# Patient Record
Sex: FEMALE | Race: WHITE | NOT HISPANIC OR LATINO | Employment: OTHER | URBAN - METROPOLITAN AREA
[De-identification: names, ages, dates, MRNs, and addresses within clinical notes are randomized per-mention and may not be internally consistent; named-entity substitution may affect disease eponyms.]

---

## 2017-02-18 ENCOUNTER — HOSPITAL ENCOUNTER (EMERGENCY)
Facility: HOSPITAL | Age: 82
Discharge: HOME | End: 2017-02-18
Attending: EMERGENCY MEDICINE
Payer: MEDICARE

## 2017-02-18 VITALS
TEMPERATURE: 98 F | SYSTOLIC BLOOD PRESSURE: 187 MMHG | DIASTOLIC BLOOD PRESSURE: 77 MMHG | BODY MASS INDEX: 20.81 KG/M2 | OXYGEN SATURATION: 100 % | HEIGHT: 60 IN | WEIGHT: 106 LBS | HEART RATE: 94 BPM | RESPIRATION RATE: 16 BRPM

## 2017-02-18 DIAGNOSIS — J20.8 VIRAL BRONCHITIS: ICD-10-CM

## 2017-02-18 DIAGNOSIS — S60.222A CONTUSION OF LEFT HAND, INITIAL ENCOUNTER: ICD-10-CM

## 2017-02-18 DIAGNOSIS — R05.9 COUGH: ICD-10-CM
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
## 2017-02-18 DIAGNOSIS — S01.81XA FOREHEAD LACERATION, INITIAL ENCOUNTER: Primary | ICD-10-CM

## 2017-02-18 LAB
ANION GAP SERPL CALC-SCNC: 11 MMOL/L
BASOPHILS # BLD AUTO: 0 K/UL
BASOPHILS NFR BLD: 0.2 %
BUN SERPL-MCNC: 13 MG/DL
CALCIUM SERPL-MCNC: 8.6 MG/DL
CHLORIDE SERPL-SCNC: 103 MMOL/L
CO2 SERPL-SCNC: 25 MMOL/L
CREAT SERPL-MCNC: 0.7 MG/DL
DIFFERENTIAL METHOD: ABNORMAL
EOSINOPHIL # BLD AUTO: 0.1 K/UL
EOSINOPHIL NFR BLD: 0.9 %
ERYTHROCYTE [DISTWIDTH] IN BLOOD BY AUTOMATED COUNT: 12.9 %
EST. GFR  (AFRICAN AMERICAN): >60 ML/MIN/1.73 M^2
EST. GFR  (NON AFRICAN AMERICAN): >60 ML/MIN/1.73 M^2
GLUCOSE SERPL-MCNC: 113 MG/DL
HCT VFR BLD AUTO: 36.8 %
HGB BLD-MCNC: 12 G/DL
LYMPHOCYTES # BLD AUTO: 0.9 K/UL
LYMPHOCYTES NFR BLD: 9.9 %
MCH RBC QN AUTO: 29.2 PG
MCHC RBC AUTO-ENTMCNC: 32.7 %
MCV RBC AUTO: 89 FL
MONOCYTES # BLD AUTO: 0.8 K/UL
MONOCYTES NFR BLD: 9.6 %
NEUTROPHILS # BLD AUTO: 7 K/UL
NEUTROPHILS NFR BLD: 79.4 %
PLATELET # BLD AUTO: 148 K/UL
PMV BLD AUTO: 9.1 FL
POTASSIUM SERPL-SCNC: 3.8 MMOL/L
RBC # BLD AUTO: 4.12 M/UL
SODIUM SERPL-SCNC: 139 MMOL/L
WBC # BLD AUTO: 8.8 K/UL

## 2017-02-18 PROCEDURE — 80048 BASIC METABOLIC PNL TOTAL CA: CPT

## 2017-02-18 PROCEDURE — 85025 COMPLETE CBC W/AUTO DIFF WBC: CPT

## 2017-02-18 PROCEDURE — 36415 COLL VENOUS BLD VENIPUNCTURE: CPT

## 2017-02-18 PROCEDURE — 25000003 PHARM REV CODE 250: Performed by: EMERGENCY MEDICINE

## 2017-02-18 PROCEDURE — 99284 EMERGENCY DEPT VISIT MOD MDM: CPT | Mod: 25

## 2017-02-18 PROCEDURE — 12053 INTMD RPR FACE/MM 5.1-7.5 CM: CPT

## 2017-02-18 PROCEDURE — 93005 ELECTROCARDIOGRAM TRACING: CPT

## 2017-02-18 PROCEDURE — 93010 ELECTROCARDIOGRAM REPORT: CPT | Mod: ,,, | Performed by: INTERNAL MEDICINE

## 2017-02-18 RX ORDER — BENZONATATE 100 MG/1
200 CAPSULE ORAL 3 TIMES DAILY PRN
Qty: 15 CAPSULE | Refills: 0 | Status: SHIPPED | OUTPATIENT
Start: 2017-02-18 | End: 2017-06-15

## 2017-02-18 RX ORDER — BENZONATATE 100 MG/1
200 CAPSULE ORAL
Status: COMPLETED | OUTPATIENT
Start: 2017-02-18 | End: 2017-02-18

## 2017-02-18 RX ORDER — LIDOCAINE HCL/EPINEPHRINE/PF 2%-1:200K
10 VIAL (ML) INJECTION ONCE
Status: DISCONTINUED | OUTPATIENT
Start: 2017-02-18 | End: 2017-02-18 | Stop reason: HOSPADM

## 2017-02-18 RX ADMIN — BENZONATATE 200 MG: 100 CAPSULE ORAL at 05:02

## 2017-02-18 NOTE — ED NOTES
Patient identifiers for Talia Alberts checked and correct.  LOC: Patient is awake, alert, and aware of environment with an appropriate affect. Patient is oriented x 3 and speaking appropriately.  APPEARANCE: Patient resting comfortably and in no acute distress. Patient is clean and well groomed, patient's clothing is properly fastened.  SKIN: The skin is warm and dry. Patient has normal skin turgor and moist mucus membrances. Laceration noted to the midline forehead, jagged, est. 4 cm in length  MUSCULOSKELETAL: Patient is moving all extremities well, no obvious deformities noted. Pulses intact. Ground level fall prior to arrival, reports pain to the right hand  RESPIRATORY: Airway is open and patent. Respirations are spontaneous and non-labored with normal effort and rate.  CARDIAC: Patient has a normal rate and rhythm. No peripheral edema noted. Capillary refill < 3 seconds.  ABDOMEN: No distention noted. Bowel sounds active in all 4 quadrants. Soft and non-tender upon palpation.  NEUROLOGICAL: PERRL. Facial expression is symmetrical. Hand grasps are equal bilaterally. Normal sensation in all extremities when touched with finger. GCS 15, pt denies any loss of consciousness, cranial nerves intact, speech normal.  Allergies reported:   Review of patient's allergies indicates:   Allergen Reactions    Morphine Swelling

## 2017-02-18 NOTE — ED AVS SNAPSHOT
[facility] [address]  [place] [address]               Talia Alberts   2017  3:47 AM   ED    Description:  Female : 1926   Department:  [facility]           Your Care was Coordinated By:     Provider Role From To    Simon Larsen III, MD Attending Provider 17 0353 --      Reason for Visit     Fall           Diagnoses this Visit        Comments    Forehead laceration, initial encounter    -  Primary     Fall         Cough         Contusion of left hand, initial encounter         Viral bronchitis           ED Disposition     None           To Do List           Follow-up Information     Follow up with Michael Martin MD In 3 days.    Specialty:  Family Medicine    Contact information:    [address] [facility]  [place] [address]  [phone]         These Medications        Disp Refills Start End    benzonatate (TESSALON) 100 MG capsule 15 capsule 0 2017     Take 2 capsules (200 mg total) by mouth 3 (three) times daily as needed for Cough. - Oral      [facility]     [facility] Nurse Care Line -  Assistance  Registered nurses in the [facility] provide clinical advisement, health education, appointment booking, and other advisory services.  Call for this free service at [phone].             Medications           Message regarding Medications     Verify the changes and/or additions to your medication regime listed below are the same as discussed with your clinician today.  If any of these changes or additions are incorrect, please notify your healthcare provider.        START taking these NEW medications        Refills    benzonatate (TESSALON) 100 MG capsule 0    Sig: Take 2 capsules (200 mg total) by mouth 3 (three) times daily as needed for Cough.    Class: Print    Route: Oral      These medications were administered today        Dose Freq    lidocaine-EPINEPHrine (PF) 2%-1:200,000  injection 10 mL 10 mL Once    Sig: Inject 10 mLs into the skin once.    Class: Normal    Route: Intradermal    benzonatate capsule 200 mg 200 mg ED 1 Time    Sig: Take 2 capsules (200 mg total) by mouth ED 1 Time.    Class: Normal    Route: Oral           Verify that the below list of medications is an accurate representation of the medications you are currently taking.  If none reported, the list may be blank. If incorrect, please contact your healthcare provider. Carry this list with you in case of emergency.           Current Medications     acetaminophen (TYLENOL) 500 MG tablet Take 1,000 mg by mouth every 6 (six) hours as needed.    aspirin 81 MG Chew Take 81 mg by mouth once daily.    benzonatate (TESSALON) 100 MG capsule Take 2 capsules (200 mg total) by mouth 3 (three) times daily as needed for Cough.    benzonatate capsule 200 mg Take 2 capsules (200 mg total) by mouth ED 1 Time.    lidocaine-EPINEPHrine (PF) 2%-1:200,000 injection 10 mL Inject 10 mLs into the skin once.    lisinopril (PRINIVIL,ZESTRIL) 5 MG tablet Take 5 mg by mouth once daily.    metoprolol tartrate (LOPRESSOR) 25 MG tablet Take 25 mg by mouth 2 (two) times daily.    omeprazole (PRILOSEC) 40 MG capsule Take 40 mg by mouth once daily.    senna (SENOKOT) 8.6 mg tablet Take 1 tablet by mouth once daily.    simvastatin (ZOCOR) 40 MG tablet Take 40 mg by mouth every evening.    tolterodine (DETROL LA) 4 MG 24 hr capsule Take 4 mg by mouth once daily.           Clinical Reference Information           Your Vitals Were     BP Pulse Temp Resp Height Weight    221/96 (BP Location: Right arm, Patient Position: Sitting) 94 98 °F (36.7 °C) (Oral) 16 5' (1.524 m) 48.1 kg (106 lb)    SpO2 BMI             100% 20.7 kg/m2         Allergies as of 2/18/2017        Reactions    Morphine Swelling      Immunizations Administered on Date of Encounter - 2/18/2017     None      ED Micro, Lab, POCT     Start Ordered       Status Ordering Provider    02/18/17 6953  02/18/17 0359  Basic metabolic panel  STAT      Acknowledged     02/18/17 0400 02/18/17 0359  CBC auto differential  STAT      Acknowledged       ED Imaging Orders     Start Ordered       Status Ordering Provider    02/18/17 0442 02/18/17 0431  X-Ray Hand 3 View Left  1 time imaging      In process     02/18/17 0432 02/18/17 0431    1 time imaging,   Status:  Canceled      Canceled     02/18/17 0432 02/18/17 0431  X-Ray Chest PA And Lateral  1 time imaging      In process     02/18/17 0400 02/18/17 0359  CT Head Without Contrast  1 time imaging      In process       Discharge References/Attachments     BRONCHITIS, NO ANTIBIOTIC (ADULT) (ENGLISH)    LACERATION, FACE: SUTURE OR TAPE (ENGLISH)    HAND CONTUSION (ENGLISH)       [facility] complies with applicable Federal civil rights laws and does not discriminate on the basis of race, color, national origin, age, disability, or sex.        Language Assistance Services     ATTENTION: Language assistance services are available, free of charge. Please call [phone].      ATENCIÓN: Si habla español, tiene a aranda disposición servicios gratuitos de asistencia lingüística. Llame al [phone].     CHÚ Ý: N?u b?n nói Ti?ng Vi?t, có các d?ch v? h? tr? ngôn ng? mi?n phí dành cho b?n. G?i s? [phone].

## 2017-02-18 NOTE — ED PROVIDER NOTES
Encounter Date: 2/18/2017       History     Chief Complaint   Patient presents with    Fall     Review of patient's allergies indicates:   Allergen Reactions    Morphine Swelling     HPI Comments: Chief complaint: Lost my balance and fell    History of present illness:Talia Alberts is a 89 y.o. female who presents with  a for head laceration and left hand pain after she lost her balance while going to the bathroom falling and hitting her head.  She denies any loss of consciousness and has no headache.  She denies any neck pain.  She has no symptoms prior to fall and denies any chest pain or palpitations.  She has frequent falls and had one fall earlier tonight.  Her past medical history is significant for hypertension and  coronary artery disease.    The history is provided by the patient.     Past Medical History   Diagnosis Date    Cancer     Hypertension     MI (myocardial infarction)      No past medical history pertinent negatives.  Past Surgical History   Procedure Laterality Date    Cardiac surgery      Fracture surgery       History reviewed. No pertinent family history.  Social History   Substance Use Topics    Smoking status: Never Smoker    Smokeless tobacco: None    Alcohol use No     Review of Systems   Constitutional: Negative for activity change, appetite change and fever.   HENT: Negative for voice change.    Eyes: Negative for visual disturbance.   Respiratory: Negative for apnea and shortness of breath.    Cardiovascular: Negative for chest pain.   Gastrointestinal: Negative for abdominal pain and vomiting.   Genitourinary: Negative for decreased urine volume.   Musculoskeletal: Negative for back pain and neck pain.   Skin: Negative for color change.   Neurological: Negative for weakness and headaches.   Hematological: Does not bruise/bleed easily.   Psychiatric/Behavioral: Negative for confusion.       Physical Exam   Initial Vitals   BP Pulse Resp Temp SpO2   02/18/17 0348 02/18/17  0348 02/18/17 0348 02/18/17 0348 02/18/17 0348   221/96 94 16 98 °F (36.7 °C) 100 %     Physical Exam    Nursing note and vitals reviewed.  Constitutional: She appears well-developed and well-nourished.   HENT:   Head: Normocephalic.   7.1 cm flap laceration of the central for head with bleeding   Eyes: EOM are normal. Pupils are equal, round, and reactive to light.   Left subconjunctival hemorrhage     Neck: Normal range of motion. Neck supple.   Cardiovascular: Normal rate.   Pulmonary/Chest: Effort normal and breath sounds normal. No respiratory distress. She has no wheezes. She has no rales.   Abdominal: Soft. She exhibits no distension. There is no tenderness.   Musculoskeletal: Normal range of motion. She exhibits tenderness (Tenderness without swelling or ecchymosis of the left hyperthenar eminence).   Neurological: She is alert and oriented to person, place, and time.   Skin: Skin is warm and dry. No erythema.   Psychiatric: She has a normal mood and affect.         ED Course   Lac Repair  Date/Time: 2/18/2017 4:32 AM  Performed by: JOSHUA BURRIS III  Authorized by: JOSHUA BURRIS III   Body area: head/neck  Location details: forehead  Laceration length: 7.1 cm  Anesthesia: local infiltration    Anesthesia:  Anesthesia: local infiltration  Local Anesthetic: lidocaine 2% with epinephrine   Anesthetic total: 5 mL  Patient sedated: no  Preparation: Patient was prepped and draped in the usual sterile fashion.  Irrigation solution: saline  Irrigation method: jet lavage  Amount of cleaning: extensive  Debridement: none  Degree of undermining: none  Skin closure: 5-0 nylon  Number of sutures: 14  Technique: simple  Approximation: close  Approximation difficulty: complex  Patient tolerance: Patient tolerated the procedure well with no immediate complications        Labs Reviewed   BASIC METABOLIC PANEL - Abnormal; Notable for the following:        Result Value    Glucose 113 (*)     Calcium 8.6 (*)     All  other components within normal limits   CBC W/ AUTO DIFFERENTIAL - Abnormal; Notable for the following:     Hematocrit 36.8 (*)     Platelets 148 (*)     MPV 9.1 (*)     Lymph # 0.9 (*)     Gran% 79.4 (*)     Lymph% 9.9 (*)     All other components within normal limits     EKG Readings: (Independently Interpreted)   Rhythm: Normal Sinus Rhythm. Heart Rate: 91. Ectopy: No Ectopy. Conduction: Normal. ST Segments: Normal ST Segments. T Waves: Normal. T Waves Flipped: I. Clinical Impression: Normal Sinus Rhythm          Medical Decision Making:   Independently Interpreted Test(s):   I have ordered and independently interpreted X-rays - see summary below.       <> Summary of X-Ray Reading(s): chest x-ray interpreted by me reveals no infiltrates, effusions or mediastinal widening  Bilateral hand x-rays independently interpreted by me demonstrate osteopenia with no fracture or dislocation.  ED Management:  Talia Alberts is a 89 y.o. female who presents with  a large forehead laceration which is repaired.  CT of the head is obtained to exclude intracranial injury with no significant abnormality.  She complains of right hand pain with no radiographic abnormalities.  She has had a persistent nonproductive cough with no radiographic evidence of pneumonia.  The symptoms most likely represent a viral URI.                   ED Course     Clinical Impression:   The primary encounter diagnosis was Forehead laceration, initial encounter. Diagnoses of Fall, Cough, Contusion of left hand, initial encounter, and Viral bronchitis were also pertinent to this visit.          Simon Larsen III, MD  02/18/17 0613

## 2017-03-19 ENCOUNTER — HOSPITAL ENCOUNTER (OUTPATIENT)
Facility: HOSPITAL | Age: 82
Discharge: NURSING, REHAB OR LONG-TERM CARE | End: 2017-03-22
Attending: EMERGENCY MEDICINE | Admitting: INTERNAL MEDICINE
Payer: MEDICARE

## 2017-03-19 DIAGNOSIS — S00.83XA FACIAL CONTUSION, INITIAL ENCOUNTER: ICD-10-CM

## 2017-03-19 DIAGNOSIS — M25.562 KNEE PAIN, BILATERAL: ICD-10-CM

## 2017-03-19 DIAGNOSIS — M25.561 KNEE PAIN, BILATERAL: ICD-10-CM

## 2017-03-19 DIAGNOSIS — I10 ESSENTIAL HYPERTENSION: ICD-10-CM

## 2017-03-19 DIAGNOSIS — K21.9 GASTROESOPHAGEAL REFLUX DISEASE, ESOPHAGITIS PRESENCE NOT SPECIFIED: ICD-10-CM

## 2017-03-19 DIAGNOSIS — S89.92XA LEFT KNEE INJURY, INITIAL ENCOUNTER: Primary | ICD-10-CM
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
## 2017-03-19 LAB
ALBUMIN SERPL BCP-MCNC: 3.5 G/DL
ALP SERPL-CCNC: 98 U/L
ALT SERPL W/O P-5'-P-CCNC: 19 U/L
ANION GAP SERPL CALC-SCNC: 8 MMOL/L
AST SERPL-CCNC: 27 U/L
BACTERIA #/AREA URNS HPF: ABNORMAL /HPF
BASOPHILS # BLD AUTO: 0 K/UL
BASOPHILS NFR BLD: 0.3 %
BILIRUB SERPL-MCNC: 0.4 MG/DL
BILIRUB UR QL STRIP: NEGATIVE
BUN SERPL-MCNC: 16 MG/DL
CALCIUM SERPL-MCNC: 8.5 MG/DL
CHLORIDE SERPL-SCNC: 104 MMOL/L
CLARITY UR: ABNORMAL
CO2 SERPL-SCNC: 26 MMOL/L
COLOR UR: YELLOW
CREAT SERPL-MCNC: 0.9 MG/DL
DIFFERENTIAL METHOD: ABNORMAL
EOSINOPHIL # BLD AUTO: 0.1 K/UL
EOSINOPHIL NFR BLD: 1.4 %
ERYTHROCYTE [DISTWIDTH] IN BLOOD BY AUTOMATED COUNT: 13.9 %
EST. GFR  (AFRICAN AMERICAN): >60 ML/MIN/1.73 M^2
EST. GFR  (NON AFRICAN AMERICAN): 56 ML/MIN/1.73 M^2
GLUCOSE SERPL-MCNC: 107 MG/DL
GLUCOSE UR QL STRIP: NEGATIVE
HCT VFR BLD AUTO: 35.9 %
HGB BLD-MCNC: 11.6 G/DL
HGB UR QL STRIP: NEGATIVE
HYALINE CASTS #/AREA URNS LPF: 5 /LPF
KETONES UR QL STRIP: NEGATIVE
LEUKOCYTE ESTERASE UR QL STRIP: ABNORMAL
LYMPHOCYTES # BLD AUTO: 1.4 K/UL
LYMPHOCYTES NFR BLD: 16 %
MCH RBC QN AUTO: 28.6 PG
MCHC RBC AUTO-ENTMCNC: 32.2 %
MCV RBC AUTO: 89 FL
MICROSCOPIC COMMENT: ABNORMAL
MONOCYTES # BLD AUTO: 0.6 K/UL
MONOCYTES NFR BLD: 7.2 %
NEUTROPHILS # BLD AUTO: 6.4 K/UL
NEUTROPHILS NFR BLD: 75.1 %
NITRITE UR QL STRIP: NEGATIVE
PH UR STRIP: 7 [PH] (ref 5–8)
PLATELET # BLD AUTO: 140 K/UL
PMV BLD AUTO: 8.7 FL
POTASSIUM SERPL-SCNC: 4.6 MMOL/L
PROT SERPL-MCNC: 6.2 G/DL
PROT UR QL STRIP: NEGATIVE
RBC # BLD AUTO: 4.05 M/UL
SODIUM SERPL-SCNC: 138 MMOL/L
SP GR UR STRIP: 1.01 (ref 1–1.03)
SQUAMOUS #/AREA URNS HPF: 2 /HPF
URN SPEC COLLECT METH UR: ABNORMAL
UROBILINOGEN UR STRIP-ACNC: NEGATIVE EU/DL
WBC # BLD AUTO: 8.5 K/UL
WBC #/AREA URNS HPF: 30 /HPF (ref 0–5)

## 2017-03-19 PROCEDURE — 36415 COLL VENOUS BLD VENIPUNCTURE: CPT

## 2017-03-19 PROCEDURE — 99219 PR INITIAL OBSERVATION CARE,LEVL II: CPT | Mod: ,,, | Performed by: INTERNAL MEDICINE

## 2017-03-19 PROCEDURE — 85025 COMPLETE CBC W/AUTO DIFF WBC: CPT

## 2017-03-19 PROCEDURE — G0378 HOSPITAL OBSERVATION PER HR: HCPCS

## 2017-03-19 PROCEDURE — 25000003 PHARM REV CODE 250: Performed by: EMERGENCY MEDICINE

## 2017-03-19 PROCEDURE — 81000 URINALYSIS NONAUTO W/SCOPE: CPT

## 2017-03-19 PROCEDURE — 99285 EMERGENCY DEPT VISIT HI MDM: CPT

## 2017-03-19 PROCEDURE — 80053 COMPREHEN METABOLIC PANEL: CPT

## 2017-03-19 RX ORDER — ACETAMINOPHEN 500 MG
500 TABLET ORAL
Status: COMPLETED | OUTPATIENT
Start: 2017-03-19 | End: 2017-03-19

## 2017-03-19 RX ORDER — HYDROCODONE BITARTRATE AND ACETAMINOPHEN 5; 325 MG/1; MG/1
1 TABLET ORAL
Status: COMPLETED | OUTPATIENT
Start: 2017-03-19 | End: 2017-03-19

## 2017-03-19 RX ADMIN — HYDROCODONE BITARTRATE AND ACETAMINOPHEN 1 TABLET: 5; 325 TABLET ORAL at 09:03

## 2017-03-19 RX ADMIN — ACETAMINOPHEN 500 MG: 500 TABLET, FILM COATED ORAL at 09:03

## 2017-03-19 NOTE — IP AVS SNAPSHOT
[address] [place] [address]  Phone: [phone]           Patient Discharge Instructions     Our goal is to set you up for success. This packet includes information on your condition, medications, and your home care. It will help you to care for yourself so you don't get sicker and need to go back to the hospital.     Please ask your nurse if you have any questions.        There are many details to remember when preparing to leave the hospital. Here is what you will need to do:    1. Take your medicine. If you are prescribed medications, review your Medication List in the following pages. You may have new medications to  at the pharmacy and others that you'll need to stop taking. Review the instructions for how and when to take your medications. Talk with your doctor or nurses if you are unsure of what to do.     2. Go to your follow-up appointments. Specific follow-up information is listed in the following pages. Your may be contacted by a transition nurse or clinical provider about future appointments. Be sure we have all of the phone numbers to reach you, if needed. Please contact your provider's office if you are unable to make an appointment.     3. Watch for warning signs. Your doctor or nurse will give you detailed warning signs to watch for and when to call for assistance. These instructions may also include educational information about your condition. If you experience any of warning signs to your health, call your doctor.               [facility]  Unless otherwise directed by your provider, please contact [facility], our nurse care line that is available for 24/7 assistance.     [phone] (toll-free)    Registered nurses in the [facility] provide clinical advisement, health education, appointment booking, and other advisory services.                    ** Verify the list of medication(s) below is accurate and up to date.  Carry this with you in case of emergency. If your medications have changed, please notify your healthcare provider.             Medication List      START taking these medications        Additional Info                      ciprofloxacin HCl 250 MG tablet   Commonly known as:  CIPRO   Quantity:  10 tablet   Refills:  0   Dose:  250 mg    Instructions:  Take 1 tablet (250 mg total) by mouth 2 (two) times daily.     Begin Date    AM    Noon    PM    Bedtime       hydrocodone-acetaminophen 5-325mg 5-325 mg per tablet   Commonly known as:  NORCO   Quantity:  15 tablet   Refills:  0   Dose:  1 tablet    Last time this was given:  1 tablet on 3/22/2017 11:22 AM   Instructions:  Take 1 tablet by mouth every 4 (four) hours as needed.     Begin Date    AM    Noon    PM    Bedtime         CONTINUE taking these medications        Additional Info                      acetaminophen 500 MG tablet   Commonly known as:  TYLENOL   Refills:  0   Dose:  1000 mg    Last time this was given:  650 mg on 3/21/2017  5:37 PM   Instructions:  Take 1,000 mg by mouth every 6 (six) hours as needed.     Begin Date    AM    Noon    PM    Bedtime       alendronate 70 MG tablet   Commonly known as:  FOSAMAX   Refills:  0   Dose:  70 mg    Instructions:  Take 70 mg by mouth every 7 days.     Begin Date    AM    Noon    PM    Bedtime       aspirin 81 MG Chew   Refills:  0   Dose:  81 mg    Last time this was given:  81 mg on 3/22/2017  9:01 AM   Instructions:  Take 81 mg by mouth once daily.     Begin Date    AM    Noon    PM    Bedtime       benzonatate 100 MG capsule   Commonly known as:  TESSALON   Quantity:  15 capsule   Refills:  0   Dose:  200 mg    Instructions:  Take 2 capsules (200 mg total) by mouth 3 (three) times daily as needed for Cough.     Begin Date    AM    Noon    PM    Bedtime       escitalopram oxalate 10 MG tablet   Commonly known as:  LEXAPRO   Refills:  0   Dose:  10 mg    Last time this was given:  10 mg on 3/21/2017  9:30 PM    Instructions:  Take 10 mg by mouth every evening.     Begin Date    AM    Noon    PM    Bedtime       losartan 25 MG tablet   Commonly known as:  COZAAR   Refills:  0   Dose:  25 mg    Last time this was given:  25 mg on 3/21/2017  9:30 PM   Instructions:  Take 25 mg by mouth nightly.     Begin Date    AM    Noon    PM    Bedtime       pantoprazole 40 MG tablet   Commonly known as:  PROTONIX   Refills:  0   Dose:  40 mg    Last time this was given:  40 mg on 3/22/2017  9:01 AM   Instructions:  Take 40 mg by mouth once daily.     Begin Date    AM    Noon    PM    Bedtime       senna 8.6 mg tablet   Commonly known as:  SENOKOT   Refills:  0   Dose:  1 tablet    Last time this was given:  1 tablet on 3/22/2017  9:01 AM   Instructions:  Take 1 tablet by mouth once daily.     Begin Date    AM    Noon    PM    Bedtime       simvastatin 40 MG tablet   Commonly known as:  ZOCOR   Refills:  0   Dose:  40 mg    Last time this was given:  40 mg on 3/21/2017  9:30 PM   Instructions:  Take 40 mg by mouth every evening.     Begin Date    AM    Noon    PM    Bedtime       trospium 20 mg Tab tablet   Commonly known as:  sanctura   Refills:  0   Dose:  20 mg    Last time this was given:  20 mg on 3/21/2017  9:30 PM   Instructions:  Take 20 mg by mouth nightly.     Begin Date    AM    Noon    PM    Bedtime            Where to Get Your Medications      You can get these medications from any pharmacy     Bring a paper prescription for each of these medications     ciprofloxacin HCl 250 MG tablet    hydrocodone-acetaminophen 5-325mg 5-325 mg per tablet                  Please bring to all follow up appointments:    1. A copy of your discharge instructions.  2. All medicines you are currently taking in their original bottles.  3. Identification and insurance card.    Please arrive 15 minutes ahead of scheduled appointment time.    Please call 24 hours in advance if you must reschedule your appointment and/or time.        Follow-up  Information     Follow up with Michael Martin MD On 3/29/2017.    Specialty:  Family Medicine    Why:  @9:00am     Contact information:    [address] [place] [address]  [phone]          Follow up with [facility].    Why:  SNF, Nursing Home    Contact information:    [place] [address]  [phone]          Discharge Instructions     Future Orders    Call MD for:     Comments:    For worsening symptoms, chest pain, shortness of breath, increased abdominal pain, high grade fever, stroke or stroke like symptoms, immediately go to the nearest Emergency Room or call 911 as soon as possible.    Diet general     Comments:    Cardiac/ 2 gram sodium low cholesterol diet    Questions:    Total calories:      Fat restriction, if any:      Protein restriction, if any:      Na restriction, if any:      Fluid restriction:      Additional restrictions:      Other restrictions (specify):     Comments:    Fall precautions        Discharge Instructions       Admit to [facility]  Dx-   Patient Active Problem List   Diagnosis    GERD (gastroesophageal reflux disease)    Left knee injury    Essential hypertension    Fall     Diet- Cardiac     PT/OT eval and treat 5x's weekly  Fall Precautions   Up to chair with all meals        Primary Diagnosis     Your primary diagnosis was:  Injury Of Knee, Leg, Ankle And Foot      Admission Information     Date & Time Provider Department CSN    3/19/2017  6:11 PM Ranjith Beebe MD [facility] [id number]      Care Providers     Provider Role Specialty Primary office phone    Ranjith Beebe MD Attending Provider Internal Medicine [phone]      Your Vitals Were     BP Pulse Temp Resp Height Weight    178/74 88 97.7 °F (36.5 °C) (Oral) 18 5' (1.524 m) 50.8 kg (112 lb)    SpO2 BMI             99% 21.87 kg/m2         Recent Lab Values     No lab values to display.      Pending Labs     Order Current Status    Urine culture In  process      Allergies as of 3/22/2017        Reactions    Morphine Swelling      Advance Directives     An advance directive is a document which, in the event you are no longer able to make decisions for yourself, tells your healthcare team what kind of treatment you do or do not want to receive, or who you would like to make those decisions for you.  If you do not currently have an advance directive, [facility] encourages you to create one.  For more information call:  [phone]), [phone] ([phone]),  or log on to [email or web address].        Language Assistance Services     ATTENTION: Language assistance services are available, free of charge. Please call [phone].      ATENCIÓN: Si habla español, tiene a aranda disposición servicios gratuitos de asistencia lingüística. Llame al [phone].     CHÚ Ý: N?u b?n nói Ti?ng Vi?t, có các d?ch v? h? tr? ngôn ng? mi?n phí dành cho b?n. G?i s? [phone].         [facility] complies with applicable Federal civil rights laws and does not discriminate on the basis of race, color, national origin, age, disability, or sex.

## 2017-03-19 NOTE — ED PROVIDER NOTES
"Encounter Date: 3/19/2017    SCRIBE #1 NOTE: I, Faye Crystal, am scribing for, and in the presence of, Dr Haddad.       History     Chief Complaint   Patient presents with    Fall     bilateral knee pain / recent fall- ED visit    Facial Injury     Review of patient's allergies indicates:   Allergen Reactions    Morphine Swelling     HPI Comments: 03/19/2017  6:47 PM     Chief Complaint: Fall       Talia Alberts is a 89 y.o. female presenting to the E.D. with an acute onset of a fal which occurred prior to arrival tonight. Pt reports when attempting to get out of vehicle tonight her legs "gave out on her" and she collapsed to the ground. She has complaints of bilateral knee pain following fall. Pain is exacerbated with bearing weight and movement and there are no alleviating factors. Denies CP, SOB, numbness, dysuria. She notes mild weakness to legs and recently underwent bilateral knee replacement surgery. No LOC. Pmhx of  has a past medical history of Cancer; HTN; and MI.  Pt has a past surgical history that includes Cardiac surgery and Fracture surgery.      The history is provided by the patient and the EMS personnel.     Past Medical History:   Diagnosis Date    Cancer     Hypertension     MI (myocardial infarction)      Past Surgical History:   Procedure Laterality Date    CARDIAC SURGERY      FRACTURE SURGERY       History reviewed. No pertinent family history.  Social History   Substance Use Topics    Smoking status: Never Smoker    Smokeless tobacco: None    Alcohol use No     Review of Systems   Constitutional: Negative for fever.   HENT: Negative for sore throat.    Eyes: Negative for visual disturbance.   Respiratory: Negative for cough.    Cardiovascular: Negative for chest pain.   Gastrointestinal: Negative for abdominal pain, diarrhea, nausea and vomiting.   Genitourinary: Negative for difficulty urinating and pelvic pain.   Musculoskeletal: Positive for arthralgias (bilateral knee " pain).   Skin: Negative for rash.   Neurological: Positive for weakness.       Physical Exam   Initial Vitals   BP Pulse Resp Temp SpO2   03/19/17 1812 03/19/17 1812 03/19/17 1812 03/19/17 1812 03/19/17 1812   213/93 84 20 97.3 °F (36.3 °C) 97 %     Physical Exam    Nursing note and vitals reviewed.  Constitutional: She appears well-developed.   HENT:   Head: Normocephalic.   Mouth/Throat: Oropharynx is clear and moist.   Bruising to left lateral eyebrow. Tenderness over the left upper lateral orbit.    Eyes: Conjunctivae are normal.   Neck: Neck supple.   Cardiovascular: Normal rate, regular rhythm, normal heart sounds and intact distal pulses. Exam reveals no gallop and no friction rub.    No murmur heard.  Pulses:       Radial pulses are 2+ on the right side, and 2+ on the left side.        Dorsalis pedis pulses are 2+ on the right side, and 2+ on the left side.        Posterior tibial pulses are 2+ on the right side, and 2+ on the left side.   Mild systolic ejection murmur.    Pulmonary/Chest: Breath sounds normal. She has no wheezes. She has no rhonchi. She has no rales.   Abdominal: Soft. She exhibits no distension. There is no tenderness.   Bedside US displays no sign of aneurysmal dilation of aorta.    Musculoskeletal: Normal range of motion.   Pain over medial left knee with flexion to 60 degrees. Negative Lachman's bilaterally.    Neurological: She is alert and oriented to person, place, and time.   Skin: No rash noted. No erythema.   Psychiatric: She has a normal mood and affect.         ED Course   Procedures  Labs Reviewed   CBC W/ AUTO DIFFERENTIAL - Abnormal; Notable for the following:        Result Value    Hemoglobin 11.6 (*)     Hematocrit 35.9 (*)     Platelets 140 (*)     MPV 8.7 (*)     Gran% 75.1 (*)     Lymph% 16.0 (*)     All other components within normal limits   COMPREHENSIVE METABOLIC PANEL - Abnormal; Notable for the following:     Calcium 8.5 (*)     eGFR if non  56 (*)      All other components within normal limits   URINALYSIS - Abnormal; Notable for the following:     Appearance, UA Hazy (*)     Leukocytes, UA 2+ (*)     All other components within normal limits   URINALYSIS MICROSCOPIC - Abnormal; Notable for the following:     WBC, UA 30 (*)     Bacteria, UA Few (*)     Hyaline Casts, UA 5 (*)     All other components within normal limits          X-Rays:   Independently Interpreted Readings:   Head CT: No skull fracture.  No hemorrhage.  No acute stroke.   Other Readings:  XRay b/l Knees: No acute fracture or dislocation    Medical Decision Making:   History:   Old Medical Records: I decided to obtain old medical records.  Initial Assessment:   Patient is a 89-year-old woman who presents after her knees gave out on her causing her to fall and strike her knees and face on the ground.  Denies loss of consciousness.  No vision changes.  CT of her head and maxillofacial is negative.  X-rays of her knees were negative for any acute fracture dislocation.  Hardware appears in place.  Patient is an persistent left knee pain and is having difficulty bearing weight or walking despite given narcotic pain medicine in the ED..  Daughter is unable to transfer her from wheelchair.  Will admit patient for pain control and PTT.  Discussed the hospitalist who agrees to admit the patient.            Scribe Attestation:   Scribe #1: I performed the above scribed service and the documentation accurately describes the services I performed. I attest to the accuracy of the note.    Attending Attestation:           Physician Attestation for Scribe:  Physician Attestation Statement for Scribe #1: I, Dr Haddad, reviewed documentation, as scribed by Faye Crystal in my presence, and it is both accurate and complete.                 ED Course     Clinical Impression:   The primary encounter diagnosis was Left knee injury, initial encounter. Diagnoses of Knee pain, bilateral, Facial contusion, initial  encounter, Gastroesophageal reflux disease, esophagitis presence not specified, and Essential hypertension were also pertinent to this visit.          Wilfred Haddad MD  03/26/17 0968

## 2017-03-20 LAB
APTT BLDCRRT: 28.1 SEC
BASOPHILS # BLD AUTO: 0 K/UL
BASOPHILS NFR BLD: 0.3 %
DIFFERENTIAL METHOD: ABNORMAL
EOSINOPHIL # BLD AUTO: 0.4 K/UL
EOSINOPHIL NFR BLD: 2.7 %
ERYTHROCYTE [DISTWIDTH] IN BLOOD BY AUTOMATED COUNT: 13.2 %
HCT VFR BLD AUTO: 37.6 %
HGB BLD-MCNC: 12.7 G/DL
INR PPP: 1.1
LYMPHOCYTES # BLD AUTO: 1 K/UL
LYMPHOCYTES NFR BLD: 7.6 %
MCH RBC QN AUTO: 29.8 PG
MCHC RBC AUTO-ENTMCNC: 33.7 %
MCV RBC AUTO: 88 FL
MONOCYTES # BLD AUTO: 0.8 K/UL
MONOCYTES NFR BLD: 6.3 %
NEUTROPHILS # BLD AUTO: 10.8 K/UL
NEUTROPHILS NFR BLD: 83.1 %
PLATELET # BLD AUTO: 135 K/UL
PMV BLD AUTO: 9.4 FL
PROTHROMBIN TIME: 11.2 SEC
RBC # BLD AUTO: 4.26 M/UL
WBC # BLD AUTO: 13 K/UL

## 2017-03-20 PROCEDURE — 85025 COMPLETE CBC W/AUTO DIFF WBC: CPT

## 2017-03-20 PROCEDURE — 25000003 PHARM REV CODE 250: Performed by: NURSE PRACTITIONER

## 2017-03-20 PROCEDURE — 85610 PROTHROMBIN TIME: CPT

## 2017-03-20 PROCEDURE — 85730 THROMBOPLASTIN TIME PARTIAL: CPT

## 2017-03-20 PROCEDURE — 25000003 PHARM REV CODE 250: Performed by: INTERNAL MEDICINE

## 2017-03-20 PROCEDURE — 99225 PR SUBSEQUENT OBSERVATION CARE,LEVEL II: CPT | Mod: ,,, | Performed by: INTERNAL MEDICINE

## 2017-03-20 PROCEDURE — 97530 THERAPEUTIC ACTIVITIES: CPT

## 2017-03-20 PROCEDURE — G0378 HOSPITAL OBSERVATION PER HR: HCPCS

## 2017-03-20 PROCEDURE — 97162 PT EVAL MOD COMPLEX 30 MIN: CPT

## 2017-03-20 PROCEDURE — 63600175 PHARM REV CODE 636 W HCPCS: Performed by: NURSE PRACTITIONER

## 2017-03-20 PROCEDURE — G8978 MOBILITY CURRENT STATUS: HCPCS | Mod: CN

## 2017-03-20 PROCEDURE — G8979 MOBILITY GOAL STATUS: HCPCS | Mod: CN

## 2017-03-20 RX ORDER — NAPROXEN SODIUM 220 MG/1
81 TABLET, FILM COATED ORAL DAILY
Status: DISCONTINUED | OUTPATIENT
Start: 2017-03-20 | End: 2017-03-22 | Stop reason: HOSPADM

## 2017-03-20 RX ORDER — IBUPROFEN 200 MG
16 TABLET ORAL
Status: DISCONTINUED | OUTPATIENT
Start: 2017-03-20 | End: 2017-03-22 | Stop reason: HOSPADM

## 2017-03-20 RX ORDER — PANTOPRAZOLE SODIUM 40 MG/1
40 TABLET, DELAYED RELEASE ORAL DAILY
Status: DISCONTINUED | OUTPATIENT
Start: 2017-03-20 | End: 2017-03-22 | Stop reason: HOSPADM

## 2017-03-20 RX ORDER — ALENDRONATE SODIUM 70 MG/1
70 TABLET ORAL
COMMUNITY
End: 2017-06-15

## 2017-03-20 RX ORDER — ACETAMINOPHEN 325 MG/1
650 TABLET ORAL EVERY 6 HOURS PRN
Status: DISCONTINUED | OUTPATIENT
Start: 2017-03-20 | End: 2017-03-20 | Stop reason: SDUPTHER

## 2017-03-20 RX ORDER — ACETAMINOPHEN 325 MG/1
650 TABLET ORAL EVERY 6 HOURS PRN
Status: DISCONTINUED | OUTPATIENT
Start: 2017-03-21 | End: 2017-03-22 | Stop reason: HOSPADM

## 2017-03-20 RX ORDER — GLUCAGON 1 MG
1 KIT INJECTION
Status: DISCONTINUED | OUTPATIENT
Start: 2017-03-20 | End: 2017-03-22 | Stop reason: HOSPADM

## 2017-03-20 RX ORDER — SENNOSIDES 8.6 MG/1
1 TABLET ORAL DAILY
Status: DISCONTINUED | OUTPATIENT
Start: 2017-03-20 | End: 2017-03-22 | Stop reason: HOSPADM

## 2017-03-20 RX ORDER — TROSPIUM CHLORIDE 20 MG/1
20 TABLET, FILM COATED ORAL NIGHTLY
Status: DISCONTINUED | OUTPATIENT
Start: 2017-03-20 | End: 2017-03-20

## 2017-03-20 RX ORDER — SODIUM CHLORIDE 450 MG/100ML
INJECTION, SOLUTION INTRAVENOUS CONTINUOUS
Status: DISCONTINUED | OUTPATIENT
Start: 2017-03-20 | End: 2017-03-22 | Stop reason: HOSPADM

## 2017-03-20 RX ORDER — ZOLPIDEM TARTRATE 5 MG/1
5 TABLET ORAL NIGHTLY PRN
Status: DISCONTINUED | OUTPATIENT
Start: 2017-03-21 | End: 2017-03-20

## 2017-03-20 RX ORDER — BENZONATATE 100 MG/1
200 CAPSULE ORAL 3 TIMES DAILY PRN
Status: DISCONTINUED | OUTPATIENT
Start: 2017-03-20 | End: 2017-03-22 | Stop reason: HOSPADM

## 2017-03-20 RX ORDER — PANTOPRAZOLE SODIUM 40 MG/1
40 TABLET, DELAYED RELEASE ORAL DAILY
Status: DISCONTINUED | OUTPATIENT
Start: 2017-03-21 | End: 2017-03-20 | Stop reason: SDUPTHER

## 2017-03-20 RX ORDER — SENNOSIDES 8.6 MG/1
8.6 TABLET ORAL DAILY PRN
Status: DISCONTINUED | OUTPATIENT
Start: 2017-03-20 | End: 2017-03-22 | Stop reason: HOSPADM

## 2017-03-20 RX ORDER — LOSARTAN POTASSIUM 50 MG/1
50 TABLET ORAL 2 TIMES DAILY
COMMUNITY

## 2017-03-20 RX ORDER — ENOXAPARIN SODIUM 100 MG/ML
30 INJECTION SUBCUTANEOUS EVERY 24 HOURS
Status: DISCONTINUED | OUTPATIENT
Start: 2017-03-20 | End: 2017-03-22 | Stop reason: HOSPADM

## 2017-03-20 RX ORDER — PANTOPRAZOLE SODIUM 40 MG/1
40 TABLET, DELAYED RELEASE ORAL DAILY
COMMUNITY

## 2017-03-20 RX ORDER — ESCITALOPRAM OXALATE 10 MG/1
10 TABLET ORAL NIGHTLY
Status: ON HOLD | COMMUNITY
End: 2019-09-27 | Stop reason: HOSPADM

## 2017-03-20 RX ORDER — LISINOPRIL 2.5 MG/1
5 TABLET ORAL DAILY
Status: DISCONTINUED | OUTPATIENT
Start: 2017-03-20 | End: 2017-03-20

## 2017-03-20 RX ORDER — TROSPIUM CHLORIDE 20 MG/1
20 TABLET, FILM COATED ORAL NIGHTLY
COMMUNITY
End: 2018-02-07

## 2017-03-20 RX ORDER — OXYBUTYNIN CHLORIDE 5 MG/1
10 TABLET, EXTENDED RELEASE ORAL DAILY
Status: DISCONTINUED | OUTPATIENT
Start: 2017-03-20 | End: 2017-03-20

## 2017-03-20 RX ORDER — LOSARTAN POTASSIUM 25 MG/1
25 TABLET ORAL NIGHTLY
Status: DISCONTINUED | OUTPATIENT
Start: 2017-03-20 | End: 2017-03-22 | Stop reason: HOSPADM

## 2017-03-20 RX ORDER — ESCITALOPRAM OXALATE 10 MG/1
10 TABLET ORAL NIGHTLY
Status: DISCONTINUED | OUTPATIENT
Start: 2017-03-20 | End: 2017-03-22 | Stop reason: HOSPADM

## 2017-03-20 RX ORDER — HYDRALAZINE HYDROCHLORIDE 20 MG/ML
10 INJECTION INTRAMUSCULAR; INTRAVENOUS EVERY 6 HOURS PRN
Status: DISCONTINUED | OUTPATIENT
Start: 2017-03-20 | End: 2017-03-22 | Stop reason: HOSPADM

## 2017-03-20 RX ORDER — METOPROLOL TARTRATE 25 MG/1
25 TABLET, FILM COATED ORAL 2 TIMES DAILY
Status: DISCONTINUED | OUTPATIENT
Start: 2017-03-20 | End: 2017-03-20

## 2017-03-20 RX ORDER — ONDANSETRON 2 MG/ML
4 INJECTION INTRAMUSCULAR; INTRAVENOUS EVERY 6 HOURS PRN
Status: DISCONTINUED | OUTPATIENT
Start: 2017-03-20 | End: 2017-03-22 | Stop reason: HOSPADM

## 2017-03-20 RX ORDER — IBUPROFEN 200 MG
24 TABLET ORAL
Status: DISCONTINUED | OUTPATIENT
Start: 2017-03-20 | End: 2017-03-22 | Stop reason: HOSPADM

## 2017-03-20 RX ORDER — SIMVASTATIN 40 MG/1
40 TABLET, FILM COATED ORAL NIGHTLY
Status: DISCONTINUED | OUTPATIENT
Start: 2017-03-20 | End: 2017-03-22 | Stop reason: HOSPADM

## 2017-03-20 RX ORDER — HYDROCODONE BITARTRATE AND ACETAMINOPHEN 5; 325 MG/1; MG/1
1 TABLET ORAL EVERY 4 HOURS PRN
Status: DISCONTINUED | OUTPATIENT
Start: 2017-03-21 | End: 2017-03-22 | Stop reason: HOSPADM

## 2017-03-20 RX ORDER — TROSPIUM CHLORIDE 20 MG/1
20 TABLET, FILM COATED ORAL NIGHTLY
Status: DISCONTINUED | OUTPATIENT
Start: 2017-03-21 | End: 2017-03-22 | Stop reason: HOSPADM

## 2017-03-20 RX ADMIN — METOPROLOL TARTRATE 25 MG: 25 TABLET ORAL at 09:03

## 2017-03-20 RX ADMIN — SIMVASTATIN 40 MG: 40 TABLET, FILM COATED ORAL at 03:03

## 2017-03-20 RX ADMIN — ESCITALOPRAM OXALATE 10 MG: 10 TABLET ORAL at 10:03

## 2017-03-20 RX ADMIN — PANTOPRAZOLE SODIUM 40 MG: 40 TABLET, DELAYED RELEASE ORAL at 09:03

## 2017-03-20 RX ADMIN — HYDRALAZINE HYDROCHLORIDE 10 MG: 20 INJECTION INTRAMUSCULAR; INTRAVENOUS at 03:03

## 2017-03-20 RX ADMIN — ASPIRIN 81 MG CHEWABLE TABLET 81 MG: 81 TABLET CHEWABLE at 09:03

## 2017-03-20 RX ADMIN — ENOXAPARIN SODIUM 30 MG: 100 INJECTION SUBCUTANEOUS at 05:03

## 2017-03-20 RX ADMIN — LOSARTAN POTASSIUM 25 MG: 25 TABLET ORAL at 10:03

## 2017-03-20 RX ADMIN — SIMVASTATIN 40 MG: 40 TABLET, FILM COATED ORAL at 09:03

## 2017-03-20 RX ADMIN — OXYBUTYNIN CHLORIDE 10 MG: 5 TABLET, FILM COATED, EXTENDED RELEASE ORAL at 09:03

## 2017-03-20 RX ADMIN — SENNOSIDES 1 TABLET: 8.6 TABLET, FILM COATED ORAL at 09:03

## 2017-03-20 RX ADMIN — LISINOPRIL 5 MG: 2.5 TABLET ORAL at 09:03

## 2017-03-20 RX ADMIN — SODIUM CHLORIDE: 0.45 INJECTION, SOLUTION INTRAVENOUS at 05:03

## 2017-03-20 NOTE — H&P
[facility] Medicine  History & Physical    Patient Name: Talia Alberts  MRN: [id number]  Admission Date: 3/19/2017  Attending Physician: Ranjith Beebe MD   Primary Care Provider: Michael Martin MD         Patient information was obtained from patient and ER records.     Subjective:     Principal Problem:Left knee injury    Chief Complaint:   Chief Complaint   Patient presents with    Fall     bilateral knee pain / recent fall- ED visit    Facial Injury        HPI: Talia Alberts is a 89 y.o. Female with PMHx significant for HTN, osteoarthritis with knee replacements, and MI.  She was admitted to the service with LEFT knee injury after sustaining a fall.  She reports that she was getting out the vehicle after a day of shopping and her legs became weak and gave out causing her to fall forward with impact to her LEFT knee and LEFT face.  She was unable to stand or walk after.  She had a HA that has since resolved.  In the ED she underwent CT head and maxillofacial as well as XR of the knees which were negative for acute findings.  She was, however, unable to ambulate and her daughter felt she could not take her home in this condition.  She reports her LEFT knee continues to be painful with movement rating it 7/10.  She denies pain at rest. She states she does use a walker at home. She lives with her daughter.  She has had a few recent falls-- a few weeks ago she fell out of the bed.  Other pertinent medical history as below:    Past Medical History:   Diagnosis Date    Cancer     Hypertension     MI (myocardial infarction)        Past Surgical History:   Procedure Laterality Date    CARDIAC SURGERY      FRACTURE SURGERY         Review of patient's allergies indicates:   Allergen Reactions    Morphine Swelling       No current facility-administered medications on file prior to encounter.      Current Outpatient Prescriptions on File Prior to Encounter   Medication Sig     acetaminophen (TYLENOL) 500 MG tablet Take 1,000 mg by mouth every 6 (six) hours as needed.    aspirin 81 MG Chew Take 81 mg by mouth once daily.    benzonatate (TESSALON) 100 MG capsule Take 2 capsules (200 mg total) by mouth 3 (three) times daily as needed for Cough.    lisinopril (PRINIVIL,ZESTRIL) 5 MG tablet Take 5 mg by mouth once daily.    metoprolol tartrate (LOPRESSOR) 25 MG tablet Take 25 mg by mouth 2 (two) times daily.    omeprazole (PRILOSEC) 40 MG capsule Take 40 mg by mouth once daily.    senna (SENOKOT) 8.6 mg tablet Take 1 tablet by mouth once daily.    simvastatin (ZOCOR) 40 MG tablet Take 40 mg by mouth every evening.    tolterodine (DETROL LA) 4 MG 24 hr capsule Take 4 mg by mouth once daily.     Family History     None        Social History Main Topics    Smoking status: Never Smoker    Smokeless tobacco: Not on file    Alcohol use No    Drug use: No    Sexual activity: No     Review of Systems   Constitutional: Negative for activity change, appetite change, chills, fatigue and fever.   HENT: Negative for congestion, postnasal drip, sinus pressure and sore throat.    Eyes: Negative for photophobia and visual disturbance.   Respiratory: Negative for cough, chest tightness, shortness of breath and wheezing.    Cardiovascular: Negative for chest pain, palpitations and leg swelling.   Gastrointestinal: Negative for abdominal distention, abdominal pain, constipation, diarrhea and nausea.   Genitourinary: Negative for dysuria, flank pain and hematuria.   Musculoskeletal: Positive for arthralgias, gait problem and joint swelling. Negative for myalgias.   Skin: Negative for color change.   Neurological: Positive for weakness. Negative for dizziness, light-headedness and headaches.   Psychiatric/Behavioral: Negative for agitation and confusion. The patient is not nervous/anxious.      Objective:     Vital Signs (Most Recent):  Temp: 97.3 °F (36.3 °C) (03/19/17 1812)  Pulse: 84 (03/19/17  1812)  Resp: 20 (03/19/17 1812)  BP: (!) 213/93 (03/19/17 1812)  SpO2: 97 % (03/19/17 1812) Vital Signs (24h Range):  Temp:  [97.3 °F (36.3 °C)] 97.3 °F (36.3 °C)  Pulse:  [84] 84  Resp:  [20] 20  SpO2:  [97 %] 97 %  BP: (213)/(93) 213/93     Weight: 48.1 kg (106 lb)  Body mass index is 20.7 kg/(m^2).    Physical Exam   Constitutional: She is oriented to person, place, and time. She appears well-developed and well-nourished. No distress.   HENT:   Head: Normocephalic and atraumatic.   Eyes: Conjunctivae and EOM are normal. Pupils are equal, round, and reactive to light.   Neck: Normal range of motion. Neck supple. No thyromegaly present.   Cardiovascular: Normal rate, regular rhythm, normal heart sounds and intact distal pulses.    Pulmonary/Chest: Effort normal and breath sounds normal. No respiratory distress.   Abdominal: Soft. Bowel sounds are normal. She exhibits no distension.   Musculoskeletal: She exhibits edema and tenderness.   Impaired flexion of LEFT knee, pain with movement   Neurological: She is alert and oriented to person, place, and time. No cranial nerve deficit.   Skin: Skin is warm and dry.   Psychiatric: She has a normal mood and affect. Her behavior is normal. Judgment and thought content normal.        Significant Labs:   CBC:   Recent Labs  Lab 03/19/17 1918   WBC 8.50   HGB 11.6*   HCT 35.9*   *     CMP:   Recent Labs  Lab 03/19/17 1918      K 4.6      CO2 26      BUN 16   CREATININE 0.9   CALCIUM 8.5*   PROT 6.2   ALBUMIN 3.5   BILITOT 0.4   ALKPHOS 98   AST 27   ALT 19   ANIONGAP 8   EGFRNONAA 56*     Urine Studies:   Recent Labs  Lab 03/19/17 1922   COLORU Yellow   APPEARANCEUA Hazy*   PHUR 7.0   SPECGRAV 1.010   PROTEINUA Negative   GLUCUA Negative   KETONESU Negative   BILIRUBINUA Negative   OCCULTUA Negative   NITRITE Negative   UROBILINOGEN Negative   LEUKOCYTESUR 2+*   WBCUA 30*   BACTERIA Few*   SQUAMEPITHEL 2   HYALINECASTS 5*       Significant Imaging:    CT Head: No acute abnormalities  CT Maxillofacial: No facial fracture  XR BILATERAL knees: negative for acute findings per ED MD.    Assessment/Plan:     Left knee injury  Without radiologic evidence of fracture.  PT to evaluate, pain control.      Fall  Maintain fall precautions, PT to see.      Essential hypertension  Chronic-uncontrolled, continue home regimen.  Monitor BP closely and titrate for sustained BP control. PRN hydralazine as patient with elevated BP upon admit.        GERD (gastroesophageal reflux disease)  Chronic, continue PPI therapy.      VTE Risk Mitigation         Ordered     enoxaparin injection 30 mg  Daily     Route:  Subcutaneous        03/20/17 0312     Medium Risk of VTE  Once      03/20/17 0312   Peptic ulcer prophylaxis: Protonix.     Ashlie Sauer NP  Department of Hospital Medicine   [facility]

## 2017-03-20 NOTE — PROGRESS NOTES
"Progress Note  [facility]  Patient Name:Talia Alberts  MRN:  [id number]  Patient Class: OP- Observation  Admit Date: 3/19/2017  Length of Stay: 0 days  Expected Discharge Date:   Attending Physician: Ranjith Beebe MD  Primary Care Provider:  Michael Martin MD    SUBJECTIVE:     Principal Problem: Left knee injury  Initial history of present illness: Talia Alberts is a 89 y.o. Female with PMHx significant for HTN, osteoarthritis with knee replacements, and MI. She was admitted to the service with LEFT knee injury after sustaining a fall. She reports that she was getting out the vehicle after a day of shopping and her legs became weak and gave out causing her to fall forward with impact to her LEFT knee and LEFT face. She was unable to stand or walk after. She had a HA that has since resolved. In the ED she underwent CT head and maxillofacial as well as XR of the knees which were negative for acute findings. She was, however, unable to ambulate and her daughter felt she could not take her home in this condition. She reports her LEFT knee continues to be painful with movement rating it 7/10. She denies pain at rest. She states she does use a walker at home. She lives with her daughter. She has had a few recent falls-- a few weeks ago she fell out of the bed.    PMH/PSH/SH/FH/Meds: reviewed.    Symptoms/Review of Systems: "I am not able to get out of bed". Reports left knee pain. No confusion. No shortness of breath, cough, chest pain or headache, fever or abdominal pain.     Diet:  Adequate intake.    Activity level: Up with assistance   Pain:  As above     OBJECTIVE:   Vital Signs (Most Recent):      Temp: 98 °F (36.7 °C) (03/20/17 0745)  Pulse: 89 (03/20/17 0745)  Resp: 16 (03/20/17 0745)  BP: 119/61 (03/20/17 0745)  SpO2: 98 % (03/20/17 0745)       Vital Signs Range (Last 24H):  Temp:  [97.3 °F (36.3 °C)-98.4 °F (36.9 °C)]   Pulse:  [78-89]   Resp:  [16-20]   BP: (119-229)/(60-99)   SpO2:  [94 %-98 %] "     Weight: 50.8 kg (112 lb)  Body mass index is 21.87 kg/(m^2).  No intake or output data in the 24 hours ending 03/20/17 0921  Physical Examination:  Constitutional: She is oriented to person, place, and time. She appears well-developed and well-nourished. No distress.   HENT:   Head: Normocephalic and >eft angle of eye /orbid bruising noted, no deformity  Eyes: Conjunctivae and EOM are normal. Pupils are equal, round, and reactive to light.   Neck: Normal range of motion. Neck supple. No thyromegaly present.   Cardiovascular: Normal rate, regular rhythm, normal heart sounds and intact distal pulses.   Pulmonary/Chest: Effort normal and breath sounds normal. No respiratory distress.   Abdominal: Soft. Bowel sounds are normal. She exhibits no distension.   Musculoskeletal: She exhibits edema and tenderness.   Impaired flexion of LEFT knee, pain with movement   Neurological: She is alert and oriented to person, place, and time. No cranial nerve deficit.   Skin: Skin is warm and dry.   Psychiatric: She has a normal mood and affect. Her behavior is normal. Judgment and thought content normal.   CBC:    Recent Labs  Lab 03/19/17 1918 03/20/17  0551   WBC 8.50 13.00*   RBC 4.05 4.26   HGB 11.6* 12.7   HCT 35.9* 37.6   * 135*   MCV 89 88   MCH 28.6 29.8   MCHC 32.2 33.7   BMP    Recent Labs  Lab 03/19/17 1918         K 4.6      CO2 26   BUN 16   CREATININE 0.9   CALCIUM 8.5*      Diagnostic Results:  Microbiology Results (last 7 days)     ** No results found for the last 168 hours. **         Bilateral knee x-rays: Status post right and left total knee arthroplasties and left distal femoral ORIF.  Trace bilateral joint effusions.    CT Head:  1.  No acute intracranial process.  Moderate chronic ischemic microvascular change and multifocal chronic lacunar infarcts.  2.  No evidence for maxillofacial injury.    CT Maxillao-facial scan:  1.  No acute intracranial process.  Moderate chronic  ischemic microvascular change and multifocal chronic lacunar infarcts.  2.  No evidence for maxillofacial injury.    Assessment/Plan:     Left knee injury  Without radiologic evidence of fracture. PT to evaluate, pain control.     Fall  Maintain fall precautions, PT to see.     Essential hypertension  Chronic-uncontrolled, continue home regimen. Monitor BP closely and titrate for sustained BP control. PRN hydralazine as patient with elevated BP upon admit.      GERD (gastroesophageal reflux disease)  Chronic, continue PPI therapy.    VTE Risk Mitigation         Ordered     enoxaparin injection 30 mg  Daily     Route:  Subcutaneous        03/20/17 0312     Medium Risk of VTE  Once      03/20/17 0312        Ranjith Beebe MD  Department of Hospital Medicine   [facility]

## 2017-03-20 NOTE — PLAN OF CARE
The pt was awake and able to verify all info on the face sheet as correct. The pt stated that she lives at her daughters home one week (Monet Gutierrez [phone]) and her sons home (Alber Alberts [phone]) another week. She stated that she just started using her walker to ambulate. She has a cane but it makes her fall. She also has a shower chair and bathroom bar handles. She sees Dr. Martin as her PCP and verified that she has [employer] insurance coverage. She has no questions or concerns at this time. Jaye Del Rosario ZEE     03/20/17 1125   Discharge Assessment   Assessment Type Discharge Planning Assessment   Confirmed/corrected address and phone number on facesheet? Yes   Assessment information obtained from? Patient   Communicated expected length of stay with patient/caregiver no   Type of Healthcare Directive Received (if needed pts daughter Monet Gutierrez [phone])   If Healthcare Directive is received, is it scanned into Epic? no (comment)   Prior to hospitilization cognitive status: Alert/Oriented   Prior to hospitalization functional status: Assistive Equipment   Current cognitive status: Alert/Oriented   Current Functional Status: Assistive Equipment   Lives With child(ange), adult   Able to Return to Prior Arrangements yes   How many people do you have in your home that can help with your care after discharge? 2   Who are your caregiver(s) and their phone number(s)? adult daughter and son    Readmission Within The Last 30 Days no previous admission in last 30 days   Patient currently being followed by outpatient case management? No   Patient currently receives home health services? No   Does the patient currently use HME? No   Patient currently receives private duty nursing? No   Patient currently receives any other outside agency services? No   Equipment Currently Used at Home cane, straight;shower chair;other (see comments)  (bath handles )   Do you have any problems affording any of your  prescribed medications? No  (can't remember the name but its on [employer] )   Is the patient taking medications as prescribed? yes   Do you have any financial concerns preventing you from receiving the healthcare you need? No  ([employer] )   On Dialysis? No   Does the patient receive services at the Coumadin Clinic? No   Are there any open cases? No   Discharge Plan A Home with family   Patient/Family In Agreement With Plan yes

## 2017-03-20 NOTE — PROGRESS NOTES
The enoxaparin dose has been adjusted for Talia Alberts [id number] according to the pharmacy practice protocol.      Based on the patient's Estimated Creatinine Clearance: 29.2 mL/min (based on Cr of 0.9)., Body mass index is 20.7 kg/(m^2)., and weight= 48.1 kg (106 lb), the enoxaparin dose has been adjusted 30 mg every 24 hours.    Thank you,  Kelechi Leal

## 2017-03-20 NOTE — SUBJECTIVE & OBJECTIVE
Past Medical History:   Diagnosis Date    Cancer     Hypertension     MI (myocardial infarction)        Past Surgical History:   Procedure Laterality Date    CARDIAC SURGERY      FRACTURE SURGERY         Review of patient's allergies indicates:   Allergen Reactions    Morphine Swelling       No current facility-administered medications on file prior to encounter.      Current Outpatient Prescriptions on File Prior to Encounter   Medication Sig    acetaminophen (TYLENOL) 500 MG tablet Take 1,000 mg by mouth every 6 (six) hours as needed.    aspirin 81 MG Chew Take 81 mg by mouth once daily.    benzonatate (TESSALON) 100 MG capsule Take 2 capsules (200 mg total) by mouth 3 (three) times daily as needed for Cough.    lisinopril (PRINIVIL,ZESTRIL) 5 MG tablet Take 5 mg by mouth once daily.    metoprolol tartrate (LOPRESSOR) 25 MG tablet Take 25 mg by mouth 2 (two) times daily.    omeprazole (PRILOSEC) 40 MG capsule Take 40 mg by mouth once daily.    senna (SENOKOT) 8.6 mg tablet Take 1 tablet by mouth once daily.    simvastatin (ZOCOR) 40 MG tablet Take 40 mg by mouth every evening.    tolterodine (DETROL LA) 4 MG 24 hr capsule Take 4 mg by mouth once daily.     Family History     None        Social History Main Topics    Smoking status: Never Smoker    Smokeless tobacco: Not on file    Alcohol use No    Drug use: No    Sexual activity: No     Review of Systems   Constitutional: Negative for activity change, appetite change, chills, fatigue and fever.   HENT: Negative for congestion, postnasal drip, sinus pressure and sore throat.    Eyes: Negative for photophobia and visual disturbance.   Respiratory: Negative for cough, chest tightness, shortness of breath and wheezing.    Cardiovascular: Negative for chest pain, palpitations and leg swelling.   Gastrointestinal: Negative for abdominal distention, abdominal pain, constipation, diarrhea and nausea.   Genitourinary: Negative for dysuria, flank pain  and hematuria.   Musculoskeletal: Positive for arthralgias, gait problem and joint swelling. Negative for myalgias.   Skin: Negative for color change.   Neurological: Positive for weakness. Negative for dizziness, light-headedness and headaches.   Psychiatric/Behavioral: Negative for agitation and confusion. The patient is not nervous/anxious.      Objective:     Vital Signs (Most Recent):  Temp: 97.3 °F (36.3 °C) (03/19/17 1812)  Pulse: 84 (03/19/17 1812)  Resp: 20 (03/19/17 1812)  BP: (!) 213/93 (03/19/17 1812)  SpO2: 97 % (03/19/17 1812) Vital Signs (24h Range):  Temp:  [97.3 °F (36.3 °C)] 97.3 °F (36.3 °C)  Pulse:  [84] 84  Resp:  [20] 20  SpO2:  [97 %] 97 %  BP: (213)/(93) 213/93     Weight: 48.1 kg (106 lb)  Body mass index is 20.7 kg/(m^2).    Physical Exam   Constitutional: She is oriented to person, place, and time. She appears well-developed and well-nourished. No distress.   HENT:   Head: Normocephalic and atraumatic.   Eyes: Conjunctivae and EOM are normal. Pupils are equal, round, and reactive to light.   Neck: Normal range of motion. Neck supple. No thyromegaly present.   Cardiovascular: Normal rate, regular rhythm, normal heart sounds and intact distal pulses.    Pulmonary/Chest: Effort normal and breath sounds normal. No respiratory distress.   Abdominal: Soft. Bowel sounds are normal. She exhibits no distension.   Musculoskeletal: She exhibits edema and tenderness.   Impaired flexion of LEFT knee, pain with movement   Neurological: She is alert and oriented to person, place, and time. No cranial nerve deficit.   Skin: Skin is warm and dry.   Psychiatric: She has a normal mood and affect. Her behavior is normal. Judgment and thought content normal.        Significant Labs:   CBC:   Recent Labs  Lab 03/19/17 1918   WBC 8.50   HGB 11.6*   HCT 35.9*   *     CMP:   Recent Labs  Lab 03/19/17 1918      K 4.6      CO2 26      BUN 16   CREATININE 0.9   CALCIUM 8.5*   PROT 6.2    ALBUMIN 3.5   BILITOT 0.4   ALKPHOS 98   AST 27   ALT 19   ANIONGAP 8   EGFRNONAA 56*     Urine Studies:   Recent Labs  Lab 03/19/17 1922   COLORU Yellow   APPEARANCEUA Hazy*   PHUR 7.0   SPECGRAV 1.010   PROTEINUA Negative   GLUCUA Negative   KETONESU Negative   BILIRUBINUA Negative   OCCULTUA Negative   NITRITE Negative   UROBILINOGEN Negative   LEUKOCYTESUR 2+*   WBCUA 30*   BACTERIA Few*   SQUAMEPITHEL 2   HYALINECASTS 5*       Significant Imaging:   CT Head: No acute abnormalities  CT Maxillofacial: No facial fracture  XR BILATERAL knees: negative for acute findings per ED MD.

## 2017-03-20 NOTE — PT/OT/SLP EVAL
"Physical Therapy  Evaluation    Talia Alberts   MRN: [id number]   Admitting Diagnosis: Left knee injury    PT Received On: 03/20/17  PT Start Time: 1024     PT Stop Time: 1056    PT Total Time (min): 32 min       Billable Minutes:  Evaluation 15 and Therapeutic Activity 17    Diagnosis: Left knee injury      Past Medical History:   Diagnosis Date    Cancer     Hypertension     MI (myocardial infarction)       Past Surgical History:   Procedure Laterality Date    CARDIAC SURGERY      FRACTURE SURGERY         Referring physician:   Date referred to PT: 03-    General Precautions: Standard, fall  Orthopedic Precautions: N/A   Braces: N/A            Patient History:  Lives With: child(ange), adult  Transportation Available: family or friend will provide  Living Environment Comment: Pt reported living with daughter an dson on alternating weeks  Equipment Currently Used at Home: walker, rolling  DME owned (not currently used):     Previous Level of Function:  Ambulation Skills: needs device  Transfer Skills: needs device  ADL Skills: needs device      Subjective:  Communicated with nurse Malena prior to session.  Pt found resting in bed and able to arouse easily  Pt agreeable to PT but stated "I can't walk so I'm not sure I can do much of therapy"   Pt requested to use BR to void    Chief Complaint: L knee pain  Patient goals:     Pain Rating:  (no rating given)   Location - Side: Left     Location: knee  Pain Addressed: Cessation of Activity, Distraction       Objective:   Patient found with: telemetry, bed alarm     Cognitive Exam:  Oriented to: Person, Place and Situation    Follows Commands/attention: Easily distracted and Follows one-step commands  Communication: clear/fluent  Safety awareness/insight to disability: impaired    Physical Exam:  Postural examination/scapula alignment: Rounded shoulder, Head forward and Kyphosis    Skin integrity: Visible skin intact, Dry and bruising around L " eye  Edema: None noted     Sensation:   Intact    Upper Extremity Range of Motion:  Right Upper Extremity: WFL  Left Upper Extremity: WFL    Upper Extremity Strength:  Right Upper Extremity: WFL  Left Upper Extremity: WFL    Lower Extremity Range of Motion:  Right Lower Extremity: WFL  Left Lower Extremity: WFL except L knee flexion limited    Lower Extremity Strength:  Right Lower Extremity: 3+/5  Left Lower Extremity: 3+/5     Fine motor coordination:      Gross motor coordination:     Functional Mobility:  Bed Mobility:  Rolling/Turning to Left: Contact guard assistance  Rolling/Turning Right: Contact guard assistance  Scooting/Bridging: Minimum Assistance, Contact Guard Assistance  Supine to Sit: Contact Guard Assistance  Sit to Supine: Contact Guard Assistance    Transfers:  Sit <> Stand Assistance: Moderate Assistance, Maximum Assistance  Sit <> Stand Assistive Device: Rolling Walker  Bed <> Chair Technique: Stand Pivot  Bed <> Chair Assistance: Moderate Assistance, Maximum Assistance  Bed <> Chair Assistive Device: Rolling Walker  Toilet Transfer Assistance: Moderate Assistance  Toilet Transfer Assistive Device: Rolling Walker    Gait:   Gait Distance: 15 ft bedside to bathroom and back   Assistance 1: Moderate assistance, Maximum assistance  Gait Assistive Device: Rolling walker  Gait Pattern: 3-point gait  Gait Deviation(s): decreased ying, increased time in double stance, decreased velocity of limb motion, decreased step length, forward lean    Stairs:      Balance:   Static Sit: FAIR+: Able to take MINIMAL challenges from all directions  Dynamic Sit: FAIR+: Maintains balance through MINIMAL excursions of active trunk motion  Static Stand: FAIR: Maintains without assist but unable to take challenges  Dynamic stand: POOR: N/A    Therapeutic Activities and Exercises:  Prior to sitting up, ACE bandage on pt's L knee reapplied due to it coming loose. Pt transferred to sitting EOB with no complaints of  dizziness. Pt required multiple attempts and Mod/Max A for successful sit <> stand. Pt ambulated to BR to void- required assistance and repeated cueing for safety. Pt placed in bedside chair but returned to bed for safety per nurse's recommendation- pt forgets to call for help.    AM-PAC 6 CLICK MOBILITY  How much help from another person does this patient currently need?   1 = Unable, Total/Dependent Assistance  2 = A lot, Maximum/Moderate Assistance  3 = A little, Minimum/Contact Guard/Supervision  4 = None, Modified Arab/Independent          AM-PAC Raw Score CMS G-Code Modifier Level of Impairment Assistance   6 % Total / Unable   7 - 9 CM 80 - 100% Maximal Assist   10 - 14 CL 60 - 80% Moderate Assist   15 - 19 CK 40 - 60% Moderate Assist   20 - 22 CJ 20 - 40% Minimal Assist   23 CI 1-20% SBA / CGA   24 CH 0% Independent/ Mod I     Patient left supine with all lines intact, call button in reach, bed alarm on and nurse Malena  notified.    Assessment:   Talia Alberts is a 89 y.o. female with a medical diagnosis of Left knee injury and presents with L knee pain, impaired safety awareness, and gait instability. PTA pt reports using RW for household distances and ADLs. Pt also reports falls at home and presents as a fall risk. Gait assessment abbreviated due to pt unable to bear full weight on LLE during ambulation with c/o pain . Pt is quick to move and demonstrates poor safety awareness when ready to sit down. Pt would benefit from continued therapy via SNF to address functional deficits.      Rehab identified problem list/impairments: Rehab identified problem list/impairments: weakness, pain, decreased safety awareness, gait instability, impaired self care skills, impaired functional mobilty, decreased lower extremity function    Rehab potential is fair.    Activity tolerance: Fair    Discharge recommendations: Discharge Facility/Level Of Care Needs: nursing facility, skilled     Barriers to  discharge:      Equipment recommendations:       GOALS:   Physical Therapy Goals        Problem: Physical Therapy Goal    Goal Priority Disciplines Outcome Goal Variances Interventions   Physical Therapy Goal    High PT/OT, PT      Description:  Goals to be met by: 2017     Patient will increase functional independence with mobility by performin. Sit to stand transfer with Minimal Assistance  2. Bed to chair transfer with Minimal Assistance using Rolling Walker  3. Gait  x 25 feet with Moderate Assistance using Rolling Walker.   4. Lower extremity exercise program x10 reps per handout, with assistance as needed                PLAN:    Patient to be seen 6 x/week to address the above listed problems via gait training, therapeutic activities, therapeutic exercises  Plan of Care expires: 17  Plan of Care reviewed with: patient    Functional Assessment Tool Used: FIM  Score: 1/7  Functional Limitation: Mobility: Walking and moving around  Mobility: Walking and Moving Around Current Status (): CN  Mobility: Walking and Moving Around Goal Status (): CONCHITA Peralta, [facility]  2017    I certify that I was present in the room directing the student in service delivery and guiding them using my skilled judgment. As the co-signing therapist I have reviewed the students documentation and am responsible for the treatment, assessment, and plan.     Abbie Michael, PT  2017

## 2017-03-20 NOTE — ASSESSMENT & PLAN NOTE
Chronic-uncontrolled, continue home regimen.  Monitor BP closely and titrate for sustained BP control. PRN hydralazine as patient with elevated BP upon admit.

## 2017-03-20 NOTE — PLAN OF CARE
Problem: Patient Care Overview  Goal: Plan of Care Review  Pt admitted s/p fall at home.  C/o left knee pain intermittently.  Bruising noted to left eye and forehead from previous fall.  Urinary retention noted.  Encouraged po intake.  IVF to start.  Hearing aides brought by family this afternoon.  Tele in use and running sinus rhythm.  Tolerating diet.  No distress.  Instructed not to ambulate with out assistance.  Call light in reach.  Ok to take own home medication.  PT eval complete.  Pt remains free from injury.

## 2017-03-20 NOTE — PLAN OF CARE
Pt has not voided today.  Reports not having the urge but drinking adequately.  Pt bladder scan reveals appr 400cc urine in bladder.  Assisted pt to bsc to void 100cc dark yellow urine.  PVR results aapr 350cc.  Home medications delivered per family.      1620: updated Dr Beebe re PVR and home medications.  Ok to take home medication.  Orders received to start IVF.

## 2017-03-20 NOTE — ED NOTES
To Room:  315 by stretcher:   AA & O x 3, skin warm and dry, in NAD, Saline Lock in place and patent, site clean and dry.

## 2017-03-21 LAB
ANION GAP SERPL CALC-SCNC: 10 MMOL/L
BASOPHILS # BLD AUTO: 0 K/UL
BASOPHILS NFR BLD: 0.3 %
BUN SERPL-MCNC: 16 MG/DL
CALCIUM SERPL-MCNC: 8.3 MG/DL
CHLORIDE SERPL-SCNC: 103 MMOL/L
CO2 SERPL-SCNC: 21 MMOL/L
CREAT SERPL-MCNC: 0.8 MG/DL
DIFFERENTIAL METHOD: ABNORMAL
EOSINOPHIL # BLD AUTO: 0.7 K/UL
EOSINOPHIL NFR BLD: 7.5 %
ERYTHROCYTE [DISTWIDTH] IN BLOOD BY AUTOMATED COUNT: 13.9 %
EST. GFR  (AFRICAN AMERICAN): >60 ML/MIN/1.73 M^2
EST. GFR  (NON AFRICAN AMERICAN): >60 ML/MIN/1.73 M^2
GLUCOSE SERPL-MCNC: 97 MG/DL
HCT VFR BLD AUTO: 32.5 %
HGB BLD-MCNC: 10.9 G/DL
LYMPHOCYTES # BLD AUTO: 1.2 K/UL
LYMPHOCYTES NFR BLD: 12.7 %
MCH RBC QN AUTO: 29.3 PG
MCHC RBC AUTO-ENTMCNC: 33.5 %
MCV RBC AUTO: 88 FL
MONOCYTES # BLD AUTO: 0.7 K/UL
MONOCYTES NFR BLD: 7.2 %
NEUTROPHILS # BLD AUTO: 6.9 K/UL
NEUTROPHILS NFR BLD: 72.3 %
PLATELET # BLD AUTO: 101 K/UL
PMV BLD AUTO: 9.1 FL
POTASSIUM SERPL-SCNC: 4.5 MMOL/L
RBC # BLD AUTO: 3.71 M/UL
SODIUM SERPL-SCNC: 134 MMOL/L
TROPONIN I SERPL DL<=0.01 NG/ML-MCNC: 0.03 NG/ML
WBC # BLD AUTO: 9.5 K/UL

## 2017-03-21 PROCEDURE — 93005 ELECTROCARDIOGRAM TRACING: CPT

## 2017-03-21 PROCEDURE — 63600175 PHARM REV CODE 636 W HCPCS: Performed by: INTERNAL MEDICINE

## 2017-03-21 PROCEDURE — 36415 COLL VENOUS BLD VENIPUNCTURE: CPT

## 2017-03-21 PROCEDURE — 86580 TB INTRADERMAL TEST: CPT | Performed by: INTERNAL MEDICINE

## 2017-03-21 PROCEDURE — 84484 ASSAY OF TROPONIN QUANT: CPT

## 2017-03-21 PROCEDURE — 85025 COMPLETE CBC W/AUTO DIFF WBC: CPT

## 2017-03-21 PROCEDURE — G0378 HOSPITAL OBSERVATION PER HR: HCPCS

## 2017-03-21 PROCEDURE — 80048 BASIC METABOLIC PNL TOTAL CA: CPT

## 2017-03-21 PROCEDURE — 63600175 PHARM REV CODE 636 W HCPCS: Performed by: NURSE PRACTITIONER

## 2017-03-21 PROCEDURE — 25000003 PHARM REV CODE 250: Performed by: INTERNAL MEDICINE

## 2017-03-21 PROCEDURE — 97110 THERAPEUTIC EXERCISES: CPT

## 2017-03-21 PROCEDURE — 97530 THERAPEUTIC ACTIVITIES: CPT

## 2017-03-21 PROCEDURE — 87086 URINE CULTURE/COLONY COUNT: CPT

## 2017-03-21 PROCEDURE — 25000003 PHARM REV CODE 250: Performed by: NURSE PRACTITIONER

## 2017-03-21 PROCEDURE — 99225 PR SUBSEQUENT OBSERVATION CARE,LEVEL II: CPT | Mod: ,,, | Performed by: INTERNAL MEDICINE

## 2017-03-21 RX ADMIN — CEFTRIAXONE 1 G: 1 INJECTION, SOLUTION INTRAVENOUS at 11:03

## 2017-03-21 RX ADMIN — ACETAMINOPHEN 650 MG: 325 TABLET ORAL at 05:03

## 2017-03-21 RX ADMIN — TROSPIUM CHLORIDE 20 MG: 20 TABLET, FILM COATED ORAL at 09:03

## 2017-03-21 RX ADMIN — HYDRALAZINE HYDROCHLORIDE 10 MG: 20 INJECTION INTRAMUSCULAR; INTRAVENOUS at 05:03

## 2017-03-21 RX ADMIN — ESCITALOPRAM OXALATE 10 MG: 10 TABLET ORAL at 09:03

## 2017-03-21 RX ADMIN — HYDROCODONE BITARTRATE AND ACETAMINOPHEN 1 TABLET: 5; 325 TABLET ORAL at 06:03

## 2017-03-21 RX ADMIN — ASPIRIN 81 MG CHEWABLE TABLET 81 MG: 81 TABLET CHEWABLE at 08:03

## 2017-03-21 RX ADMIN — PANTOPRAZOLE SODIUM 40 MG: 40 TABLET, DELAYED RELEASE ORAL at 08:03

## 2017-03-21 RX ADMIN — LOSARTAN POTASSIUM 25 MG: 25 TABLET ORAL at 09:03

## 2017-03-21 RX ADMIN — SIMVASTATIN 40 MG: 40 TABLET, FILM COATED ORAL at 09:03

## 2017-03-21 RX ADMIN — ENOXAPARIN SODIUM 30 MG: 100 INJECTION SUBCUTANEOUS at 04:03

## 2017-03-21 RX ADMIN — TUBERCULIN PURIFIED PROTEIN DERIVATIVE 5 UNITS: 5 INJECTION, SOLUTION INTRADERMAL at 04:03

## 2017-03-21 RX ADMIN — SODIUM CHLORIDE: 0.45 INJECTION, SOLUTION INTRAVENOUS at 11:03

## 2017-03-21 RX ADMIN — SENNOSIDES 1 TABLET: 8.6 TABLET, FILM COATED ORAL at 08:03

## 2017-03-21 NOTE — PLAN OF CARE
Problem: Patient Care Overview  Goal: Plan of Care Review  Outcome: Ongoing (interventions implemented as appropriate)  Patient with uneventful night, slept off and on. Up to bedside commode multiple times , total assist due to pain in both knees. VS stable. Dressing left knee dry intact. IV restarted due to patient getting confused during the night, pulling of telemetry leads, reoriented. Bed alarm on at all times. Call light at bedside. Monitoring on telemetry. Tolerating diet well, receiving IV fluids.

## 2017-03-21 NOTE — PLAN OF CARE
Problem: Patient Care Overview  Goal: Plan of Care Review  Comments:   Awake and oriented x3. Disoriented to situation. Hearing aids and dentures at bedside. Up to bedside commode x4 today. VS stable. Dressing to left knee dry and intact. IV discontinued due to infiltration. Bed alarm on at all times. Bed down, brakes locked, SR up x 2, call light within reach. Denies pain at this time. Patient verbalizes understanding of plan of care.

## 2017-03-21 NOTE — PLAN OF CARE
Problem: Physical Therapy Goal  Goal: Physical Therapy Goal  Goals to be met by: 2017     Patient will increase functional independence with mobility by performin. Sit to stand transfer with Minimal Assistance  2. Bed to chair transfer with Minimal Assistance using Rolling Walker  3. Gait x 25 feet with Moderate Assistance using Rolling Walker.   4. Lower extremity exercise program x10 reps per handout, with assistance as needed   Outcome: Ongoing (interventions implemented as appropriate)  Pt transferred to bedside chair with Mod A

## 2017-03-21 NOTE — PROGRESS NOTES
Progress Note  Hospital Medicine  Patient Name:Talia Alberts  MRN:  [id number]  Patient Class: OP- Observation  Admit Date: 3/19/2017  Length of Stay: 0 days  Expected Discharge Date:   Attending Physician: Ranjith Beebe MD  Primary Care Provider:  Michael Martin MD    SUBJECTIVE:     Principal Problem: Left knee injury  Initial history of present illness: Talia Alberts is a 89 y.o. Female with PMHx significant for HTN, osteoarthritis with knee replacements, and MI. She was admitted to the service with LEFT knee injury after sustaining a fall. She reports that she was getting out the vehicle after a day of shopping and her legs became weak and gave out causing her to fall forward with impact to her LEFT knee and LEFT face. She was unable to stand or walk after. She had a HA that has since resolved. In the ED she underwent CT head and maxillofacial as well as XR of the knees which were negative for acute findings. She was, however, unable to ambulate and her daughter felt she could not take her home in this condition. She reports her LEFT knee continues to be painful with movement rating it 7/10. She denies pain at rest. She states she does use a walker at home. She lives with her daughter. She has had a few recent falls-- a few weeks ago she fell out of the bed.    PMH/PSH/SH/FH/Meds: reviewed.    Symptoms/Review of Systems: Unable to get out of bed again today. Daughter reported frequent fall. No confusion. No shortness of breath, cough, chest pain or headache, fever or abdominal pain.     Diet:  Adequate intake.    Activity level: Up with assistance   Pain:  As above     OBJECTIVE:   Vital Signs (Most Recent):      Temp: 98.2 °F (36.8 °C) (03/21/17 0500)  Pulse: 72 (03/21/17 0500)  Resp: 18 (03/21/17 0500)  BP: 130/80 (03/21/17 0500)  SpO2: 100 % (03/21/17 0500)       Vital Signs Range (Last 24H):  Temp:  [98 °F (36.7 °C)-98.3 °F (36.8 °C)]   Pulse:  [72-78]   Resp:  [18]   BP: (122-136)/(58-80)   SpO2:  [94  %-100 %]     Weight: 50.8 kg (112 lb)  Body mass index is 21.87 kg/(m^2).    Intake/Output Summary (Last 24 hours) at 03/21/17 1029  Last data filed at 03/21/17 0600   Gross per 24 hour   Intake          2043.75 ml   Output              425 ml   Net          1618.75 ml     Physical Examination:  Constitutional: She is oriented to person, place, and time. She appears well-developed and well-nourished. No distress.   HENT:   Head: Normocephalic and >eft angle of eye /orbid bruising noted, no deformity  Eyes: Conjunctivae and EOM are normal. Pupils are equal, round, and reactive to light.   Neck: Normal range of motion. Neck supple. No thyromegaly present.   Cardiovascular: Normal rate, regular rhythm, normal heart sounds and intact distal pulses.   Pulmonary/Chest: Effort normal and breath sounds normal. No respiratory distress.   Abdominal: Soft. Bowel sounds are normal. She exhibits no distension.   Musculoskeletal: She exhibits edema and tenderness.   Impaired flexion of LEFT knee, pain with movement   Neurological: She is alert and oriented to person, place, and time. No cranial nerve deficit.   Skin: Skin is warm and dry.   Psychiatric: She has a normal mood and affect. Her behavior is normal. Judgment and thought content normal.   CBC:    Recent Labs  Lab 03/19/17 1918 03/20/17  0551 03/21/17  0544   WBC 8.50 13.00* 9.50   RBC 4.05 4.26 3.71*   HGB 11.6* 12.7 10.9*   HCT 35.9* 37.6 32.5*   * 135* 101*   MCV 89 88 88   MCH 28.6 29.8 29.3   MCHC 32.2 33.7 33.5   BMP    Recent Labs  Lab 03/19/17 1918 03/21/17  0544    97    134*   K 4.6 4.5    103   CO2 26 21*   BUN 16 16   CREATININE 0.9 0.8   CALCIUM 8.5* 8.3*      Diagnostic Results:  Microbiology Results (last 7 days)     Procedure Component Value Units Date/Time    Urine culture [[id number]]     Order Status:  No result Specimen:  Urine          Bilateral knee x-rays: Status post right and left total knee arthroplasties and left  distal femoral ORIF.  Trace bilateral joint effusions.    CT Head:  1.  No acute intracranial process.  Moderate chronic ischemic microvascular change and multifocal chronic lacunar infarcts.  2.  No evidence for maxillofacial injury.    CT Maxillao-facial scan:  1.  No acute intracranial process.  Moderate chronic ischemic microvascular change and multifocal chronic lacunar infarcts.  2.  No evidence for maxillofacial injury.    Assessment/Plan:     Left knee injury  Without radiologic evidence of fracture. Continue PT, pain control.     Fall  Maintain fall precautions, PT to see.    UTI  Urine C/S. Continue IV Rocephin.     Essential hypertension  Chronic-uncontrolled, continue home regimen. Monitor BP closely and titrate for sustained BP control. PRN hydralazine as patient with elevated BP upon admit.      GERD (gastroesophageal reflux disease)  Chronic, continue PPI therapy.    Likely would benefit with SNF placement.    VTE Risk Mitigation         Ordered     enoxaparin injection 30 mg  Daily     Route:  Subcutaneous        03/20/17 0312     Medium Risk of VTE  Once      03/20/17 0312        Ranjith Beebe MD  Department of Hospital Medicine   [facility]

## 2017-03-21 NOTE — PROGRESS NOTES
Spoke with the pt and her daughter, Esthela ([phone]) along with Dr Beebe to discuss discharge plan; the pt states she was not able to walk today with physical therapy. Esthela is concerned that the pt needs more physical therapy before going home. Before she fell and came into the hospital, the pt was ambulating with her rolling walker and no assistance. She was going to physical therapy outpt at [facility].   We discussed SNF as an option if she qualified with her insurance, [employer]. The daughter's first choice is [facility] and her second is [facility].   I will ask GREGORIA Cee to assist with the SNF process. I will follow up with the pt's daughter, Esthela once the pt has been reviewed by [employer]....MAIKOL Rutledge CM

## 2017-03-21 NOTE — PROGRESS NOTES
1739 Patient /96 .  IVP hydralazine given over 2 minutes.  1755 Patient complains of dizziness and back pain.  Patient BP at this time 103/58 . Patient remains on IVF at this time.   1830 Patient /58 HR 95  Patient begins to complain of chest pain.   Dr. Beebe notified.  STAT EKG and Cardiac enzymes ordered.   Patient remains on telemetry monitor and family at bedside.  Patient and family at bedside updated on plan of care.  Will continue to monitor.

## 2017-03-21 NOTE — PT/OT/SLP PROGRESS
Physical Therapy  Treatment    Talia Alberts   MRN: [id number]   Admitting Diagnosis: Left knee injury    PT Received On: 03/21/17  PT Start Time: 1012     PT Stop Time: 1038    PT Total Time (min): 26 min       Billable Minutes:  Therapeutic Activity 16 and Therapeutic Exercise 10    Treatment Type: Treatment  PT/PTA: PT             General Precautions: Standard, fall  Orthopedic Precautions: N/A   Braces: N/A         Subjective:  Communicated with nurse Lambert prior to session.  Pt found awake laying bent over in bed  Pt reported leaning over to the R but found herself unable to return to midline for 20 minutes- pt stated she did not call for help    Pain Rating:  (no rating given)  Location - Side: Left     Location: knee  Pain Addressed: Cessation of Activity, Distraction       Objective:   Patient found with: telemetry, bed alarm    Functional Mobility:  Bed Mobility:   Supine to Sit: Contact Guard Assistance    Transfers:  Sit <> Stand Assistance: Moderate Assistance  Sit <> Stand Assistive Device: Rolling Walker  Bed <> Chair Technique: Stand Pivot  Bed <> Chair Assistance: Moderate Assistance  Bed <> Chair Assistive Device: Rolling Walker    Gait:   Gait Distance: took 4-5 steps towards door but unable to continue due to pain  Assistance 1: Moderate assistance, Maximum assistance  Gait Assistive Device: Rolling walker  Gait Pattern: 3-point gait  Gait Deviation(s): decreased ying, increased time in double stance, decreased velocity of limb motion, decreased step length    Stairs:      Balance:   Static Sit: FAIR: Maintains without assist, but unable to take any challenges   Dynamic Sit: FAIR: Cannot move trunk without losing balance  Static Stand: POOR: Needs MODERATE assist to maintain  Dynamic stand: POOR: N/A     Therapeutic Activities and Exercises:  Pt assisted back to midline laying in bed prior to progressing to sitting EOB. Pt required multiple attempts to stand and reluctantly placed some weight  on LLE. Pt attempted to ambulate to hallways but distance abbreviated and pt placed in bedside chair- pt unable to bear full weight through LLE due to pain. Pt performed LE exercises 10x, x2: ankle pumps and gluteal sets. Pt instructed to call for help when ready to return to bed.      AM-PAC 6 CLICK MOBILITY  How much help from another person does this patient currently need?   1 = Unable, Total/Dependent Assistance  2 = A lot, Maximum/Moderate Assistance  3 = A little, Minimum/Contact Guard/Supervision  4 = None, Modified [place]/Independent         AM-PAC Raw Score CMS G-Code Modifier Level of Impairment Assistance   6 % Total / Unable   7 - 9 CM 80 - 100% Maximal Assist   10 - 14 CL 60 - 80% Moderate Assist   15 - 19 CK 40 - 60% Moderate Assist   20 - 22 CJ 20 - 40% Minimal Assist   23 CI 1-20% SBA / CGA   24 CH 0% Independent/ Mod I     Patient left up in chair with all lines intact, call button in reach and nurse Petra notified.    Assessment:  Talia Alberts is a 89 y.o. female with a medical diagnosis of Left knee injury and presents with limited functional mobility due to pain and impaired safety awareness. Pt unable to ambulate successfully due to reluctance to weight bear on LLE. Pt requires repeated reminders to for safety and needs reinforcement. Pt would benefit from continued therapies vis SNF.      Rehab identified problem list/impairments: Rehab identified problem list/impairments: weakness, decreased safety awareness, decreased lower extremity function, impaired self care skills, gait instability, pain, impaired functional mobilty    Rehab potential is fair.    Activity tolerance: Fair    Discharge recommendations: Discharge Facility/Level Of Care Needs: nursing facility, skilled     Barriers to discharge:      Equipment recommendations:       GOALS:   Physical Therapy Goals        Problem: Physical Therapy Goal    Goal Priority Disciplines Outcome Goal Variances Interventions    Physical Therapy Goal    High PT/OT, PT Ongoing (interventions implemented as appropriate)     Description:  Goals to be met by: 2017     Patient will increase functional independence with mobility by performin. Sit to stand transfer with Minimal Assistance  2. Bed to chair transfer with Minimal Assistance using Rolling Walker  3. Gait  x 25 feet with Moderate Assistance using Rolling Walker.   4. Lower extremity exercise program x10 reps per handout, with assistance as needed                PLAN:    Patient to be seen 6 x/week  to address the above listed problems via gait training, therapeutic activities, therapeutic exercises  Plan of Care expires: 17  Plan of Care reviewed with: patient    Geeta Peralta, SPT  2017      I certify that I was present in the room directing the student in service delivery and guiding them using my skilled judgment. As the co-signing therapist I have reviewed the students documentation and am responsible for the treatment, assessment, and plan.     bAbie Michael, PT  2017

## 2017-03-22 VITALS
OXYGEN SATURATION: 99 % | BODY MASS INDEX: 21.99 KG/M2 | HEIGHT: 60 IN | RESPIRATION RATE: 20 BRPM | TEMPERATURE: 98 F | WEIGHT: 112 LBS | HEART RATE: 89 BPM | DIASTOLIC BLOOD PRESSURE: 70 MMHG | SYSTOLIC BLOOD PRESSURE: 169 MMHG

## 2017-03-22 LAB
ANION GAP SERPL CALC-SCNC: 8 MMOL/L
BUN SERPL-MCNC: 14 MG/DL
CALCIUM SERPL-MCNC: 8.1 MG/DL
CHLORIDE SERPL-SCNC: 106 MMOL/L
CO2 SERPL-SCNC: 22 MMOL/L
CREAT SERPL-MCNC: 0.8 MG/DL
EST. GFR  (AFRICAN AMERICAN): >60 ML/MIN/1.73 M^2
EST. GFR  (NON AFRICAN AMERICAN): >60 ML/MIN/1.73 M^2
GLUCOSE SERPL-MCNC: 96 MG/DL
POTASSIUM SERPL-SCNC: 4.4 MMOL/L
SODIUM SERPL-SCNC: 136 MMOL/L
TROPONIN I SERPL DL<=0.01 NG/ML-MCNC: 0.05 NG/ML
TROPONIN I SERPL DL<=0.01 NG/ML-MCNC: 0.07 NG/ML

## 2017-03-22 PROCEDURE — 63600175 PHARM REV CODE 636 W HCPCS: Performed by: INTERNAL MEDICINE

## 2017-03-22 PROCEDURE — 97116 GAIT TRAINING THERAPY: CPT

## 2017-03-22 PROCEDURE — G0378 HOSPITAL OBSERVATION PER HR: HCPCS

## 2017-03-22 PROCEDURE — 63600175 PHARM REV CODE 636 W HCPCS: Performed by: NURSE PRACTITIONER

## 2017-03-22 PROCEDURE — 25000003 PHARM REV CODE 250: Performed by: NURSE PRACTITIONER

## 2017-03-22 PROCEDURE — 84484 ASSAY OF TROPONIN QUANT: CPT

## 2017-03-22 PROCEDURE — 36415 COLL VENOUS BLD VENIPUNCTURE: CPT

## 2017-03-22 PROCEDURE — 80048 BASIC METABOLIC PNL TOTAL CA: CPT

## 2017-03-22 PROCEDURE — 99225 PR SUBSEQUENT OBSERVATION CARE,LEVEL II: CPT | Mod: ,,, | Performed by: INTERNAL MEDICINE

## 2017-03-22 RX ORDER — HYDROCODONE BITARTRATE AND ACETAMINOPHEN 5; 325 MG/1; MG/1
1 TABLET ORAL EVERY 4 HOURS PRN
Qty: 15 TABLET | Refills: 0 | Status: SHIPPED | OUTPATIENT
Start: 2017-03-22 | End: 2017-06-15

## 2017-03-22 RX ORDER — CIPROFLOXACIN 250 MG/1
250 TABLET, FILM COATED ORAL 2 TIMES DAILY
Qty: 10 TABLET | Refills: 0 | Status: SHIPPED | OUTPATIENT
Start: 2017-03-22 | End: 2017-03-27

## 2017-03-22 RX ADMIN — ASPIRIN 81 MG CHEWABLE TABLET 81 MG: 81 TABLET CHEWABLE at 09:03

## 2017-03-22 RX ADMIN — PANTOPRAZOLE SODIUM 40 MG: 40 TABLET, DELAYED RELEASE ORAL at 09:03

## 2017-03-22 RX ADMIN — CEFTRIAXONE 1 G: 1 INJECTION, SOLUTION INTRAVENOUS at 02:03

## 2017-03-22 RX ADMIN — SENNOSIDES 1 TABLET: 8.6 TABLET, FILM COATED ORAL at 09:03

## 2017-03-22 RX ADMIN — HYDROCODONE BITARTRATE AND ACETAMINOPHEN 1 TABLET: 5; 325 TABLET ORAL at 11:03

## 2017-03-22 RX ADMIN — ENOXAPARIN SODIUM 30 MG: 100 INJECTION SUBCUTANEOUS at 05:03

## 2017-03-22 NOTE — PROGRESS NOTES
I completed the [place] assessment with Alethea at [phone] opt3. I faxed the signed and completed PASRR to [facility] at [phone]. I will follow up with obtaining pts emailed 142. Jaye Del Rosario LMSW

## 2017-03-22 NOTE — DISCHARGE INSTRUCTIONS
Admit to [facility]  Dx-   Patient Active Problem List   Diagnosis    GERD (gastroesophageal reflux disease)    Left knee injury    Essential hypertension    Fall     Diet- Cardiac     PT/OT eval and treat 5x's weekly  Fall Precautions   Up to chair with all meals

## 2017-03-22 NOTE — PLAN OF CARE
Problem: Physical Therapy Goal  Goal: Physical Therapy Goal  Goals to be met by: 2017     Patient will increase functional independence with mobility by performin. Sit to stand transfer with Minimal Assistance  2. Bed to chair transfer with Minimal Assistance using Rolling Walker  3. Gait x 25 feet with Moderate Assistance using Rolling Walker.   4. Lower extremity exercise program x10 reps per handout, with assistance as needed   Outcome: Ongoing (interventions implemented as appropriate)  Ambulated 25' ModA c/ RW    Therapy Student Statement        Talia Alberts  [id number]  3/22/2017     I certify that I was present in the room directing the student in service delivery and guiding them using my skilled judgement.  As the co-signing therapist, I have reviewed the student's documentation and am responsible for the treatment, assessment and plan. Andreea Madsen PTA.

## 2017-03-22 NOTE — PROGRESS NOTES
Progress Note  Hospital Medicine  Patient Name:Talia Alberts  MRN:  [id number]  Patient Class: OP- Observation  Admit Date: 3/19/2017  Length of Stay: 0 days  Expected Discharge Date:   Attending Physician: Ranjith Beebe MD  Primary Care Provider:  Michael Martin MD    SUBJECTIVE:     Principal Problem: Left knee injury  Initial history of present illness: Talia Alberts is a 89 y.o. Female with PMHx significant for HTN, osteoarthritis with knee replacements, and MI. She was admitted to the service with LEFT knee injury after sustaining a fall. She reports that she was getting out the vehicle after a day of shopping and her legs became weak and gave out causing her to fall forward with impact to her LEFT knee and LEFT face. She was unable to stand or walk after. She had a HA that has since resolved. In the ED she underwent CT head and maxillofacial as well as XR of the knees which were negative for acute findings. She was, however, unable to ambulate and her daughter felt she could not take her home in this condition. She reports her LEFT knee continues to be painful with movement rating it 7/10. She denies pain at rest. She states she does use a walker at home. She lives with her daughter. She has had a few recent falls-- a few weeks ago she fell out of the bed.    PMH/PSH/SH/FH/Meds: reviewed.    Symptoms/Review of Systems: Bright, feeling better, last evening had an episode during which she suffered chest pain, neck pain, shoulder and back pain. No shortness of breath, cough, chest pain or headache, fever or abdominal pain.     Diet:  Adequate intake.    Activity level: Up with assistance   Pain:  As above     OBJECTIVE:   Vital Signs (Most Recent):      Temp: 97 °F (36.1 °C) (03/22/17 0539)  Pulse: 64 (03/22/17 0539)  Resp: 16 (03/22/17 0539)  BP: 120/60 (03/22/17 0539)  SpO2: 99 % (03/22/17 0539)       Vital Signs Range (Last 24H):  Temp:  [97 °F (36.1 °C)-98.4 °F (36.9 °C)]   Pulse:  []   Resp:   [16-20]   BP: (103-187)/(53-96)   SpO2:  [96 %-99 %]     Weight: 50.8 kg (112 lb)  Body mass index is 21.87 kg/(m^2).    Intake/Output Summary (Last 24 hours) at 03/22/17 0612  Last data filed at 03/22/17 0500   Gross per 24 hour   Intake             2215 ml   Output                0 ml   Net             2215 ml     Physical Examination:  Constitutional: She is oriented to person, place, and time. She appears well-developed and well-nourished. No distress.   HENT:   Head: Normocephalic and >eft angle of eye /orbid bruising noted, no deformity  Eyes: Conjunctivae and EOM are normal. Pupils are equal, round, and reactive to light.   Neck: Normal range of motion. Neck supple. No thyromegaly present.   Cardiovascular: Normal rate, regular rhythm, normal heart sounds and intact distal pulses.   Pulmonary/Chest: Effort normal and breath sounds normal. No respiratory distress.   Abdominal: Soft. Bowel sounds are normal. She exhibits no distension.   Musculoskeletal: She exhibits edema and tenderness.   Impaired flexion of LEFT knee, pain with movement   Neurological: She is alert and oriented to person, place, and time. No cranial nerve deficit.   Skin: Skin is warm and dry.   Psychiatric: She has a normal mood and affect. Her behavior is normal. Judgment and thought content normal.   CBC:    Recent Labs  Lab 03/19/17 1918 03/20/17  0551 03/21/17  0544   WBC 8.50 13.00* 9.50   RBC 4.05 4.26 3.71*   HGB 11.6* 12.7 10.9*   HCT 35.9* 37.6 32.5*   * 135* 101*   MCV 89 88 88   MCH 28.6 29.8 29.3   MCHC 32.2 33.7 33.5   BMP    Recent Labs  Lab 03/19/17 1918 03/21/17  0544    97    134*   K 4.6 4.5    103   CO2 26 21*   BUN 16 16   CREATININE 0.9 0.8   CALCIUM 8.5* 8.3*      Diagnostic Results:  Microbiology Results (last 7 days)     Procedure Component Value Units Date/Time    Urine culture [[id number]] Collected:  03/21/17 1337    Order Status:  Sent Specimen:  Urine from Urine, Clean Catch Updated:   03/21/17 2038         Bilateral knee x-rays: Status post right and left total knee arthroplasties and left distal femoral ORIF.  Trace bilateral joint effusions.    CT Head:  1.  No acute intracranial process.  Moderate chronic ischemic microvascular change and multifocal chronic lacunar infarcts.  2.  No evidence for maxillofacial injury.    CT Maxillao-facial scan:  1.  No acute intracranial process.  Moderate chronic ischemic microvascular change and multifocal chronic lacunar infarcts.  2.  No evidence for maxillofacial injury.    Assessment/Plan:     Left knee injury  Without radiologic evidence of fracture. Continue PT, pain control.     Fall  Maintain fall precautions, PT to see.    UTI  Urine C/S. Continue IV Rocephin.     Essential hypertension  Chronic-uncontrolled, continue home regimen. Monitor BP closely and titrate for sustained BP control. PRN hydralazine as patient with elevated BP upon admit.      GERD (gastroesophageal reflux disease)  Chronic, continue PPI therapy.    Await SNF placement.    VTE Risk Mitigation         Ordered     enoxaparin injection 30 mg  Daily     Route:  Subcutaneous        03/20/17 0312     Medium Risk of VTE  Once      03/20/17 0312        Ranjith Beebe MD  Department of Hospital Medicine   [facility]

## 2017-03-22 NOTE — PROGRESS NOTES
Per Jane at [facility], she needs the PHN auth and a chest xray and then report can be called to [facility] at [phone]. [id number]- PHN auth . I updated pts nurse. Jaye Del Rosario LMSW

## 2017-03-22 NOTE — PLAN OF CARE
Problem: Patient Care Overview  Goal: Plan of Care Review  Outcome: Ongoing (interventions implemented as appropriate)  Awake and oriented to self and place. Up with one person assist to bedside commode. Voiding clear, yellow urine. x1 BM. Pain managed well with PRN medications. Patient remained afebrile during my shift. Free from falls during my shift. Bed low, brakes locked, SR up x2, family at bedside, call light within reach. Patient voices understanding of plan of care.

## 2017-03-22 NOTE — PROGRESS NOTES
I spoke to Jane at [facility] [employer] [phone] and she has spoken to the pts daughter and she is coming in to complete paperwork. I faxed 142, julia DINH orders, AVS, and prescriptions to [phone] and [phone]. Jaye Del Rosario LMSW

## 2017-03-22 NOTE — PT/OT/SLP PROGRESS
Physical Therapy  Treatment    Talia Alberts   MRN: [id number]   Admitting Diagnosis: Left knee injury    PT Received On: 03/22/17  PT Start Time: 1049     PT Stop Time: 1104    PT Total Time (min): 15 min       Billable Minutes:  Gait [id number]    Treatment Type: Treatment  PT/PTA: PTA     PTA Visit Number: 1       General Precautions: Standard, fall  Orthopedic Precautions: N/A   Braces: N/A         Subjective:  Communicated with Nurse Lambert prior to session.  Agreeable to therapy    Pain Rating: other (see comments) (pain not rated)  Location - Side: Left     Location: knee  Pain Addressed: Reposition, Nurse notified       Objective:   Patient found with: telemetry, peripheral IV    Functional Mobility:  Bed Mobility:   Rolling/Turning to Left: Contact guard assistance  Rolling/Turning Right: Contact guard assistance  Scooting/Bridging: Minimum Assistance  Supine to Sit: Contact Guard Assistance  Sit to Supine: Contact Guard Assistance    Transfers:  Sit <> Stand Assistance: Moderate Assistance  Sit <> Stand Assistive Device: Rolling Walker  Bed <> Chair Technique: Stand Pivot  Bed <> Chair Assistance: Moderate Assistance  Bed <> Chair Assistive Device: No Assistive Device    Gait:   Gait Distance: 25'  Assistance 1: Moderate assistance  Gait Assistive Device: Rolling walker  Gait Pattern: 3-point gait  Gait Deviation(s): decreased ying, increased time in double stance, decreased velocity of limb motion, decreased step length, decreased weight-shifting ability    Stairs:      Balance:   Static Sit: FAIR: Maintains without assist, but unable to take any challenges   Dynamic Sit: FAIR: Cannot move trunk without losing balance  Static Stand: POOR: Needs MODERATE assist to maintain  Dynamic stand: POOR: N/A     Therapeutic Activities and Exercises:  [place] [facility] [place]/ ModA. Ambulated 25' ModA c/ RW.      AM-PAC 6 CLICK MOBILITY  How much help from another person does this patient currently  need?   1 = Unable, Total/Dependent Assistance  2 = A lot, Maximum/Moderate Assistance  3 = A little, Minimum/Contact Guard/Supervision  4 = None, Modified [place]/Independent         AM-PAC Raw Score CMS G-Code Modifier Level of Impairment Assistance   6 % Total / Unable   7 - 9 CM 80 - 100% Maximal Assist   10 - 14 CL 60 - 80% Moderate Assist   15 - 19 CK 40 - 60% Moderate Assist   20 - 22 CJ 20 - 40% Minimal Assist   23 CI 1-20% SBA / CGA   24 CH 0% Independent/ Mod I     Patient left supine with all lines intact, call button in reach, Nurse Petra notified and daughter present.    Assessment:  Talia Alberts is a 89 y.o. female with a medical diagnosis of Left knee injury and presents with weakness, decreased endurance, and pain.    Rehab identified problem list/impairments: Rehab identified problem list/impairments: weakness, impaired endurance, impaired self care skills, decreased lower extremity function, pain    Rehab potential is good.    Activity tolerance: Fair    Discharge recommendations: Discharge Facility/Level Of Care Needs: nursing facility, skilled     Barriers to discharge:      Equipment recommendations: Equipment Needed After Discharge:  (TBD)     GOALS:   Physical Therapy Goals        Problem: Physical Therapy Goal    Goal Priority Disciplines Outcome Goal Variances Interventions   Physical Therapy Goal    High PT/OT, PT Ongoing (interventions implemented as appropriate)     Description:  Goals to be met by: 2017     Patient will increase functional independence with mobility by performin. Sit to stand transfer with Minimal Assistance  2. Bed to chair transfer with Minimal Assistance using Rolling Walker  3. Gait  x 25 feet with Moderate Assistance using Rolling Walker.   4. Lower extremity exercise program x10 reps per handout, with assistance as needed                PLAN:    Patient to be seen 6 x/week  to address the above listed problems via gait training,  therapeutic activities, therapeutic exercises  Plan of Care expires: 03/27/17  Plan of Care reviewed with: patient, daughter         Pili Godinez, SPTA  03/22/2017  Therapy Student Statement        Talia Alberts  [id number]  3/22/2017     I certify that I was present in the room directing the student in service delivery and guiding them using my skilled judgement.  As the co-signing therapist, I have reviewed the student's documentation and am responsible for the treatment, assessment and plan. Andreea Madsen PTA.

## 2017-03-22 NOTE — PROGRESS NOTES
Per Jane at [facility] [phone] they are able to accept the pt. She has not been able to get in touch with any family members. I called [facility] at [phone] and left a message for a return call. According to nursing staff the pts daughter is not in the pts room. Jaye Del Rosario LMSW

## 2017-03-22 NOTE — PROGRESS NOTES
I spoke to the pts son Alber at [phone] and he stated that he just spoke to his sister and she had been out running and was going to return the calls when she got home. I explained that the pt is ready for discharge and paperwork needed to be completed today to transfer to [facility]. He stated that he will call her and ask her to make it a priority to return our call. I updated EMRE Rosenbaum. Jaye Del Rosario LMSW

## 2017-03-22 NOTE — PROGRESS NOTES
PIV removed per MD order.  Intact and pressure applied.  No redness or swelling noted to site.  Patient discharged to [facility] with daughter.  Written and verbal discharge instructions given to patient and daughter.  Patient verbalized understanding of all instructions.  Patient left unit via /[facility] transport.  No complications.

## 2017-03-22 NOTE — PLAN OF CARE
Problem: Patient Care Overview  Goal: Plan of Care Review  Outcome: Ongoing (interventions implemented as appropriate)  Patient with uneventful night, slept well. VS stable. Receiving IV fluids and antibiotics, tolerating po fluids well. No complaints of pain to left knee, dressing dry and intact. Monitoring on Telemetry.

## 2017-03-22 NOTE — PROGRESS NOTES
EMRE Rosenbaum spoke to Jane bryson [place] at [phone] regarding the pts discharge plan. Jaye Del Rosario LMSW

## 2017-03-22 NOTE — PROGRESS NOTES
I received a call from Sameera at [facility] [phone] and they are able to accept the pt. However we are still unable to get in touch with family members. Jaye Del Rosario LMSW

## 2017-03-22 NOTE — DISCHARGE SUMMARY
"Discharge Summary  Hospital Medicine    Admit Date: 3/19/2017    Date and Time: 3/22/99372:56 PM    Discharge Attending Physician: Ranjith Beebe MD    Primary Care Physician: Michael Martin MD    Diagnoses:  Active Hospital Problems    Diagnosis  POA    *Left knee injury [S89.92XA]  Yes    Essential hypertension [I10]  Yes    Fall [W19.XXXA]  Yes    GERD (gastroesophageal reflux disease) [K21.9]  Yes      Resolved Hospital Problems    Diagnosis Date Resolved POA   No resolved problems to display.     Discharged Condition: Good    Hospital Course:   Talia Alberts is a 89 y.o. female presenting to the E.D. with an acute onset of a fal which occurred prior to arrival tonight. Pt reported when attempting to get out of vehicle her legs "gave out on her" and she collapsed to the ground. She has complaints of bilateral knee pain following fall. Pain was exacerbated with bearing weight and movement and there are no alleviating factors. Denies CP, SOB, numbness, dysuria. She noted mild weakness to legs and recently underwent bilateral knee replacement surgery. No LOC. Pmhx of  has a past medical history of Cancer; HTN; and MI.  Pt has a past surgical history that includes Cardiac surgery and Fracture surgery. Patient was admitted to Hospitalist medicine service. Patient was noted to be weak and had difficulty getting out of bed. With PT, patient is improving and ambulating. SNF is arranged. Fall precautions discussed with patient. Patient is being treated for UTI with antibiotics. Patient was discharged home in stable condition with following discharge plan of care.     Consults: None    Significant Diagnostic Studies: CXR: Borderline heart size.  Prior sternotomy.  Atherosclerosis.  Prior left axillary surgery otherwise negative chest    Bilateral knee x-ray: Status post right and left total knee arthroplasties and left distal femoral ORIF.  Trace bilateral joint effusions.    CT Head: 1.  No acute intracranial " process.  Moderate chronic ischemic microvascular change and multifocal chronic lacunar infarcts.  2.  No evidence for maxillofacial injury.    CT maxillofacial scan: 1.  No acute intracranial process.  Moderate chronic ischemic microvascular change and multifocal chronic lacunar infarcts.  2.  No evidence for maxillofacial injury.    Microbiology Results (last 7 days)     Procedure Component Value Units Date/Time    Urine culture [[id number]] Collected:  03/21/17 7827    Order Status:  Sent Specimen:  Urine from Urine, Clean Catch Updated:  03/21/17 2038        Special Treatments/Procedures: None  Disposition: SNF    Medications:  Reconciled Home Medications: Current Discharge Medication List      START taking these medications    Details   ciprofloxacin HCl (CIPRO) 250 MG tablet Take 1 tablet (250 mg total) by mouth 2 (two) times daily.  Qty: 10 tablet, Refills: 0      hydrocodone-acetaminophen 5-325mg (NORCO) 5-325 mg per tablet Take 1 tablet by mouth every 4 (four) hours as needed.  Qty: 15 tablet, Refills: 0         CONTINUE these medications which have NOT CHANGED    Details   acetaminophen (TYLENOL) 500 MG tablet Take 1,000 mg by mouth every 6 (six) hours as needed.      alendronate (FOSAMAX) 70 MG tablet Take 70 mg by mouth every 7 days.      aspirin 81 MG Chew Take 81 mg by mouth once daily.      benzonatate (TESSALON) 100 MG capsule Take 2 capsules (200 mg total) by mouth 3 (three) times daily as needed for Cough.  Qty: 15 capsule, Refills: 0      escitalopram oxalate (LEXAPRO) 10 MG tablet Take 10 mg by mouth every evening.      losartan (COZAAR) 25 MG tablet Take 25 mg by mouth nightly.      pantoprazole (PROTONIX) 40 MG tablet Take 40 mg by mouth once daily.      senna (SENOKOT) 8.6 mg tablet Take 1 tablet by mouth once daily.      simvastatin (ZOCOR) 40 MG tablet Take 40 mg by mouth every evening.      trospium (SANCTURA) 20 mg Tab tablet Take 20 mg by mouth nightly.             Discharge Procedure  Orders  Diet general   Order Comments: Cardiac/ 2 gram sodium low cholesterol diet     Other restrictions (specify):   Order Comments: Fall precautions     Call MD for:   Order Comments: For worsening symptoms, chest pain, shortness of breath, increased abdominal pain, high grade fever, stroke or stroke like symptoms, immediately go to the nearest Emergency Room or call 911 as soon as possible.       Follow-up Information     Follow up with Michael Martin MD On 3/29/2017.    Specialty:  Family Medicine    Why:  @9:00am     Contact information:    [address] [address]  [phone]          Follow up with [facility].    Why:  SNF, Nursing Home    Contact information:    [address] [address]  [phone]

## 2017-03-22 NOTE — PROGRESS NOTES
I received the pts approved 142 via email. Copy placed in the pts blue folder. Jaye Del Rosario LMSW

## 2017-03-22 NOTE — PROGRESS NOTES
I spoke to Sameera at [facility] [phone] and she stated that they are still reviewing it and will update me soon.  I also left a message for Jane at [facility] via [employer]. Jaye Del Rosario LMSW

## 2017-03-23 LAB — BACTERIA UR CULT: NO GROWTH

## 2017-03-24 NOTE — PLAN OF CARE
03/24/17 0846   Final Note   Assessment Type Discharge Planning Assessment   Discharge Disposition SNF   Discharge planning education complete? Yes

## 2017-06-11 ENCOUNTER — HOSPITAL ENCOUNTER (EMERGENCY)
Facility: HOSPITAL | Age: 82
Discharge: HOME | End: 2017-06-11
Attending: EMERGENCY MEDICINE
Payer: MEDICARE

## 2017-06-11 VITALS
RESPIRATION RATE: 16 BRPM | DIASTOLIC BLOOD PRESSURE: 86 MMHG | HEART RATE: 55 BPM | TEMPERATURE: 98 F | SYSTOLIC BLOOD PRESSURE: 203 MMHG | OXYGEN SATURATION: 100 %

## 2017-06-11 DIAGNOSIS — S61.411A LACERATION OF RIGHT HAND WITHOUT FOREIGN BODY, INITIAL ENCOUNTER: ICD-10-CM

## 2017-06-11 DIAGNOSIS — S09.90XA CLOSED HEAD INJURY, INITIAL ENCOUNTER: Primary | ICD-10-CM

## 2017-06-11 DIAGNOSIS — S41.111A SKIN TEAR OF RIGHT UPPER ARM WITHOUT COMPLICATION, INITIAL ENCOUNTER: ICD-10-CM

## 2017-06-11 PROCEDURE — 25000003 PHARM REV CODE 250: Performed by: EMERGENCY MEDICINE

## 2017-06-11 PROCEDURE — 99284 EMERGENCY DEPT VISIT MOD MDM: CPT

## 2017-06-11 RX ADMIN — BACITRACIN ZINC NEOMYCIN SULFATE POLYMYXIN B SULFATE: 400; 3.5; 5 OINTMENT TOPICAL at 08:06

## 2017-06-12 NOTE — ED NOTES
Pt wounds cleans and dressed with dry sterile dressings. Antibiotic ointment applied, pt tolerated well.

## 2017-06-12 NOTE — ED NOTES
Pt presents to ER after falling yesterday. Small puncture wound noted to R forehead, skin tear to R upper arm, and a bruise and minor scrape to R knee. Pt denies losing consciousness.

## 2017-06-12 NOTE — ED PROVIDER NOTES
Encounter Date: 6/11/2017    SCRIBE #1 NOTE: I, Sonia Cameron, am scribing for, and in the presence of, Dr. Lizama.       History     Chief Complaint   Patient presents with    Fall     Slipped when she turned too quick after feeding the birds. Laceration to right forhead, right pinky finger and skin tear to upper right arm. Fell last night. No LOC     Review of patient's allergies indicates:   Allergen Reactions    Morphine Swelling     06/11/2017  7:54 PM     Chief Complaint: Fall      The patient is a 89 y.o. female who has a PMHx of cancer; hypertension; and MI is presenting c/o sudden onset of a fall yesterday. She reports hitting her head and skin tears to her right hand and right arm. She denies vomiting, or visual disturbances. She received Hydrocodone after her fall. Pt has a PSHx that includes cardiac surgery and fracture surgery.        The history is provided by the patient and a relative.     Past Medical History:   Diagnosis Date    Cancer     Hypertension     MI (myocardial infarction)      Past Surgical History:   Procedure Laterality Date    CARDIAC SURGERY      FRACTURE SURGERY       History reviewed. No pertinent family history.  Social History   Substance Use Topics    Smoking status: Never Smoker    Smokeless tobacco: Not on file    Alcohol use No     Review of Systems   Constitutional: Negative for fever.   HENT: Negative for sore throat.         + Head injury.    Eyes: Negative for visual disturbance.   Respiratory: Negative for shortness of breath.    Cardiovascular: Negative for chest pain.   Gastrointestinal: Negative for nausea and vomiting.   Genitourinary: Negative for dysuria.   Musculoskeletal: Negative for back pain.   Skin: Positive for wound (right arm and right hand). Negative for rash.   Neurological: Negative for weakness and headaches.   Hematological: Does not bruise/bleed easily.       Physical Exam     Initial Vitals [06/11/17 1933]   BP Pulse Resp Temp SpO2    (!) 129/59 61 16 97.9 °F (36.6 °C) 99 %     Physical Exam    Nursing note and vitals reviewed.  Constitutional: She appears well-developed and well-nourished.   HENT:   1 cm right frontal laceration.    Eyes: EOM are normal. Pupils are equal, round, and reactive to light.   Neck: Normal range of motion. Neck supple. No tracheal deviation present.   No midline c-spine tenderness.    Cardiovascular: Normal rate, regular rhythm, normal heart sounds and intact distal pulses.   No murmur heard.  Pulmonary/Chest: Breath sounds normal. She has no wheezes. She has no rhonchi. She has no rales.   Abdominal: Soft. She exhibits no distension. There is no tenderness. There is no guarding.   Musculoskeletal: Normal range of motion.   No other extremities tender to palpation.   Full ROM at the mcp pip and dip of 5th finger.    Neurological: She displays normal reflexes. No cranial nerve deficit or sensory deficit.   CN 3-12 intact.   5/5 strength and sensations.    Skin: Capillary refill takes less than 2 seconds. No rash noted.   Skin tear to right humerus region with tenderness.   Laceration to 5th MCP, no associated bony tenderness.    Psychiatric: She has a normal mood and affect.         ED Course   Procedures  Labs Reviewed - No data to display                     Scribe Attestation:   Scribe #1: I performed the above scribed service and the documentation accurately describes the services I performed. I attest to the accuracy of the note.    Attending Attestation:           Physician Attestation for Scribe:  Physician Attestation Statement for Scribe #1: I, Dr. Lizama, reviewed documentation, as scribed by Sonia Cameron in my presence, and it is both accurate and complete.         Talia Alberts is a 89 y.o. female presenting with close head injury and mechanical fall yesterday.  Several lacerations are present including on the forehead and hand with increased risk of infection with closure.  Son and patient elect  for healing by secondary intention.  There are aware of the likelihood of increased scarring.  No sign of acute fracture dislocation with fall.  Likely right knee contusion without difficulty ambulating or significant tenderness to palpation.  I do not think further x-rays indicated.  Possibility of meniscal ligamentous injury discussed with follow-up if not resolved with rest.  Fall precautions reviewed.  Nonfocal neuro exam w/o sign of ICH on HCT done due to age.  Follow up with PCP.  Detailed return precautions reviewed.          ED Course   Comment By Time   XR R humerus:  Chronic-appearing proximal humeral fx seen on prior CXR with no acute fx/dislocation. (my read) Grady Lizama MD 06/11 2034   CT-H:  NAD. (rad read) Grady Lizama MD 06/11 2106     Clinical Impression:   The primary encounter diagnosis was Closed head injury, initial encounter. Diagnoses of Laceration of right hand without foreign body, initial encounter and Skin tear of right upper arm without complication, initial encounter were also pertinent to this visit.          Grady Lizama MD  06/12/17 6646

## 2017-06-15 ENCOUNTER — OFFICE VISIT (OUTPATIENT)
Dept: DERMATOLOGY | Facility: CLINIC | Age: 82
End: 2017-06-15
Payer: MEDICARE

## 2017-06-15 VITALS — WEIGHT: 112 LBS | BODY MASS INDEX: 21.99 KG/M2 | HEIGHT: 60 IN

## 2017-06-15 DIAGNOSIS — D48.9 NEOPLASM OF UNCERTAIN BEHAVIOR: Primary | ICD-10-CM

## 2017-06-15 DIAGNOSIS — S01.81XA LACERATION OF FOREHEAD, INITIAL ENCOUNTER: ICD-10-CM

## 2017-06-15 DIAGNOSIS — L57.0 ACTINIC KERATOSES: ICD-10-CM

## 2017-06-15 DIAGNOSIS — Z85.828 HISTORY OF SKIN CANCER: ICD-10-CM

## 2017-06-15 PROCEDURE — 1159F MED LIST DOCD IN RCRD: CPT | Mod: S$GLB,,, | Performed by: DERMATOLOGY

## 2017-06-15 PROCEDURE — 1157F ADVNC CARE PLAN IN RCRD: CPT | Mod: S$GLB,,, | Performed by: DERMATOLOGY

## 2017-06-15 PROCEDURE — 17003 DESTRUCT PREMALG LES 2-14: CPT | Mod: S$GLB,,, | Performed by: DERMATOLOGY

## 2017-06-15 PROCEDURE — 1126F AMNT PAIN NOTED NONE PRSNT: CPT | Mod: S$GLB,,, | Performed by: DERMATOLOGY

## 2017-06-15 PROCEDURE — 88305 TISSUE EXAM BY PATHOLOGIST: CPT | Performed by: PATHOLOGY

## 2017-06-15 PROCEDURE — 17000 DESTRUCT PREMALG LESION: CPT | Mod: S$GLB,,, | Performed by: DERMATOLOGY

## 2017-06-15 PROCEDURE — 99999 PR PBB SHADOW E&M-EST. PATIENT-LVL III: CPT | Mod: PBBFAC,,, | Performed by: DERMATOLOGY

## 2017-06-15 PROCEDURE — 99201 PR OFFICE/OUTPT VISIT,NEW,LEVL I: CPT | Mod: 25,S$GLB,, | Performed by: DERMATOLOGY

## 2017-06-15 PROCEDURE — 11100 PR BIOPSY OF SKIN LESION: CPT | Mod: 59,S$GLB,, | Performed by: DERMATOLOGY

## 2017-06-15 PROCEDURE — 11101 PR BIOPSY, EACH ADDED LESION: CPT | Mod: S$GLB,,, | Performed by: DERMATOLOGY

## 2017-06-15 RX ORDER — METOPROLOL SUCCINATE 25 MG/1
25 TABLET, EXTENDED RELEASE ORAL DAILY
COMMUNITY
Start: 2017-06-06

## 2017-06-15 RX ORDER — AMLODIPINE BESYLATE 5 MG/1
5 TABLET ORAL DAILY
COMMUNITY
End: 2017-07-24

## 2017-06-15 NOTE — PROGRESS NOTES
Subjective:       Patient ID:  Talia Alberts is a 89 y.o. female who presents for   Chief Complaint   Patient presents with    Warts     R hand, index finger     Initial visit  H/o skin cancer on arms, removed by Dr Chavez, unknown date and type        Warts  - Initial  Affected locations: right hand and left hand (R wrist)  Duration: unsure of duration   Signs / symptoms: scaling (raised)  Severity: mild  Timing: constant  Aggravated by: nothing  Relieving factors/Treatments tried: nothing        Review of Systems   Constitutional: Negative for fever, chills, weight loss, weight gain and night sweats.   Skin: Positive for dry skin. Negative for activity-related sunscreen use and wears hat.   Hematologic/Lymphatic: Bruises/bleeds easily.        Objective:    Physical Exam   Constitutional: She appears well-developed and well-nourished. No distress.   Neurological: She is alert and oriented to person, place, and time. She is not disoriented.   Psychiatric: She has a normal mood and affect.   Skin:   Areas Examined (abnormalities noted in diagram):   Head / Face Inspection Performed  Nails and Digits Inspection Performed             Diagram Legend     Erythematous scaling macule/papule c/w actinic keratosis       Vascular papule c/w angioma      Pigmented verrucoid papule/plaque c/w seborrheic keratosis      Yellow umbilicated papule c/w sebaceous hyperplasia      Irregularly shaped tan macule c/w lentigo     1-2 mm smooth white papules consistent with Milia      Movable subcutaneous cyst with punctum c/w epidermal inclusion cyst      Subcutaneous movable cyst c/w pilar cyst      Firm pink to brown papule c/w dermatofibroma      Pedunculated fleshy papule(s) c/w skin tag(s)      Evenly pigmented macule c/w junctional nevus     Mildly variegated pigmented, slightly irregular-bordered macule c/w mildly atypical nevus      Flesh colored to evenly pigmented papule c/w intradermal nevus       Pink pearly  papule/plaque c/w basal cell carcinoma      Erythematous hyperkeratotic cursted plaque c/w SCC      Surgical scar with no sign of skin cancer recurrence      Open and closed comedones      Inflammatory papules and pustules      Verrucoid papule consistent consistent with wart     Erythematous eczematous patches and plaques     Dystrophic onycholytic nail with subungual debris c/w onychomycosis     Umbilicated papule    Erythematous-base heme-crusted tan verrucoid plaque consistent with inflamed seborrheic keratosis     Erythematous Silvery Scaling Plaque c/w Psoriasis     See annotation                  Assessment / Plan:      Pathology Orders:      Normal Orders This Visit    Tissue Specimen To Pathology, Dermatology     Questions:    Directional Terms:  Other(comment)    Clinical information:  1/3- R dorsal wrist, 2/3- R index finger, 3/3- left dorsal hand, ALL THREE: Hyperkeratotic warty pink papules, SCC vs other    Specific Site:  1/3- R dorsal wrist, 2/3- R index finger, 3/3- left dorsal hand,        Neoplasm of uncertain behavior  -     Tissue Specimen To Pathology, Dermatology  Shave biopsy procedure note:x3    Shave biopsy performed after verbal consent including risk of infection, scar, recurrence, need for additional treatment of site. Area prepped with alcohol, anesthetized with approximately 1.0cc of 1% lidocaine with epinephrine. Lesional tissue shaved with razor blade. Hemostasis achieved with application of aluminum chloride followed by hyfrecation. No complications. Dressing applied. Wound care explained.    Actinic keratoses  Cryosurgery Procedure Note    Verbal consent from the patient is obtained and the patient is aware of the precancerous quality and need for treatment of these lesions. Liquid nitrogen cryosurgery is applied to the 3 actinic keratoses, as detailed in the physical exam, to produce a freeze injury. The patient is aware that blisters may form and is instructed on wound care with  gentle cleansing and use of vaseline ointment to keep moist until healed. The patient is supplied a handout on cryosurgery and is instructed to call if lesions do not completely resolve.    Laceration of forehead, initial encounter  Continue wound care with plain vaseline  Okay to shower daily, pat dry and cover with vaseline/bandaid    Patient instructed in importance in daily sun protection of at least spf 30. Sun avoidance and topical protection discussed.   Recommend Elta MD (physician office) COTZ sensitive (available online) for daily use on face and neck.  Patient encouraged to wear hat for all outdoor exposure.   Also discussed sun protective clothing.               Return in about 6 months (around 12/15/2017).

## 2017-06-15 NOTE — PATIENT INSTRUCTIONS
Shave Biopsy Wound Care    Your doctor has performed a shave biopsy today.  A band aid and vaseline ointment has been placed over the site.  This should remain in place for 24 hours.  It is recommended that you keep the area dry for the first 24 hours.  After 24 hours, you may remove the band aid and wash the area with warm soap and water and apply Vaseline jelly.  Many patients prefer to use Neosporin or Bacitracin ointment.  This is acceptable; however, know that you can develop an allergy to this medication even if you have used it safely for years.  It is important to keep the area moist.  Letting it dry out and get air slows healing time, and will worsen the scar.  Band aid is optional after first 24 hours.      If you notice increasing redness, tenderness, pain, or yellow drainage at the biopsy site, please notify your doctor.  These are signs of an infection.    If your biopsy site is bleeding, apply firm pressure for 15 minutes straight.  Repeat for another 15 minutes, if it is still bleeding.   If the surgical site continues to bleed, then please contact your doctor.       [facility]  [facility]  [address]  [place] [address]  Dept: [phone]       CRYOSURGERY      Your doctor has used a method called cryosurgery to treat your skin condition. Cryosurgery refers to the use of very cold substances to treat a variety of skin conditions such as warts, pre-skin cancers, molluscum contagiosum, sun spots, and several benign growths. The substance we use in cryosurgery is liquid nitrogen and is so cold (-195 degrees Celsius) that is burns when administered.     Following treatment in the office, the skin may immediately burn and become red. You may find the area around the lesion is affected as well. It is sometimes necessary to treat not only the lesion, but a small area of the surrounding normal skin to achieve a good response.     A blister, and even a blood filled blister,  may form after treatment.   This is a normal response. If the blister is painful, it is acceptable to sterilize a needle and with rubbing alcohol and gently pop the blister. It is important that you gently wash the area with soap and warm water as the blister fluid may contain wart virus if a wart was treated. Do no remove the roof of the blister.     The area treated can take anywhere from 1-3 weeks to heal. Healing time depends on the kind skin lesion treated, the location, and how aggressively the lesion was treated. It is recommended that the areas treated are covered with Vaseline or bacitracin ointment and a band-aid. If a band-aid is not practical, just ointment applied several times per day will do. Keeping these areas moist will speed the healing time.    Treatment with liquid nitrogen can leave a scar. In dark skin, it may be a light or dark scar, in light skin it may be a white or pink scar. These will generally fade with time, but may never go away completely.     If you have any concerns after your treatment, please feel free to call the office.         [facility]  [facility]  [address] [place] [address]  Dept: [phone]

## 2017-06-15 NOTE — LETTER
Gaye 15, 2017      Michael Martin MD  [address]  [place] [address]           [place] [facility]  [address]  [place] [address]  Phone: [phone]          Patient: Talia Alberts   MR Number: [id number]   YOB: 1926   Date of Visit: 6/15/2017       Dear Dr. Michael Martin:    Thank you for referring Talia Alberts to me for evaluation. Attached you will find relevant portions of my assessment and plan of care.    If you have questions, please do not hesitate to call me. I look forward to following Talia Alberts along with you.    Sincerely,    Eugenia Kiran MD    Enclosure  CC:  No Recipients    If you would like to receive this communication electronically, please contact [email or web address] or [phone] to request more information on [facility] Link access.    For providers and/or their staff who would like to refer a patient to [facility], please contact us through our one-stop-shop provider referral line, [facility], at [phone].    If you feel you have received this communication in error or would no longer like to receive these types of communications, please e-mail [email or web address]

## 2017-06-23 DIAGNOSIS — D09.9 SQUAMOUS CELL CARCINOMA IN SITU: Primary | ICD-10-CM

## 2017-06-23 RX ORDER — FLUOROURACIL 50 MG/G
CREAM TOPICAL
Qty: 40 G | Refills: 0 | Status: ON HOLD | OUTPATIENT
Start: 2017-06-23 | End: 2017-09-21

## 2017-06-26 ENCOUNTER — TELEPHONE (OUTPATIENT)
Dept: ORTHOPEDICS | Facility: CLINIC | Age: 82
End: 2017-06-26

## 2017-06-26 NOTE — TELEPHONE ENCOUNTER
----- Message from Josy Lai sent at 6/26/2017 12:15 PM CDT -----  Contact: HAFSA GARCIA Dr's office called and wanted to schedule an appt on 6/29/17 for Talia Alberts but I was unable to schedule.  Pt has a hairline fracture of right shoulder (she does have the xray disc) and uses a walker to get around.  Please call pt to schedule an appt @ [phone].  Thanks

## 2017-07-05 DIAGNOSIS — M25.511 RIGHT SHOULDER PAIN, UNSPECIFIED CHRONICITY: Primary | ICD-10-CM

## 2017-07-06 ENCOUNTER — OFFICE VISIT (OUTPATIENT)
Dept: ORTHOPEDICS | Facility: CLINIC | Age: 82
End: 2017-07-06
Payer: MEDICARE

## 2017-07-06 ENCOUNTER — HOSPITAL ENCOUNTER (OUTPATIENT)
Dept: RADIOLOGY | Facility: HOSPITAL | Age: 82
Discharge: HOME | End: 2017-07-06
Attending: ORTHOPAEDIC SURGERY
Payer: MEDICARE

## 2017-07-06 VITALS
BODY MASS INDEX: 21.99 KG/M2 | HEIGHT: 60 IN | SYSTOLIC BLOOD PRESSURE: 149 MMHG | WEIGHT: 112 LBS | DIASTOLIC BLOOD PRESSURE: 64 MMHG | HEART RATE: 66 BPM

## 2017-07-06 DIAGNOSIS — M19.011 PRIMARY OSTEOARTHRITIS OF RIGHT SHOULDER: Primary | ICD-10-CM

## 2017-07-06 DIAGNOSIS — M25.511 RIGHT SHOULDER PAIN, UNSPECIFIED CHRONICITY: ICD-10-CM

## 2017-07-06 PROCEDURE — 99999 PR PBB SHADOW E&M-EST. PATIENT-LVL III: CPT | Mod: PBBFAC,,, | Performed by: ORTHOPAEDIC SURGERY

## 2017-07-06 PROCEDURE — 1159F MED LIST DOCD IN RCRD: CPT | Mod: S$GLB,,, | Performed by: ORTHOPAEDIC SURGERY

## 2017-07-06 PROCEDURE — 99214 OFFICE O/P EST MOD 30 MIN: CPT | Mod: S$GLB,,, | Performed by: ORTHOPAEDIC SURGERY

## 2017-07-06 PROCEDURE — 1125F AMNT PAIN NOTED PAIN PRSNT: CPT | Mod: S$GLB,,, | Performed by: ORTHOPAEDIC SURGERY

## 2017-07-06 PROCEDURE — 1157F ADVNC CARE PLAN IN RCRD: CPT | Mod: S$GLB,,, | Performed by: ORTHOPAEDIC SURGERY

## 2017-07-06 NOTE — LETTER
July 8, 2017      Michael Martin MD  [address]  [facility] [address]           [address] [address]  Phone: [phone]          Patient: Talia Alberts   MR Number: [id number]   YOB: 1926   Date of Visit: 7/6/2017       Dear Dr. Michael Martin:    Thank you for referring Talia Alberts to me for evaluation. Attached you will find relevant portions of my assessment and plan of care.    If you have questions, please do not hesitate to call me. I look forward to following Talia Alberts along with you.    Sincerely,    Baljinder Roger MD    Enclosure  CC:  No Recipients    If you would like to receive this communication electronically, please contact [email or web address] or [phone] to request more information on [facility] Link access.    For providers and/or their staff who would like to refer a patient to [facility], please contact us through our one-stop-shop provider referral line, [facility], at [phone].    If you feel you have received this communication in error or would no longer like to receive these types of communications, please e-mail [email or web address]

## 2017-07-08 PROBLEM — M19.011 PRIMARY OSTEOARTHRITIS OF RIGHT SHOULDER: Status: ACTIVE | Noted: 2017-07-08
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
## 2017-07-08 PROBLEM — S89.92XA LEFT KNEE INJURY: Status: RESOLVED | Noted: 2017-03-19 | Resolved: 2017-07-08

## 2017-07-08 NOTE — PROGRESS NOTES
Past Medical History:   Diagnosis Date    Cancer     Hypertension     MI (myocardial infarction)     Skin cancer unsure    L and R arm, was removed by dr christianson       Past Surgical History:   Procedure Laterality Date    CARDIAC SURGERY      FRACTURE SURGERY         Current Outpatient Prescriptions   Medication Sig    acetaminophen (TYLENOL) 500 MG tablet Take 1,000 mg by mouth every 6 (six) hours as needed.    amlodipine (NORVASC) 5 MG tablet Take 5 mg by mouth once daily.    aspirin 81 MG Chew Take 81 mg by mouth once daily.    escitalopram oxalate (LEXAPRO) 10 MG tablet Take 10 mg by mouth every evening.    fluorouracil (EFUDEX) 5 % cream Thin film to AA R dorsal wrist, R index finger and left dorsal hand BID x 4 weeks. Avoid transfer to other sites (especially eyes)    losartan (COZAAR) 25 MG tablet Take 25 mg by mouth nightly.    metoprolol succinate (TOPROL-XL) 25 MG 24 hr tablet     pantoprazole (PROTONIX) 40 MG tablet Take 40 mg by mouth once daily.    simvastatin (ZOCOR) 40 MG tablet Take 40 mg by mouth every evening.    trospium (SANCTURA) 20 mg Tab tablet Take 20 mg by mouth nightly.     No current facility-administered medications for this visit.        Review of patient's allergies indicates:   Allergen Reactions    Morphine Swelling       Family History   Problem Relation Age of Onset    Melanoma Neg Hx     Psoriasis Neg Hx     Lupus Neg Hx     Eczema Neg Hx        Social History     Social History    Marital status:      Spouse name: N/A    Number of children: N/A    Years of education: N/A     Occupational History    Not on file.     Social History Main Topics    Smoking status: Never Smoker    Smokeless tobacco: Never Used    Alcohol use No    Drug use: No    Sexual activity: No     Other Topics Concern    Not on file     Social History Narrative    No narrative on file       Chief Complaint:   Chief Complaint   Patient presents with    Shoulder Pain      right shoulder pain        History of present illness: Is a 89-year-old female seen for right shoulder pain.  Patient had a fall about a month ago directly onto the right shoulder.  She was seen in urgent care.  They thought she possibly had a hairline fracture.  Patient refuses to wear her sling.  Pain as a 4 out of 10.  Pain reaching above her head or across her body.  Pain radiates into the biceps at times.  Patient has a history of shoulder arthritis as well.      Review of Systems:    Constitution: Negative for chills, fever, and sweats.  Negative for unexplained weight loss.    HENT:  Negative for headaches and blurry vision.    Cardiovascular:Negative for chest pain or irregular heart beat. Negative for hypertension.    Respiratory:  Negative for cough and shortness of breath.    Gastrointestinal: Negative for abdominal pain, heartburn, melena, nausea, and vomitting.    Genitourinary:  Negative bladder incontinence and dysuria.    Musculoskeletal:  See HPI    Neurological: Negative for numbness.    Psychiatric/Behavioral: Negative for depression.  The patient is not nervous/anxious.      Endocrine: Negative for polyuria    Hematologic/Lymphatic: Negative for bleeding problem.  Does not bruise/bleed easily.    Skin: Negative for poor would healing and rash      Physical Examination:    Vital Signs:    Vitals:    07/06/17 1527   BP: (!) 149/64   Pulse: 66       Body mass index is 21.87 kg/m².    This a well-developed, well nourished patient in no acute distress.  They are alert and oriented and cooperative to examination.  Pt. walks without an antalgic gait.      Examination of the right shoulder shows no rashes or erythema. There are no masses, ecchymosis, or atrophy. The patient has decent range of motion in forward flexion, external rotation, and internal rotation to the hip. The patient has moderately positive impingement signs.   2+ radial pulse. Intact axillary, radial, median and ulnar sensation. 5 out  of 5 resisted forward flexion, external rotation, and negative lift off test.    Examination of the left shoulder shows no rashes or erythema. There are no masses, ecchymosis, or atrophy. The patient has full range of motion in forward flexion, external rotation, and internal rotation to the mid T-spine. The patient has - impingement signs.  Passive range of motion: Forward flexion of 180°, external rotation at 90° of 90°, internal rotation of 50°, and external rotation at 0° of 50°. 2+ radial pulse. Intact axillary, radial, median and ulnar sensation. 5 out of 5 resisted forward flexion, external rotation, and negative lift off test.        X-rays: X-rays of the right shoulder are brought in from outside facility which show possible hairline fracture of the humeral neck with some underlying arthritic changes of the glenohumeral joint     Assessment:: Right shoulder arthritis  Right proximal humerus fracture    Plan:  I reviewed the x-rays with her and her daughter today.  I recommended starting some home health occupational therapy for range of motion.  I will see her back in 6 weeks with x-rays of the right shoulder.    This note was created using Dragon voice recognition software that occasionally misinterpreted phrases or words.    Consult note is delivered via Epic messaging service.

## 2017-07-24 ENCOUNTER — OFFICE VISIT (OUTPATIENT)
Dept: DERMATOLOGY | Facility: CLINIC | Age: 82
End: 2017-07-24
Payer: MEDICARE

## 2017-07-24 ENCOUNTER — TELEPHONE (OUTPATIENT)
Dept: DERMATOLOGY | Facility: CLINIC | Age: 82
End: 2017-07-24

## 2017-07-24 VITALS — HEIGHT: 60 IN | WEIGHT: 112 LBS | BODY MASS INDEX: 21.99 KG/M2

## 2017-07-24 DIAGNOSIS — D04.9 SQUAMOUS CELL CARCINOMA IN SITU OF SKIN: Primary | ICD-10-CM

## 2017-07-24 PROCEDURE — 1159F MED LIST DOCD IN RCRD: CPT | Mod: S$GLB,,, | Performed by: DERMATOLOGY

## 2017-07-24 PROCEDURE — 99999 PR PBB SHADOW E&M-EST. PATIENT-LVL II: CPT | Mod: PBBFAC,,, | Performed by: DERMATOLOGY

## 2017-07-24 PROCEDURE — 99213 OFFICE O/P EST LOW 20 MIN: CPT | Mod: S$GLB,,, | Performed by: DERMATOLOGY

## 2017-07-24 PROCEDURE — 1126F AMNT PAIN NOTED NONE PRSNT: CPT | Mod: S$GLB,,, | Performed by: DERMATOLOGY

## 2017-07-24 PROCEDURE — 1157F ADVNC CARE PLAN IN RCRD: CPT | Mod: S$GLB,,, | Performed by: DERMATOLOGY

## 2017-07-24 RX ORDER — ALENDRONATE SODIUM 70 MG/1
70 TABLET ORAL
Refills: 3 | COMMUNITY
Start: 2017-06-23

## 2017-07-24 RX ORDER — MUPIROCIN 20 MG/G
OINTMENT TOPICAL
Qty: 30 G | Refills: 0 | Status: SHIPPED | OUTPATIENT
Start: 2017-07-24 | End: 2017-10-24

## 2017-07-24 RX ORDER — MUPIROCIN 20 MG/G
OINTMENT TOPICAL 3 TIMES DAILY
Status: DISCONTINUED | OUTPATIENT
Start: 2017-07-24 | End: 2017-07-24

## 2017-07-24 NOTE — TELEPHONE ENCOUNTER
----- Message from Maddie Gibson sent at 7/24/2017 10:45 AM CDT -----  Pt is at Dr Martin's office now ... Asking for biopsy reports to be faxed [phone] / clayton Mckenzie  [phone]

## 2017-07-24 NOTE — PROGRESS NOTES
Subjective:       Patient ID:  Talia Alberts is a 89 y.o. female who presents for   Chief Complaint   Patient presents with    Follow-up     1 month chemo follow up      Patient seen 6/15, 3 lesions bx on hands,   Treated with efudex BID x 4 weeks, robust reaction    FINAL PATHOLOGIC DIAGNOSIS  1. Skin, right dorsal wrist, shave biopsy:  - SQUAMOUS CELL CARCINOMA IN SITU.  - THE TUMOR EXTENDS TO THE LATERAL MARGINS IN THE PLANES OF SECTION.    2. Skin, right index finger, shave biopsy:  - SQUAMOUS CELL CARCINOMA IN SITU.  - THE TUMOR EXTENDS TO A LATERAL MARGIN IN THE PLANES OF SECTION.    3. Skin, left dorsal hand, shave biopsy:  - SQUAMOUS CELL CARCINOMA IN SITU.  - THE TUMOR EXTENDS TO A LATERAL MARGIN IN THE PLANES OF SECTION.          Review of Systems   Constitutional: Negative for fever, chills, weight loss, fatigue and night sweats.   Skin: Positive for dry skin. Negative for daily sunscreen use and wears hat.   Hematologic/Lymphatic: Bruises/bleeds easily.        Objective:    Physical Exam   Constitutional: She appears well-developed and well-nourished. No distress.   Neurological: She is alert and oriented to person, place, and time. She is not disoriented.   Psychiatric: She has a normal mood and affect.   Skin:   Areas Examined (abnormalities noted in diagram):   Nails and Digits Inspection Performed             Diagram Legend     Erythematous scaling macule/papule c/w actinic keratosis       Vascular papule c/w angioma      Pigmented verrucoid papule/plaque c/w seborrheic keratosis      Yellow umbilicated papule c/w sebaceous hyperplasia      Irregularly shaped tan macule c/w lentigo     1-2 mm smooth white papules consistent with Milia      Movable subcutaneous cyst with punctum c/w epidermal inclusion cyst      Subcutaneous movable cyst c/w pilar cyst      Firm pink to brown papule c/w dermatofibroma      Pedunculated fleshy papule(s) c/w skin tag(s)      Evenly pigmented macule c/w junctional  nevus     Mildly variegated pigmented, slightly irregular-bordered macule c/w mildly atypical nevus      Flesh colored to evenly pigmented papule c/w intradermal nevus       Pink pearly papule/plaque c/w basal cell carcinoma      Erythematous hyperkeratotic cursted plaque c/w SCC      Surgical scar with no sign of skin cancer recurrence      Open and closed comedones      Inflammatory papules and pustules      Verrucoid papule consistent consistent with wart     Erythematous eczematous patches and plaques     Dystrophic onycholytic nail with subungual debris c/w onychomycosis     Umbilicated papule    Erythematous-base heme-crusted tan verrucoid plaque consistent with inflamed seborrheic keratosis     Erythematous Silvery Scaling Plaque c/w Psoriasis     See annotation      Assessment / Plan:        Squamous cell carcinoma in situ of skin x3  bx 6/15/17  Treated with efudex BID x 4 weeks  Robust reaction  Stop efudex, wound care with mupirocin and plain vaseline    -     mupirocin 2 % ointment; Apply topically 3 (three) times daily.             Return in about 3 months (around 10/24/2017).

## 2017-08-04 ENCOUNTER — TELEPHONE (OUTPATIENT)
Dept: DERMATOLOGY | Facility: CLINIC | Age: 82
End: 2017-08-04

## 2017-08-04 NOTE — TELEPHONE ENCOUNTER
----- Message from Esthela Martinez sent at 8/4/2017 12:45 PM CDT -----  Daughter (Esthela) stated patient has painful cancer spot on knuckle on right hand/needs soon appointment/please call back at [phone] to schedule or advise.

## 2017-08-14 ENCOUNTER — TELEPHONE (OUTPATIENT)
Dept: ORTHOPEDICS | Facility: CLINIC | Age: 82
End: 2017-08-14

## 2017-08-14 NOTE — TELEPHONE ENCOUNTER
----- Message from Pierce Beavers sent at 8/14/2017  1:09 PM CDT -----  Contact: Ronda/[facility]  Ronda called in regarding the attached patient and wanted to let Dr. Roger know that patient is being discharged today 8/14/17.      Ronda is requesting a call back at [phone]

## 2017-08-14 NOTE — TELEPHONE ENCOUNTER
Home health called to advise that she will be discharged from care today. Patient has an apt to see Dr. Roger on 8/17/17. Daughter ok with patient being discharged as well.

## 2017-08-16 DIAGNOSIS — M25.511 RIGHT SHOULDER PAIN, UNSPECIFIED CHRONICITY: Primary | ICD-10-CM

## 2017-08-28 ENCOUNTER — HOSPITAL ENCOUNTER (OUTPATIENT)
Dept: RADIOLOGY | Facility: HOSPITAL | Age: 82
Discharge: HOME | End: 2017-08-28
Attending: ORTHOPAEDIC SURGERY
Payer: MEDICARE

## 2017-08-28 ENCOUNTER — TELEPHONE (OUTPATIENT)
Dept: DERMATOLOGY | Facility: CLINIC | Age: 82
End: 2017-08-28

## 2017-08-28 ENCOUNTER — OFFICE VISIT (OUTPATIENT)
Dept: ORTHOPEDICS | Facility: CLINIC | Age: 82
End: 2017-08-28
Attending: ORTHOPAEDIC SURGERY
Payer: MEDICARE

## 2017-08-28 VITALS
WEIGHT: 112 LBS | HEIGHT: 60 IN | BODY MASS INDEX: 21.99 KG/M2 | SYSTOLIC BLOOD PRESSURE: 222 MMHG | HEART RATE: 64 BPM | DIASTOLIC BLOOD PRESSURE: 90 MMHG

## 2017-08-28 DIAGNOSIS — M19.011 PRIMARY OSTEOARTHRITIS OF RIGHT SHOULDER: Primary | ICD-10-CM

## 2017-08-28 DIAGNOSIS — M25.511 RIGHT SHOULDER PAIN, UNSPECIFIED CHRONICITY: ICD-10-CM

## 2017-08-28 PROCEDURE — 99999 PR PBB SHADOW E&M-EST. PATIENT-LVL III: CPT | Mod: PBBFAC,,, | Performed by: ORTHOPAEDIC SURGERY

## 2017-08-28 PROCEDURE — 73030 X-RAY EXAM OF SHOULDER: CPT | Mod: 26,RT,, | Performed by: RADIOLOGY

## 2017-08-28 PROCEDURE — 1157F ADVNC CARE PLAN IN RCRD: CPT | Mod: S$GLB,,, | Performed by: ORTHOPAEDIC SURGERY

## 2017-08-28 PROCEDURE — 73030 X-RAY EXAM OF SHOULDER: CPT | Mod: TC,PN,RT

## 2017-08-28 PROCEDURE — 3008F BODY MASS INDEX DOCD: CPT | Mod: S$GLB,,, | Performed by: ORTHOPAEDIC SURGERY

## 2017-08-28 PROCEDURE — 99213 OFFICE O/P EST LOW 20 MIN: CPT | Mod: S$GLB,,, | Performed by: ORTHOPAEDIC SURGERY

## 2017-08-28 PROCEDURE — 1159F MED LIST DOCD IN RCRD: CPT | Mod: S$GLB,,, | Performed by: ORTHOPAEDIC SURGERY

## 2017-08-28 PROCEDURE — 1126F AMNT PAIN NOTED NONE PRSNT: CPT | Mod: S$GLB,,, | Performed by: ORTHOPAEDIC SURGERY

## 2017-08-28 NOTE — TELEPHONE ENCOUNTER
----- Message from Esthela Cummings sent at 8/28/2017 10:09 AM CDT -----  Contact: Esthela - Daughter  Patient daughter Esthela states the area treated on the right had is not healing, contact her at [phone] to advise.       Thank you

## 2017-08-28 NOTE — TELEPHONE ENCOUNTER
Spoke to pt's daughter Esthela. States pt hand is painful 1 week post efudex treatment. Informed pt's daughter that pt should only be using vaseline and abx ointment at this time. States pt bumped her hand and it hurts bad. Advised to keep covered to have a barrier. Verbalized understanding.

## 2017-08-28 NOTE — PROGRESS NOTES
Past Medical History:   Diagnosis Date    Cancer     Hypertension     MI (myocardial infarction)     Skin cancer unsure    L and R arm, was removed by dr christianson    Squamous cell carcinoma        Past Surgical History:   Procedure Laterality Date    CARDIAC SURGERY      FRACTURE SURGERY         Current Outpatient Prescriptions   Medication Sig    acetaminophen (TYLENOL) 500 MG tablet Take 1,000 mg by mouth every 6 (six) hours as needed.    alendronate (FOSAMAX) 70 MG tablet     aspirin 81 MG Chew Take 81 mg by mouth once daily.    escitalopram oxalate (LEXAPRO) 10 MG tablet Take 10 mg by mouth every evening.    fluorouracil (EFUDEX) 5 % cream Thin film to AA R dorsal wrist, R index finger and left dorsal hand BID x 4 weeks. Avoid transfer to other sites (especially eyes)    losartan (COZAAR) 25 MG tablet Take 25 mg by mouth nightly.    metoprolol succinate (TOPROL-XL) 25 MG 24 hr tablet     mupirocin (BACTROBAN) 2 % ointment AAA hands TID    pantoprazole (PROTONIX) 40 MG tablet Take 40 mg by mouth once daily.    simvastatin (ZOCOR) 40 MG tablet Take 40 mg by mouth every evening.    trospium (SANCTURA) 20 mg Tab tablet Take 20 mg by mouth nightly.     No current facility-administered medications for this visit.        Review of patient's allergies indicates:   Allergen Reactions    Morphine Swelling       Family History   Problem Relation Age of Onset    Melanoma Neg Hx     Psoriasis Neg Hx     Lupus Neg Hx     Eczema Neg Hx        Social History     Social History    Marital status:      Spouse name: N/A    Number of children: N/A    Years of education: N/A     Occupational History    Not on file.     Social History Main Topics    Smoking status: Never Smoker    Smokeless tobacco: Never Used    Alcohol use No    Drug use: No    Sexual activity: No     Other Topics Concern    Not on file     Social History Narrative    No narrative on file       Chief Complaint:   Chief  Complaint   Patient presents with    Shoulder Pain     R shoulder 6 wk f/u       History of present illness: Is a 89-year-old female seen for right shoulder pain.  Patient had a fall about a month ago directly onto the right shoulder.  She was seen in urgent care.  They thought she possibly had a hairline fracture.  Pain as a 0 out of 10.  Pain reaching above her head or across her body.  Physical therapy is helping.    Review of Systems:    Constitution: Negative for chills, fever, and sweats.  Negative for unexplained weight loss.    HENT:  Negative for headaches and blurry vision.    Cardiovascular:Negative for chest pain or irregular heart beat. Negative for hypertension.    Respiratory:  Negative for cough and shortness of breath.    Gastrointestinal: Negative for abdominal pain, heartburn, melena, nausea, and vomitting.    Genitourinary:  Negative bladder incontinence and dysuria.    Musculoskeletal:  See HPI    Neurological: Negative for numbness.    Psychiatric/Behavioral: Negative for depression.  The patient is not nervous/anxious.      Endocrine: Negative for polyuria    Hematologic/Lymphatic: Negative for bleeding problem.  Does not bruise/bleed easily.    Skin: Negative for poor would healing and rash      Physical Examination:    Vital Signs:    Vitals:    08/28/17 0839   BP: (!) 222/90   Pulse: 64       Body mass index is 21.87 kg/m².    This a well-developed, well nourished patient in no acute distress.  They are alert and oriented and cooperative to examination.  Pt. walks without an antalgic gait.      Examination of the right shoulder shows no rashes or erythema. There are no masses, ecchymosis, or atrophy. The patient has almost full range of motion in forward flexion, external rotation, and internal rotation to the hip. The patient has minimally positive impingement signs.   2+ radial pulse. Intact axillary, radial, median and ulnar sensation. 5 out of 5 resisted forward flexion, external  rotation, and negative lift off test.        X-rays: X-rays of the right shoulder are ordered and reviewed which show possible hairline fracture of the humeral neck with some underlying arthritic changes of the glenohumeral joint     Assessment:: Right shoulder arthritis  Right proximal humerus fracture    Plan:  I reviewed the x-rays with her and her daughter today.  I recommended continuing with the home therapy.  I don't think another x-ray as needed.  Follow-up in 2 months.    This note was created using Dragon voice recognition software that occasionally misinterpreted phrases or words.    Consult note is delivered via Epic messaging service.

## 2017-09-01 ENCOUNTER — TELEPHONE (OUTPATIENT)
Dept: DERMATOLOGY | Facility: CLINIC | Age: 82
End: 2017-09-01

## 2017-09-16 ENCOUNTER — HOSPITAL ENCOUNTER (INPATIENT)
Facility: HOSPITAL | Age: 82
LOS: 5 days | Discharge: NURSING, REHAB OR LONG-TERM CARE | DRG: 194 | End: 2017-09-21
Attending: EMERGENCY MEDICINE | Admitting: FAMILY MEDICINE
Payer: MEDICARE

## 2017-09-16 DIAGNOSIS — R53.83 FATIGUE, UNSPECIFIED TYPE: Primary | ICD-10-CM

## 2017-09-16 DIAGNOSIS — I10 ESSENTIAL HYPERTENSION: ICD-10-CM

## 2017-09-16 DIAGNOSIS — J18.9 CAP (COMMUNITY ACQUIRED PNEUMONIA): ICD-10-CM

## 2017-09-16 DIAGNOSIS — I50.9 CHF (CONGESTIVE HEART FAILURE): ICD-10-CM

## 2017-09-16 DIAGNOSIS — S32.592A CLOSED FRACTURE OF RAMUS OF LEFT PUBIS, INITIAL ENCOUNTER: ICD-10-CM

## 2017-09-16 DIAGNOSIS — E44.0 MALNUTRITION OF MODERATE DEGREE: ICD-10-CM

## 2017-09-16 LAB
ALBUMIN SERPL BCP-MCNC: 3.3 G/DL
ALP SERPL-CCNC: 91 U/L
ALT SERPL W/O P-5'-P-CCNC: 33 U/L
ANION GAP SERPL CALC-SCNC: 14 MMOL/L
AST SERPL-CCNC: 53 U/L
BASOPHILS # BLD AUTO: 0 K/UL
BASOPHILS NFR BLD: 0.1 %
BILIRUB SERPL-MCNC: 0.8 MG/DL
BILIRUB UR QL STRIP: NEGATIVE
BNP SERPL-MCNC: 658 PG/ML
BUN SERPL-MCNC: 23 MG/DL
CALCIUM SERPL-MCNC: 8.6 MG/DL
CHLORIDE SERPL-SCNC: 104 MMOL/L
CLARITY UR: CLEAR
CO2 SERPL-SCNC: 23 MMOL/L
COLOR UR: YELLOW
CREAT SERPL-MCNC: 1 MG/DL
DIFFERENTIAL METHOD: ABNORMAL
EOSINOPHIL # BLD AUTO: 0 K/UL
EOSINOPHIL NFR BLD: 0 %
ERYTHROCYTE [DISTWIDTH] IN BLOOD BY AUTOMATED COUNT: 15.1 %
EST. GFR  (AFRICAN AMERICAN): 57 ML/MIN/1.73 M^2
EST. GFR  (NON AFRICAN AMERICAN): 49 ML/MIN/1.73 M^2
GLUCOSE SERPL-MCNC: 113 MG/DL
GLUCOSE UR QL STRIP: NEGATIVE
HCT VFR BLD AUTO: 38.2 %
HGB BLD-MCNC: 12.9 G/DL
HGB UR QL STRIP: NEGATIVE
INR PPP: 1
KETONES UR QL STRIP: ABNORMAL
LACTATE SERPL-SCNC: 1.1 MMOL/L
LACTATE SERPL-SCNC: 1.5 MMOL/L
LEUKOCYTE ESTERASE UR QL STRIP: NEGATIVE
LYMPHOCYTES # BLD AUTO: 1.2 K/UL
LYMPHOCYTES NFR BLD: 5.5 %
MAGNESIUM SERPL-MCNC: 1.6 MG/DL
MCH RBC QN AUTO: 29.2 PG
MCHC RBC AUTO-ENTMCNC: 33.8 G/DL
MCV RBC AUTO: 86 FL
MONOCYTES # BLD AUTO: 2.2 K/UL
MONOCYTES NFR BLD: 10 %
NEUTROPHILS # BLD AUTO: 18.8 K/UL
NEUTROPHILS NFR BLD: 84.4 %
NITRITE UR QL STRIP: NEGATIVE
PH UR STRIP: 6 [PH] (ref 5–8)
PLATELET # BLD AUTO: 164 K/UL
PMV BLD AUTO: 9.1 FL
POCT GLUCOSE: 124 MG/DL (ref 70–110)
POTASSIUM SERPL-SCNC: 3.9 MMOL/L
PROT SERPL-MCNC: 6.3 G/DL
PROT UR QL STRIP: NEGATIVE
PROTHROMBIN TIME: 10.8 SEC
RBC # BLD AUTO: 4.43 M/UL
SODIUM SERPL-SCNC: 141 MMOL/L
SP GR UR STRIP: 1.01 (ref 1–1.03)
TROPONIN I SERPL DL<=0.01 NG/ML-MCNC: 0.01 NG/ML
TSH SERPL DL<=0.005 MIU/L-ACNC: 0.58 UIU/ML
URN SPEC COLLECT METH UR: ABNORMAL
UROBILINOGEN UR STRIP-ACNC: 1 EU/DL
WBC # BLD AUTO: 22.3 K/UL

## 2017-09-16 PROCEDURE — 96361 HYDRATE IV INFUSION ADD-ON: CPT

## 2017-09-16 PROCEDURE — 81003 URINALYSIS AUTO W/O SCOPE: CPT

## 2017-09-16 PROCEDURE — 84484 ASSAY OF TROPONIN QUANT: CPT

## 2017-09-16 PROCEDURE — 51701 INSERT BLADDER CATHETER: CPT

## 2017-09-16 PROCEDURE — 85025 COMPLETE CBC W/AUTO DIFF WBC: CPT

## 2017-09-16 PROCEDURE — 12000002 HC ACUTE/MED SURGE SEMI-PRIVATE ROOM

## 2017-09-16 PROCEDURE — 25000242 PHARM REV CODE 250 ALT 637 W/ HCPCS: Performed by: EMERGENCY MEDICINE

## 2017-09-16 PROCEDURE — 99285 EMERGENCY DEPT VISIT HI MDM: CPT | Mod: 25

## 2017-09-16 PROCEDURE — 83605 ASSAY OF LACTIC ACID: CPT | Mod: 91

## 2017-09-16 PROCEDURE — 80053 COMPREHEN METABOLIC PANEL: CPT

## 2017-09-16 PROCEDURE — 83735 ASSAY OF MAGNESIUM: CPT

## 2017-09-16 PROCEDURE — 36415 COLL VENOUS BLD VENIPUNCTURE: CPT

## 2017-09-16 PROCEDURE — 84443 ASSAY THYROID STIM HORMONE: CPT

## 2017-09-16 PROCEDURE — 96368 THER/DIAG CONCURRENT INF: CPT

## 2017-09-16 PROCEDURE — 25000003 PHARM REV CODE 250: Performed by: EMERGENCY MEDICINE

## 2017-09-16 PROCEDURE — 87040 BLOOD CULTURE FOR BACTERIA: CPT | Mod: 59

## 2017-09-16 PROCEDURE — 93010 ELECTROCARDIOGRAM REPORT: CPT | Mod: ,,, | Performed by: INTERNAL MEDICINE

## 2017-09-16 PROCEDURE — 63600175 PHARM REV CODE 636 W HCPCS: Performed by: EMERGENCY MEDICINE

## 2017-09-16 PROCEDURE — 93005 ELECTROCARDIOGRAM TRACING: CPT

## 2017-09-16 PROCEDURE — 96365 THER/PROPH/DIAG IV INF INIT: CPT

## 2017-09-16 PROCEDURE — 85610 PROTHROMBIN TIME: CPT

## 2017-09-16 PROCEDURE — 83880 ASSAY OF NATRIURETIC PEPTIDE: CPT

## 2017-09-16 PROCEDURE — 94640 AIRWAY INHALATION TREATMENT: CPT

## 2017-09-16 PROCEDURE — 82962 GLUCOSE BLOOD TEST: CPT

## 2017-09-16 RX ORDER — ALBUTEROL SULFATE 2.5 MG/.5ML
2.5 SOLUTION RESPIRATORY (INHALATION) EVERY 6 HOURS
Status: DISCONTINUED | OUTPATIENT
Start: 2017-09-17 | End: 2017-09-21 | Stop reason: HOSPADM

## 2017-09-16 RX ORDER — LOSARTAN POTASSIUM 25 MG/1
25 TABLET ORAL NIGHTLY
Status: DISCONTINUED | OUTPATIENT
Start: 2017-09-16 | End: 2017-09-21 | Stop reason: HOSPADM

## 2017-09-16 RX ORDER — SIMVASTATIN 40 MG/1
40 TABLET, FILM COATED ORAL NIGHTLY
Status: DISCONTINUED | OUTPATIENT
Start: 2017-09-16 | End: 2017-09-21 | Stop reason: HOSPADM

## 2017-09-16 RX ORDER — NAPROXEN SODIUM 220 MG/1
81 TABLET, FILM COATED ORAL DAILY
Status: DISCONTINUED | OUTPATIENT
Start: 2017-09-17 | End: 2017-09-21 | Stop reason: HOSPADM

## 2017-09-16 RX ORDER — ONDANSETRON 2 MG/ML
4 INJECTION INTRAMUSCULAR; INTRAVENOUS EVERY 12 HOURS PRN
Status: DISCONTINUED | OUTPATIENT
Start: 2017-09-16 | End: 2017-09-21 | Stop reason: HOSPADM

## 2017-09-16 RX ORDER — IPRATROPIUM BROMIDE AND ALBUTEROL SULFATE 2.5; .5 MG/3ML; MG/3ML
3 SOLUTION RESPIRATORY (INHALATION)
Status: COMPLETED | OUTPATIENT
Start: 2017-09-16 | End: 2017-09-16

## 2017-09-16 RX ORDER — FLAVOXATE HYDROCHLORIDE 100 MG/1
100 TABLET ORAL 3 TIMES DAILY PRN
Status: DISCONTINUED | OUTPATIENT
Start: 2017-09-16 | End: 2017-09-21 | Stop reason: HOSPADM

## 2017-09-16 RX ORDER — PANTOPRAZOLE SODIUM 40 MG/1
40 TABLET, DELAYED RELEASE ORAL DAILY
Status: DISCONTINUED | OUTPATIENT
Start: 2017-09-17 | End: 2017-09-21 | Stop reason: HOSPADM

## 2017-09-16 RX ORDER — ACETAMINOPHEN 325 MG/1
650 TABLET ORAL EVERY 8 HOURS PRN
Status: DISCONTINUED | OUTPATIENT
Start: 2017-09-16 | End: 2017-09-21 | Stop reason: HOSPADM

## 2017-09-16 RX ORDER — METOPROLOL SUCCINATE 25 MG/1
25 TABLET, EXTENDED RELEASE ORAL DAILY
Status: DISCONTINUED | OUTPATIENT
Start: 2017-09-17 | End: 2017-09-21 | Stop reason: HOSPADM

## 2017-09-16 RX ORDER — ESCITALOPRAM OXALATE 10 MG/1
10 TABLET ORAL NIGHTLY
Status: DISCONTINUED | OUTPATIENT
Start: 2017-09-16 | End: 2017-09-21 | Stop reason: HOSPADM

## 2017-09-16 RX ADMIN — SIMVASTATIN 40 MG: 40 TABLET, FILM COATED ORAL at 09:09

## 2017-09-16 RX ADMIN — SODIUM CHLORIDE 1000 ML: 0.9 INJECTION, SOLUTION INTRAVENOUS at 04:09

## 2017-09-16 RX ADMIN — IPRATROPIUM BROMIDE AND ALBUTEROL SULFATE 3 ML: .5; 3 SOLUTION RESPIRATORY (INHALATION) at 06:09

## 2017-09-16 RX ADMIN — AZITHROMYCIN MONOHYDRATE 500 MG: 500 INJECTION, POWDER, LYOPHILIZED, FOR SOLUTION INTRAVENOUS at 06:09

## 2017-09-16 RX ADMIN — CEFTRIAXONE 1 G: 1 INJECTION, SOLUTION INTRAVENOUS at 06:09

## 2017-09-16 RX ADMIN — LOSARTAN POTASSIUM 25 MG: 25 TABLET, FILM COATED ORAL at 09:09

## 2017-09-16 RX ADMIN — ESCITALOPRAM OXALATE 10 MG: 10 TABLET ORAL at 09:09

## 2017-09-16 NOTE — ED PROVIDER NOTES
Encounter Date: 9/16/2017    SCRIBE #1 NOTE: I, Isael Jara, am scribing for, and in the presence of, Dr. Ojeda .       History     Chief Complaint   Patient presents with    Fatigue     yesterday able to walk without difficulty, today she is unable to stand and walk. slipped out of bed 3 days ago. denies pain to legs.        09/16/2017 3:17 PM     Chief Complaint: Fatigue      Talia Alberts is a 89 y.o. female with a history of HTN, MI, skin CA, and CAD who presents to the ED with complaints of fatigue since this morning. She notes right arm pain. Per reports trouble getting out of bed, walking and standing this morning. Pt notes productive cough last night. She is unaware if she spit up blood. She denies leg swelling, abd pain, dysuria and urinary frequency. Pt fell 3 days ago while bending over to get pajamas out the drawer. Allergens include Morphine.       The history is provided by the patient.     Review of patient's allergies indicates:   Allergen Reactions    Morphine Swelling     Past Medical History:   Diagnosis Date    Cancer     Coronary artery disease     Hypertension     MI (myocardial infarction)     Skin cancer unsure    L and R arm, was removed by dr christianson    Squamous cell carcinoma      Past Surgical History:   Procedure Laterality Date    CARDIAC SURGERY      FRACTURE SURGERY       Family History   Problem Relation Age of Onset    Melanoma Neg Hx     Psoriasis Neg Hx     Lupus Neg Hx     Eczema Neg Hx      Social History   Substance Use Topics    Smoking status: Never Smoker    Smokeless tobacco: Never Used    Alcohol use No     Review of Systems   Constitutional: Positive for fatigue. Negative for fever.   HENT: Negative for congestion.    Eyes: Negative for visual disturbance.   Respiratory: Negative for wheezing.    Cardiovascular: Negative for chest pain and leg swelling.   Gastrointestinal: Negative for abdominal pain.   Genitourinary: Negative for dysuria and  urgency.   Musculoskeletal: Positive for gait problem and myalgias. Negative for joint swelling.   Skin: Positive for wound. Negative for rash.   Neurological: Positive for weakness. Negative for syncope.   Hematological: Does not bruise/bleed easily.   Psychiatric/Behavioral: Negative for confusion.       Physical Exam     Initial Vitals [09/16/17 1458]   BP Pulse Resp Temp SpO2   (!) 104/57 78 16 98.4 °F (36.9 °C) (!) 94 %      MAP       72.67         Physical Exam    Nursing note and vitals reviewed.  Constitutional: She appears well-developed and well-nourished. No distress.   HENT:   Head: Normocephalic and atraumatic.   Eyes: Conjunctivae and EOM are normal. Pupils are equal, round, and reactive to light.   Neck: Normal range of motion. Neck supple. No thyroid mass present.   Cardiovascular: Normal rate and regular rhythm. Exam reveals no gallop and no friction rub.    Murmur heard.  Accentuated s2 heart sound.    Pulmonary/Chest: She has no wheezes. She has no rhonchi. She has no rales.   Abdominal: Soft. Normal appearance and bowel sounds are normal. She exhibits no distension. There is no tenderness.   Musculoskeletal: Normal range of motion.   Neurological: She is alert and oriented to person, place, and time. She has normal strength. No cranial nerve deficit or sensory deficit.   Skin: Skin is warm and dry. No rash noted. No erythema.   Psychiatric: She has a normal mood and affect. Her speech is normal. Cognition and memory are normal.         ED Course   Procedures  Labs Reviewed - No data to display  EKG Readings: (Independently Interpreted)   Sinus rhythm with premature atrial complexes, 76 bpm, left axis deviation, incomplete left bundle-branch block, moderate voltage for criteria for LVH and aVL, prolonged QT,  ms, no acute ischemic wave segments, no STEMI          Medical Decision Making:   Initial Assessment:   This patient was interviewed and examined and is found to be in no acute  distress.  She currently has stable vital signs but her reports of worsening productive cough and generalized fatigue are concerning for potential underlying immediately acquired pneumonia.  The patient does not have any significant recent healthcare exposures.  IV was established and she will be provided bolus hydration with lab analyses and radiograph to assess for evidence of underlying progressing infection or inflammation.  ED Management:  Workup today significant for evidence of a BNP elevation to 700, leukocytosis of 22,000 with a left shift.  Chest x-ray indicates a new right middle lobe infiltrate.  Blood cultures and lactate will be ordered at this time.  Patient will be started on IV Rocephin and Zithromax.  She does warrant admission for further monitoring of her respiratory course and IV antibiotics.  Case was discussed with Dr. Brown who agreed to admit the patient.  She'll be transferred to a telemetry bed in guarded condition.            Scribe Attestation:   Scribe #1: I performed the above scribed service and the documentation accurately describes the services I performed. I attest to the accuracy of the note.    Attending Attestation:           Physician Attestation for Scribe:  Physician Attestation Statement for Scribe #1: I, Dr. Ojeda , reviewed documentation, as scribed by Isael Jara in my presence, and it is both accurate and complete.                 ED Course      Clinical Impression:     1. Fatigue, unspecified type    2. CHF (congestive heart failure)    3. CAP (community acquired pneumonia)        Disposition:   Disposition: Admitted  Condition: Serious                        Sim Ojeda MD  09/16/17 3858

## 2017-09-17 PROBLEM — I25.10 CORONARY ARTERY DISEASE INVOLVING NATIVE CORONARY ARTERY WITHOUT ANGINA PECTORIS: Status: ACTIVE | Noted: 2017-09-17

## 2017-09-17 LAB — CRP SERPL-MCNC: 65.7 MG/L

## 2017-09-17 PROCEDURE — 25000003 PHARM REV CODE 250: Performed by: EMERGENCY MEDICINE

## 2017-09-17 PROCEDURE — 63600175 PHARM REV CODE 636 W HCPCS: Performed by: NURSE PRACTITIONER

## 2017-09-17 PROCEDURE — 63600175 PHARM REV CODE 636 W HCPCS: Performed by: FAMILY MEDICINE

## 2017-09-17 PROCEDURE — 86140 C-REACTIVE PROTEIN: CPT

## 2017-09-17 PROCEDURE — 36415 COLL VENOUS BLD VENIPUNCTURE: CPT

## 2017-09-17 PROCEDURE — 25000242 PHARM REV CODE 250 ALT 637 W/ HCPCS: Performed by: NURSE PRACTITIONER

## 2017-09-17 PROCEDURE — 25000003 PHARM REV CODE 250: Performed by: NURSE PRACTITIONER

## 2017-09-17 PROCEDURE — 94640 AIRWAY INHALATION TREATMENT: CPT

## 2017-09-17 PROCEDURE — 94761 N-INVAS EAR/PLS OXIMETRY MLT: CPT

## 2017-09-17 PROCEDURE — 12000002 HC ACUTE/MED SURGE SEMI-PRIVATE ROOM

## 2017-09-17 RX ADMIN — ALBUTEROL SULFATE 2.5 MG: 2.5 SOLUTION RESPIRATORY (INHALATION) at 01:09

## 2017-09-17 RX ADMIN — ALBUTEROL SULFATE 2.5 MG: 2.5 SOLUTION RESPIRATORY (INHALATION) at 12:09

## 2017-09-17 RX ADMIN — CEFTRIAXONE 1 G: 1 INJECTION, SOLUTION INTRAVENOUS at 05:09

## 2017-09-17 RX ADMIN — AZITHROMYCIN MONOHYDRATE 250 MG: 500 INJECTION, POWDER, LYOPHILIZED, FOR SOLUTION INTRAVENOUS at 06:09

## 2017-09-17 RX ADMIN — LOSARTAN POTASSIUM 25 MG: 25 TABLET, FILM COATED ORAL at 08:09

## 2017-09-17 RX ADMIN — SIMVASTATIN 40 MG: 40 TABLET, FILM COATED ORAL at 08:09

## 2017-09-17 RX ADMIN — ALBUTEROL SULFATE 2.5 MG: 2.5 SOLUTION RESPIRATORY (INHALATION) at 08:09

## 2017-09-17 RX ADMIN — ALBUTEROL SULFATE 2.5 MG: 2.5 SOLUTION RESPIRATORY (INHALATION) at 07:09

## 2017-09-17 RX ADMIN — ESCITALOPRAM OXALATE 10 MG: 10 TABLET ORAL at 08:09

## 2017-09-17 RX ADMIN — METOPROLOL SUCCINATE 25 MG: 25 TABLET, EXTENDED RELEASE ORAL at 08:09

## 2017-09-17 RX ADMIN — ASPIRIN 81 MG CHEWABLE TABLET 81 MG: 81 TABLET CHEWABLE at 08:09

## 2017-09-17 RX ADMIN — PANTOPRAZOLE SODIUM 40 MG: 40 TABLET, DELAYED RELEASE ORAL at 08:09

## 2017-09-17 NOTE — PROGRESS NOTES
[facility] met with patient and daughter Monet Gutierrez to complete discharge planning assessment.  Patient verified all demographic information on facesheet is correct.  Patient verified PCP is Dr. Michael Martin.  Patient verified primary health insurance is [employer]. Patient signed patient choice disclosure choosing to allow placement with first available provider if/when MD orders.          Discharge Planning Assessment    Patient Identification  Name: Talia Alberts  Age: 89 y.o.   Gender: female.  Admitting Diagnosis: CAP (community acquired pneumonia)  PCP: Michael Martin MD   PCP Address: [address] [place] [address] / [facility] [address]  PCP Phone Number: [phone]     Telephone Contacts  Extended Emergency Contact Information  Primary Emergency Contact: Alber Alberts           [place] [address] [place]  Mobile Phone: [phone]  Relation: Son  Secondary Emergency Contact: Monet Gutierrez   [place] [facility]  Mobile Phone: [phone]  Relation: Daughter    Alert/Orientation: yes xs 3  If patient is unable to sign for self, who is handling affairs?: daughter  Is this person the HPOA? no  Does patient have a living will? no    Arrived from: home  Lives with: daughter and her spouse  Support System: family  Who do you want involved in your discharge planning? daughter  Are they available to help you at discharge? yes  Prior Level of Functioning: independent with assistance  Were you able to care for yourself at home? Yes with assistance  Who assists with Medications, Meal Prep, Housekeeping, Laundry, Shopping, MD Appts?: daughter    Have you been in the hospital in the last 30 days?: no  If so, were you admitted for the same reason? n/a  If not, why were you in the hospital? n/a    Services received prior to admit:  none  DME used at home: rollator, shower chair, walker    Capacity for self-care: independent with assistance  Services patient will need at discharge: possible home  health  DME pt will need at discharge: none    Pharmacy most often used: [facility]  Able to afford meds?: yes    How do you get to your MD appointments / Do you have transportation or depend on someone else: daughter  Are you taking Coumadin at home?:  no If so, who monitors your INR: n/a  Are you on Dialysis?:  no If so where do you attend dialysis: n/a    Community Resources & Referrals Needed: none      Discharge plan 1:  Possible home with home health  Discharge plan 2:  Home with family

## 2017-09-17 NOTE — PLAN OF CARE
Problem: Patient Care Overview  Goal: Plan of Care Review  Outcome: Ongoing (interventions implemented as appropriate)  Pt on room air with Q6 albuterol treatments

## 2017-09-17 NOTE — NURSING
No acute changes this shift Respirations even and unlabored Call light in reach Side rails x 3 Denies pain

## 2017-09-17 NOTE — HPI
"Pt is a young 89 yo  admitted to the services of hospital medicine via the ER for CAP. Presents with c/o acute onset of extreme weakness "I couldn't stand on my legs, they were too weak" c/o acute onset of productive cough which started Friday night. Denies any chest pain or SOB. Felt feverish but did not measure temp. Denies any sick contacts. No hx of lung disease. Hx of bioprosthetic heart valve, CAD and HTN, all stable with medications.   "

## 2017-09-17 NOTE — H&P
"[facility] Medicine  History & Physical    Patient Name: Talia Alberts  MRN: [id number]  Admission Date: 9/16/2017  Attending Physician: Abdirashid Brown MD   Primary Care Provider: Michael Martin MD         Patient information was obtained from patient and ER records.     Subjective:     Principal Problem:CAP (community acquired pneumonia)    Chief Complaint:   Chief Complaint   Patient presents with    Fatigue     yesterday able to walk without difficulty, today she is unable to stand and walk. slipped out of bed 3 days ago. denies pain to legs.         HPI: Pt is a young 89 yo  admitted to the services of hospital medicine via the ER for CAP. Presents with c/o acute onset of extreme weakness "I couldn't stand on my legs, they were too weak" c/o acute onset of productive cough which started Friday night. Denies any chest pain or SOB. Felt feverish but did not measure temp. Denies any sick contacts. No hx of lung disease. Hx of bioprosthetic heart valve, CAD and HTN, all stable with medications.     Past Medical History:   Diagnosis Date    Cancer     Coronary artery disease     Hypertension     MI (myocardial infarction)     Skin cancer unsure    L and R arm, was removed by dr christianson    Squamous cell carcinoma        Past Surgical History:   Procedure Laterality Date    CARDIAC SURGERY      FRACTURE SURGERY         Review of patient's allergies indicates:   Allergen Reactions    Morphine Swelling       No current facility-administered medications on file prior to encounter.      Current Outpatient Prescriptions on File Prior to Encounter   Medication Sig    acetaminophen (TYLENOL) 500 MG tablet Take 1,000 mg by mouth every 6 (six) hours as needed.    alendronate (FOSAMAX) 70 MG tablet once a week.     aspirin 81 MG Chew Take 81 mg by mouth once daily.    escitalopram oxalate (LEXAPRO) 10 MG tablet Take 10 mg by mouth every evening.    fluorouracil (EFUDEX) 5 % cream " Thin film to AA R dorsal wrist, R index finger and left dorsal hand BID x 4 weeks. Avoid transfer to other sites (especially eyes)    losartan (COZAAR) 25 MG tablet Take 50 mg by mouth 2 (two) times daily.     metoprolol succinate (TOPROL-XL) 25 MG 24 hr tablet     mupirocin (BACTROBAN) 2 % ointment AAA hands TID    pantoprazole (PROTONIX) 40 MG tablet Take 40 mg by mouth once daily.    simvastatin (ZOCOR) 40 MG tablet Take 40 mg by mouth every evening.    trospium (SANCTURA) 20 mg Tab tablet Take 20 mg by mouth nightly.     Family History     None        Social History Main Topics    Smoking status: Never Smoker    Smokeless tobacco: Never Used    Alcohol use No    Drug use: No    Sexual activity: No     Review of Systems   Constitutional: Positive for fatigue and fever. Negative for activity change, appetite change and diaphoresis.   HENT: Negative for congestion and facial swelling.    Eyes: Negative for redness and itching.   Respiratory: Positive for cough. Negative for chest tightness, shortness of breath and wheezing.    Cardiovascular: Negative for chest pain, palpitations and leg swelling.   Gastrointestinal: Negative for abdominal pain, constipation, diarrhea, nausea and vomiting.   Endocrine: Negative for cold intolerance and heat intolerance.   Genitourinary: Negative for difficulty urinating, dysuria, flank pain, frequency, hematuria and urgency.   Musculoskeletal: Negative for arthralgias, back pain, joint swelling, myalgias and neck pain.   Neurological: Negative for dizziness, syncope, weakness and headaches.   Psychiatric/Behavioral: Negative for agitation and confusion. The patient is not nervous/anxious.      Objective:     Vital Signs (Most Recent):  Temp: 97.8 °F (36.6 °C) (09/17/17 0409)  Pulse: 74 (09/17/17 0409)  Resp: 16 (09/17/17 0409)  BP: (!) 170/70 (09/17/17 0409)  SpO2: 97 % (09/17/17 0409) Vital Signs (24h Range):  Temp:  [97.6 °F (36.4 °C)-98.4 °F (36.9 °C)] 97.8 °F (36.6  °C)  Pulse:  [65-78] 74  Resp:  [12-16] 16  SpO2:  [94 %-100 %] 97 %  BP: (104-187)/(6-79) 170/70     Weight: 45.4 kg (100 lb)  Body mass index is 19.53 kg/m².    Physical Exam   Constitutional: She is oriented to person, place, and time. She appears well-developed and well-nourished. No distress.   Appears much younger than stated age  Does not appear toxic   HENT:   Head: Normocephalic and atraumatic.   Mouth/Throat: Oropharynx is clear and moist.   Eyes: Conjunctivae are normal. Pupils are equal, round, and reactive to light. Right eye exhibits no discharge. Left eye exhibits no discharge. No scleral icterus.   Neck: Normal range of motion. No JVD present. No thyromegaly present.   Cardiovascular: Normal rate, regular rhythm and normal heart sounds.  Exam reveals no gallop and no friction rub.    No murmur heard.  Pulmonary/Chest: Effort normal. No respiratory distress. She has no wheezes. She has no rales. She exhibits no tenderness.   Diminished on right, no wheezing   Abdominal: Soft. Bowel sounds are normal. She exhibits no distension. There is no tenderness. There is no rebound and no guarding.   Musculoskeletal: Normal range of motion. She exhibits no edema or tenderness.   Lymphadenopathy:     She has no cervical adenopathy.   Neurological: She is alert and oriented to person, place, and time. She displays normal reflexes. No cranial nerve deficit.   Skin: Skin is warm and dry. Capillary refill takes less than 2 seconds. She is not diaphoretic.   Psychiatric: She has a normal mood and affect. Her behavior is normal. Judgment and thought content normal.   Vitals reviewed.       Significant Labs:   CBC:   Recent Labs  Lab 09/16/17  1603   WBC 22.30*   HGB 12.9   HCT 38.2        CMP:   Recent Labs  Lab 09/16/17  1603      K 3.9      CO2 23   *   BUN 23   CREATININE 1.0   CALCIUM 8.6*   PROT 6.3   ALBUMIN 3.3*   BILITOT 0.8   ALKPHOS 91   AST 53*   ALT 33   ANIONGAP 14   EGFRNONAA 49*        Significant Imaging: middle lobe infiltrate     Assessment/Plan:     * CAP (community acquired pneumonia)    Acute  Associated with elevated white count and left shift  Check CRP  Breathing tx  Empiric coverage with a macrolide            Coronary artery disease involving native coronary artery without angina pectoris    Chronic  Stable  Reorder home medications  Telemetry         Essential hypertension    Chronic  Controlled  Continue home medications  Monitor and treat accordingly             VTE Risk Mitigation         Ordered     Place QUINTON hose  Until discontinued      09/17/17 0647     Place sequential compression device  Until discontinued      09/17/17 0647     Medium Risk of VTE  Once      09/16/17 1950             Liz Cruz NP  Department of Hospital Medicine   [facility]

## 2017-09-17 NOTE — ASSESSMENT & PLAN NOTE
Acute  Associated with elevated white count and left shift  Check CRP  Breathing tx  Empiric coverage with a macrolide

## 2017-09-17 NOTE — NURSING
Resting comfortably in bed No distress Respirations even and unlabored Denies pain Call light in reach Side rails x 3.

## 2017-09-17 NOTE — SUBJECTIVE & OBJECTIVE
Past Medical History:   Diagnosis Date    Cancer     Coronary artery disease     Hypertension     MI (myocardial infarction)     Skin cancer unsure    L and R arm, was removed by dr christianson    Squamous cell carcinoma        Past Surgical History:   Procedure Laterality Date    CARDIAC SURGERY      FRACTURE SURGERY         Review of patient's allergies indicates:   Allergen Reactions    Morphine Swelling       No current facility-administered medications on file prior to encounter.      Current Outpatient Prescriptions on File Prior to Encounter   Medication Sig    acetaminophen (TYLENOL) 500 MG tablet Take 1,000 mg by mouth every 6 (six) hours as needed.    alendronate (FOSAMAX) 70 MG tablet once a week.     aspirin 81 MG Chew Take 81 mg by mouth once daily.    escitalopram oxalate (LEXAPRO) 10 MG tablet Take 10 mg by mouth every evening.    fluorouracil (EFUDEX) 5 % cream Thin film to AA R dorsal wrist, R index finger and left dorsal hand BID x 4 weeks. Avoid transfer to other sites (especially eyes)    losartan (COZAAR) 25 MG tablet Take 50 mg by mouth 2 (two) times daily.     metoprolol succinate (TOPROL-XL) 25 MG 24 hr tablet     mupirocin (BACTROBAN) 2 % ointment AAA hands TID    pantoprazole (PROTONIX) 40 MG tablet Take 40 mg by mouth once daily.    simvastatin (ZOCOR) 40 MG tablet Take 40 mg by mouth every evening.    trospium (SANCTURA) 20 mg Tab tablet Take 20 mg by mouth nightly.     Family History     None        Social History Main Topics    Smoking status: Never Smoker    Smokeless tobacco: Never Used    Alcohol use No    Drug use: No    Sexual activity: No     Review of Systems   Constitutional: Positive for fatigue and fever. Negative for activity change, appetite change and diaphoresis.   HENT: Negative for congestion and facial swelling.    Eyes: Negative for redness and itching.   Respiratory: Positive for cough. Negative for chest tightness, shortness of breath and  wheezing.    Cardiovascular: Negative for chest pain, palpitations and leg swelling.   Gastrointestinal: Negative for abdominal pain, constipation, diarrhea, nausea and vomiting.   Endocrine: Negative for cold intolerance and heat intolerance.   Genitourinary: Negative for difficulty urinating, dysuria, flank pain, frequency, hematuria and urgency.   Musculoskeletal: Negative for arthralgias, back pain, joint swelling, myalgias and neck pain.   Neurological: Negative for dizziness, syncope, weakness and headaches.   Psychiatric/Behavioral: Negative for agitation and confusion. The patient is not nervous/anxious.      Objective:     Vital Signs (Most Recent):  Temp: 97.8 °F (36.6 °C) (09/17/17 0409)  Pulse: 74 (09/17/17 0409)  Resp: 16 (09/17/17 0409)  BP: (!) 170/70 (09/17/17 0409)  SpO2: 97 % (09/17/17 0409) Vital Signs (24h Range):  Temp:  [97.6 °F (36.4 °C)-98.4 °F (36.9 °C)] 97.8 °F (36.6 °C)  Pulse:  [65-78] 74  Resp:  [12-16] 16  SpO2:  [94 %-100 %] 97 %  BP: (104-187)/(6-79) 170/70     Weight: 45.4 kg (100 lb)  Body mass index is 19.53 kg/m².    Physical Exam   Constitutional: She is oriented to person, place, and time. She appears well-developed and well-nourished. No distress.   Appears much younger than stated age  Does not appear toxic   HENT:   Head: Normocephalic and atraumatic.   Mouth/Throat: Oropharynx is clear and moist.   Eyes: Conjunctivae are normal. Pupils are equal, round, and reactive to light. Right eye exhibits no discharge. Left eye exhibits no discharge. No scleral icterus.   Neck: Normal range of motion. No JVD present. No thyromegaly present.   Cardiovascular: Normal rate, regular rhythm and normal heart sounds.  Exam reveals no gallop and no friction rub.    No murmur heard.  Pulmonary/Chest: Effort normal. No respiratory distress. She has no wheezes. She has no rales. She exhibits no tenderness.   Diminished on right, no wheezing   Abdominal: Soft. Bowel sounds are normal. She exhibits  no distension. There is no tenderness. There is no rebound and no guarding.   Musculoskeletal: Normal range of motion. She exhibits no edema or tenderness.   Lymphadenopathy:     She has no cervical adenopathy.   Neurological: She is alert and oriented to person, place, and time. She displays normal reflexes. No cranial nerve deficit.   Skin: Skin is warm and dry. Capillary refill takes less than 2 seconds. She is not diaphoretic.   Psychiatric: She has a normal mood and affect. Her behavior is normal. Judgment and thought content normal.   Vitals reviewed.       Significant Labs:   CBC:   Recent Labs  Lab 09/16/17  1603   WBC 22.30*   HGB 12.9   HCT 38.2        CMP:   Recent Labs  Lab 09/16/17  1603      K 3.9      CO2 23   *   BUN 23   CREATININE 1.0   CALCIUM 8.6*   PROT 6.3   ALBUMIN 3.3*   BILITOT 0.8   ALKPHOS 91   AST 53*   ALT 33   ANIONGAP 14   EGFRNONAA 49*       Significant Imaging: middle lobe infiltrate

## 2017-09-18 PROBLEM — S32.592A CLOSED FRACTURE OF RAMUS OF LEFT PUBIS: Status: ACTIVE | Noted: 2017-09-18

## 2017-09-18 LAB
ALBUMIN SERPL BCP-MCNC: 2.9 G/DL
ALP SERPL-CCNC: 92 U/L
ALT SERPL W/O P-5'-P-CCNC: 23 U/L
ANION GAP SERPL CALC-SCNC: 9 MMOL/L
AST SERPL-CCNC: 29 U/L
BASOPHILS # BLD AUTO: 0 K/UL
BASOPHILS NFR BLD: 0.2 %
BILIRUB SERPL-MCNC: 0.6 MG/DL
BUN SERPL-MCNC: 12 MG/DL
CALCIUM SERPL-MCNC: 8.5 MG/DL
CHLORIDE SERPL-SCNC: 101 MMOL/L
CO2 SERPL-SCNC: 25 MMOL/L
CREAT SERPL-MCNC: 0.7 MG/DL
DIFFERENTIAL METHOD: ABNORMAL
EOSINOPHIL # BLD AUTO: 0 K/UL
EOSINOPHIL NFR BLD: 0.2 %
ERYTHROCYTE [DISTWIDTH] IN BLOOD BY AUTOMATED COUNT: 15.1 %
EST. GFR  (AFRICAN AMERICAN): >60 ML/MIN/1.73 M^2
EST. GFR  (NON AFRICAN AMERICAN): >60 ML/MIN/1.73 M^2
GLUCOSE SERPL-MCNC: 113 MG/DL
HCT VFR BLD AUTO: 34.1 %
HGB BLD-MCNC: 11.4 G/DL
LYMPHOCYTES # BLD AUTO: 1.4 K/UL
LYMPHOCYTES NFR BLD: 9.6 %
MCH RBC QN AUTO: 29.1 PG
MCHC RBC AUTO-ENTMCNC: 33.5 G/DL
MCV RBC AUTO: 87 FL
MONOCYTES # BLD AUTO: 0.9 K/UL
MONOCYTES NFR BLD: 6.2 %
NEUTROPHILS # BLD AUTO: 11.9 K/UL
NEUTROPHILS NFR BLD: 83.8 %
PLATELET # BLD AUTO: 118 K/UL
PMV BLD AUTO: 9.2 FL
POTASSIUM SERPL-SCNC: 4 MMOL/L
PROT SERPL-MCNC: 6 G/DL
RBC # BLD AUTO: 3.92 M/UL
SODIUM SERPL-SCNC: 135 MMOL/L
WBC # BLD AUTO: 14.1 K/UL

## 2017-09-18 PROCEDURE — 92610 EVALUATE SWALLOWING FUNCTION: CPT

## 2017-09-18 PROCEDURE — 94640 AIRWAY INHALATION TREATMENT: CPT

## 2017-09-18 PROCEDURE — G8996 SWALLOW CURRENT STATUS: HCPCS | Mod: CI

## 2017-09-18 PROCEDURE — 25000242 PHARM REV CODE 250 ALT 637 W/ HCPCS: Performed by: NURSE PRACTITIONER

## 2017-09-18 PROCEDURE — 25000003 PHARM REV CODE 250: Performed by: EMERGENCY MEDICINE

## 2017-09-18 PROCEDURE — 25000003 PHARM REV CODE 250: Performed by: NURSE PRACTITIONER

## 2017-09-18 PROCEDURE — G8997 SWALLOW GOAL STATUS: HCPCS | Mod: CI

## 2017-09-18 PROCEDURE — 63600175 PHARM REV CODE 636 W HCPCS: Performed by: FAMILY MEDICINE

## 2017-09-18 PROCEDURE — 99223 1ST HOSP IP/OBS HIGH 75: CPT | Mod: ,,, | Performed by: ORTHOPAEDIC SURGERY

## 2017-09-18 PROCEDURE — 12000002 HC ACUTE/MED SURGE SEMI-PRIVATE ROOM

## 2017-09-18 PROCEDURE — 36415 COLL VENOUS BLD VENIPUNCTURE: CPT

## 2017-09-18 PROCEDURE — 85025 COMPLETE CBC W/AUTO DIFF WBC: CPT

## 2017-09-18 PROCEDURE — 63600175 PHARM REV CODE 636 W HCPCS: Performed by: NURSE PRACTITIONER

## 2017-09-18 PROCEDURE — 80053 COMPREHEN METABOLIC PANEL: CPT

## 2017-09-18 PROCEDURE — 94761 N-INVAS EAR/PLS OXIMETRY MLT: CPT

## 2017-09-18 PROCEDURE — 63600175 PHARM REV CODE 636 W HCPCS: Performed by: EMERGENCY MEDICINE

## 2017-09-18 PROCEDURE — G8998 SWALLOW D/C STATUS: HCPCS | Mod: CI

## 2017-09-18 PROCEDURE — 99233 SBSQ HOSP IP/OBS HIGH 50: CPT | Mod: ,,, | Performed by: INTERNAL MEDICINE

## 2017-09-18 PROCEDURE — 99900035 HC TECH TIME PER 15 MIN (STAT)

## 2017-09-18 RX ADMIN — ONDANSETRON 4 MG: 2 INJECTION INTRAMUSCULAR; INTRAVENOUS at 02:09

## 2017-09-18 RX ADMIN — CEFTRIAXONE 1 G: 1 INJECTION, SOLUTION INTRAVENOUS at 05:09

## 2017-09-18 RX ADMIN — ESCITALOPRAM OXALATE 10 MG: 10 TABLET ORAL at 08:09

## 2017-09-18 RX ADMIN — METOPROLOL SUCCINATE 25 MG: 25 TABLET, EXTENDED RELEASE ORAL at 08:09

## 2017-09-18 RX ADMIN — PANTOPRAZOLE SODIUM 40 MG: 40 TABLET, DELAYED RELEASE ORAL at 08:09

## 2017-09-18 RX ADMIN — ASPIRIN 81 MG CHEWABLE TABLET 81 MG: 81 TABLET CHEWABLE at 08:09

## 2017-09-18 RX ADMIN — AZITHROMYCIN MONOHYDRATE 250 MG: 500 INJECTION, POWDER, LYOPHILIZED, FOR SOLUTION INTRAVENOUS at 06:09

## 2017-09-18 RX ADMIN — ACETAMINOPHEN 650 MG: 325 TABLET ORAL at 01:09

## 2017-09-18 RX ADMIN — ALBUTEROL SULFATE 2.5 MG: 2.5 SOLUTION RESPIRATORY (INHALATION) at 12:09

## 2017-09-18 RX ADMIN — SIMVASTATIN 40 MG: 40 TABLET, FILM COATED ORAL at 08:09

## 2017-09-18 RX ADMIN — ALBUTEROL SULFATE 2.5 MG: 2.5 SOLUTION RESPIRATORY (INHALATION) at 08:09

## 2017-09-18 RX ADMIN — ACETAMINOPHEN 650 MG: 325 TABLET ORAL at 08:09

## 2017-09-18 RX ADMIN — LOSARTAN POTASSIUM 25 MG: 25 TABLET, FILM COATED ORAL at 08:09

## 2017-09-18 NOTE — PT/OT/SLP EVAL
"Speech Language Pathology  Evaluation    Talia Alberts   MRN: [id number]   Admitting Diagnosis: CAP (community acquired pneumonia)    Diet recommendations: Solid Diet Level:  (Dysphagia Diet 3)  Liquid Diet Level: Thin, cut all food into 1/2" pcs,  1 bite/sip at a time, Eliminate distractions, Avoid talking while eating and Assistance with meals to slow rate & cue to swallow before taking next bite    SLP Treatment Date: 09/18/17  Speech Start Time: 1214     Speech Stop Time: 1225     Speech Total (min): 11 min       TREATMENT BILLABLE MINUTES:  Eval Swallow and Oral Function 11    Diagnosis: CAP (community acquired pneumonia)      Past Medical History:   Diagnosis Date    Cancer     Coronary artery disease     Hypertension     MI (myocardial infarction)     Skin cancer unsure    L and R arm, was removed by dr christianson    Squamous cell carcinoma      Past Surgical History:   Procedure Laterality Date    CARDIAC SURGERY      FRACTURE SURGERY         Has the patient been evaluated by SLP for swallowing? : Yes  Keep patient NPO?: No   General Precautions: Standard, fall, hearing impaired    Current Respiratory Status: nasal cannula     Social Hx: Patient lives with daughter.    Prior diet: modified regular textures cut into 1/2" pcs, thin liquids.    Subjective:  You know I fell.  I wanted to go to the bathroom.  I should wait for the nurse.     Objective:   Patient found with: oxygen.  Alert & cooperative, no dysarthria, daughter present cutting lunch meal into 1/2" pieces.  Daughter reported pt has always eaten rapidly, adding more food before swallowing previous, "until she looks like a squirrel", requiring verbal cues.  Denied hx neuro or pulmonary diagnoses; endorsed protonix daily for GI diagnosis; denied neck/throat surgery.    CXR - right upper lobe airspace consolidation, concerning for acute pneumonia/aspiration  CT Head - Advanced chronic ischemic microvascular change and multifocal chronic lacunar " "infarcts    Oral Musculature Evaluation  Oral Musculature: WNL  Dentition: upper and lower dentures  Mucosal Quality: good  Mandibular Strength and Mobility: WFL  Oral Labial Strength and Mobility: WFL  Lingual Strength and Mobility: WFL  Velar Elevation: WFL  Buccal Strength and Mobility: WFL  Voice Prior to PO Intake: clear   DDK/AMRs - WFL    Bedside Swallow Eval:  Consistencies Assessed: Thin liquids via straw sip & serial straw sips, Soft solids red beans & rice and Solids sausage & cornbread  Oral Phase: prolonged chewing, adding more before previous bite was swallowed,  slow oral transit time  Pharyngeal Phase: no overt clinical  signs/symptoms of aspiration and no overt clinical signs/symptoms of pharyngeal dysphagia   Daughter verbally prompted pt to not overfill mouth, swallow before next bite    Assessment:  Talia Alberts is a 89 y.o. female with a medical diagnosis of CAP (community acquired pneumonia) and presented with mild oral prep dysphagia but no s/s pharyngeal dysphagia.  Recommend Dysphagia Diet 3 with thin liquids, cut all food into 1/2" pcs before serving, follow posted swallowing guidelines.  No tx.        Goals:    SLP Goals     Not on file                 Plan:   Plan of Care reviewed with: patient, daughter  SLP Follow-up?: No              Cinthya Gandhi CCC-SLP/A  09/18/2017            "

## 2017-09-18 NOTE — CONSULTS
Past Medical History:   Diagnosis Date    Cancer     Coronary artery disease     Hypertension     MI (myocardial infarction)     Skin cancer unsure    L and R arm, was removed by dr christianson    Squamous cell carcinoma        Past Surgical History:   Procedure Laterality Date    CARDIAC SURGERY      FRACTURE SURGERY         Current Facility-Administered Medications   Medication    acetaminophen tablet 650 mg    albuterol sulfate nebulizer solution 2.5 mg    aspirin chewable tablet 81 mg    azithromycin (ZITHROMAX) 250 mg in dextrose 5 % 250 mL IVPB    cefTRIAXone (ROCEPHIN) 1 g in dextrose 5 % 50 mL IVPB    escitalopram oxalate tablet 10 mg    flavoxate tablet 100 mg    losartan tablet 25 mg    metoprolol succinate (TOPROL-XL) 24 hr tablet 25 mg    ondansetron injection 4 mg    pantoprazole EC tablet 40 mg    simvastatin tablet 40 mg       Review of patient's allergies indicates:   Allergen Reactions    Morphine Swelling       Family History   Problem Relation Age of Onset    Melanoma Neg Hx     Psoriasis Neg Hx     Lupus Neg Hx     Eczema Neg Hx        Social History     Social History    Marital status:      Spouse name: N/A    Number of children: N/A    Years of education: N/A     Occupational History    Not on file.     Social History Main Topics    Smoking status: Never Smoker    Smokeless tobacco: Never Used    Alcohol use No    Drug use: No    Sexual activity: No     Other Topics Concern    Not on file     Social History Narrative    No narrative on file       Chief Complaint:   Chief Complaint   Patient presents with    Fatigue     yesterday able to walk without difficulty, today she is unable to stand and walk. slipped out of bed 3 days ago. denies pain to legs.        Consulting Physician: Self, Aaareferral    History of Present Illness:    This is a 89 y.o. year old female who complains of left hip pain following a fall this morning when she attempted to get out  of bed. Reports pain is minimal at this time.      ROS    Examination:    Vital Signs:    Vitals:    09/18/17 1100 09/18/17 1235 09/18/17 1519 09/18/17 1826   BP: (!) 176/74  (!) 183/81 130/70   Pulse: 73 78 76    Resp: 16 16 18    Temp: 97.6 °F (36.4 °C)  97.4 °F (36.3 °C)    TempSrc: Oral  Oral    SpO2: (!) 94% 95% 95%    Weight:       Height:           This a well-developed, well nourished patient in no acute distress.    Alert and oriented x 3 and cooperative to examination.       Physical Exam: Left Hip Exam    Gait:   Non weight bearing    Skin  Rash:   None  Scars:   None    Inspection  Erythema:  None  Bruising:  none  Swelling:  Mild  Masses:  None  Lymphadenopathy: None    Range of Motion: Not tested due to fracture    Tenderness:  Diffuse    Strength:  Not tested due to fracture    Stability:  Not tested due to fracture    Sensation:  Intact    Vascular  Pulses:  Palpable distally          Imaging: X-rays ordered and reviewed today personally of the left hip and pelvis show a minimally displaced superior and inferior pubic rami fracture.     Assessment: Fatigue, unspecified type    CHF (congestive heart failure)  -     X-Ray Chest AP Portable; Standing    CAP (community acquired pneumonia)    Other orders  -     Brain natriuretic peptide; Standing  -     Complete Blood Count (CBC); Standing  -     Comprehensive Metabolic Panel (CMP); Standing  -     Lactic acid, plasma; Standing  -     Magnesium; Standing  -     Troponin I; Standing  -     TSH; Standing  -     Urinalysis; Standing  -     Straight Cath (In and Out) - do not wait for patient to void; Standing  -     EKG 12-lead; Standing  -     Insert Saline lock IV; Standing  -     sodium chloride 0.9% bolus 1,000 mL; Inject 1,000 mLs into the vein ED 1 Time.  -     Cardiac Monitoring - Adult; Standing  -     POCT glucose; Standing  -     POCT glucose; Standing  -     Blood culture #1 **CANNOT BE ORDERED STAT**; Standing  -     Blood culture #2 **CANNOT BE  ORDERED STAT**; Standing  -     Lactic acid, plasma; Standing  -     Protime-INR; Standing  -     cefTRIAXone (ROCEPHIN) 1 g in dextrose 5 % 50 mL IVPB; Inject 50 mLs (1 g total) into the vein ED 1 Time.  -     azithromycin 500 mg in dextrose 5 % 250 mL IVPB (ready to mix system); Inject 250 mLs (500 mg total) into the vein ED 1 Time.  -     Admit to Inpatient; Standing  -     metoprolol succinate (TOPROL-XL) 24 hr tablet 25 mg; Take 1 tablet (25 mg total) by mouth once daily.  -     escitalopram oxalate tablet 10 mg; Take 1 tablet (10 mg total) by mouth every evening.  -     losartan tablet 25 mg; Take 1 tablet (25 mg total) by mouth nightly.  -     pantoprazole EC tablet 40 mg; Take 1 tablet (40 mg total) by mouth once daily.  -     flavoxate tablet 100 mg; Take 1 tablet (100 mg total) by mouth 3 (three) times daily as needed for bladder spasms.  -     aspirin chewable tablet 81 mg; Take 1 tablet (81 mg total) by mouth once daily.  -     simvastatin tablet 40 mg; Take 1 tablet (40 mg total) by mouth every evening.  -     Vital signs; Standing  -     Notify physician ; Standing  -     Cancel: Pulse Oximetry Q4H; Standing  -     Full code; Standing  -     Medium Risk of VTE; Standing  -     acetaminophen tablet 650 mg; Take 2 tablets (650 mg total) by mouth every 8 (eight) hours as needed for mild pain 1-3/10 pain scale.  -     ondansetron injection 4 mg; Inject 4 mg into the vein every 12 (twelve) hours as needed for Nausea/Vomiting (1st choice) - use as first treatment.  -     Respiratory mechanics Once; Standing  -     Inhalation Treatment Once; Standing  -     albuterol-ipratropium 2.5mg-0.5mg/3mL nebulizer solution 3 mL; Take 3 mLs by nebulization ED 1 Time.  -     albuterol sulfate nebulizer solution 2.5 mg; Take 2.5 mg by nebulization every 6 (six) hours.  -     azithromycin (ZITHROMAX) 250 mg in dextrose 5 % 250 mL IVPB; Inject 250 mg into the vein every 24 hours.  -     C-reactive protein; Standing  -      Place QUINTON hose; Standing  -     Place sequential compression device; Standing  -     Cancel: Diet Cardiac; Standing  -     cefTRIAXone (ROCEPHIN) 1 g in dextrose 5 % 50 mL IVPB; Inject 50 mLs (1 g total) into the vein once daily.  -     CT Head Without Contrast; Standing  -     X-Ray Hip 2 or 3 views Left; Standing  -     Fall precautions; Standing  -     CBC auto differential; Standing  -     Comprehensive metabolic panel; Standing  -     Culture, Respiratory with Gram Stain; Standing  -     SLP evaluation; Standing  -     Diet Cardiac; Standing  -     Inpatient consult to Orthopedic Surgery; Standing  -     Bed rest; Standing  -     CARDIAC MONITORING STRIPS        Plan:  We will ask PT to assist pt with transfer and mobilization. She is WB as tolerated. No operative intervention at this time.      DISCLAIMER: This note may have been dictated using voice recognition software and may contain grammatical errors.     NOTE: Consult report sent to referring provider via [employer].

## 2017-09-18 NOTE — PROGRESS NOTES
09/18/17 0836   Patient Assessment/Suction   Level of Consciousness (AVPU) alert   Respiratory Effort Unlabored   All Lung Fields Breath Sounds clear;equal bilaterally   PRE-TX-O2-ETCO2   O2 Device (Oxygen Therapy) room air   SpO2 95 %   Pulse Oximetry Type Intermittent   $ Pulse Oximetry - Multiple Charge Pulse Oximetry - Multiple   Pulse 83   Resp 16   Aerosol Therapy   $ Aerosol Therapy Charges Refused  (pt eating breakfast)

## 2017-09-18 NOTE — PLAN OF CARE
Problem: Patient Care Overview  Goal: Plan of Care Review  Outcome: Ongoing (interventions implemented as appropriate)  POC reviewed with pt's, verbalized understanding. Bed wheels locked, call light in reach, side rail up x 4, bed alarm on. Family at the bedside. Alert and oriented. Denies pain. Strict bedrest. On room air stable vital sign. No bleeding noted in the skin tear. To be seen by ortho doctor tomorrow, already informed. Fall precaution reviewed with family. Pt's still confused and disoriented x 3. Oriented to self. Will continue to monitor.

## 2017-09-18 NOTE — PLAN OF CARE
09/18/17 1049   Discharge Assessment   Assessment Type Discharge Planning Assessment   See assessement

## 2017-09-18 NOTE — PLAN OF CARE
Spoke with the pt's daughter, Esthela ([phone]) at the bedside to answer questions regarding discharge planning. Esthela was asking about long term NH placement but states when the pt was in SNF they said she did not qualify for long term Medicaid due to having given away some property 3yrs ago. She is concerned that the pt frequently falls at home and is very quick moving before anybody can help her. I suggested that they put the pt's mattress on the floor at home to lesson the fall; she states that her brother has done that at his home. I also suggested that she get baby monitors and could order a home bed alarm system online. [facility] has approved the pt for a rollator per the pt's request; I told her I did not think that was a good idea since it would most likely move faster than the pt. She verbalized understanding and states she is going to try to keep it away from the pt as much as possible.  I will continue to follow and assist as needed.....Mandeep Rutledge CM

## 2017-09-18 NOTE — NURSING
Informed Dr. Beebe the result of hip xray, spoke with the daughter, verbalized understanding. Orthopedic consult done.

## 2017-09-18 NOTE — NURSING
"Daughter informed pt's fall. State " its not your fault, she always fall because she never listened."  "

## 2017-09-18 NOTE — PROGRESS NOTES
"Progress Note    Admit Date: 9/16/2017   LOS: 2 days     SUBJECTIVE:     HPI: Pt is a young 89 yo  admitted to the services of hospital medicine via the ER for CAP. Presents with c/o acute onset of extreme weakness "I couldn't stand on my legs, they were too weak" c/o acute onset of productive cough which started Friday night. Denies any chest pain or SOB. Felt feverish but did not measure temp. Denies any sick contacts. No hx of lung disease. Hx of bioprosthetic heart valve, CAD and HTN, all stable with medications    Interval history   Patient seen and examined doing well. No acute complaints. Per nursing, patient found on the floor this AM with VS WNL. Patient has left forehead contision and complained of left hip pain.    Scheduled Meds:   albuterol sulfate  2.5 mg Nebulization Q6H    aspirin  81 mg Oral Daily    azithromycin  250 mg Intravenous Q24H    cefTRIAXone (ROCEPHIN) IVPB  1 g Intravenous Daily    escitalopram oxalate  10 mg Oral QHS    losartan  25 mg Oral Nightly    metoprolol succinate  25 mg Oral Daily    pantoprazole  40 mg Oral Daily    simvastatin  40 mg Oral QHS     Continuous Infusions:   PRN Meds:acetaminophen, flavoxate, ondansetron    Review of patient's allergies indicates:   Allergen Reactions    Morphine Swelling        OBJECTIVE:     Vital Signs (Most Recent)  Temp: 97.7 °F (36.5 °C) (09/18/17 0744)  Pulse: 83 (09/18/17 0836)  Resp: 16 (09/18/17 0836)  BP: 137/73 (09/18/17 0744)  SpO2: 95 % (09/18/17 0836)    Vital Signs Range (Last 24H):  Temp:  [97.7 °F (36.5 °C)-98.8 °F (37.1 °C)]   Pulse:  [70-95]   Resp:  [14-17]   BP: (137-199)/(73-95)   SpO2:  [93 %-96 %]     I & O (Last 24H):    Intake/Output Summary (Last 24 hours) at 09/18/17 1122  Last data filed at 09/17/17 1811   Gross per 24 hour   Intake              540 ml   Output                0 ml   Net              540 ml     Physical Exam:  Constitutional: She is oriented to person, place, and time. She appears " well-developed and well-nourished. No distress.   HENT:   Head: Normocephalic and Mild left forehead contusion.  Mouth/Throat: Oropharynx is clear and moist.   Eyes: Conjunctivae are normal. Pupils are equal, round, and reactive to light. Right eye exhibits no discharge. Left eye exhibits no discharge. No scleral icterus.   Neck: Normal range of motion. No JVD present. No thyromegaly present.   Cardiovascular: Normal rate, regular rhythm and normal heart sounds.  Exam reveals no gallop and no friction rub.    No murmur heard.  Pulmonary/Chest: Effort normal. No respiratory distress. She has no wheezes. She has no rales. She exhibits no tenderness.   Abdominal: Soft. Bowel sounds are normal. She exhibits no distension. There is no tenderness. There is no rebound and no guarding.   Musculoskeletal: Normal range of motion. Left hip anteriorly tender to touch.  Lymphadenopathy:     She has no cervical adenopathy.   Neurological: She is alert and oriented to person, place, and time. She displays normal reflexes. No cranial nerve deficit.   Skin: Skin is warm and dry. Capillary refill takes less than 2 seconds. She is not diaphoretic.   Psychiatric: She has a normal mood and affect. Her behavior is normal. Judgment and thought content normal.     Laboratory:  CBC:     Recent Labs  Lab 09/16/17  1603   WBC 22.30*   RBC 4.43   HGB 12.9   HCT 38.2      MCV 86   MCH 29.2   MCHC 33.8     CMP:     Recent Labs  Lab 09/16/17  1603   *   CALCIUM 8.6*   ALBUMIN 3.3*   PROT 6.3      K 3.9   CO2 23      BUN 23   CREATININE 1.0   ALKPHOS 91   ALT 33   AST 53*   BILITOT 0.8       Diagnostic Results:  CXR: Interval involvement of right upper lobe airspace consolidation, concerning for acute pneumonia/aspiration    Left hip x-ray:  Acute appearing fractures of the left superior and inferior pubic rami.    ASSESSMENT/PLAN:     * CAP (community acquired pneumonia)     Acute  Associated with elevated white count  and left shift  Check CRP  Breathing tx  Rocephin and azithromycin           Left superior and inferior pubic rami fracture  Fall precautions.  X-ray results reviewed with patient and and her daughter.  PO narcotics prn.     Coronary artery disease involving native coronary artery without angina pectoris     Chronic  Stable  Reorder home medications  Telemetry        Essential hypertension     Chronic  Controlled  Continue home medications  Monitor and treat accordingly           Multi-infarct dementia  Supportive care, fall precautions    Discussed with patient's daughter who admits frequent falls and attributes to patient's advancing dementia. She is unable to place her at a halfway nursing home as patient does not qualify for Medicaid. Discussed with . Time spent in care of the patient, counseling and coordination of care (Greater than 50% spent in direct face to face contact): 35 min.          VTE Risk Mitigation          Ordered       Place QUINTON hose  Until discontinued       09/17/17 0647       Place sequential compression device  Until discontinued       09/17/17 0647       Medium Risk of VTE  Once       09/16/17 1950     Ranjith Beebe MD

## 2017-09-18 NOTE — NURSING
Called in the room, pt's found on the floor. Alert. Vitals sign checked BP: 173/69 mmhg, HR: 78 bpm, RR: 18, Temp: 97.8 F. Noted skin tear in the left elbow, cleaned and applied mepelex and also a small bump in the left forehead. Pt's complaint of left hip pain. Transfer to bed with the help of 4 nurses to avoid any injury. Informed Dr. Beebe. Ordered Stat ct scan and hip xray. Informed the daughter of the pt's. Informed the charge nurse. SOS done.

## 2017-09-19 LAB
ALBUMIN SERPL BCP-MCNC: 2.6 G/DL
ALP SERPL-CCNC: 87 U/L
ALT SERPL W/O P-5'-P-CCNC: 20 U/L
ANION GAP SERPL CALC-SCNC: 10 MMOL/L
AST SERPL-CCNC: 22 U/L
BASOPHILS # BLD AUTO: 0 K/UL
BASOPHILS NFR BLD: 0.3 %
BILIRUB SERPL-MCNC: 0.7 MG/DL
BUN SERPL-MCNC: 12 MG/DL
CALCIUM SERPL-MCNC: 8.1 MG/DL
CHLORIDE SERPL-SCNC: 103 MMOL/L
CO2 SERPL-SCNC: 25 MMOL/L
CREAT SERPL-MCNC: 0.7 MG/DL
DIFFERENTIAL METHOD: ABNORMAL
EOSINOPHIL # BLD AUTO: 0.2 K/UL
EOSINOPHIL NFR BLD: 2.6 %
ERYTHROCYTE [DISTWIDTH] IN BLOOD BY AUTOMATED COUNT: 15.3 %
EST. GFR  (AFRICAN AMERICAN): >60 ML/MIN/1.73 M^2
EST. GFR  (NON AFRICAN AMERICAN): >60 ML/MIN/1.73 M^2
GLUCOSE SERPL-MCNC: 110 MG/DL
HCT VFR BLD AUTO: 31.4 %
HGB BLD-MCNC: 10.6 G/DL
LYMPHOCYTES # BLD AUTO: 1.1 K/UL
LYMPHOCYTES NFR BLD: 12.9 %
MCH RBC QN AUTO: 29.4 PG
MCHC RBC AUTO-ENTMCNC: 33.6 G/DL
MCV RBC AUTO: 88 FL
MONOCYTES # BLD AUTO: 0.6 K/UL
MONOCYTES NFR BLD: 7 %
NEUTROPHILS # BLD AUTO: 6.7 K/UL
NEUTROPHILS NFR BLD: 77.2 %
PLATELET # BLD AUTO: 113 K/UL
PMV BLD AUTO: 9.5 FL
POTASSIUM SERPL-SCNC: 3.6 MMOL/L
PROT SERPL-MCNC: 5.7 G/DL
RBC # BLD AUTO: 3.59 M/UL
SODIUM SERPL-SCNC: 138 MMOL/L
WBC # BLD AUTO: 8.7 K/UL

## 2017-09-19 PROCEDURE — 36415 COLL VENOUS BLD VENIPUNCTURE: CPT

## 2017-09-19 PROCEDURE — 25000003 PHARM REV CODE 250: Performed by: EMERGENCY MEDICINE

## 2017-09-19 PROCEDURE — 94761 N-INVAS EAR/PLS OXIMETRY MLT: CPT

## 2017-09-19 PROCEDURE — 99232 SBSQ HOSP IP/OBS MODERATE 35: CPT | Mod: ,,, | Performed by: INTERNAL MEDICINE

## 2017-09-19 PROCEDURE — 85025 COMPLETE CBC W/AUTO DIFF WBC: CPT

## 2017-09-19 PROCEDURE — 94760 N-INVAS EAR/PLS OXIMETRY 1: CPT

## 2017-09-19 PROCEDURE — 25000003 PHARM REV CODE 250: Performed by: NURSE PRACTITIONER

## 2017-09-19 PROCEDURE — 12000002 HC ACUTE/MED SURGE SEMI-PRIVATE ROOM

## 2017-09-19 PROCEDURE — 94640 AIRWAY INHALATION TREATMENT: CPT

## 2017-09-19 PROCEDURE — 63600175 PHARM REV CODE 636 W HCPCS: Performed by: NURSE PRACTITIONER

## 2017-09-19 PROCEDURE — 80053 COMPREHEN METABOLIC PANEL: CPT

## 2017-09-19 PROCEDURE — 63600175 PHARM REV CODE 636 W HCPCS: Performed by: FAMILY MEDICINE

## 2017-09-19 PROCEDURE — 25000242 PHARM REV CODE 250 ALT 637 W/ HCPCS: Performed by: NURSE PRACTITIONER

## 2017-09-19 RX ADMIN — AZITHROMYCIN MONOHYDRATE 250 MG: 500 INJECTION, POWDER, LYOPHILIZED, FOR SOLUTION INTRAVENOUS at 07:09

## 2017-09-19 RX ADMIN — ACETAMINOPHEN 650 MG: 325 TABLET ORAL at 05:09

## 2017-09-19 RX ADMIN — LOSARTAN POTASSIUM 25 MG: 25 TABLET, FILM COATED ORAL at 08:09

## 2017-09-19 RX ADMIN — SIMVASTATIN 40 MG: 40 TABLET, FILM COATED ORAL at 08:09

## 2017-09-19 RX ADMIN — ALBUTEROL SULFATE 2.5 MG: 2.5 SOLUTION RESPIRATORY (INHALATION) at 07:09

## 2017-09-19 RX ADMIN — CEFTRIAXONE 1 G: 1 INJECTION, SOLUTION INTRAVENOUS at 05:09

## 2017-09-19 RX ADMIN — ASPIRIN 81 MG CHEWABLE TABLET 81 MG: 81 TABLET CHEWABLE at 08:09

## 2017-09-19 RX ADMIN — ACETAMINOPHEN 650 MG: 325 TABLET ORAL at 04:09

## 2017-09-19 RX ADMIN — ESCITALOPRAM OXALATE 10 MG: 10 TABLET ORAL at 08:09

## 2017-09-19 RX ADMIN — ALBUTEROL SULFATE 2.5 MG: 2.5 SOLUTION RESPIRATORY (INHALATION) at 01:09

## 2017-09-19 RX ADMIN — ALBUTEROL SULFATE 2.5 MG: 2.5 SOLUTION RESPIRATORY (INHALATION) at 12:09

## 2017-09-19 RX ADMIN — PANTOPRAZOLE SODIUM 40 MG: 40 TABLET, DELAYED RELEASE ORAL at 08:09

## 2017-09-19 RX ADMIN — METOPROLOL SUCCINATE 25 MG: 25 TABLET, EXTENDED RELEASE ORAL at 08:09

## 2017-09-19 NOTE — PLAN OF CARE
09/18/17 2000   Patient Assessment/Suction   Level of Consciousness (AVPU) alert   Respiratory Effort Unlabored   Expansion/Accessory Muscles/Retractions expansion symmetric   All Lung Fields Breath Sounds clear   Rhythm/Pattern, Respiratory pattern regular   Cough Frequency infrequent   Cough Type fair;nonproductive   PRE-TX-O2-ETCO2   O2 Device (Oxygen Therapy) room air   SpO2 97 %   Pulse Oximetry Type Intermittent   $ Pulse Oximetry - Multiple Charge Pulse Oximetry - Multiple   Pulse 74   Resp 16   Aerosol Therapy   $ Aerosol Therapy Charges Aerosol Treatment   Respiratory Treatment Status given   SVN/Inhaler Treatment Route mask   Position During Treatment HOB at 30 degrees   Patient Tolerance good   Post-Treatment   Post-treatment Heart Rate (beats/min) 74   Post-treatment Resp Rate (breaths/min) 16   All Fields Breath Sounds unchanged

## 2017-09-19 NOTE — PLAN OF CARE
Problem: Patient Care Overview  Goal: Plan of Care Review  Outcome: Ongoing (interventions implemented as appropriate)  Pt lying in bed, respirations even and unlabored, no acute distress noted.  Antibiotic therapy continues.  She has been medicated for pelvic pain once this shift, instructed to call for medication before pain is severe.  She has been doing well overall.  Side rails up times two, bed low and locked, call light within reach.  Bed alarm set.

## 2017-09-19 NOTE — PROGRESS NOTES
"Progress Note    Admit Date: 9/16/2017   LOS: 3 days     SUBJECTIVE:     HPI: Pt is a young 89 yo  admitted to the services of hospital medicine via the ER for CAP. Presents with c/o acute onset of extreme weakness "I couldn't stand on my legs, they were too weak" c/o acute onset of productive cough which started Friday night. Denies any chest pain or SOB. Felt feverish but did not measure temp. Denies any sick contacts. No hx of lung disease. Hx of bioprosthetic heart valve, CAD and HTN, all stable with medications    Interval history   Patient seen and examined doing well. No acute complaints. Patient has left forehead contision and complained of left hip pain.    Scheduled Meds:   albuterol sulfate  2.5 mg Nebulization Q6H    aspirin  81 mg Oral Daily    azithromycin  250 mg Intravenous Q24H    cefTRIAXone (ROCEPHIN) IVPB  1 g Intravenous Daily    escitalopram oxalate  10 mg Oral QHS    losartan  25 mg Oral Nightly    metoprolol succinate  25 mg Oral Daily    pantoprazole  40 mg Oral Daily    simvastatin  40 mg Oral QHS     Continuous Infusions:   PRN Meds:acetaminophen, flavoxate, ondansetron    Review of patient's allergies indicates:   Allergen Reactions    Morphine Swelling        OBJECTIVE:     Vital Signs (Most Recent)  Temp: 98 °F (36.7 °C) (09/19/17 0800)  Pulse: 81 (09/19/17 0800)  Resp: 18 (09/19/17 0800)  BP: (!) 134/52 (09/19/17 0800)  SpO2: 95 % (09/19/17 0800)    Vital Signs Range (Last 24H):  Temp:  [97.4 °F (36.3 °C)-98.1 °F (36.7 °C)]   Pulse:  [66-81]   Resp:  [15-18]   BP: (130-183)/(52-81)   SpO2:  [94 %-97 %]     I & O (Last 24H):    Intake/Output Summary (Last 24 hours) at 09/19/17 1032  Last data filed at 09/19/17 0459   Gross per 24 hour   Intake               60 ml   Output                0 ml   Net               60 ml     Physical Exam:  Constitutional: She is oriented to person, place, and time. She appears well-developed and well-nourished. No distress.   HENT:   Head: " Normocephalic and Mild left forehead contusion.  Mouth/Throat: Oropharynx is clear and moist.   Eyes: Conjunctivae are normal. Pupils are equal, round, and reactive to light. Right eye exhibits no discharge. Left eye exhibits no discharge. No scleral icterus.   Neck: Normal range of motion. No JVD present. No thyromegaly present.   Cardiovascular: Normal rate, regular rhythm and normal heart sounds.  Exam reveals no gallop and no friction rub.    No murmur heard.  Pulmonary/Chest: Effort normal. No respiratory distress. She has no wheezes. She has no rales. She exhibits no tenderness.   Abdominal: Soft. Bowel sounds are normal. She exhibits no distension. There is no tenderness. There is no rebound and no guarding.   Musculoskeletal: Normal range of motion. Left hip anteriorly tender to touch.  Lymphadenopathy:     She has no cervical adenopathy.   Neurological: She is alert and oriented to person, place, and time. She displays normal reflexes. No cranial nerve deficit.   Skin: Skin is warm and dry. Capillary refill takes less than 2 seconds. She is not diaphoretic.   Psychiatric: She has a normal mood and affect. Her behavior is normal. Judgment and thought content normal.     Laboratory:  CBC:     Recent Labs  Lab 09/19/17  0416   WBC 8.70   RBC 3.59*   HGB 10.6*   HCT 31.4*   *   MCV 88   MCH 29.4   MCHC 33.6     CMP:     Recent Labs  Lab 09/19/17  0415      CALCIUM 8.1*   ALBUMIN 2.6*   PROT 5.7*      K 3.6   CO2 25      BUN 12   CREATININE 0.7   ALKPHOS 87   ALT 20   AST 22   BILITOT 0.7       Diagnostic Results:  CXR: Interval involvement of right upper lobe airspace consolidation, concerning for acute pneumonia/aspiration    Left hip x-ray:  Acute appearing fractures of the left superior and inferior pubic rami.    ASSESSMENT/PLAN:     * CAP (community acquired pneumonia)     Acute  Associated with elevated white count and left shift  Repeat CXR.  Breathing tx  Rocephin and  azithromycin        Left superior and inferior pubic rami fracture  Fall precautions.  WBAT as per Dr. Quiles.  PO narcotics prn.     Coronary artery disease involving native coronary artery without angina pectoris     Chronic  Stable  Reorder home medications  Telemetry        Essential hypertension     Chronic  Controlled  Continue home medications  Monitor and treat accordingly           Multi-infarct dementia  Supportive care, fall precautions    Likely would need SNF.          VTE Risk Mitigation          Ordered       Place QUINTON hose  Until discontinued       09/17/17 0647       Place sequential compression device  Until discontinued       09/17/17 0647       Medium Risk of VTE  Once       09/16/17 1950     Ranjith Beebe MD

## 2017-09-19 NOTE — PLAN OF CARE
09/19/17 0712   Patient Assessment/Suction   Level of Consciousness (AVPU) alert   Respiratory Effort Unlabored   Expansion/Accessory Muscles/Retractions no use of accessory muscles   All Lung Fields Breath Sounds clear   PRE-TX-O2-ETCO2   O2 Device (Oxygen Therapy) room air   SpO2 95 %   Pulse Oximetry Type Intermittent   $ Pulse Oximetry - Multiple Charge Pulse Oximetry - Multiple   Pulse 73   Resp 16   Aerosol Therapy   $ Aerosol Therapy Charges Aerosol Treatment   Respiratory Treatment Status given   SVN/Inhaler Treatment Route mask   Position During Treatment HOB at 30 degrees   Patient Tolerance good   Post-Treatment   Post-treatment Heart Rate (beats/min) 77   Post-treatment Resp Rate (breaths/min) 16   All Fields Breath Sounds unchanged

## 2017-09-20 LAB
ALBUMIN SERPL BCP-MCNC: 2.5 G/DL
ALP SERPL-CCNC: 82 U/L
ALT SERPL W/O P-5'-P-CCNC: 14 U/L
ANION GAP SERPL CALC-SCNC: 12 MMOL/L
AST SERPL-CCNC: 20 U/L
BASOPHILS # BLD AUTO: 0 K/UL
BASOPHILS NFR BLD: 0.4 %
BILIRUB SERPL-MCNC: 0.4 MG/DL
BUN SERPL-MCNC: 13 MG/DL
CALCIUM SERPL-MCNC: 8.3 MG/DL
CHLORIDE SERPL-SCNC: 103 MMOL/L
CO2 SERPL-SCNC: 24 MMOL/L
CREAT SERPL-MCNC: 0.7 MG/DL
DIFFERENTIAL METHOD: ABNORMAL
EOSINOPHIL # BLD AUTO: 0.6 K/UL
EOSINOPHIL NFR BLD: 5.4 %
ERYTHROCYTE [DISTWIDTH] IN BLOOD BY AUTOMATED COUNT: 15.3 %
EST. GFR  (AFRICAN AMERICAN): >60 ML/MIN/1.73 M^2
EST. GFR  (NON AFRICAN AMERICAN): >60 ML/MIN/1.73 M^2
GLUCOSE SERPL-MCNC: 104 MG/DL
HCT VFR BLD AUTO: 30.8 %
HGB BLD-MCNC: 10.4 G/DL
LYMPHOCYTES # BLD AUTO: 1.4 K/UL
LYMPHOCYTES NFR BLD: 13.1 %
MCH RBC QN AUTO: 29.4 PG
MCHC RBC AUTO-ENTMCNC: 33.6 G/DL
MCV RBC AUTO: 87 FL
MONOCYTES # BLD AUTO: 0.9 K/UL
MONOCYTES NFR BLD: 8.5 %
NEUTROPHILS # BLD AUTO: 7.5 K/UL
NEUTROPHILS NFR BLD: 72.6 %
PLATELET # BLD AUTO: 118 K/UL
PLATELET BLD QL SMEAR: ABNORMAL
PMV BLD AUTO: 9.7 FL
POCT GLUCOSE: 135 MG/DL (ref 70–110)
POCT GLUCOSE: 143 MG/DL (ref 70–110)
POTASSIUM SERPL-SCNC: 4.2 MMOL/L
PROT SERPL-MCNC: 5.6 G/DL
RBC # BLD AUTO: 3.52 M/UL
SODIUM SERPL-SCNC: 139 MMOL/L
WBC # BLD AUTO: 10.3 K/UL

## 2017-09-20 PROCEDURE — 94640 AIRWAY INHALATION TREATMENT: CPT

## 2017-09-20 PROCEDURE — 97116 GAIT TRAINING THERAPY: CPT

## 2017-09-20 PROCEDURE — 86580 TB INTRADERMAL TEST: CPT | Performed by: INTERNAL MEDICINE

## 2017-09-20 PROCEDURE — 97162 PT EVAL MOD COMPLEX 30 MIN: CPT

## 2017-09-20 PROCEDURE — 25000003 PHARM REV CODE 250: Performed by: EMERGENCY MEDICINE

## 2017-09-20 PROCEDURE — 99232 SBSQ HOSP IP/OBS MODERATE 35: CPT | Mod: ,,, | Performed by: INTERNAL MEDICINE

## 2017-09-20 PROCEDURE — 97530 THERAPEUTIC ACTIVITIES: CPT

## 2017-09-20 PROCEDURE — 36415 COLL VENOUS BLD VENIPUNCTURE: CPT

## 2017-09-20 PROCEDURE — 25000003 PHARM REV CODE 250: Performed by: NURSE PRACTITIONER

## 2017-09-20 PROCEDURE — 87070 CULTURE OTHR SPECIMN AEROBIC: CPT

## 2017-09-20 PROCEDURE — 63600175 PHARM REV CODE 636 W HCPCS: Performed by: INTERNAL MEDICINE

## 2017-09-20 PROCEDURE — G8987 SELF CARE CURRENT STATUS: HCPCS | Mod: CK

## 2017-09-20 PROCEDURE — 63600175 PHARM REV CODE 636 W HCPCS: Performed by: FAMILY MEDICINE

## 2017-09-20 PROCEDURE — 12000002 HC ACUTE/MED SURGE SEMI-PRIVATE ROOM

## 2017-09-20 PROCEDURE — 85025 COMPLETE CBC W/AUTO DIFF WBC: CPT

## 2017-09-20 PROCEDURE — 97535 SELF CARE MNGMENT TRAINING: CPT

## 2017-09-20 PROCEDURE — 87205 SMEAR GRAM STAIN: CPT

## 2017-09-20 PROCEDURE — G8988 SELF CARE GOAL STATUS: HCPCS | Mod: CK

## 2017-09-20 PROCEDURE — 63600175 PHARM REV CODE 636 W HCPCS: Performed by: NURSE PRACTITIONER

## 2017-09-20 PROCEDURE — 97166 OT EVAL MOD COMPLEX 45 MIN: CPT

## 2017-09-20 PROCEDURE — 25000242 PHARM REV CODE 250 ALT 637 W/ HCPCS: Performed by: NURSE PRACTITIONER

## 2017-09-20 PROCEDURE — 80053 COMPREHEN METABOLIC PANEL: CPT

## 2017-09-20 RX ADMIN — ALBUTEROL SULFATE 2.5 MG: 2.5 SOLUTION RESPIRATORY (INHALATION) at 07:09

## 2017-09-20 RX ADMIN — METOPROLOL SUCCINATE 25 MG: 25 TABLET, EXTENDED RELEASE ORAL at 09:09

## 2017-09-20 RX ADMIN — SIMVASTATIN 40 MG: 40 TABLET, FILM COATED ORAL at 09:09

## 2017-09-20 RX ADMIN — PANTOPRAZOLE SODIUM 40 MG: 40 TABLET, DELAYED RELEASE ORAL at 09:09

## 2017-09-20 RX ADMIN — Medication 5 UNITS: at 03:09

## 2017-09-20 RX ADMIN — ALBUTEROL SULFATE 2.5 MG: 2.5 SOLUTION RESPIRATORY (INHALATION) at 01:09

## 2017-09-20 RX ADMIN — LOSARTAN POTASSIUM 25 MG: 25 TABLET, FILM COATED ORAL at 09:09

## 2017-09-20 RX ADMIN — ASPIRIN 81 MG CHEWABLE TABLET 81 MG: 81 TABLET CHEWABLE at 09:09

## 2017-09-20 RX ADMIN — CEFTRIAXONE 1 G: 1 INJECTION, SOLUTION INTRAVENOUS at 06:09

## 2017-09-20 RX ADMIN — ACETAMINOPHEN 650 MG: 325 TABLET ORAL at 05:09

## 2017-09-20 RX ADMIN — ACETAMINOPHEN 650 MG: 325 TABLET ORAL at 06:09

## 2017-09-20 RX ADMIN — AZITHROMYCIN MONOHYDRATE 250 MG: 500 INJECTION, POWDER, LYOPHILIZED, FOR SOLUTION INTRAVENOUS at 07:09

## 2017-09-20 RX ADMIN — ESCITALOPRAM OXALATE 10 MG: 10 TABLET ORAL at 09:09

## 2017-09-20 NOTE — PROGRESS NOTES
· 9/20/2017 1:40:05 PM Note: Accepted  Jane [email or web address]  Per Jane at [place] [phone]

## 2017-09-20 NOTE — PLAN OF CARE
Problem: Patient Care Overview  Goal: Plan of Care Review  Outcome: Ongoing (interventions implemented as appropriate)  Pt lying in bed, respirations even and unlabored, no acute distress noted.  She ambulated to door in her room today with physical therapy.  Antibiotic therapy continues.  She denies pain and discomfort at this time.  She had a PPD placed to her RFA this shift, needs to be read on 9/21 for 24 hours then for 48 hours 9/22.  She is awaiting placement, possibly to [place] for SNF.  Side rails up times two, bed low and locked, call light within reach.

## 2017-09-20 NOTE — PLAN OF CARE
09/19/17 1952   Patient Assessment/Suction   Level of Consciousness (AVPU) alert   Respiratory Effort Unlabored   Expansion/Accessory Muscles/Retractions expansion symmetric   All Lung Fields Breath Sounds clear;diminished   Rhythm/Pattern, Respiratory pattern regular   Cough Frequency infrequent   Cough Type none   PRE-TX-O2-ETCO2   O2 Device (Oxygen Therapy) room air   SpO2 95 %   $ Pulse Oximetry - Single Charge Pulse Oximetry - Single   $ Pulse Oximetry - Multiple Charge Pulse Oximetry - Multiple   Pulse 77   Resp 16   Aerosol Therapy   $ Aerosol Therapy Charges Aerosol Treatment   Respiratory Treatment Status given   SVN/Inhaler Treatment Route mask   Position During Treatment HOB at 30 degrees   Patient Tolerance good   Post-Treatment   Post-treatment Heart Rate (beats/min) 80   Post-treatment Resp Rate (breaths/min) 16   All Fields Breath Sounds unchanged

## 2017-09-20 NOTE — PLAN OF CARE
Problem: Occupational Therapy Goal  Goal: Occupational Therapy Goal  Goals to be met by: 10/4/17     Patient will increase functional independence with ADLs by performing:    LE Dressing with Set-up Assistance, Minimal Assistance and Assistive Devices as needed.  Toileting from bedside commode with Set-up Assistance, Moderate Assistance and Assistive Devices as needed for hygiene and clothing management.   Supine to sit with Stand-by Assistance and use of bedrail as needed.  Toilet transfer to bedside commode with Contact Guard Assistance.    Outcome: Ongoing (interventions implemented as appropriate)  OT evaluation completed today. Goals & care plan established.    PHUC Renteria  9/20/2017

## 2017-09-20 NOTE — PLAN OF CARE
Problem: Patient Care Overview  Goal: Plan of Care Review  PT eval and treat. Pt seen sitting EOB with OT. Pt progressed to gait training with RW 15 ft with 1 seated rest. Daughter at bedside

## 2017-09-20 NOTE — PT/OT/SLP EVAL
Physical Therapy  Evaluation    Talia Alberts   MRN: [id number]   Admitting Diagnosis: CAP (community acquired pneumonia)    PT Received On: 09/20/17  PT Start Time: 1021     PT Stop Time: 1042    PT Total Time (min): 21 min       Billable Minutes:  Evaluation 10 and Gait [id number]    Diagnosis: CAP (community acquired pneumonia)      Past Medical History:   Diagnosis Date    Cancer     Coronary artery disease     Hypertension     MI (myocardial infarction)     Skin cancer unsure    L and R arm, was removed by dr christianson    Squamous cell carcinoma       Past Surgical History:   Procedure Laterality Date    CARDIAC SURGERY      FRACTURE SURGERY         Referring physician: Kb  Date referred to PT: 09-    General Precautions: Standard, fall, hearing impaired  Orthopedic Precautions: LLE weight bearing as tolerated   Braces: N/A            Patient History:  Lives With: child(ange), adult  Living Arrangements: house  Transportation Available: family or friend will provide  Equipment Currently Used at Home: walker, rolling  DME owned (not currently used):     Previous Level of Function:  Ambulation Skills: needs assist  Transfer Skills: needs assist  ADL Skills: needs assist    Subjective:  Communicated with nurse Richard/ KENDALL ramires prior to session.  Pt seen seated EOB, alert, interactive  Pt stated of falls at home due to dizziness.  Pt denies dizziness while with PT  Chief Complaint: L pelvic pain  Patient goals: get well    Pain/Comfort  Pain Rating 1: 8/10  Location - Side 1: Left  Location 1: hip  Pain Addressed 1: Reposition      Objective:         Cognitive Exam:  Oriented to: Person, Place and Situation    Follows Commands/attention: Follows multistep  commands  Communication: clear/fluent  Safety awareness/insight to disability: impaired    Physical Exam:  Postural examination/scapula alignment: Rounded shoulder and Head forward    Skin integrity: Dry  Edema: None noted     Sensation:    Intact    Upper Extremity Range of Motion:  Right Upper Extremity: see OT notes  Left Upper Extremity:     Upper Extremity Strength:  Right Upper Extremity:   Left Upper Extremity:     Lower Extremity Range of Motion:  Right Lower Extremity: WFL  Left Lower Extremity: Deficits: decreased 50% due to pain    Lower Extremity Strength:  Right Lower Extremity: 4-/5  Left Lower Extremity: 3-/5     Fine motor coordination:      Gross motor coordination:     Functional Mobility:  Bed Mobility:  Sit to Supine: Moderate Assistance    Transfers:  Sit <> Stand Assistance: Moderate Assistance, Minimum Assistance  Sit <> Stand Assistive Device: Rolling Walker  Bed <> Chair Technique: Stand Pivot  Bed <> Chair Assistance: Moderate Assistance, Minimum Assistance  Bed <> Chair Assistive Device: Rolling Walker    Gait:   Gait Distance: 15 ft with 1 seated rest  Assistance 1: Minimum assistance  Gait Assistive Device: Rolling walker  Gait Pattern: 3-point gait  Gait Deviation(s): decreased ying, increased time in double stance, decreased velocity of limb motion, decreased step length, decreased weight-shifting ability    Stairs:      Balance:   Static Sit: GOOD-: Takes MODERATE challenges from all directions but inconsistently  Dynamic Sit: GOOD-: Maintains balance through MODERATE excursions of active trunk movement,     Static Stand: POOR+: Needs MINIMAL assist to maintain  Dynamic stand: POOR: N/A    Therapeutic Activities and Exercises:  EOB sitting with no pain at rest  Commode transfer with min/mod assist to void, assist with donning of new diaper  Gait with RW 15 ft, slow, wbat LLE  Back to bed/ assist with LLE   bed alarm activated  Daughter visiting but is leaving soon    AM-PAC 6 CLICK MOBILITY  How much help from another person does this patient currently need?   1 = Unable, Total/Dependent Assistance  2 = A lot, Maximum/Moderate Assistance  3 = A little, Minimum/Contact Guard/Supervision  4 = None, Modified  [place]/Independent          AM-PAC Raw Score CMS G-Code Modifier Level of Impairment Assistance   6 % Total / Unable   7 - 9 CM 80 - 100% Maximal Assist   10 - 14 CL 60 - 80% Moderate Assist   15 - 19 CK 40 - 60% Moderate Assist   20 - 22 CJ 20 - 40% Minimal Assist   23 CI 1-20% SBA / CGA   24 CH 0% Independent/ Mod I     Patient left HOB elevated with all lines intact, call button in reach and bed alarm on.    Assessment:   Talia Alberts is a 89 y.o. female with a medical diagnosis of CAP (community acquired pneumonia) and presents with L pelvic pain, nondisplaced  Sup/inf pubic rami. Pt with several fall hx at home- Pt to benefit from continued therapies/ 24 hr supervision SNF    Rehab identified problem list/impairments: Rehab identified problem list/impairments: weakness, impaired endurance, gait instability, impaired functional mobilty, impaired self care skills, impaired balance, decreased lower extremity function, pain, decreased safety awareness, orthopedic precautions    Rehab potential is fair.    Activity tolerance: Fair    Discharge recommendations: Discharge Facility/Level Of Care Needs: nursing facility, skilled     Barriers to discharge: Barriers to Discharge: Decreased caregiver support    Equipment recommendations:       GOALS:    Physical Therapy Goals        Problem: Physical Therapy Goal    Goal Priority Disciplines Outcome Goal Variances Interventions   Physical Therapy Goal    High PT/OT, PT      Description:  Goals to be met by: 2017     Patient will increase functional independence with mobility by performin. Supine to sit with MInimal Assistance  2. Sit to stand transfer with Minimal Assistance  3. Bed to chair transfer with Minimal Assistance using Rolling Walker  4. Gait  x 50 feet with Minimal Assistance using Rolling Walker.   5. Lower extremity exercise program x20 reps per handout, with assistance as needed                      PLAN:    Patient to be seen  daily to address the above listed problems via gait training, therapeutic activities, therapeutic exercises  Plan of Care expires: 09/29/17  Plan of Care reviewed with: patient, daughter          Abbie Michael, PT  09/20/2017

## 2017-09-20 NOTE — PT/OT/SLP EVAL
Occupational Therapy  Evaluation    Talia Alberts   MRN: [id number]   Admitting Diagnosis: CAP (community acquired pneumonia)    OT Date of Treatment: 09/20/17   OT Start Time: 0945  OT Stop Time: 1025  OT Total Time (min): 40 min    Billable Minutes:  Evaluation 8  Self Care/Home Management 12  Therapeutic Activity 20    Diagnosis: CAP (community acquired pneumonia)   S/p pelvic fracture 9/18/17    Past Medical History:   Diagnosis Date    Cancer     Coronary artery disease     Hypertension     MI (myocardial infarction)     Skin cancer unsure    L and R arm, was removed by dr christianson    Squamous cell carcinoma       Past Surgical History:   Procedure Laterality Date    CARDIAC SURGERY      FRACTURE SURGERY         Referring physician:   Date referred to OT: 9/19/17    General Precautions: Standard, fall, hearing impaired, aspiration  Orthopedic Precautions: LLE weight bearing as tolerated  Braces: N/A          Patient History:  Living Environment  Lives With: child(ange), adult  Living Arrangements: house  Home Accessibility: stairs to enter home  Number of Stairs to Enter Home: 1  Transportation Available: family or friend will provide  Living Environment Comment: Pt lives with her daughter and son-in-law in a [facility] with [address] [facility] and has a walk-in shower with grab bars.  Equipment Currently Used at Home: walker, rolling, shower chair, grab bar    Prior level of function:   Bed Mobility/Transfers: needs device  Grooming: independent  Bathing: needs device and assist  Upper Body Dressing: needs device  Lower Body Dressing: needs device  Toileting: needs device  Home Management Skills: unable to perform  Homemaking Responsibilities: No  Driving License: No  Mode of Transportation: Family  Leisure and Hobbies: crocheting  IADL Comments: PTA, pt was Mod I with all ADLs except SBA for safety with bathing. Her daughter does all household tasks and provides transportation. Pt has had a few recent falls.      Dominant hand: right    Subjective:  Communicated with nurse Vero prior to session.  Stated patient was cleared for OT today.  Chief Complaint: L hip pain with movement  Patient/Family stated goals: To be able to walk to the bathroom    Pain/Comfort  Pain Rating 1: 0/10  Location - Side 1: Left  Location - Orientation 1: generalized  Location 1: hip  Pain Addressed 1: Pre-medicate for activity, Reposition, Distraction, Cessation of Activity  Pain Rating Post-Intervention 1: 8/10    Objective:  Patient found with: peripheral IV, telemetry    Cognitive Exam:  Oriented to: Person, Place, Situation and year, not month  Follows Commands/attention: Follows two-step commands  Communication: clear/fluent  Memory:  Impaired STM and Poor immediate recall  Safety awareness/insight to disability: impaired  Coping skills/emotional control: Appropriate to situation    Visual/perceptual:  Intact    Physical Exam:  Postural examination/scapula alignment: Rounded shoulder, Head forward and Posterior pelvic tilt  Skin integrity: skin tear on L elbow and hand and L side of forehead  Edema: None noted     Sensation:   Intact    Upper Extremity Range of Motion:  Right Upper Extremity: WFL  Left Upper Extremity: WFL    Upper Extremity Strength:  Right Upper Extremity: 4-/5  Left Upper Extremity: 4-/5   Strength: B weak     Fine motor coordination:   Intact    Gross motor coordination: WFL    Functional Mobility:  Bed Mobility:  Rolling/Turning Right: Minimum assistance, With side rail  Scooting/Bridging: Minimum Assistance, With side rail  Supine to Sit: Minimum Assistance, WIth side rail (with HOB at 45*)    Transfers:  Sit <> Stand Assistance: Minimum Assistance (cues for hand placement)  Sit <> Stand Assistive Device: Rolling Walker  Toilet Transfer Technique: Stand Pivot  Toilet Transfer Assistance: Minimum Assistance (cues for sequencing and hand placement)  Toilet Transfer Assistive Device: bedside commode,  "Rolling Walker    Functional Ambulation: Pt took 6 steps forward and backwards at bedside with walker & Min A and cues for technique and sequencing.    Activities of Daily Living:  Feeding Level of Assistance: Set-up Assistance    UE Dressing Level of Assistance: Set-up Assistance, Stand by assistance    Grooming Position: EOB  Grooming Level of Assistance: Stand by assistance  Toileting Where Assessed: Bedside commode  Toileting Level of Assistance: Maximum assistance (pt able to complete anterior hygiene but needed Max A for brief management)    Balance:   Static Sit: FAIR: Maintains without assist, but unable to take any challenges   Dynamic Sit: FAIR+: Maintains balance through MINIMAL excursions of active trunk motion  Static Stand: POOR+: Needs MINIMAL assist to maintain  Dynamic stand: POOR: N/A    Therapeutic Activities and Exercises:  OT ed pt on OT role & POC as well as discharge recommendations.  OT ed pt on bed mobility techniques to increase independence with task.  OT ed patient on safety with walker use for functional mobility with cues for hand placement & sequencing.   OT educated patient on bedside commode transfer techniques using rolling walker.  OT ed pt on fall risk and strongly advised pt to call for help for all OOB mobility.      AM-PAC 6 CLICK ADL  How much help from another person does this patient currently need?  1 = Unable, Total/Dependent Assistance  2 = A lot, Maximum/Moderate Assistance  3 = A little, Minimum/Contact Guard/Supervision  4 = None, Modified [place]/Independent    Putting on and taking off regular lower body clothing? : 2  Bathing (including washing, rinsing, drying)?: 2  Toileting, which includes using toilet, bedpan, or urinal? : 2  Putting on and taking off regular upper body clothing?: 3  Taking care of personal grooming such as brushing teeth?: 3  Eating meals?: 3  Total Score: 15    AM-PAC Raw Score CMS "G-Code Modifier Level of Impairment Assistance   6 CN " 100% Total / Unable   7 - 9 CM 80 - 100% Maximal Assist   10-14 CL 60 - 80% Moderate Assist   15 - 19 CK 40 - 60% Moderate Assist   20 - 22 CJ 20 - 40% Minimal Assist   23 CI 1-20% SBA / CGA   24 CH 0% Independent/ Mod I       Patient left with Annalise, PT standing at bedside with Vero, nurse notified of pt status and that she would be able to use a BSC for toileting which pt stated she wished to do.    Assessment:  Talia Alberts is a 89 y.o. female with a medical diagnosis of CAP (community acquired pneumonia) & a pelvic fracture and presents with a decline in functional status due to the below listed impairments, impacting ADLs and functional mobility.  Recommend OT treatment to maximize endurance, safety & independence with ADL's & functional mobility.  Pt will benefit from further inpatient therapy to maximize return to prior level of function, due to pt currently needing extensive assistance for ADLs and functional mobility and to reduce risk of falls.    Rehab identified problem list/impairments: Rehab identified problem list/impairments: weakness, impaired self care skills, impaired balance, decreased safety awareness, orthopedic precautions, pain, gait instability, impaired functional mobilty, impaired endurance, decreased lower extremity function    Rehab potential is good.    Activity tolerance: Fair    Discharge recommendations: Discharge Facility/Level Of Care Needs: nursing facility, skilled     Barriers to discharge: Barriers to Discharge: Decreased caregiver support    Equipment recommendations: bedside commode     GOALS:    Occupational Therapy Goals        Problem: Occupational Therapy Goal    Goal Priority Disciplines Outcome Interventions   Occupational Therapy Goal     OT, PT/OT Ongoing (interventions implemented as appropriate)    Description:  Goals to be met by: 10/4/17     Patient will increase functional independence with ADLs by performing:    LE Dressing with Set-up Assistance, Minimal  Assistance and Assistive Devices as needed.  Toileting from bedside commode with Set-up Assistance, Moderate Assistance and Assistive Devices as needed for hygiene and clothing management.   Supine to sit with Stand-by Assistance and use of bedrail as needed.  Toilet transfer to bedside commode with Contact Guard Assistance.                      PLAN:  Patient to be seen 3 x/week to address the above listed problems via self-care/home management, therapeutic activities, therapeutic exercises  Plan of Care expires: 10/04/17  Plan of Care reviewed with: patient    OT G-codes  Functional Assessment Tool Used: [facility]  Score: 15  Functional Limitation: Self care  Self Care Current Status (): CORINA  Self Care Goal Status (): PHUC Armando  09/20/2017

## 2017-09-20 NOTE — PROGRESS NOTES
I sent a 3 day placement packet, current meds and SNF consult to all four [facility] facilities via [facility] .  Jaye Del Rosario LMSW

## 2017-09-20 NOTE — PROGRESS NOTES
"Progress Note    Admit Date: 9/16/2017   LOS: 4 days     SUBJECTIVE:     HPI: Pt is a young 89 yo  admitted to the services of hospital medicine via the ER for CAP. Presents with c/o acute onset of extreme weakness "I couldn't stand on my legs, they were too weak" c/o acute onset of productive cough which started Friday night. Denies any chest pain or SOB. Felt feverish but did not measure temp. Denies any sick contacts. No hx of lung disease. Hx of bioprosthetic heart valve, CAD and HTN, all stable with medications    Interval history   Patient seen and examined doing well. No acute complaints. Left hip pain stable.    Scheduled Meds:   albuterol sulfate  2.5 mg Nebulization Q6H    aspirin  81 mg Oral Daily    azithromycin  250 mg Intravenous Q24H    cefTRIAXone (ROCEPHIN) IVPB  1 g Intravenous Daily    escitalopram oxalate  10 mg Oral QHS    losartan  25 mg Oral Nightly    metoprolol succinate  25 mg Oral Daily    pantoprazole  40 mg Oral Daily    simvastatin  40 mg Oral QHS     Continuous Infusions:   PRN Meds:acetaminophen, flavoxate, ondansetron    Review of patient's allergies indicates:   Allergen Reactions    Morphine Swelling        OBJECTIVE:     Vital Signs (Most Recent)  Temp: 97.1 °F (36.2 °C) (09/20/17 0800)  Pulse: 77 (09/20/17 0800)  Resp: 18 (09/20/17 0800)  BP: (!) 147/63 (09/20/17 0800)  SpO2: 98 % (09/20/17 0800)    Vital Signs Range (Last 24H):  Temp:  [97.1 °F (36.2 °C)-98.2 °F (36.8 °C)]   Pulse:  [63-86]   Resp:  [16-18]   BP: (115-189)/(57-84)   SpO2:  [94 %-98 %]     I & O (Last 24H):    Intake/Output Summary (Last 24 hours) at 09/20/17 0931  Last data filed at 09/20/17 0928   Gross per 24 hour   Intake             1440 ml   Output                0 ml   Net             1440 ml     Physical Exam:  Constitutional: She is oriented to person, place, and time. She appears well-developed and well-nourished. No distress.   HENT:   Head: Normocephalic and Mild left forehead " contusion.  Mouth/Throat: Oropharynx is clear and moist.   Eyes: Conjunctivae are normal. Pupils are equal, round, and reactive to light. Right eye exhibits no discharge. Left eye exhibits no discharge. No scleral icterus.   Neck: Normal range of motion. No JVD present. No thyromegaly present.   Cardiovascular: Normal rate, regular rhythm and normal heart sounds.  Exam reveals no gallop and no friction rub.    No murmur heard.  Pulmonary/Chest: Effort normal. No respiratory distress. She has no wheezes. She has no rales. She exhibits no tenderness.   Abdominal: Soft. Bowel sounds are normal. She exhibits no distension. There is no tenderness. There is no rebound and no guarding.   Musculoskeletal: Normal range of motion. Left hip anteriorly tender to touch.  Lymphadenopathy:     She has no cervical adenopathy.   Neurological: She is alert and oriented to person, place, and time. She displays normal reflexes. No cranial nerve deficit.   Skin: Skin is warm and dry. Capillary refill takes less than 2 seconds. She is not diaphoretic.   Psychiatric: She has a normal mood and affect. Her behavior is normal. Judgment and thought content normal.     Laboratory:  CBC:     Recent Labs  Lab 09/20/17 0424   WBC 10.30   RBC 3.52*   HGB 10.4*   HCT 30.8*   *   MCV 87   MCH 29.4   MCHC 33.6     CMP:     Recent Labs  Lab 09/20/17 0424      CALCIUM 8.3*   ALBUMIN 2.5*   PROT 5.6*      K 4.2   CO2 24      BUN 13   CREATININE 0.7   ALKPHOS 82   ALT 14   AST 20   BILITOT 0.4       Diagnostic Results:  CXR: Interval involvement of right upper lobe airspace consolidation, concerning for acute pneumonia/aspiration    Left hip x-ray:  Acute appearing fractures of the left superior and inferior pubic rami.    CXR: Moderate decrease of the infiltrate right midlung field compared to the prior exam.    ASSESSMENT/PLAN:     * CAP (community acquired pneumonia)     Acute  Associated with elevated white count and left  shift  Repeat CXR.  Breathing tx  Rocephin and azithromycin        Left superior and inferior pubic rami fracture  Fall precautions.  WBAT as per Dr. Quiles.  PO narcotics prn.     Coronary artery disease involving native coronary artery without angina pectoris     Chronic  Stable  Reorder home medications  Telemetry        Essential hypertension     Chronic  Controlled  Continue home medications  Monitor and treat accordingly           Multi-infarct dementia  Supportive care, fall precautions    Await SNF placement.          VTE Risk Mitigation          Ordered       Place QUINTON hose  Until discontinued       09/17/17 0647       Place sequential compression device  Until discontinued       09/17/17 0647       Medium Risk of VTE  Once       09/16/17 1950     Ranjith Beebe MD

## 2017-09-20 NOTE — PLAN OF CARE
Spoke with the pt's daughter, Esthela regarding SNF; she is requesting [facility] as her 1st choice. I asked for a 2nd choice, she is unsure but is agreeable to us sending her packet to [facility] and [facility] and she will go visit those facilities after bowling around 4pm.   I notified GREGORIA Cee of the SNF consult....MAIKOL Rutledge CM

## 2017-09-21 VITALS
OXYGEN SATURATION: 99 % | RESPIRATION RATE: 16 BRPM | DIASTOLIC BLOOD PRESSURE: 73 MMHG | TEMPERATURE: 98 F | HEIGHT: 60 IN | WEIGHT: 100 LBS | HEART RATE: 67 BPM | BODY MASS INDEX: 19.63 KG/M2 | SYSTOLIC BLOOD PRESSURE: 171 MMHG

## 2017-09-21 PROBLEM — E44.0 MALNUTRITION OF MODERATE DEGREE: Status: ACTIVE | Noted: 2017-09-21

## 2017-09-21 LAB
ALBUMIN SERPL BCP-MCNC: 2.3 G/DL
ALP SERPL-CCNC: 78 U/L
ALT SERPL W/O P-5'-P-CCNC: 14 U/L
ANION GAP SERPL CALC-SCNC: 12 MMOL/L
AST SERPL-CCNC: 20 U/L
BASOPHILS # BLD AUTO: 0 K/UL
BASOPHILS NFR BLD: 0.4 %
BILIRUB SERPL-MCNC: 0.3 MG/DL
BUN SERPL-MCNC: 14 MG/DL
CALCIUM SERPL-MCNC: 8.2 MG/DL
CHLORIDE SERPL-SCNC: 105 MMOL/L
CO2 SERPL-SCNC: 23 MMOL/L
CREAT SERPL-MCNC: 0.7 MG/DL
DIFFERENTIAL METHOD: ABNORMAL
EOSINOPHIL # BLD AUTO: 0.5 K/UL
EOSINOPHIL NFR BLD: 6.2 %
ERYTHROCYTE [DISTWIDTH] IN BLOOD BY AUTOMATED COUNT: 15.1 %
EST. GFR  (AFRICAN AMERICAN): >60 ML/MIN/1.73 M^2
EST. GFR  (NON AFRICAN AMERICAN): >60 ML/MIN/1.73 M^2
GLUCOSE SERPL-MCNC: 102 MG/DL
HCT VFR BLD AUTO: 29.4 %
HGB BLD-MCNC: 10.1 G/DL
LYMPHOCYTES # BLD AUTO: 1.3 K/UL
LYMPHOCYTES NFR BLD: 14.5 %
MCH RBC QN AUTO: 29.9 PG
MCHC RBC AUTO-ENTMCNC: 34.4 G/DL
MCV RBC AUTO: 87 FL
MONOCYTES # BLD AUTO: 0.9 K/UL
MONOCYTES NFR BLD: 10.5 %
NEUTROPHILS # BLD AUTO: 6 K/UL
NEUTROPHILS NFR BLD: 68.4 %
OVALOCYTES BLD QL SMEAR: ABNORMAL
PLATELET # BLD AUTO: 106 K/UL
PLATELET BLD QL SMEAR: ABNORMAL
PMV BLD AUTO: 9.6 FL
POCT GLUCOSE: 135 MG/DL (ref 70–110)
POIKILOCYTOSIS BLD QL SMEAR: SLIGHT
POTASSIUM SERPL-SCNC: 4 MMOL/L
PROT SERPL-MCNC: 5.2 G/DL
RBC # BLD AUTO: 3.38 M/UL
SODIUM SERPL-SCNC: 140 MMOL/L
WBC # BLD AUTO: 8.8 K/UL

## 2017-09-21 PROCEDURE — 25000242 PHARM REV CODE 250 ALT 637 W/ HCPCS: Performed by: NURSE PRACTITIONER

## 2017-09-21 PROCEDURE — 25000003 PHARM REV CODE 250: Performed by: EMERGENCY MEDICINE

## 2017-09-21 PROCEDURE — 36415 COLL VENOUS BLD VENIPUNCTURE: CPT

## 2017-09-21 PROCEDURE — 80053 COMPREHEN METABOLIC PANEL: CPT

## 2017-09-21 PROCEDURE — 97110 THERAPEUTIC EXERCISES: CPT

## 2017-09-21 PROCEDURE — 94761 N-INVAS EAR/PLS OXIMETRY MLT: CPT

## 2017-09-21 PROCEDURE — 85025 COMPLETE CBC W/AUTO DIFF WBC: CPT

## 2017-09-21 PROCEDURE — 97116 GAIT TRAINING THERAPY: CPT

## 2017-09-21 PROCEDURE — 94640 AIRWAY INHALATION TREATMENT: CPT

## 2017-09-21 PROCEDURE — 99239 HOSP IP/OBS DSCHRG MGMT >30: CPT | Mod: ,,, | Performed by: INTERNAL MEDICINE

## 2017-09-21 RX ORDER — ALBUTEROL SULFATE 2.5 MG/.5ML
2.5 SOLUTION RESPIRATORY (INHALATION) EVERY 4 HOURS PRN
Qty: 300 MG | Refills: 11 | Status: SHIPPED | OUTPATIENT
Start: 2017-09-21 | End: 2019-12-03

## 2017-09-21 RX ORDER — AZITHROMYCIN 250 MG/1
250 TABLET, FILM COATED ORAL DAILY
Qty: 5 TABLET | Refills: 0
Start: 2017-09-21 | End: 2017-10-24

## 2017-09-21 RX ADMIN — ALBUTEROL SULFATE 2.5 MG: 2.5 SOLUTION RESPIRATORY (INHALATION) at 01:09

## 2017-09-21 RX ADMIN — ASPIRIN 81 MG CHEWABLE TABLET 81 MG: 81 TABLET CHEWABLE at 09:09

## 2017-09-21 RX ADMIN — ALBUTEROL SULFATE 2.5 MG: 2.5 SOLUTION RESPIRATORY (INHALATION) at 07:09

## 2017-09-21 RX ADMIN — PANTOPRAZOLE SODIUM 40 MG: 40 TABLET, DELAYED RELEASE ORAL at 09:09

## 2017-09-21 RX ADMIN — ACETAMINOPHEN 650 MG: 325 TABLET ORAL at 07:09

## 2017-09-21 RX ADMIN — METOPROLOL SUCCINATE 25 MG: 25 TABLET, EXTENDED RELEASE ORAL at 09:09

## 2017-09-21 NOTE — PROGRESS NOTES
· 9/21/2017 8:05:56 AM Note: Spoke with family yesterday they have chosen our facility she has been here before  [email or web address] [email or web address]  Per marcial at [employer]. Jaye Del Rosario LMSW

## 2017-09-21 NOTE — PROGRESS NOTES
I completed the LOCET assessment with Aicha at [phone] opt 3. I faxed the signed and completed PASRR to [facility] at [phone]. Jaye Del Rosario LMSW

## 2017-09-21 NOTE — PT/OT/SLP PROGRESS
Physical Therapy  Treatment    Talia Alberts   MRN: [id number]   Admitting Diagnosis: CAP (community acquired pneumonia)    PT Received On: 09/21/17  PT Start Time: 0905     PT Stop Time: 0921    PT Total Time (min): 16 min       Billable Minutes:  Gait [id number] and Therapeutic Exercise 8min    Treatment Type: Treatment  PT/PTA: PTA     PTA Visit Number: 1       General Precautions: Standard, fall, hearing impaired, aspiration  Orthopedic Precautions: LLE weight bearing as tolerated   Braces:           Subjective:  Communicated with nurse Brooke prior to session.  Agreed to mobilize    Pain/Comfort  Pain Rating 1: other (see comments) (did not rate)  Location - Side 1: Right  Location - Orientation 1: generalized  Location 1: hip  Pain Addressed 1: Pre-medicate for activity, Reposition, Nurse notified    Objective:   Patient found with: telemetry    Functional Mobility:  Bed Mobility:   Rolling/Turning Right: Minimum assistance  Scooting/Bridging: Contact Guard Assistance  Supine to Sit: Minimum Assistance  Sit to Supine: Moderate Assistance    Transfers:  Sit <> Stand Assistance: Minimum Assistance  Sit <> Stand Assistive Device: Rolling Walker    Gait:   Gait Distance: 20'  Assistance 1: Minimum assistance  Gait Assistive Device: Rolling walker  Gait Deviation(s): decreased ying, increased time in double stance, decreased velocity of limb motion, decreased step length, decreased toe-to-floor clearance, decreased weight-shifting ability    Stairs:      Balance:   Static Sit: GOOD-: Takes MODERATE challenges from all directions but inconsistently  Dynamic Sit: GOOD-: Maintains balance through MODERATE excursions of active trunk movement,     Static Stand: FAIR: Maintains without assist but unable to take challenges  Dynamic stand: POOR: N/A     Therapeutic Activities and Exercises:  Ambulated 20' with rw and Min A very slowly.  Performed LE exercises seated EOB at 10 reps each ; TKE's, AP's, Hip flexion,  zee.     AM-PAC 6 CLICK MOBILITY  How much help from another person does this patient currently need?   1 = Unable, Total/Dependent Assistance  2 = A lot, Maximum/Moderate Assistance  3 = A little, Minimum/Contact Guard/Supervision  4 = None, Modified [place]/Independent         AM-PAC Raw Score CMS G-Code Modifier Level of Impairment Assistance   6 % Total / Unable   7 - 9 CM 80 - 100% Maximal Assist   10 - 14 CL 60 - 80% Moderate Assist   15 - 19 CK 40 - 60% Moderate Assist   20 - 22 CJ 20 - 40% Minimal Assist   23 CI 1-20% SBA / CGA   24 CH 0% Independent/ Mod I     Patient left supine with all lines intact, call button in reach and nurse Brooke notified.    Assessment:  Talia Alberts is a 89 y.o. female with a medical diagnosis of CAP (community acquired pneumonia) and presents with weakness, pain with movement.    Rehab identified problem list/impairments: Rehab identified problem list/impairments: weakness, impaired endurance, impaired self care skills, decreased safety awareness, orthopedic precautions, pain, gait instability    Rehab potential is fair.    Activity tolerance: Fair    Discharge recommendations: Discharge Facility/Level Of Care Needs: nursing facility, skilled     Barriers to discharge: Barriers to Discharge: Decreased caregiver support    Equipment recommendations:       GOALS:    Physical Therapy Goals        Problem: Physical Therapy Goal    Goal Priority Disciplines Outcome Goal Variances Interventions   Physical Therapy Goal    High PT/OT, PT Ongoing (interventions implemented as appropriate)     Description:  Goals to be met by: 2017     Patient will increase functional independence with mobility by performin. Supine to sit with MInimal Assistance  2. Sit to stand transfer with Minimal Assistance  3. Bed to chair transfer with Minimal Assistance using Rolling Walker  4. Gait  x 50 feet with Minimal Assistance using Rolling Walker.   5. Lower extremity  exercise program x20 reps per handout, with assistance as needed                      PLAN:    Patient to be seen daily  to address the above listed problems via gait training, therapeutic activities, therapeutic exercises  Plan of Care expires: 09/29/17  Plan of Care reviewed with: patient         Andreea Cale, PTA  09/21/2017

## 2017-09-21 NOTE — PROGRESS NOTES
Report can be called to Odilia at [facility]. I updated the pts nurse, Brooke.  Jaye Del Rosario LMSW

## 2017-09-21 NOTE — PROGRESS NOTES
I received the pts approved 142 via email and placed a copy in the pts blue chart. Jaye Del Rosario LMSW

## 2017-09-21 NOTE — DISCHARGE SUMMARY
"Discharge Summary  Hospital Medicine    Admit Date: 9/16/2017    Date and Time: 9/21/201710:10 AM    Discharge Attending Physician: aRnjith Beebe MD    Primary Care Physician: Michael Martin MD    Diagnoses:  Active Hospital Problems    Diagnosis  POA    *CAP (community acquired pneumonia) [J18.9]  Yes    Malnutrition of moderate degree [E44.0]  Yes    Fatigue [R53.83]  Unknown    Closed fracture of ramus of left pubis - superior and inferior rami [S32.592A]  No    Coronary artery disease involving native coronary artery without angina pectoris [I25.10]  Yes    Essential hypertension [I10]  Yes      Resolved Hospital Problems    Diagnosis Date Resolved POA   No resolved problems to display.     Discharged Condition: Good    Hospital Course:   Pt is a young 89 yo  admitted to the services of hospital medicine via the ER for CAP. Presents with c/o acute onset of extreme weakness "I couldn't stand on my legs, they were too weak" c/o acute onset of productive cough which started Friday night. Denies any chest pain or SOB. Felt feverish but did not measure temp. Denied any sick contacts. No hx of lung disease. Hx of bioprosthetic heart valve, CAD and HTN, all stable with medications. Patient was admitted to Hospitalist medicine service. Patient was evaluated by Dr. Quiles and non-operative management of pelvic fracture recommended. Patient was provided supportive care and pneumonia treated with IV antibiotics. Symptoms improved. Patient worked with PT but is deconditioned. Patient was discharged to SNF in stable condition with following discharge plan of care. Total time with the patient was 30 minutes and greater than 50% was spent in counseling and coordination of care. The assessment and plan have been discussed at length. Physicians' notes reviewed. Labs and procedure reviewed.     Consults: Dr. Quiles    Significant Diagnostic Studies:   CXR: Interval involvement of right upper lobe airspace consolidation, " concerning for acute pneumonia/aspiration     Left hip x-ray:  Acute appearing fractures of the left superior and inferior pubic rami.     CXR: Moderate decrease of the infiltrate right midlung field compared to the prior exam.  Microbiology Results (last 7 days)     Procedure Component Value Units Date/Time    Blood culture #1 **CANNOT BE ORDERED STAT** [[id number]] Collected:  09/16/17 1745    Order Status:  Completed Specimen:  Blood from Antecubital, Left Updated:  09/21/17 0612     Blood Culture, Routine No Growth to date     Blood Culture, Routine No Growth to date     Blood Culture, Routine No Growth to date     Blood Culture, Routine No Growth to date     Blood Culture, Routine No Growth to date    Blood culture #2 **CANNOT BE ORDERED STAT** [[id number]] Collected:  09/16/17 1741    Order Status:  Completed Specimen:  Blood from Antecubital, Right Updated:  09/21/17 0612     Blood Culture, Routine No Growth to date     Blood Culture, Routine No Growth to date     Blood Culture, Routine No Growth to date     Blood Culture, Routine No Growth to date     Blood Culture, Routine No Growth to date    Culture, Respiratory with Gram Stain [[id number]] Collected:  09/20/17 1815    Order Status:  Completed Specimen:  Respiratory from Sputum, Expectorated Updated:  09/21/17 0601     Gram Stain (Respiratory) <10 epithelial cells per low power field.     Gram Stain (Respiratory) Few WBC's     Gram Stain (Respiratory) Moderate yeast     Gram Stain (Respiratory) Few Gram positive cocci        Special Treatments/Procedures: None  Disposition: SNF    Medications:  Reconciled Home Medications: Current Discharge Medication List      START taking these medications    Details   albuterol sulfate 2.5 mg/0.5 mL Nebu Take 2.5 mg by nebulization every 4 (four) hours as needed. Rescue  Qty: 300 mg, Refills: 11      azithromycin (Z-VELVET) 250 MG tablet Take 1 tablet (250 mg total) by mouth once daily.  Qty: 5 tablet, Refills: 0          CONTINUE these medications which have NOT CHANGED    Details   acetaminophen (TYLENOL) 500 MG tablet Take 1,000 mg by mouth every 6 (six) hours as needed.      alendronate (FOSAMAX) 70 MG tablet once a week.   Refills: 3      aspirin 81 MG Chew Take 81 mg by mouth once daily.      escitalopram oxalate (LEXAPRO) 10 MG tablet Take 10 mg by mouth every evening.      fluorouracil (EFUDEX) 5 % cream Thin film to AA R dorsal wrist, R index finger and left dorsal hand BID x 4 weeks. 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       metoprolol succinate (TOPROL-XL) 25 MG 24 hr tablet       mupirocin (BACTROBAN) 2 % ointment AAA hands TID  Qty: 30 g, Refills: 0      pantoprazole (PROTONIX) 40 MG tablet Take 40 mg by mouth once daily.      simvastatin (ZOCOR) 40 MG tablet Take 40 mg by mouth every evening.      trospium (SANCTURA) 20 mg Tab tablet Take 20 mg by mouth nightly.             Discharge Procedure Orders  Diet general   Order Comments: Cardiac/ 2 gram sodium low cholesterol diet.  Ensure can with meals     Other restrictions (specify):   Order Comments: Fall precautions   Scheduling Instructions: Left leg non-weight bearing as tolerated     Call MD for:   Order Comments: For worsening symptoms, chest pain, shortness of breath, increased abdominal pain, high grade fever, stroke or stroke like symptoms, immediately go to the nearest Emergency Room or call 911 as soon as possible.       Follow-up Information     [facility].    Why:  SNF, Nursing Home  Contact information:  [address]  [facility] [address]           Michael Martin MD In 2 weeks.    Specialty:  Family Medicine  Contact information:  [address] [facility] [address] [phone]             Amarjit Quiles MD In 2 weeks.    Specialties:  Sports Medicine,  Orthopedic Surgery  Contact information:  [address] [place] [address]  [place] [id number] [phone]

## 2017-09-21 NOTE — PROGRESS NOTES
I scanned and attached the 142 and PASRR to [facility] and faxed discharge orders and AVS to [facility]. Jaye Del Rosario LMSW

## 2017-09-21 NOTE — PLAN OF CARE
Problem: Patient Care Overview  Goal: Plan of Care Review  Outcome: Ongoing (interventions implemented as appropriate)  Patient alert and oriented with times of confusion. resting in bed. NAD. Denies pain or SOB. VSS.  Normal SR. brief for incont. Plan of care reviewed with patient.   Verbalizes understanding.Call light in reach. Pt free from fall or injury. Will monitor.

## 2017-09-21 NOTE — PLAN OF CARE
Problem: Patient Care Overview  Goal: Plan of Care Review  Outcome: Ongoing (interventions implemented as appropriate)  Pt on room air with Q6 albuterol

## 2017-09-21 NOTE — PLAN OF CARE
Problem: Physical Therapy Goal  Goal: Physical Therapy Goal  Goals to be met by: 2017     Patient will increase functional independence with mobility by performin. Supine to sit with MInimal Assistance  2. Sit to stand transfer with Minimal Assistance  3. Bed to chair transfer with Minimal Assistance using Rolling Walker  4. Gait  x 50 feet with Minimal Assistance using Rolling Walker.   5. Lower extremity exercise program x20 reps per handout, with assistance as needed     Outcome: Ongoing (interventions implemented as appropriate)  Ambulated 20' with rw and Min A.

## 2017-09-21 NOTE — PROGRESS NOTES
I left a message for scott Harris with [facility] at [phone] requesting a SNF auth for the pt to discharge to [place]. Jaye Del Rosario LMSW

## 2017-09-22 ENCOUNTER — PATIENT OUTREACH (OUTPATIENT)
Dept: ADMINISTRATIVE | Facility: CLINIC | Age: 82
End: 2017-09-22

## 2017-09-22 PROBLEM — R53.83 FATIGUE: Status: ACTIVE | Noted: 2017-09-22

## 2017-09-22 LAB
BACTERIA BLD CULT: NORMAL
BACTERIA BLD CULT: NORMAL

## 2017-09-22 NOTE — PLAN OF CARE
09/22/17 0817   Final Note   Assessment Type Final Discharge Note   Discharge Disposition SNF   Hospital Follow Up  Appt(s) scheduled? No   Discharge plans and expectations educations in teach back method with documentation complete? Yes

## 2017-09-23 LAB
BACTERIA SPEC AEROBE CULT: NORMAL
GRAM STN SPEC: NORMAL

## 2017-10-05 DIAGNOSIS — M25.559 ARTHRALGIA OF HIP, UNSPECIFIED LATERALITY: Primary | ICD-10-CM

## 2017-10-05 DIAGNOSIS — S32.592A CLOSED FRACTURE OF RAMUS OF LEFT PUBIS, INITIAL ENCOUNTER: Primary | ICD-10-CM

## 2017-10-06 ENCOUNTER — HOSPITAL ENCOUNTER (OUTPATIENT)
Dept: RADIOLOGY | Facility: HOSPITAL | Age: 82
Discharge: HOME | End: 2017-10-06
Attending: ORTHOPAEDIC SURGERY
Payer: MEDICARE

## 2017-10-06 ENCOUNTER — OFFICE VISIT (OUTPATIENT)
Dept: ORTHOPEDICS | Facility: CLINIC | Age: 82
End: 2017-10-06
Payer: MEDICARE

## 2017-10-06 VITALS
DIASTOLIC BLOOD PRESSURE: 65 MMHG | HEIGHT: 60 IN | HEART RATE: 62 BPM | SYSTOLIC BLOOD PRESSURE: 140 MMHG | WEIGHT: 100.06 LBS | BODY MASS INDEX: 19.65 KG/M2

## 2017-10-06 DIAGNOSIS — S32.592A CLOSED FRACTURE OF RAMUS OF LEFT PUBIS, INITIAL ENCOUNTER: ICD-10-CM

## 2017-10-06 DIAGNOSIS — S32.592D CLOSED FRACTURE OF RAMUS OF LEFT PUBIS WITH ROUTINE HEALING, SUBSEQUENT ENCOUNTER: Primary | ICD-10-CM

## 2017-10-06 PROCEDURE — 73502 X-RAY EXAM HIP UNI 2-3 VIEWS: CPT | Mod: 26,LT,, | Performed by: RADIOLOGY

## 2017-10-06 PROCEDURE — 99213 OFFICE O/P EST LOW 20 MIN: CPT | Mod: S$GLB,,, | Performed by: ORTHOPAEDIC SURGERY

## 2017-10-06 PROCEDURE — 99999 PR PBB SHADOW E&M-EST. PATIENT-LVL III: CPT | Mod: PBBFAC,,, | Performed by: ORTHOPAEDIC SURGERY

## 2017-10-06 PROCEDURE — 73502 X-RAY EXAM HIP UNI 2-3 VIEWS: CPT | Mod: TC,PN,LT

## 2017-10-10 NOTE — PHYSICIAN QUERY
"PT Name: Talia Alberts  MR #: [id number]    Physician Query Form - Pneumonia Clarification     CDS/: Caroline Haider               Contact information:[email or web address]  This form is a permanent document in the medical record.    Query Date:  October 10, 2017    By submitting this query, we are merely seeking further clarification of documentation. Please utilize your independent clinical judgment when addressing the question(s) below.    The Medical record contains the following:   Indicators   Supporting Clinical Findings Location in Medical Record   x "Pneumonia" documented  (CAP) Pneumonia    d/c summary   x Chest X-Ray: Interval involvement of right upper lobe airspace consolidation, concerning for acute pneumonia/aspiration  chest x-ray 9/16    PaO2    PaCO2     O2 sat      Cultures      x Treatment   Azithromycin /Rocephin  MAR 9/16-9/21    Supplemental O2      Other       .                             Provider, please specify type of pneumonia.    [ x ] Bacterial Pneumonia (Specify organism): _______________Cx negative_______  [  ] Bacterial, Gram Negative organism Pneumonia  [  ] Viral Pneumonia (Specify virus): _______________________  [  ] Fungal Pneumonia (Specify organism): _______________________  [  ] Aspiration Pneumonia  [  ] Other type of pneumonia (please specify): ______________________________________  [  ] Clinically undetermined    Please document in your progress notes daily for the duration of treatment, until resolved, and include in your discharge summary.    .                                                                                    "

## 2017-10-16 NOTE — PROGRESS NOTES
Past Medical History:   Diagnosis Date    Cancer     Coronary artery disease     Hypertension     MI (myocardial infarction)     Skin cancer unsure    L and R arm, was removed by dr christianson    Squamous cell carcinoma        Past Surgical History:   Procedure Laterality Date    CARDIAC SURGERY      FRACTURE SURGERY         Current Outpatient Prescriptions   Medication Sig    acetaminophen (TYLENOL) 500 MG tablet Take 1,000 mg by mouth every 6 (six) hours as needed.    albuterol sulfate 2.5 mg/0.5 mL Nebu Take 2.5 mg by nebulization every 4 (four) hours as needed. Rescue    alendronate (FOSAMAX) 70 MG tablet once a week.     aspirin 81 MG Chew Take 81 mg by mouth once daily.    azithromycin (Z-VELVET) 250 MG tablet Take 1 tablet (250 mg total) by mouth once daily.    escitalopram oxalate (LEXAPRO) 10 MG tablet Take 10 mg by mouth every evening.    losartan (COZAAR) 25 MG tablet Take 50 mg by mouth 2 (two) times daily.     metoprolol succinate (TOPROL-XL) 25 MG 24 hr tablet     mupirocin (BACTROBAN) 2 % ointment AAA hands TID    pantoprazole (PROTONIX) 40 MG tablet Take 40 mg by mouth once daily.    simvastatin (ZOCOR) 40 MG tablet Take 40 mg by mouth every evening.    trospium (SANCTURA) 20 mg Tab tablet Take 20 mg by mouth nightly.     No current facility-administered medications for this visit.        Review of patient's allergies indicates:   Allergen Reactions    Morphine Swelling       Family History   Problem Relation Age of Onset    Melanoma Neg Hx     Psoriasis Neg Hx     Lupus Neg Hx     Eczema Neg Hx        Social History     Social History    Marital status:      Spouse name: N/A    Number of children: N/A    Years of education: N/A     Occupational History    Not on file.     Social History Main Topics    Smoking status: Never Smoker    Smokeless tobacco: Never Used    Alcohol use No    Drug use: No    Sexual activity: No     Other Topics Concern    Not on file      Social History Narrative    No narrative on file       Chief Complaint:   Chief Complaint   Patient presents with    Left Hip - Pain       Consulting Physician: No ref. provider found    History of present illness:    This is a 89 y.o. year old female who complains of left hip pain following fall 9-18-17. Seen as consult. Non operative rami fractures. Pain 2/10. Doing well. Ambulating with assistance.    Review of Systems:    Constitution: Denies chills, fever, and sweats.  HENT: Denies headaches or blurry vision.  Cardiovascular: Denies chest pain or irregular heart beat.  Respiratory: Denies cough or shortness of breath.  Gastrointestinal: Denies abdominal pain, nausea, or vomiting.  Musculoskeletal:  Denies muscle cramps.  Neurological: Denies dizziness or focal weakness.  Psychiatric/Behavioral: Normal mental status.  Hematologic/Lymphatic: Denies bleeding problem or easy bruising/bleeding.  Skin: Denies rash or suspicious lesions.    Examination:    Vital Signs:    Vitals:    10/06/17 1029   BP: (!) 140/65   Pulse: 62   Weight: 45.4 kg (100 lb 1.4 oz)   Height: 5' (1.524 m)   PainSc:   2   PainLoc: Hip       Body mass index is 19.55 kg/m².    This a well-developed, well nourished patient in no acute distress.    Alert and oriented x 3 and cooperative to examination.       Physical Exam: Left Hip Exam    Gait:   weight bearing    Skin  Rash:   None  Scars:   None    Inspection  Erythema:  None  Bruising:  none  Swelling:  none  Masses:  None  Lymphadenopathy: None    Range of Motion: None painful    Tenderness:  none    Strength:  Near normal    Stability:  Not tested due to fracture    Sensation:  Intact    Vascular  Pulses:  Palpable distally          Imaging: X-rays ordered and reviewed today personally of the left pelvis show healing rami fractures.        Assessment: Closed fracture of ramus of left pubis with routine healing, subsequent encounter        Plan:  She is progressing well. Continue PT.  Back in 6 weeks.      DISCLAIMER: This note may have been dictated using voice recognition software and may contain grammatical errors.     NOTE: Consult report sent to referring provider via [facility] EMR.

## 2017-10-24 ENCOUNTER — OFFICE VISIT (OUTPATIENT)
Dept: DERMATOLOGY | Facility: CLINIC | Age: 82
End: 2017-10-24
Payer: MEDICARE

## 2017-10-24 VITALS — HEIGHT: 60 IN | WEIGHT: 100 LBS | BODY MASS INDEX: 19.63 KG/M2

## 2017-10-24 DIAGNOSIS — L57.0 ACTINIC KERATOSES: ICD-10-CM

## 2017-10-24 DIAGNOSIS — D48.9 NEOPLASM OF UNCERTAIN BEHAVIOR: Primary | ICD-10-CM

## 2017-10-24 DIAGNOSIS — Z85.828 HISTORY OF SKIN CANCER: ICD-10-CM

## 2017-10-24 PROCEDURE — 11100 PR BIOPSY OF SKIN LESION: CPT | Mod: 59,S$GLB,, | Performed by: DERMATOLOGY

## 2017-10-24 PROCEDURE — 99212 OFFICE O/P EST SF 10 MIN: CPT | Mod: 25,S$GLB,, | Performed by: DERMATOLOGY

## 2017-10-24 PROCEDURE — 17000 DESTRUCT PREMALG LESION: CPT | Mod: S$GLB,,, | Performed by: DERMATOLOGY

## 2017-10-24 PROCEDURE — 99999 PR PBB SHADOW E&M-EST. PATIENT-LVL III: CPT | Mod: PBBFAC,,, | Performed by: DERMATOLOGY

## 2017-10-24 PROCEDURE — 17003 DESTRUCT PREMALG LES 2-14: CPT | Mod: S$GLB,,, | Performed by: DERMATOLOGY

## 2017-10-24 PROCEDURE — 88305 TISSUE EXAM BY PATHOLOGIST: CPT | Mod: 26,,, | Performed by: PATHOLOGY

## 2017-10-24 PROCEDURE — 88305 TISSUE EXAM BY PATHOLOGIST: CPT | Performed by: PATHOLOGY

## 2017-10-24 RX ORDER — LINACLOTIDE 145 UG/1
CAPSULE, GELATIN COATED ORAL
COMMUNITY
Start: 2017-10-23 | End: 2018-02-07

## 2017-10-24 RX ORDER — FOLIC ACID 0.4 MG
400 TABLET ORAL DAILY
COMMUNITY
End: 2019-05-03 | Stop reason: CLARIF

## 2017-10-24 NOTE — PROGRESS NOTES
Subjective:       Patient ID:  Talia Alberts is a 89 y.o. female who presents for   Chief Complaint   Patient presents with    Follow-up     recheck SCCIS sites on R dorsal wrist, R index finger and L dorsal hand     Patient last seen 7/24/17 post efudex    H/o SCCIS bx 6/15/17 x 3  Treated with efudex x 1 month  Additional round on R index finger, still not resolved  C/o tenderness at the site    FINAL PATHOLOGIC DIAGNOSIS  1. Skin, right dorsal wrist, shave biopsy:  - SQUAMOUS CELL CARCINOMA IN SITU.  - THE TUMOR EXTENDS TO THE LATERAL MARGINS IN THE PLANES OF SECTION.    2. Skin, right index finger, shave biopsy:  - SQUAMOUS CELL CARCINOMA IN SITU.  - THE TUMOR EXTENDS TO A LATERAL MARGIN IN THE PLANES OF SECTION.    3. Skin, left dorsal hand, shave biopsy:  - SQUAMOUS CELL CARCINOMA IN SITU.  - THE TUMOR EXTENDS TO A LATERAL MARGIN IN THE PLANES OF SECTION.        Review of Systems   Constitutional: Negative for fever, chills, weight loss, weight gain and night sweats.   Skin: Positive for dry skin. Negative for activity-related sunscreen use and wears hat.   Hematologic/Lymphatic: Bruises/bleeds easily.        Objective:    Physical Exam   Constitutional: She appears well-developed and well-nourished. No distress.   Neurological: She is alert and oriented to person, place, and time. She is not disoriented.   Psychiatric: She has a normal mood and affect.   Skin:   Areas Examined (abnormalities noted in diagram):   Head / Face Inspection Performed  RUE Inspected  LUE Inspection Performed  Nails and Digits Inspection Performed                  Diagram Legend     Erythematous scaling macule/papule c/w actinic keratosis       Vascular papule c/w angioma      Pigmented verrucoid papule/plaque c/w seborrheic keratosis      Yellow umbilicated papule c/w sebaceous hyperplasia      Irregularly shaped tan macule c/w lentigo     1-2 mm smooth white papules consistent with Milia      Movable subcutaneous cyst with  punctum c/w epidermal inclusion cyst      Subcutaneous movable cyst c/w pilar cyst      Firm pink to brown papule c/w dermatofibroma      Pedunculated fleshy papule(s) c/w skin tag(s)      Evenly pigmented macule c/w junctional nevus     Mildly variegated pigmented, slightly irregular-bordered macule c/w mildly atypical nevus      Flesh colored to evenly pigmented papule c/w intradermal nevus       Pink pearly papule/plaque c/w basal cell carcinoma      Erythematous hyperkeratotic cursted plaque c/w SCC      Surgical scar with no sign of skin cancer recurrence      Open and closed comedones      Inflammatory papules and pustules      Verrucoid papule consistent consistent with wart     Erythematous eczematous patches and plaques     Dystrophic onycholytic nail with subungual debris c/w onychomycosis     Umbilicated papule    Erythematous-base heme-crusted tan verrucoid plaque consistent with inflamed seborrheic keratosis     Erythematous Silvery Scaling Plaque c/w Psoriasis     See annotation          Assessment / Plan:      Pathology Orders:      Normal Orders This Visit    Tissue Specimen To Pathology, Dermatology     Questions:    Directional Terms:  Other(comment)    Clinical information:  pink scaly papule, SCCIS vs other    Specific Site:  R index finger, PIP,        Neoplasm of uncertain behavior  -     Tissue Specimen To Pathology, Dermatology  Shave biopsy procedure note:    Shave biopsy performed after verbal consent including risk of infection, scar, recurrence, need for additional treatment of site. Area prepped with alcohol, anesthetized with approximately 1.0cc of 1% lidocaine with epinephrine. Lesional tissue shaved with razor blade. Hemostasis achieved with application of aluminum chloride followed by hyfrecation. No complications. Dressing applied. Wound care explained.    Actinic keratoses, dorsal hands and forearms  Extensive actinic damage  Cryosurgery Procedure Note    Verbal consent from the  patient is obtained and the patient is aware of the precancerous quality and need for treatment of these lesions. Liquid nitrogen cryosurgery is applied to the 8 actinic keratoses, as detailed in the physical exam, to produce a freeze injury. The patient is aware that blisters may form and is instructed on wound care with gentle cleansing and use of vaseline ointment to keep moist until healed. The patient is supplied a handout on cryosurgery and is instructed to call if lesions do not completely resolve.    History of skin cancer  Clinical resolution post bx and efudex of R dorsal wrist and left dorsal hand lesions  R index finger treated with efudex, adjacent pink scaly papule biopsied today with hyfreaction of base  Trying to avoid surgery in this 89 yo lady           No Follow-up on file.

## 2017-10-31 ENCOUNTER — TELEPHONE (OUTPATIENT)
Dept: DERMATOLOGY | Facility: CLINIC | Age: 82
End: 2017-10-31

## 2017-10-31 NOTE — TELEPHONE ENCOUNTER
----- Message from Eugenia Kiran MD sent at 10/30/2017  5:19 PM CDT -----  Biopsied with curative intent. Monitor site.   As discussed with patient's daughter, may follow with course of efudex once site completely healed    FINAL PATHOLOGIC DIAGNOSIS  Skin, right index finger, shave biopsy:  - SUPERFICIALLY INVASIVE SQUAMOUS CELL CARCINOMA.  - NEGATIVE BIOPSY MARGINS IN PLANES OF SECTION EXAMINED.  MICROSCOPIC DESCRIPTION: Sections show a squamous cell proliferation exhibiting atypia and infiltrating within  the superficial dermis.  Diagnosed by: Sary Sanchez M.D.  (Electronically Signed: 2017-10-30 09:23:59)

## 2017-11-27 ENCOUNTER — OFFICE VISIT (OUTPATIENT)
Dept: ORTHOPEDICS | Facility: CLINIC | Age: 82
End: 2017-11-27
Payer: MEDICARE

## 2017-11-27 VITALS
SYSTOLIC BLOOD PRESSURE: 119 MMHG | BODY MASS INDEX: 19.63 KG/M2 | HEART RATE: 57 BPM | DIASTOLIC BLOOD PRESSURE: 55 MMHG | HEIGHT: 60 IN | WEIGHT: 100 LBS

## 2017-11-27 DIAGNOSIS — M19.011 PRIMARY OSTEOARTHRITIS OF RIGHT SHOULDER: Primary | ICD-10-CM

## 2017-11-27 PROCEDURE — 99999 PR PBB SHADOW E&M-EST. PATIENT-LVL III: CPT | Mod: PBBFAC,,, | Performed by: ORTHOPAEDIC SURGERY

## 2017-11-27 PROCEDURE — 99213 OFFICE O/P EST LOW 20 MIN: CPT | Mod: 25,S$GLB,, | Performed by: ORTHOPAEDIC SURGERY

## 2017-11-27 PROCEDURE — 20610 DRAIN/INJ JOINT/BURSA W/O US: CPT | Mod: RT,S$GLB,, | Performed by: ORTHOPAEDIC SURGERY

## 2017-11-27 RX ORDER — TRIAMCINOLONE ACETONIDE 40 MG/ML
40 INJECTION, SUSPENSION INTRA-ARTICULAR; INTRAMUSCULAR
Status: DISCONTINUED | OUTPATIENT
Start: 2017-11-27 | End: 2017-11-27 | Stop reason: HOSPADM

## 2017-11-27 RX ADMIN — TRIAMCINOLONE ACETONIDE 40 MG: 40 INJECTION, SUSPENSION INTRA-ARTICULAR; INTRAMUSCULAR at 09:11

## 2017-11-27 NOTE — PROCEDURES
Large Joint Aspiration/Injection  Date/Time: 11/27/2017 9:08 AM  Performed by: KRANTHI MILES  Authorized by: KRANTHI MILES     Consent Done?:  Yes (Verbal)  Indications:  Pain  Procedure site marked: Yes    Timeout: Prior to procedure the correct patient, procedure, and site was verified      Location:  Shoulder  Site:  R subacromial bursa  Prep: Patient was prepped and draped in usual sterile fashion    Ultrasonic Guidance for needle placement: No  Needle size:  20 G  Approach:  Posterior  Medications:  40 mg triamcinolone acetonide 40 mg/mL  Patient tolerance:  Patient tolerated the procedure well with no immediate complications

## 2017-11-27 NOTE — PROGRESS NOTES
Past Medical History:   Diagnosis Date    Cancer     Coronary artery disease     Hypertension     MI (myocardial infarction)     Skin cancer unsure    L and R arm, was removed by dr christianson    Squamous cell carcinoma        Past Surgical History:   Procedure Laterality Date    CARDIAC SURGERY      FRACTURE SURGERY         Current Outpatient Prescriptions   Medication Sig    acetaminophen (TYLENOL) 500 MG tablet Take 1,000 mg by mouth every 6 (six) hours as needed.    albuterol sulfate 2.5 mg/0.5 mL Nebu Take 2.5 mg by nebulization every 4 (four) hours as needed. Rescue    alendronate (FOSAMAX) 70 MG tablet once a week.     aspirin 81 MG Chew Take 81 mg by mouth once daily.    escitalopram oxalate (LEXAPRO) 10 MG tablet Take 10 mg by mouth every evening.    FERROUS SULFATE (IRON ORAL) Take by mouth.    FOLIC ACID ORAL Take by mouth.    LINZESS 145 mcg Cap capsule     losartan (COZAAR) 25 MG tablet Take 50 mg by mouth 2 (two) times daily.     metoprolol succinate (TOPROL-XL) 25 MG 24 hr tablet     pantoprazole (PROTONIX) 40 MG tablet Take 40 mg by mouth once daily.    simvastatin (ZOCOR) 40 MG tablet Take 40 mg by mouth every evening.    trospium (SANCTURA) 20 mg Tab tablet Take 20 mg by mouth nightly.     No current facility-administered medications for this visit.        Review of patient's allergies indicates:   Allergen Reactions    Morphine Swelling       Family History   Problem Relation Age of Onset    Melanoma Neg Hx     Psoriasis Neg Hx     Lupus Neg Hx     Eczema Neg Hx        Social History     Social History    Marital status:      Spouse name: N/A    Number of children: N/A    Years of education: N/A     Occupational History    Not on file.     Social History Main Topics    Smoking status: Never Smoker    Smokeless tobacco: Never Used    Alcohol use No    Drug use: No    Sexual activity: No     Other Topics Concern    Not on file     Social History Narrative     No narrative on file       Chief Complaint:   Chief Complaint   Patient presents with    Shoulder Pain     right shoulder follow up       History of present illness: Is a 89-year-old female seen for right shoulder pain.  Patient had a fall several months ago directly onto the right shoulder.  She was seen in urgent care.  They thought she possibly had a hairline fracture.  She just finished with the home physical therapy.  Having little more pain at night.  She does have some underlying arthritis which could be agitated.  Pain today as a 0 out of 10 but up to a 6 out of 10 at night.      Review of systems:     General: Negative for weight loss.    HENT:  Negative for headaches and blurry vision.    Cardiovascular:Negative for chest pain or irregular heart beat. Negative for hypertension.    Respiratory:  Negative for cough and shortness of breath.    Gastrointestinal: Negative for abdominal pain, heartburn, melena, nausea, and vomitting.    Genitourinary:  Negative bladder incontinence and dysuria.    Musculoskeletal:  See HPI    Neurological: Negative for numbness.    Psychiatric/Behavioral: Negative for depression.  The patient is not nervous/anxious.      Endocrine: Negative for polyuria    Hematologic/Lymphatic: Negative for bleeding problem.  Does not bruise/bleed easily.    Skin: Negative for poor would healing and rash      Physical Examination:    Vital Signs:    Vitals:    11/27/17 0856   BP: (!) 119/55   Pulse: (!) 57       Body mass index is 19.53 kg/m².    This a well-developed, well nourished patient in no acute distress.  They are alert and oriented and cooperative to examination.  Pt. walks without an antalgic gait.      Examination of the right shoulder shows no rashes or erythema. There are no masses, ecchymosis, or atrophy. The patient has almost full range of motion in forward flexion, external rotation, and internal rotation to the hip. The patient has minimally positive impingement signs.    2+ radial pulse. Intact axillary, radial, median and ulnar sensation. 5 out of 5 resisted forward flexion, external rotation, and negative lift off test.        X-rays: X-rays of the right shoulder are reviewed which show possible hairline fracture of the humeral neck with some underlying arthritic changes of the glenohumeral joint     Assessment:: Right shoulder arthritis  Right proximal humerus fracture    Plan:  I reviewed the x-rays with her and her daughter today.  I recommended another cortisone injection.  She's been having trouble sleeping.  Hopefully this will help somewhat down her night pain.  I'm okay with not continuing the physical therapy since she just finished some.    This note was created using Dragon voice recognition software that occasionally misinterpreted phrases or words.    Consult note is delivered via Epic messaging service.

## 2017-12-02 ENCOUNTER — HOSPITAL ENCOUNTER (EMERGENCY)
Facility: HOSPITAL | Age: 82
Discharge: HOME | End: 2017-12-03
Attending: EMERGENCY MEDICINE
Payer: MEDICARE

## 2017-12-02 DIAGNOSIS — S09.90XA CLOSED HEAD INJURY, INITIAL ENCOUNTER: Primary | ICD-10-CM

## 2017-12-02 PROCEDURE — 99284 EMERGENCY DEPT VISIT MOD MDM: CPT

## 2017-12-03 VITALS
DIASTOLIC BLOOD PRESSURE: 72 MMHG | HEART RATE: 68 BPM | SYSTOLIC BLOOD PRESSURE: 162 MMHG | BODY MASS INDEX: 23.09 KG/M2 | RESPIRATION RATE: 20 BRPM | TEMPERATURE: 97 F | HEIGHT: 58 IN | WEIGHT: 110 LBS | OXYGEN SATURATION: 95 %

## 2017-12-03 NOTE — ED PROVIDER NOTES
Encounter Date: 12/2/2017    SCRIBE #1 NOTE: I, Baljinder Palomares, am scribing for, and in the presence of, Dr. Lizama.       History     Chief Complaint   Patient presents with    Fall     after becoming lightheadedness.  No LOC        12/02/2017 10:58 PM     Chief complaint: Head trauma      Talia Alberts is a 89 y.o. female with [place], HTN, Mi, and skin cancer who presents to the ED with an onset of acute head trauma with associated dizziness, abrasion to the left elbow, and a headache near the rear of the skull. The symptoms occurred shortly prior to arrival when the patient fell after putting her pajamas on resulting in the head trauma. She denies any loss of consciousness. No pertinent Shx noted.         The history is provided by the patient.     Review of patient's allergies indicates:   Allergen Reactions    Morphine Swelling     Past Medical History:   Diagnosis Date    Cancer     Coronary artery disease     Hypertension     MI (myocardial infarction)     Skin cancer unsure    L and R arm, was removed by dr christianson    Squamous cell carcinoma      Past Surgical History:   Procedure Laterality Date    CARDIAC SURGERY      FRACTURE SURGERY       Family History   Problem Relation Age of Onset    Melanoma Neg Hx     Psoriasis Neg Hx     Lupus Neg Hx     Eczema Neg Hx      Social History   Substance Use Topics    Smoking status: Never Smoker    Smokeless tobacco: Never Used    Alcohol use No     Review of Systems   Constitutional: Negative for fever.   HENT: Negative for congestion.    Eyes: Negative for visual disturbance.   Respiratory: Negative for wheezing.    Cardiovascular: Negative for chest pain.   Gastrointestinal: Negative for abdominal pain.   Genitourinary: Negative for dysuria.   Musculoskeletal: Negative for joint swelling.   Skin: Positive for wound (abrasion to left elbow.). Negative for rash.   Neurological: Positive for dizziness and headaches. Negative for syncope.         No loss of consciousness.   Hematological: Does not bruise/bleed easily.   Psychiatric/Behavioral: Negative for confusion.       Physical Exam     Initial Vitals [12/02/17 2239]   BP Pulse Resp Temp SpO2   (!) 135/98 63 16 98.1 °F (36.7 °C) 97 %      MAP       110.33         Physical Exam    Constitutional: She appears well-nourished.   No midline C-spine tenderness.   HENT:   Head: Normocephalic and atraumatic.   No palpable skull fracture.   Eyes: Conjunctivae and EOM are normal.   Neck: Normal range of motion. Neck supple. No thyroid mass present.   Cardiovascular: Normal rate and regular rhythm.   No murmur heard.  Pulses:       Dorsalis pedis pulses are 2+ on the right side, and 2+ on the left side.        Posterior tibial pulses are 2+ on the right side, and 2+ on the left side.   Abdominal: Soft. Normal appearance and bowel sounds are normal. There is no tenderness. There is no guarding.   Musculoskeletal: She exhibits no edema or tenderness.   Neurological: She is alert and oriented to person, place, and time. She has normal strength. No cranial nerve deficit or sensory deficit.   Skin: Skin is warm and dry. No rash noted. No erythema.   No hematomas.   Psychiatric: She has a normal mood and affect. Her speech is normal. Thought content normal. Cognition and memory are normal.         ED Course   Procedures  Labs Reviewed - No data to display     Imaging Results          CT Head Without Contrast (In process)                    Medical Decision Making:   History:   Old Medical Records: I decided to obtain old medical records.  Clinical Tests:   Radiological Study: Ordered and Reviewed            Scribe Attestation:   Scribe #1: I performed the above scribed service and the documentation accurately describes the services I performed. I attest to the accuracy of the note.    I, Dr. Grady Lizama, personally performed the services described in this documentation. All medical record entries made by the  bridgette were at my direction and in my presence.  I have reviewed the chart and agree that the record reflects my personal performance and is accurate and complete. Grady Lizama MD.  2:39 AM 12/03/2017    Talia Alberts is a 89 y.o. female presenting with closed head injury from mechanical fall.  No obvious acute precipitating medical condition requiring further workup in the emergency department.  Head CT performed with no sign of intracranial hemorrhage.  Patient with baseline mental status and nonfocal neurological examination.  There is no neck pain or reproducible C-spine tenderness and I doubt cervical vertebral fracture or subluxation.  No sign of other major injury.  Follow-up with PCP.  Fall precautions reviewed.  Detailed return precautions reviewed as well.          ED Course as of Dec 03 0239   Sat Dec 02, 2017   2332 CT-H:  NAD. (rad read)  [MR]      ED Course User Index  [MR] Grady Lizama MD     Clinical Impression:     1. Closed head injury, initial encounter          Disposition:   Disposition: Discharged  Condition: Stable                        Grady Lizama MD  12/03/17 0240

## 2017-12-03 NOTE — ED NOTES
Alert, appropriate, responsive and cooperative.  Skin warm/dry, respirations even/unlabored.  NAD.

## 2017-12-04 ENCOUNTER — TELEPHONE (OUTPATIENT)
Dept: DERMATOLOGY | Facility: CLINIC | Age: 82
End: 2017-12-04

## 2017-12-04 NOTE — TELEPHONE ENCOUNTER
----- Message from Sheree Dubois sent at 12/4/2017  9:57 AM CST -----  Contact: Daughter- Esthela Gutierrez -[phone]  Patient needs an earlier appointment for infected area on the knuckle on the patient's right hand. The daughter says the area was diagnosed as cancer.  Thanks!

## 2017-12-04 NOTE — TELEPHONE ENCOUNTER
Spoke to pt's daughter, Esthela. States pt feels pain on her hand. The home nurse checked hand, states not signs of infection. Pt completed last dose of chemotherapy cream 11/26/2017. Informed hand may take a few weeks to heal. Continue wound care discussed and call back if not better in 2 weeks. Verbalized understanding.

## 2018-02-04 ENCOUNTER — HOSPITAL ENCOUNTER (OUTPATIENT)
Facility: HOSPITAL | Age: 83
Discharge: HOME HEALTH | End: 2018-02-06
Attending: EMERGENCY MEDICINE | Admitting: INTERNAL MEDICINE
Payer: MEDICARE
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
## 2018-02-04 DIAGNOSIS — R41.82 ALTERED MENTAL STATUS, UNSPECIFIED ALTERED MENTAL STATUS TYPE: Primary | ICD-10-CM

## 2018-02-04 DIAGNOSIS — R06.02 SHORTNESS OF BREATH: ICD-10-CM

## 2018-02-04 DIAGNOSIS — I25.10 CORONARY ARTERY DISEASE INVOLVING NATIVE CORONARY ARTERY OF NATIVE HEART WITHOUT ANGINA PECTORIS: ICD-10-CM

## 2018-02-04 DIAGNOSIS — H10.33 ACUTE BACTERIAL CONJUNCTIVITIS OF BOTH EYES: ICD-10-CM

## 2018-02-04 DIAGNOSIS — S09.90XA CLOSED HEAD INJURY, INITIAL ENCOUNTER: ICD-10-CM

## 2018-02-04 LAB
ABO + RH BLD: NORMAL
ALBUMIN SERPL BCP-MCNC: 3.2 G/DL
ALP SERPL-CCNC: 140 U/L
ALT SERPL W/O P-5'-P-CCNC: 40 U/L
ANION GAP SERPL CALC-SCNC: 10 MMOL/L
AST SERPL-CCNC: 41 U/L
BASOPHILS # BLD AUTO: 0 K/UL
BASOPHILS NFR BLD: 0.1 %
BILIRUB SERPL-MCNC: 0.6 MG/DL
BILIRUB UR QL STRIP: NEGATIVE
BLD GP AB SCN CELLS X3 SERPL QL: NORMAL
BNP SERPL-MCNC: 534 PG/ML
BUN SERPL-MCNC: 12 MG/DL
CALCIUM SERPL-MCNC: 8.7 MG/DL
CHLORIDE SERPL-SCNC: 106 MMOL/L
CLARITY UR: CLEAR
CO2 SERPL-SCNC: 25 MMOL/L
COLOR UR: YELLOW
CREAT SERPL-MCNC: 0.8 MG/DL
DIFFERENTIAL METHOD: ABNORMAL
EOSINOPHIL # BLD AUTO: 0.3 K/UL
EOSINOPHIL NFR BLD: 2.9 %
ERYTHROCYTE [DISTWIDTH] IN BLOOD BY AUTOMATED COUNT: 15.4 %
EST. GFR  (AFRICAN AMERICAN): >60 ML/MIN/1.73 M^2
EST. GFR  (NON AFRICAN AMERICAN): >60 ML/MIN/1.73 M^2
GLUCOSE SERPL-MCNC: 120 MG/DL
GLUCOSE UR QL STRIP: NEGATIVE
HCT VFR BLD AUTO: 40.5 %
HGB BLD-MCNC: 13.3 G/DL
HGB UR QL STRIP: NEGATIVE
INR PPP: 1
KETONES UR QL STRIP: NEGATIVE
LACTATE SERPL-SCNC: 0.8 MMOL/L
LEUKOCYTE ESTERASE UR QL STRIP: NEGATIVE
LYMPHOCYTES # BLD AUTO: 1.6 K/UL
LYMPHOCYTES NFR BLD: 14.9 %
MCH RBC QN AUTO: 29.2 PG
MCHC RBC AUTO-ENTMCNC: 32.8 G/DL
MCV RBC AUTO: 89 FL
MONOCYTES # BLD AUTO: 1.1 K/UL
MONOCYTES NFR BLD: 10.7 %
NEUTROPHILS # BLD AUTO: 7.6 K/UL
NEUTROPHILS NFR BLD: 71.4 %
NITRITE UR QL STRIP: NEGATIVE
PH UR STRIP: 7 [PH] (ref 5–8)
PLATELET # BLD AUTO: 165 K/UL
PMV BLD AUTO: 8.8 FL
POTASSIUM SERPL-SCNC: 4.4 MMOL/L
PROT SERPL-MCNC: 6.8 G/DL
PROT UR QL STRIP: NEGATIVE
PROTHROMBIN TIME: 10.3 SEC
RBC # BLD AUTO: 4.54 M/UL
SODIUM SERPL-SCNC: 141 MMOL/L
SP GR UR STRIP: 1.01 (ref 1–1.03)
TROPONIN I SERPL DL<=0.01 NG/ML-MCNC: <0.006 NG/ML
TSH SERPL DL<=0.005 MIU/L-ACNC: 1.2 UIU/ML
URN SPEC COLLECT METH UR: NORMAL
UROBILINOGEN UR STRIP-ACNC: 1 EU/DL
WBC # BLD AUTO: 10.6 K/UL

## 2018-02-04 PROCEDURE — 80053 COMPREHEN METABOLIC PANEL: CPT

## 2018-02-04 PROCEDURE — 85025 COMPLETE CBC W/AUTO DIFF WBC: CPT

## 2018-02-04 PROCEDURE — 99285 EMERGENCY DEPT VISIT HI MDM: CPT | Mod: 25

## 2018-02-04 PROCEDURE — 87040 BLOOD CULTURE FOR BACTERIA: CPT | Mod: 59

## 2018-02-04 PROCEDURE — 96360 HYDRATION IV INFUSION INIT: CPT

## 2018-02-04 PROCEDURE — G0378 HOSPITAL OBSERVATION PER HR: HCPCS

## 2018-02-04 PROCEDURE — 87086 URINE CULTURE/COLONY COUNT: CPT

## 2018-02-04 PROCEDURE — 36415 COLL VENOUS BLD VENIPUNCTURE: CPT

## 2018-02-04 PROCEDURE — 86901 BLOOD TYPING SEROLOGIC RH(D): CPT

## 2018-02-04 PROCEDURE — 84484 ASSAY OF TROPONIN QUANT: CPT

## 2018-02-04 PROCEDURE — 85610 PROTHROMBIN TIME: CPT

## 2018-02-04 PROCEDURE — 84443 ASSAY THYROID STIM HORMONE: CPT

## 2018-02-04 PROCEDURE — 25000003 PHARM REV CODE 250: Performed by: INTERNAL MEDICINE

## 2018-02-04 PROCEDURE — 81003 URINALYSIS AUTO W/O SCOPE: CPT

## 2018-02-04 PROCEDURE — 93005 ELECTROCARDIOGRAM TRACING: CPT

## 2018-02-04 PROCEDURE — 83605 ASSAY OF LACTIC ACID: CPT

## 2018-02-04 PROCEDURE — 83880 ASSAY OF NATRIURETIC PEPTIDE: CPT

## 2018-02-04 PROCEDURE — 63600175 PHARM REV CODE 636 W HCPCS: Performed by: INTERNAL MEDICINE

## 2018-02-04 RX ORDER — GLUCAGON 1 MG
1 KIT INJECTION
Status: DISCONTINUED | OUTPATIENT
Start: 2018-02-04 | End: 2018-02-06 | Stop reason: HOSPADM

## 2018-02-04 RX ORDER — SODIUM CHLORIDE 0.9 % (FLUSH) 0.9 %
5 SYRINGE (ML) INJECTION
Status: DISCONTINUED | OUTPATIENT
Start: 2018-02-04 | End: 2018-02-06 | Stop reason: HOSPADM

## 2018-02-04 RX ORDER — LOSARTAN POTASSIUM 25 MG/1
50 TABLET ORAL 2 TIMES DAILY
Status: DISCONTINUED | OUTPATIENT
Start: 2018-02-04 | End: 2018-02-06 | Stop reason: HOSPADM

## 2018-02-04 RX ORDER — NAPROXEN SODIUM 220 MG/1
81 TABLET, FILM COATED ORAL DAILY
Status: DISCONTINUED | OUTPATIENT
Start: 2018-02-05 | End: 2018-02-06 | Stop reason: HOSPADM

## 2018-02-04 RX ORDER — ACETAMINOPHEN 325 MG/1
650 TABLET ORAL EVERY 4 HOURS PRN
Status: DISCONTINUED | OUTPATIENT
Start: 2018-02-04 | End: 2018-02-06 | Stop reason: HOSPADM

## 2018-02-04 RX ORDER — BENZONATATE 100 MG/1
100 CAPSULE ORAL 3 TIMES DAILY PRN
Status: DISCONTINUED | OUTPATIENT
Start: 2018-02-04 | End: 2018-02-06 | Stop reason: HOSPADM

## 2018-02-04 RX ORDER — HYDRALAZINE HYDROCHLORIDE 20 MG/ML
10 INJECTION INTRAMUSCULAR; INTRAVENOUS EVERY 4 HOURS PRN
Status: DISCONTINUED | OUTPATIENT
Start: 2018-02-04 | End: 2018-02-06 | Stop reason: HOSPADM

## 2018-02-04 RX ORDER — IBUPROFEN 200 MG
16 TABLET ORAL
Status: DISCONTINUED | OUTPATIENT
Start: 2018-02-04 | End: 2018-02-06 | Stop reason: HOSPADM

## 2018-02-04 RX ORDER — PANTOPRAZOLE SODIUM 40 MG/1
40 TABLET, DELAYED RELEASE ORAL DAILY
Status: DISCONTINUED | OUTPATIENT
Start: 2018-02-05 | End: 2018-02-06 | Stop reason: HOSPADM

## 2018-02-04 RX ORDER — METOPROLOL SUCCINATE 25 MG/1
25 TABLET, EXTENDED RELEASE ORAL DAILY
Status: DISCONTINUED | OUTPATIENT
Start: 2018-02-05 | End: 2018-02-06 | Stop reason: HOSPADM

## 2018-02-04 RX ORDER — RAMELTEON 8 MG/1
8 TABLET ORAL NIGHTLY PRN
Status: DISCONTINUED | OUTPATIENT
Start: 2018-02-04 | End: 2018-02-06 | Stop reason: HOSPADM

## 2018-02-04 RX ORDER — ESCITALOPRAM OXALATE 10 MG/1
10 TABLET ORAL NIGHTLY
Status: DISCONTINUED | OUTPATIENT
Start: 2018-02-04 | End: 2018-02-06 | Stop reason: HOSPADM

## 2018-02-04 RX ORDER — IPRATROPIUM BROMIDE AND ALBUTEROL SULFATE 2.5; .5 MG/3ML; MG/3ML
3 SOLUTION RESPIRATORY (INHALATION) EVERY 6 HOURS PRN
Status: DISCONTINUED | OUTPATIENT
Start: 2018-02-04 | End: 2018-02-06 | Stop reason: HOSPADM

## 2018-02-04 RX ORDER — IBUPROFEN 200 MG
24 TABLET ORAL
Status: DISCONTINUED | OUTPATIENT
Start: 2018-02-04 | End: 2018-02-06 | Stop reason: HOSPADM

## 2018-02-04 RX ORDER — SIMVASTATIN 40 MG/1
40 TABLET, FILM COATED ORAL NIGHTLY
Status: DISCONTINUED | OUTPATIENT
Start: 2018-02-04 | End: 2018-02-06 | Stop reason: HOSPADM

## 2018-02-04 RX ADMIN — SIMVASTATIN 40 MG: 40 TABLET, FILM COATED ORAL at 09:02

## 2018-02-04 RX ADMIN — HYDRALAZINE HYDROCHLORIDE 10 MG: 20 INJECTION INTRAMUSCULAR; INTRAVENOUS at 11:02

## 2018-02-04 RX ADMIN — ESCITALOPRAM 10 MG: 10 TABLET, FILM COATED ORAL at 10:02

## 2018-02-04 RX ADMIN — SODIUM CHLORIDE 500 ML: 0.9 INJECTION, SOLUTION INTRAVENOUS at 10:02

## 2018-02-04 RX ADMIN — ACETAMINOPHEN 650 MG: 325 TABLET ORAL at 11:02

## 2018-02-04 RX ADMIN — LOSARTAN POTASSIUM 50 MG: 25 TABLET, FILM COATED ORAL at 09:02

## 2018-02-04 RX ADMIN — BENZONATATE 100 MG: 100 CAPSULE ORAL at 11:02

## 2018-02-05 PROBLEM — H10.33 ACUTE BACTERIAL CONJUNCTIVITIS OF BOTH EYES: Status: ACTIVE | Noted: 2018-02-05

## 2018-02-05 PROBLEM — S09.90XA CLOSED HEAD INJURY: Status: ACTIVE | Noted: 2018-02-05

## 2018-02-05 LAB
ANION GAP SERPL CALC-SCNC: 9 MMOL/L
BASOPHILS # BLD AUTO: 0 K/UL
BASOPHILS NFR BLD: 0.3 %
BUN SERPL-MCNC: 13 MG/DL
CALCIUM SERPL-MCNC: 8.1 MG/DL
CHLORIDE SERPL-SCNC: 107 MMOL/L
CO2 SERPL-SCNC: 21 MMOL/L
CREAT SERPL-MCNC: 0.7 MG/DL
DIFFERENTIAL METHOD: ABNORMAL
EOSINOPHIL # BLD AUTO: 0.1 K/UL
EOSINOPHIL NFR BLD: 0.9 %
ERYTHROCYTE [DISTWIDTH] IN BLOOD BY AUTOMATED COUNT: 15.1 %
EST. GFR  (AFRICAN AMERICAN): >60 ML/MIN/1.73 M^2
EST. GFR  (NON AFRICAN AMERICAN): >60 ML/MIN/1.73 M^2
FLUAV AG SPEC QL IA: NEGATIVE
FLUBV AG SPEC QL IA: NEGATIVE
GLUCOSE SERPL-MCNC: 107 MG/DL
HCT VFR BLD AUTO: 35.6 %
HGB BLD-MCNC: 11.7 G/DL
LYMPHOCYTES # BLD AUTO: 2.6 K/UL
LYMPHOCYTES NFR BLD: 23.1 %
MCH RBC QN AUTO: 29.3 PG
MCHC RBC AUTO-ENTMCNC: 32.8 G/DL
MCV RBC AUTO: 89 FL
MONOCYTES # BLD AUTO: 1.2 K/UL
MONOCYTES NFR BLD: 10.1 %
NEUTROPHILS # BLD AUTO: 7.5 K/UL
NEUTROPHILS NFR BLD: 65.6 %
PLATELET # BLD AUTO: 166 K/UL
PMV BLD AUTO: 8.7 FL
POTASSIUM SERPL-SCNC: 3.8 MMOL/L
RBC # BLD AUTO: 4 M/UL
SODIUM SERPL-SCNC: 137 MMOL/L
SPECIMEN SOURCE: NORMAL
WBC # BLD AUTO: 11.5 K/UL

## 2018-02-05 PROCEDURE — G0378 HOSPITAL OBSERVATION PER HR: HCPCS

## 2018-02-05 PROCEDURE — G8978 MOBILITY CURRENT STATUS: HCPCS | Mod: CM

## 2018-02-05 PROCEDURE — 97162 PT EVAL MOD COMPLEX 30 MIN: CPT

## 2018-02-05 PROCEDURE — G8979 MOBILITY GOAL STATUS: HCPCS | Mod: CK

## 2018-02-05 PROCEDURE — 99219 PR INITIAL OBSERVATION CARE,LEVL II: CPT | Mod: ,,, | Performed by: INTERNAL MEDICINE

## 2018-02-05 PROCEDURE — 97535 SELF CARE MNGMENT TRAINING: CPT

## 2018-02-05 PROCEDURE — 36415 COLL VENOUS BLD VENIPUNCTURE: CPT

## 2018-02-05 PROCEDURE — 87400 INFLUENZA A/B EACH AG IA: CPT

## 2018-02-05 PROCEDURE — 97116 GAIT TRAINING THERAPY: CPT

## 2018-02-05 PROCEDURE — 94761 N-INVAS EAR/PLS OXIMETRY MLT: CPT

## 2018-02-05 PROCEDURE — G8980 MOBILITY D/C STATUS: HCPCS | Mod: CM

## 2018-02-05 PROCEDURE — 25000003 PHARM REV CODE 250: Performed by: INTERNAL MEDICINE

## 2018-02-05 PROCEDURE — 85025 COMPLETE CBC W/AUTO DIFF WBC: CPT

## 2018-02-05 PROCEDURE — 99900035 HC TECH TIME PER 15 MIN (STAT)

## 2018-02-05 PROCEDURE — 80048 BASIC METABOLIC PNL TOTAL CA: CPT

## 2018-02-05 PROCEDURE — 97165 OT EVAL LOW COMPLEX 30 MIN: CPT

## 2018-02-05 PROCEDURE — G8987 SELF CARE CURRENT STATUS: HCPCS | Mod: CK

## 2018-02-05 PROCEDURE — 99900026 HC AIRWAY MAINTENANCE (STAT)

## 2018-02-05 PROCEDURE — G8988 SELF CARE GOAL STATUS: HCPCS | Mod: CK

## 2018-02-05 RX ORDER — GENTAMICIN SULFATE 3 MG/ML
2 SOLUTION/ DROPS OPHTHALMIC EVERY 4 HOURS
Status: DISCONTINUED | OUTPATIENT
Start: 2018-02-05 | End: 2018-02-06 | Stop reason: HOSPADM

## 2018-02-05 RX ADMIN — ASPIRIN 81 MG CHEWABLE TABLET 81 MG: 81 TABLET CHEWABLE at 09:02

## 2018-02-05 RX ADMIN — LOSARTAN POTASSIUM 50 MG: 25 TABLET, FILM COATED ORAL at 09:02

## 2018-02-05 RX ADMIN — GENTAMICIN SULFATE 2 DROP: 3 SOLUTION OPHTHALMIC at 09:02

## 2018-02-05 RX ADMIN — ESCITALOPRAM 10 MG: 10 TABLET, FILM COATED ORAL at 09:02

## 2018-02-05 RX ADMIN — METOPROLOL SUCCINATE 25 MG: 25 TABLET, EXTENDED RELEASE ORAL at 09:02

## 2018-02-05 RX ADMIN — SIMVASTATIN 40 MG: 40 TABLET, FILM COATED ORAL at 09:02

## 2018-02-05 RX ADMIN — HYPROMELLOSE 2910 1 DROP: 5 SOLUTION OPHTHALMIC at 03:02

## 2018-02-05 RX ADMIN — RAMELTEON 8 MG: 8 TABLET, FILM COATED ORAL at 09:02

## 2018-02-05 RX ADMIN — PANTOPRAZOLE SODIUM 40 MG: 40 TABLET, DELAYED RELEASE ORAL at 09:02

## 2018-02-05 RX ADMIN — GENTAMICIN SULFATE 2 DROP: 3 SOLUTION OPHTHALMIC at 03:02

## 2018-02-05 NOTE — PLAN OF CARE
CM spoke to son - Alber FarfanEadohjz-[phone] over the phone, he reports that patient lives with him some of the time and with his sister, Monet Purdy [phone] some of the time. Monet could not be reached d/t medical procedure, so information received from Alber. He is not sure if patient has hospital bed. Alber thinks patient has HH but he does not know name of provider. CM left message for Monet to call when she can. Pharmacy is [facility], patient needs assistance with ADL's. PCP is dr. Martin.      02/05/18 3765   Discharge Assessment   Assessment Type Discharge Planning Assessment   Confirmed/corrected address and phone number on facesheet? Yes   Assessment information obtained from? Caregiver   Communicated expected length of stay with patient/caregiver yes   Prior to hospitilization cognitive status: Unable to Assess   Prior to hospitalization functional status: Assistive Equipment   Current cognitive status: Unable to Assess   Current Functional Status: Assistive Equipment   Lives With child(ange), adult   Able to Return to Prior Arrangements yes   Is patient able to care for self after discharge? Yes   Who are your caregiver(s) and their phone number(s)? daughter- Monet Purdy [phone] and son- Cheryl Farfan [phone]   Patient's perception of discharge disposition home health   Readmission Within The Last 30 Days no previous admission in last 30 days   Patient currently being followed by outpatient case management? No   Patient currently receives any other outside agency services? No   Equipment Currently Used at Home walker, rolling   Do you have any problems affording any of your prescribed medications? No   Is the patient taking medications as prescribed? yes   Does the patient have transportation home? Yes   Transportation Available family or friend will provide   Does the patient receive services at the [facility]? No   Discharge Plan A Home with family   Patient/Family In  Agreement With Plan yes   Does the patient have transportation to healthcare appointments? Yes

## 2018-02-05 NOTE — PT/OT/SLP EVAL
Physical Therapy Evaluation    Patient Name:  Talia Alberts   MRN:  [id number]    Recommendations:     Discharge Recommendations:  home health PT   Discharge Equipment Recommendations: none   Barriers to discharge: Decreased caregiver support    Assessment:     Talia Alberts is a 89 y.o. female admitted with a medical diagnosis of Altered mental status.  She presents with the following impairments/functional limitations:  weakness, impaired endurance, impaired functional mobilty, impaired self care skills, decreased safety awareness, gait instability, impaired cognition . Pt ambulated short distance at hallways with RW . Pt with poor safety awareness, periods of confusion.    Rehab Prognosis:  fair; patient would benefit from acute skilled PT services to address these deficits and reach maximum level of function.      Recent Surgery: * No surgery found *      Plan:     During this hospitalization, patient to be seen 6 x/week to address the above listed problems via gait training, therapeutic activities, therapeutic exercises  · Plan of Care Expires:  02/23/18   Plan of Care Reviewed with: patient    Subjective     Communicated with nurse irizarry prior to session.  Patient found supine upon PT entry to room, agreeable to evaluation.    Swab being taken to test for flu    Chief Complaint: sore throat  Patient comments/goals: get home  Pain/Comfort:  ·      Patients cultural, spiritual, Roman [employer] conflicts given the current situation:      Living Environment:  Home with daughter or son- pt stated she stays with daughter for a while then to son's house  Prior to admission, patients level of function was ambulatory with RW.  Patient has the following equipment: walker, rolling.  DME owned (not currently used): .  Upon discharge, patient will have assistance from family.    Objective:     Patient found with: telemetry     General Precautions: Standard, fall   Orthopedic Precautions:N/A   Braces: N/A      Exams:  · RLE ROM: WFL  · RLE Strength: WFL  · LLE ROM: WFL  · LLE Strength: WFL    Functional Mobility:  · Bed Mobility:     · Rolling Right: minimum assistance  · Supine to Sit: minimum assistance  · Sit to Supine: minimum assistance  · Transfers:     · Sit to Stand:  minimum assistance with rolling walker  · Gait: 60ft with RW min assist, assist for safe turns and correct use of RW    AM-PAC 6 CLICK MOBILITY  Total Score:16       Therapeutic Activities and Exercises:   EOB sitting and socks donned  Gait with RW to hallways  back to bed with bed alarm on  Assist for lunch set up    Patient left HOB elevated with all lines intact and call button in reach.    GOALS:    Physical Therapy Goals        Problem: Physical Therapy Goal    Goal Priority Disciplines Outcome Goal Variances Interventions   Physical Therapy Goal     PT/OT, PT Ongoing (interventions implemented as appropriate)     Description:  Goals to be met by: 2018     Patient will increase functional independence with mobility by performin. Supine to sit with Contact Guard Assistance  2. Sit to stand transfer with Contact Guard Assistance  3. Bed to chair transfer with Contact Guard Assistance using Rolling Walker  4. Gait  x 240 feet with Minimal Assistance using Rolling Walker.   5. Lower extremity exercise program x20 reps per handout, with assistance as needed                      History:     Past Medical History:   Diagnosis Date    Cancer     Coronary artery disease     Hypertension     MI (myocardial infarction)     Skin cancer unsure    L and R arm, was removed by dr christianson    Squamous cell carcinoma        Past Surgical History:   Procedure Laterality Date    CARDIAC SURGERY      FRACTURE SURGERY         Clinical Decision Making:     History  Co-morbidities and personal factors that may impact the plan of care Examination  Body Structures and Functions, activity limitations and participation restrictions that may impact  the plan of care Clinical Presentation   Decision Making/ Complexity Score   Co-morbidities:   [] Time since onset of injury / illness / exacerbation  [] Status of current condition  []Patient's cognitive status and safety concerns    [] Multiple Medical Problems (see med hx)  Personal Factors:   [] Patient's age  [] Prior Level of function   [] Patient's home situation (environment and family support)  [] Patient's level of motivation  [] Expected progression of patient      HISTORY:(criteria)    [] 66214 - no personal factors/history    [] 70908 - has 1-2 personal factor/comorbidity     [] 97213 - has >3 personal factor/comorbidity     Body Regions:  [] Objective examination findings  [] Head     []  Neck  [] Trunk   [] Upper Extremity  [] Lower Extremity    Body Systems:  [] For communication ability, affect, cognition, language, and learning style: the assessment of the ability to make needs known, consciousness, orientation (person, place, and time), expected emotional /behavioral responses, and learning preferences (eg, learning barriers, education  needs)  [] For the neuromuscular system: a general assessment of gross coordinated movement (eg, balance, gait, locomotion, transfers, and transitions) and motor function  (motor control and motor learning)  [] For the musculoskeletal system: the assessment of gross symmetry, gross range of motion, gross strength, height, and weight  [] For the integumentary system: the assessment of pliability(texture), presence of scar formation, skin color, and skin integrity  [] For cardiovascular/pulmonary system: the assessment of heart rate, respiratory rate, blood pressure, and edema     Activity limitations:    [] Patient's cognitive status and saf ety concerns          [] Status of current condition      [] Weight bearing restriction  [] Cardiopulmunary Restriction    Participation Restrictions:   [] Goals and goal agreement with the patient     [] Rehab potential  (prognosis) and probable outcome      Examination of Body System: (criteria)    [] 44861 - addressing 1-2 elements    [] 38687 - addressing a total of 3 or more elements     [] 86673 -  Addressing a total of 4 or more elements         Clinical Presentation: (criteria)  Choose one     On examination of body system using standardized tests and measures patient presents with (CHOOSE ONE) elements from any of the following: body structures and functions, activity limitations, and/or participation restrictions.  Leading to a clinical presentation that is considered (CHOOSE ONE)                              Clinical Decision Making  (Eval Complexity):  Choose One     Time Tracking:     PT Received On: 02/05/18  PT Start Time: 1148     PT Stop Time: 1212  PT Total Time (min): 24 min     Billable Minutes: Evaluation 12 and Gait Training 12      Abbie Michael, PT  02/05/2018

## 2018-02-05 NOTE — PLAN OF CARE
Problem: Patient Care Overview  Goal: Plan of Care Review  Outcome: Ongoing (interventions implemented as appropriate)  Patient alert and disoriented resting in bed. NAD. Denies pain or SOB. VSS. Brief for incont. Bed alarm active.    Plan of care reviewed with patient. Verbalizes understanding.Call light in reach. Pt free from fall or injury. Will monitor.

## 2018-02-05 NOTE — PLAN OF CARE
02/05/18 0130   Patient Assessment/Suction   Level of Consciousness (AVPU) alert   Respiratory Effort Normal;Unlabored   Expansion/Accessory Muscles/Retractions expansion symmetric   All Lung Fields Breath Sounds diminished   Rhythm/Pattern, Respiratory pattern regular   PRE-TX-O2-ETCO2   O2 Device (Oxygen Therapy) room air   SpO2 96 %   Aerosol Therapy   $ Aerosol Therapy Charges PRN treatment not required

## 2018-02-05 NOTE — PLAN OF CARE
Patient chart reviewed.     89 year old female with altered mental status today with subsequent fall and forehead hematoma.  CT head negative for acute process.  Labs largely unremarkable as well as EKG.  No complaints of chest pain, back pain, SOB, or any other related complaints. UA normal.     Vitals stable other than hypertension.    Plan:  1. 500 cc bolus given evidence of possible hemoconcentration on labs  2. Continue home meds  3. Monitor on telemetry  4. Avoid sedatives  5. Further workup in the am depending on mental status    Please call with any questions. Care to be turned over to Dr. Beebe at 6am.

## 2018-02-05 NOTE — H&P
PCP: Michael Martin MD    History & Physical    Chief Complaint: Confusion and fall at home    History of Present Illness:  Patient is a 89 y.o. female admitted to Hospitalist Service from [facility] Room with complaint of . Patient reportedly has past medical history significant for CAD s/p AMI and history of Squamous cell carcinoma of skin. Patient presented with an injury to her left forehead s/p mechanical fall that occurred around 5:50 PM last evening. The patient's son reports that she was not using her walker, which caused her to fall. The patient did not lose consciousness. She endorses head pain, mild neck pain, cough, congestion, and left eye redness, but denies fever, SOB, chest pain, abdominal pain, vomiting, nausea, weakness, numbness, rash, and all other symptoms at this time. The patient's son reports that she has been more confused than normal all day, including prior to the fall, and has been talking to people who aren't there. Patient denied chest pain, shortness of breath, abdominal pain, nausea, vomiting, headache, vision changes, focal neuro-deficits, cough or fever.    Past Medical History:   Diagnosis Date    Cancer     Coronary artery disease     Hypertension     MI (myocardial infarction)     Skin cancer unsure    L and R arm, was removed by dr christianson    Squamous cell carcinoma      Past Surgical History:   Procedure Laterality Date    CARDIAC SURGERY      FRACTURE SURGERY       Family History   Problem Relation Age of Onset    Melanoma Neg Hx     Psoriasis Neg Hx     Lupus Neg Hx     Eczema Neg Hx      Social History   Substance Use Topics    Smoking status: Never Smoker    Smokeless tobacco: Never Used    Alcohol use No      Review of patient's allergies indicates:   Allergen Reactions    Morphine Swelling     PTA Medications   Medication Sig    acetaminophen (TYLENOL) 500 MG tablet Take 1,000 mg by mouth every 6 (six) hours as needed.     alendronate (FOSAMAX) 70 MG tablet once a week.     albuterol sulfate 2.5 mg/0.5 mL Nebu Take 2.5 mg by nebulization every 4 (four) hours as needed. Rescue    aspirin 81 MG Chew Take 81 mg by mouth once daily.    escitalopram oxalate (LEXAPRO) 10 MG tablet Take 10 mg by mouth every evening.    FERROUS SULFATE (IRON ORAL) Take by mouth.    FOLIC ACID ORAL Take by mouth.    LINZESS 145 mcg Cap capsule     losartan (COZAAR) 25 MG tablet Take 50 mg by mouth 2 (two) times daily.     metoprolol succinate (TOPROL-XL) 25 MG 24 hr tablet     pantoprazole (PROTONIX) 40 MG tablet Take 40 mg by mouth once daily.    simvastatin (ZOCOR) 40 MG tablet Take 40 mg by mouth every evening.    trospium (SANCTURA) 20 mg Tab tablet Take 20 mg by mouth nightly.     Review of Systems:  Constitutional: no fever or chills. + HA and neck pain s/p fall  Eyes: no visual changes  Ears, nose, mouth, throat, and face: + Congestion and left eye redness  Respiratory: no cough or shorness of breath  Cardiovascular: no chest pain or palpitations  Gastrointestinal: no nausea or vomiting, no abdominal pain or change in bowel habits  Genitourinary: no hematuria or dysuria  Integument/breast: no rash or pruritis  Hematologic/lymphatic: no easy bruising or lymphadenopathy  Musculoskeletal: no arthralgias or myalgias  Neurological: no seizures or tremors.  Behavioral/Psych: no auditory or visual hallucinations. + Occasional confusion  Endocrine: no heat or cold intolerance     OBJECTIVE:     Vital Signs (Most Recent)  Temp: 97.8 °F (36.6 °C) (02/05/18 0746)  Pulse: 72 (02/05/18 0748)  Resp: 16 (02/05/18 0748)  BP: (!) 162/71 (02/05/18 0746)  SpO2: 99 % (02/05/18 0748)    Physical Exam:  General appearance: well developed, appears stated age  Head: normocephalic, There is a large hematoma noted to the left forehead that is about 4-5 cm in diameter. No septal hematoma noted. There is no sign of midface instability.    Eyes:  conjunctivae/corneas  clear. PERRL. Pupils are 3 mm and reactive to light. Bilateral eyes with yellow crusting, eyes shut.  Nose: Nares normal. Septum midline.  Throat: lips, mucosa, and tongue normal; teeth and gums normal, no throat erythema.  Neck: supple, symmetrical, trachea midline, no JVD and thyroid not enlarged, symmetric, no tenderness/mass/nodules. There is no bony tenderness of the neck.   Lungs:  clear to auscultation bilaterally and normal respiratory effort  Chest wall: no tenderness  Heart: regular rate and rhythm, S1, S2 normal, no murmur, click, rub or gallop  Abdomen: soft, non-tender non-distented; bowel sounds normal; no masses,  no organomegaly  Extremities: no cyanosis, clubbing or edema.   Pulses: 2+ and symmetric  Skin: Skin color, texture, turgor normal. No rashes or lesions.  Lymph nodes: Cervical, supraclavicular, and axillary nodes normal.  Neurologic: Grossly non-coal. Oriented to person.    Laboratory:   CBC:   Recent Labs  Lab 02/05/18 0424   WBC 11.50   RBC 4.00   HGB 11.7*   HCT 35.6*      MCV 89   MCH 29.3   MCHC 32.8     CMP:   Recent Labs  Lab 02/04/18 1905 02/05/18 0424   * 107   CALCIUM 8.7 8.1*   ALBUMIN 3.2*  --    PROT 6.8  --     137   K 4.4 3.8   CO2 25 21*    107   BUN 12 13   CREATININE 0.8 0.7   ALKPHOS 140*  --    ALT 40  --    AST 41*  --    BILITOT 0.6  --      Coagulation:   Recent Labs  Lab 02/04/18 1906   LABPROT 10.3   INR 1.0     Cardiac markers:   Recent Labs  Lab 02/04/18 1905   TROPONINI <0.006     Microbiology Results (last 7 days)     Procedure Component Value Units Date/Time    Urine culture **CANNOT BE ORDERED STAT** [[id number]] Collected:  02/04/18 1925    Order Status:  Sent Specimen:  Urine from Urine, Catheterized Updated:  02/04/18 1932    Blood culture #2 **CANNOT BE ORDERED STAT** [[id number]] Collected:  02/04/18 1920    Order Status:  Sent Specimen:  Blood Updated:  02/04/18 1921    Blood culture #1 **CANNOT BE ORDERED STAT** [[id number]]  Collected:  02/04/18 1911    Order Status:  Sent Specimen:  Blood Updated:  02/04/18 1911          Recent Labs  Lab 02/04/18 1925   COLORU Yellow   SPECGRAV 1.010   PHUR 7.0   PROTEINUA Negative   NITRITE Negative   LEUKOCYTESUR Negative   UROBILINOGEN 1.0       No results found for: HGBA1C  Microbiology Results (last 7 days)     Procedure Component Value Units Date/Time    Urine culture **CANNOT BE ORDERED STAT** [[id number]] Collected:  02/04/18 1925    Order Status:  Sent Specimen:  Urine from Urine, Catheterized Updated:  02/04/18 1932    Blood culture #2 **CANNOT BE ORDERED STAT** [[id number]] Collected:  02/04/18 1920    Order Status:  Sent Specimen:  Blood Updated:  02/04/18 1921    Blood culture #1 **CANNOT BE ORDERED STAT** [[id number]] Collected:  02/04/18 1911    Order Status:  Sent Specimen:  Blood Updated:  02/04/18 1911        Diagnostic Results:  CT head/Maxillofacial  And C-spine scan:  1. Moderate left frontal scalp hematoma without calvarial fracture or acute intracranial injury.  White matter changes suggesting chronic microvascular ischemia, and multifocal chronic lacunar infarcts.  2.  No acute cervical spine injury. Advanced multilevel degenerative change and prior C5-C7 fusion.  3. No acute maxillofacial fracture.    CXR:  Features of mild congestive failure.    Assessment/Plan:     Active Hospital Problems    Diagnosis  POA    *Altered mental status [R41.82]  Likely underlying dementia playing role.  Continue neuro-checks.  No obvious focus of infection noted other than bacterial conjunctivitis.    Yes    Acute bacterial conjunctivitis of both eyes [H10.33]  Start Gentamycin ophthalmic eye drops B/L.    Yes    Closed head injury [S09.90XA]  Observe, neuro-checks.    Yes    Coronary artery disease involving native coronary artery without angina pectoris [I25.10]  Patient with known CAD and monitor for S/Sx of angina/ACS. Continue to monitor on telemetry.    Yes    Essential hypertension  [I10]  Chronic problem. Will continue chronic medications and monitor for any changes, adjusting as needed.    Yes    Fall [W19.XXXA]  Observe fall p[recautions.  Start PT.  Yes        VTE Risk Mitigation         Ordered     Medium Risk of VTE  Once      02/04/18 2126     Place sequential compression device  Until discontinued      02/04/18 2126        Ranjith Beebe MD  Department of Hospital Medicine   [facility]

## 2018-02-05 NOTE — ED PROVIDER NOTES
Encounter Date: 2/4/2018    SCRIBE #1 NOTE: I, Kristen Reyes, am scribing for, and in the presence of, Dr. Jackson.       History     Chief Complaint   Patient presents with    Fall     lost balance and fell. No LOC. LARGE hematoma to LEFT temple area       02/04/2018 6:18 PM     Chief complaint: Head injury      Talia Alberts is a 89 y.o. female with a medical history of HTN, MI, and CA who presents to the ED for evaluation of an injury to her left forehead s/p mechanical fall that occurred around 5:50 PM tonight. The patient's son reports that she was not using her walker, which caused her to fall. The patient did not lose consciousness. She endorses head pain, mild neck pain, cough, congestion, and left eye redness, but denies fever, SOB, chest pain, abdominal pain, vomiting, nausea, weakness, numbness, rash, and all other symptoms at this time. The patient's son reports that she has been more confused than normal all day, including prior to the fall, and has been talking to people who aren't there. SHx includes cardiac surgery and fracture surgery.      The history is provided by the patient and a relative.     Review of patient's allergies indicates:   Allergen Reactions    Morphine Swelling     Past Medical History:   Diagnosis Date    Cancer     Coronary artery disease     Hypertension     MI (myocardial infarction)     Skin cancer unsure    L and R arm, was removed by dr christianson    Squamous cell carcinoma      Past Surgical History:   Procedure Laterality Date    CARDIAC SURGERY      FRACTURE SURGERY       Family History   Problem Relation Age of Onset    Melanoma Neg Hx     Psoriasis Neg Hx     Lupus Neg Hx     Eczema Neg Hx      Social History   Substance Use Topics    Smoking status: Never Smoker    Smokeless tobacco: Never Used    Alcohol use No     Review of Systems   Constitutional: Negative for activity change, appetite change, chills, diaphoresis, fatigue and fever.   HENT:  Positive for congestion. Negative for rhinorrhea, sore throat, trouble swallowing and voice change.    Eyes: Positive for redness ( left).   Respiratory: Positive for cough. Negative for choking, chest tightness, shortness of breath, wheezing and stridor.    Cardiovascular: Negative for chest pain, palpitations and leg swelling.   Gastrointestinal: Negative for abdominal distention, abdominal pain, blood in stool, constipation, diarrhea, nausea and vomiting.   Genitourinary: Negative for difficulty urinating, dysuria, flank pain, frequency and hematuria.   Musculoskeletal: Positive for neck pain ( mild). Negative for arthralgias, back pain, joint swelling, myalgias and neck stiffness.        Positive for head pain (left forehead)   Skin: Negative for color change and rash.   Neurological: Negative for dizziness, syncope, speech difficulty, weakness, numbness and headaches.        Negative for LOC.   Hematological: Negative for adenopathy. Does not bruise/bleed easily.   Psychiatric/Behavioral: Positive for confusion.   All other systems reviewed and are negative.      Physical Exam     Initial Vitals [02/04/18 1810]   BP Pulse Resp Temp SpO2   (!) 222/98 72 16 98.7 °F (37.1 °C) --      MAP       139.33         Physical Exam    Nursing note and vitals reviewed.  Constitutional: She appears well-developed and well-nourished. She is not diaphoretic. No distress.   HENT:   Head: Normocephalic and atraumatic. Head is without Cheatham's sign.   Right Ear: Tympanic membrane normal.   Left Ear: Tympanic membrane normal.   Mouth/Throat: Oropharynx is clear and moist and mucous membranes are normal.   There is a large hematoma noted to the left forehead that is about 4-5 cm in diameter. No septal hematoma noted. There is no sign of midface instability.    Eyes: EOM are normal. Pupils are equal, round, and reactive to light. Left conjunctiva is injected.   Pupils are 3 mm and reactive to light.   Neck: Neck supple. No tracheal  deviation present.   There is no bony tenderness of the neck.   Cardiovascular: Normal rate, regular rhythm and normal heart sounds. Exam reveals no gallop and no friction rub.    No murmur heard.  Pulses:       Radial pulses are 2+ on the right side, and 2+ on the left side.        Posterior tibial pulses are 2+ on the right side, and 2+ on the left side.   Capillary refill is less than 2 seconds. There is purple discoloration to the fingers, likely resting cyanosis.   Pulmonary/Chest: Breath sounds normal. She has no wheezes. She has no rhonchi. She has no rales.   Abdominal: Soft. Bowel sounds are normal. She exhibits no distension. There is no tenderness. There is no rebound, no guarding and no CVA tenderness.   There is no palpable organomegaly.   Musculoskeletal:   No step-off, deformity, or bony tenderness of the spine. Full ROM in LE's. Negative straight leg raise, bilaterally. There is no bony tenderness noted to hips. No pain with movement of bilateral wrists or elbows.   Lymphadenopathy:     She has no cervical adenopathy.   Neurological: She is alert and oriented to person, place, and time. She has normal strength. No cranial nerve deficit or sensory deficit.   Skin: Skin is warm and dry.         ED Course   Procedures  Labs Reviewed   CBC W/ AUTO DIFFERENTIAL - Abnormal; Notable for the following:        Result Value    RDW 15.4 (*)     MPV 8.8 (*)     Mono # 1.1 (*)     Lymph% 14.9 (*)     All other components within normal limits   COMPREHENSIVE METABOLIC PANEL - Abnormal; Notable for the following:     Glucose 120 (*)     Albumin 3.2 (*)     Alkaline Phosphatase 140 (*)     AST 41 (*)     All other components within normal limits   B-TYPE NATRIURETIC PEPTIDE - Abnormal; Notable for the following:      (*)     All other components within normal limits   CULTURE, BLOOD   CULTURE, BLOOD   CULTURE, URINE   TROPONIN I   PROTIME-INR   TSH   URINALYSIS   LACTIC ACID, PLASMA   TYPE & SCREEN     EKG  Readings: (Independently Interpreted)   Initial Reading: No STEMI. Rhythm: Normal Sinus Rhythm. Heart Rate: 69. Axis: Left Axis Deviation.   Normal intervals. No significant acute abnormality.     Imaging Results          CT Cervical Spine Without Contrast (In process)                CT Maxillofacial Without Contrast (In process)                CT Head Without Contrast (In process)                X-Ray Chest AP Portable (In process)                 X-Rays:   Independently Interpreted Readings:   Chest X-Ray: Normal heart size.  No infiltrates.  No acute abnormalities.   Head CT: No hemorrhage.  No skull fracture.  No acute stroke.   Other Readings:  CT C-spine: No acute fracture noted    CT facial bones: No acute abnormality noted    Medical Decision Making:   History:   Old Medical Records: I decided to obtain old medical records.  Initial Assessment:   Emergent evaluation of a 89 y.o. Female presenting s/p fall. Patient additionally has AMS which preceded the fall. Differential diagnosis includes intracranial injury, fracture, soft tissue injury, hematoma, infection, electrolyte abnormality, and stroke.    Clinical Tests:   Lab Tests: Ordered and Reviewed  Radiological Study: Ordered and Reviewed  Medical Tests: Ordered and Reviewed            Scribe Attestation:   Scribe #1: I performed the above scribed service and the documentation accurately describes the services I performed. I attest to the accuracy of the note.    Attending Attestation:             Attending ED Notes:   Patient has remained stable throughout her stay however given her altered mental status patient will be consult it to internal medicine for admission and further evaluation   I, Dr. Ke Jackson, personally performed the services described in this documentation. All medical record entries made by the scribe were at my direction and in my presence.  I have reviewed the chart and agree that the record reflects my personal performance and is  accurate and complete. Ke Payne MD.            ED Course      Clinical Impression:   The primary encounter diagnosis was Altered mental status, unspecified altered mental status type. A diagnosis of Shortness of breath was also pertinent to this visit.                           Ke Jackson MD  02/04/18 2046

## 2018-02-05 NOTE — PT/OT/SLP EVAL
Occupational Therapy   Evaluation    Name: Talia Alberts  MRN: [id number]  Admitting Diagnosis:  Altered mental status      Recommendations:     Discharge Recommendations: home health PT, home health OT (and family assistance)  Discharge Equipment Recommendations:  none  Barriers to discharge:       History:     Occupational Profile:  Living Environment: Pt alternates living with her son and her daughter.   Previous level of function: Mod I to Supervision with ADL's  Roles and Routines: Pt stated she is not left alone for very long and enjoys coloring and sean.  Equipment Owned:  walker, rolling, bedside commode, shower chair, grab bar  Assistance upon Discharge: Family will assist pt at home.    Past Medical History:   Diagnosis Date    Cancer     Coronary artery disease     Hypertension     MI (myocardial infarction)     Skin cancer unsure    L and R arm, was removed by dr christianson    Squamous cell carcinoma        Past Surgical History:   Procedure Laterality Date    CARDIAC SURGERY      FRACTURE SURGERY         Subjective     Chief Complaint: None stated  Patient/Family stated goals: To get better and go home.  Communicated with: Liz nurse prior to session.  Pain/Comfort:  · Pain Rating 1: 0/10  · Pain Rating Post-Intervention 1: 0/10    Patients cultural, spiritual, Roman [employer] conflicts given the current situation:      Objective:     Patient found with: telemetry    General Precautions: Standard, fall   Orthopedic Precautions:N/A   Braces: N/A     Occupational Performance:    Bed Mobility:    · Patient completed Rolling/Turning to Right with contact guard assistance and with side rail  · Patient completed Scooting/Bridging with minimum assistance and with side rail  · Patient completed Supine to Sit with contact guard assistance and with side rail  · Patient completed Sit to Supine with contact guard assistance and with side rail   · Cues for technique and extra time needed for all  tasks    Functional Mobility/Transfers:  · Patient completed Sit <> Stand Transfer with minimum assistance and cues for hand placement  with  rolling walker   · Patient completed Toilet Transfer Stand Pivot technique with minimum assistance and & cues for hand placement with  grab bars and rolling walker  · Functional Mobility: Pt walked from bedside to bathroom, then to sink & back to bedside using walker with Minimal A & an intermittent posterior lean noted.    Activities of Daily Living:  · Grooming: minimum assistance and & cues to access soap and to turn water on/off standing at sink with Min A for balance  · LB Dressing: stand by assistance to don socks long sitting in bed  · Toileting: minimum assistance and cues to pull pants down enough before sitting and to pull up after at toilet    Cognitive/Visual Perceptual:  Cognitive/Psychosocial Skills:     -       Oriented to: Person, Place, Situation and month, not year   -       Follows Commands/attention:Follows one-step commands and Follows two-step commands  -       Communication: clear/fluent  -       Memory: Impaired STM and Poor immediate recall  -       Safety awareness/insight to disability: impaired   -       Mood/Affect/Coping skills/emotional control: Appropriate to situation, Cooperative and Pleasant  Visual/Perceptual:      -Intact    Physical Exam:  Balance: -       SBA seated at EOB; Min A for static and dynamic standing with posterior lean  Postural examination/scapula alignment:    -       Rounded shoulders  -       Forward head  -       Posterior pelvic tilt  -       Abnormal trunk flexion  -       Kyphosis  Skin integrity: Bruising of forehead and face on L side with hematoma; ecchymosis on B UE's and dry skin  Edema:  L forehead  Sensation:    -       Intact  Motor Planning: -       WFL  Dominant hand: -       right  Upper Extremity Range of Motion:     -       Right Upper Extremity: WFL  -       Left Upper Extremity: WFL  Upper Extremity  Strength:    -       Right Upper Extremity: 4/5  -       Left Upper Extremity: 4/5   Strength:    -       Right Upper Extremity: 4/5  -       Left Upper Extremity: 4/5  Fine Motor Coordination: -       Intact  Left hand, finger to nose, Right hand, finger to nose, Left hand thumb/finger opposition skills, Right hand thumb/finger opposition skills, Left hand, manipulation of objects and Right hand, manipulation of objects    Patient left HOB elevated with all lines intact, call button in reach and bed alarm on    AMPAC 6 Click:  [facility] Total Score: 17    Treatment & Education:  OT ed pt on OT role & POC as well as discharge recommendations and oriented pt to current year.  OT ed pt on bed mobility techniques to increase independence with task.  OT ed patient on safety with walker use for functional mobility with cues for hand placement & sequencing.   OT ed pt on fall risk and strongly advised pt to call for help for all OOB mobility.    Education:    Assessment:     Talia Alberts is a 89 y.o. female with a medical diagnosis of Altered mental status.  She presents with a decline in functional status due to the below listed impairments, impacting ADLs and functional mobility. Pt is frail and weak with minimal assistance needed for all functional mobility. Pt with very mild intermittent confusion but was able to follow all commands. Pt admits to forgetting to use her walker frequently and is at high risk for additional falls. Pt will need very close supervision for safety at home.   Performance deficits affecting function are weakness, impaired self care skills, impaired balance, decreased safety awareness, impaired endurance, impaired functional mobilty, gait instability, impaired cognition.  Pt will benefit from HH OT & PT to increase ADL independence and safety in pt's home environment and close family support.      Rehab Prognosis:  Fair to good; patient would benefit from acute skilled OT services to address  "these deficits and reach maximum level of function.         Clinical Decision Makin.  OT Low:  "Pt evaluation falls under low complexity for evaluation coding due to performance deficits noted in 1-3 areas as stated above and 0 co-morbities affecting current functional status. Data obtained from problem focused assessments. No modifications or assistance was required for completion of evaluation. Only brief occupational profile and history review completed."     Plan:     Patient to be seen 4 x/week to address the above listed problems via self-care/home management, therapeutic activities, therapeutic exercises  · Plan of Care Expires:  2/15/18  · Plan of Care Reviewed with: patient    This Plan of care has been discussed with the patient who was involved in its development and understands and is in agreement with the identified goals and treatment plan    GOALS:    Occupational Therapy Goals        Problem: Occupational Therapy Goal    Goal Priority Disciplines Outcome Interventions   Occupational Therapy Goal     OT, PT/OT Ongoing (interventions implemented as appropriate)    Description:  Goals to be met by: 2/15/18     Patient will increase functional independence with ADLs by performing:    UE Dressing with Set-up Assistance and Supervision.  LE Dressing with Set-up Assistance, Contact Guard Assistance and Assistive Devices as needed.  Grooming while standing at sink with Set-up Assistance, Contact Guard Assistance and Assistive Devices as needed.  Toileting from toilet with Contact Guard Assistance and Assistive Devices as needed for hygiene and clothing management.   Toilet transfer to toilet with Contact Guard Assistance.                      Time Tracking:     OT Date of Treatment: 18  OT Start Time: 1411  OT Stop Time: 1443  OT Total Time (min): 32 min    Billable Minutes:Evaluation 8  Self Care/Home Management 24    PHUC Uriarte  2018    "

## 2018-02-05 NOTE — PLAN OF CARE
02/05/18 0748   Patient Assessment/Suction   Level of Consciousness (AVPU) alert   All Lung Fields Breath Sounds clear;diminished   PRE-TX-O2-ETCO2   O2 Device (Oxygen Therapy) room air   SpO2 99 %   Pulse 72   Resp 16   Aerosol Therapy   $ Aerosol Therapy Charges PRN treatment not required

## 2018-02-05 NOTE — ED NOTES
Pt in room 6. Sent in for evaluation of fall and confusion today. Pt awake and alert, oriented to person and situation. Pt was walking without walker and fell hitting side of head. Pt also has cough, congestion and redness to left eye today.  Son reports that patient has been talking to people that are not there, which is new for patient. Son reports PCP has discussed sundowners syndrome with family. Pt has hematomas to extremities from bumping into things as reported per patient. Resp even and unlabored, coarse breath sounds noted. Abd soft and nontender. .

## 2018-02-05 NOTE — PLAN OF CARE
Problem: Physical Therapy Goal  Goal: Physical Therapy Goal  Goals to be met by: 2018     Patient will increase functional independence with mobility by performin. Supine to sit with Contact Guard Assistance  2. Sit to stand transfer with Contact Guard Assistance  3. Bed to chair transfer with Contact Guard Assistance using Rolling Walker  4. Gait  x 240 feet with Minimal Assistance using Rolling Walker.   5. Lower extremity exercise program x20 reps per handout, with assistance as needed    Outcome: Ongoing (interventions implemented as appropriate)  PT eval and treat completed. Pt ambulated 60ft with RW, back to bed post PT with bed alarm on

## 2018-02-05 NOTE — PLAN OF CARE
Problem: Occupational Therapy Goal  Goal: Occupational Therapy Goal  Goals to be met by: 2/15/18     Patient will increase functional independence with ADLs by performing:    UE Dressing with Set-up Assistance and Supervision.  LE Dressing with Set-up Assistance, Contact Guard Assistance and Assistive Devices as needed.  Grooming while standing at sink with Set-up Assistance, Contact Guard Assistance and Assistive Devices as needed.  Toileting from toilet with Contact Guard Assistance and Assistive Devices as needed for hygiene and clothing management.   Toilet transfer to toilet with Contact Guard Assistance.    Outcome: Ongoing (interventions implemented as appropriate)  OT evaluation completed today. Goals & care plan established.    PHUC Renteria  2/5/2018

## 2018-02-06 VITALS
BODY MASS INDEX: 22.99 KG/M2 | HEART RATE: 77 BPM | OXYGEN SATURATION: 94 % | WEIGHT: 110 LBS | DIASTOLIC BLOOD PRESSURE: 72 MMHG | TEMPERATURE: 98 F | RESPIRATION RATE: 18 BRPM | SYSTOLIC BLOOD PRESSURE: 187 MMHG

## 2018-02-06 LAB
ANION GAP SERPL CALC-SCNC: 10 MMOL/L
BACTERIA UR CULT: NO GROWTH
BASOPHILS # BLD AUTO: 0 K/UL
BASOPHILS NFR BLD: 0.3 %
BUN SERPL-MCNC: 13 MG/DL
CALCIUM SERPL-MCNC: 8.1 MG/DL
CHLORIDE SERPL-SCNC: 106 MMOL/L
CO2 SERPL-SCNC: 21 MMOL/L
CREAT SERPL-MCNC: 0.7 MG/DL
DIFFERENTIAL METHOD: ABNORMAL
EOSINOPHIL # BLD AUTO: 0.2 K/UL
EOSINOPHIL NFR BLD: 1.8 %
ERYTHROCYTE [DISTWIDTH] IN BLOOD BY AUTOMATED COUNT: 15.7 %
EST. GFR  (AFRICAN AMERICAN): >60 ML/MIN/1.73 M^2
EST. GFR  (NON AFRICAN AMERICAN): >60 ML/MIN/1.73 M^2
GLUCOSE SERPL-MCNC: 111 MG/DL
HCT VFR BLD AUTO: 36.4 %
HGB BLD-MCNC: 12.2 G/DL
LYMPHOCYTES # BLD AUTO: 1.6 K/UL
LYMPHOCYTES NFR BLD: 16.9 %
MCH RBC QN AUTO: 30 PG
MCHC RBC AUTO-ENTMCNC: 33.6 G/DL
MCV RBC AUTO: 89 FL
MONOCYTES # BLD AUTO: 1.1 K/UL
MONOCYTES NFR BLD: 11.1 %
NEUTROPHILS # BLD AUTO: 6.8 K/UL
NEUTROPHILS NFR BLD: 69.9 %
PLATELET # BLD AUTO: 159 K/UL
PMV BLD AUTO: 8.8 FL
POTASSIUM SERPL-SCNC: 3.7 MMOL/L
RBC # BLD AUTO: 4.08 M/UL
SODIUM SERPL-SCNC: 137 MMOL/L
WBC # BLD AUTO: 9.8 K/UL

## 2018-02-06 PROCEDURE — 99217 PR OBSERVATION CARE DISCHARGE: CPT | Mod: ,,, | Performed by: INTERNAL MEDICINE

## 2018-02-06 PROCEDURE — 85025 COMPLETE CBC W/AUTO DIFF WBC: CPT

## 2018-02-06 PROCEDURE — 94761 N-INVAS EAR/PLS OXIMETRY MLT: CPT

## 2018-02-06 PROCEDURE — 99900035 HC TECH TIME PER 15 MIN (STAT)

## 2018-02-06 PROCEDURE — 80048 BASIC METABOLIC PNL TOTAL CA: CPT

## 2018-02-06 PROCEDURE — 36415 COLL VENOUS BLD VENIPUNCTURE: CPT

## 2018-02-06 PROCEDURE — 25000003 PHARM REV CODE 250: Performed by: INTERNAL MEDICINE

## 2018-02-06 PROCEDURE — G0378 HOSPITAL OBSERVATION PER HR: HCPCS

## 2018-02-06 RX ORDER — BENZONATATE 100 MG/1
100 CAPSULE ORAL 3 TIMES DAILY PRN
Start: 2018-02-06 | End: 2018-02-16

## 2018-02-06 RX ORDER — GENTAMICIN SULFATE 3 MG/ML
2 SOLUTION/ DROPS OPHTHALMIC EVERY 4 HOURS
Qty: 5 ML | Refills: 0 | Status: ON HOLD | OUTPATIENT
Start: 2018-02-06 | End: 2018-02-08

## 2018-02-06 RX ADMIN — GENTAMICIN SULFATE 2 DROP: 3 SOLUTION OPHTHALMIC at 10:02

## 2018-02-06 RX ADMIN — METOPROLOL SUCCINATE 25 MG: 25 TABLET, EXTENDED RELEASE ORAL at 08:02

## 2018-02-06 RX ADMIN — ASPIRIN 81 MG CHEWABLE TABLET 81 MG: 81 TABLET CHEWABLE at 08:02

## 2018-02-06 RX ADMIN — LOSARTAN POTASSIUM 50 MG: 25 TABLET, FILM COATED ORAL at 08:02

## 2018-02-06 RX ADMIN — PANTOPRAZOLE SODIUM 40 MG: 40 TABLET, DELAYED RELEASE ORAL at 08:02

## 2018-02-06 RX ADMIN — GENTAMICIN SULFATE 2 DROP: 3 SOLUTION OPHTHALMIC at 02:02

## 2018-02-06 RX ADMIN — GENTAMICIN SULFATE 2 DROP: 3 SOLUTION OPHTHALMIC at 06:02

## 2018-02-06 NOTE — DISCHARGE SUMMARY
Discharge Summary  Hospital Medicine    Admit Date: 2/4/2018    Date and Time: 2/6/201811:12 AM    Discharge Attending Physician: Ranjith Beebe MD    Primary Care Physician: Michael Martin MD    Diagnoses:  Active Hospital Problems    Diagnosis  POA    *Altered mental status [R41.82]  Yes    Coronary artery disease involving native coronary artery of native heart without angina pectoris [I25.10]  Unknown    Acute bacterial conjunctivitis of both eyes [H10.33]  Yes    Closed head injury [S09.90XA]  Yes    Coronary artery disease involving native coronary artery without angina pectoris [I25.10]  Yes    Essential hypertension [I10]  Yes    Fall [W19.XXXA]  Yes      Resolved Hospital Problems    Diagnosis Date Resolved POA   No resolved problems to display.     Discharged Condition: Good    Hospital Course:   Patient is a 89 y.o. female admitted to Hospitalist Service from [facility] Emergency Room with complaint of . Patient reportedly has past medical history significant for CAD s/p AMI and history of Squamous cell carcinoma of skin. Patient presented with an injury to her left forehead s/p mechanical fall that occurred around 5:50 PM last evening. The patient's son reports that she was not using her walker, which caused her to fall. The patient did not lose consciousness. She endorsed head pain, mild neck pain, cough, congestion, and left eye redness, but denies fever, SOB, chest pain, abdominal pain, vomiting, nausea, weakness, numbness, rash, and all other symptoms at this time. The patient's son reported that she had been more confused than normal all day, including prior to the fall, and has been talking to people who aren't there. Patient denied chest pain, shortness of breath, abdominal pain, nausea, vomiting, headache, vision changes, focal neuro-deficits, cough or fever. Patient was admitted to Hospitalist medicine service. Patient was closely monitored on tele-[facility] floor. Patient  observed for neuro-checks. Patient worked with PT. Mental status improved. Patient was discharged home in stable condition with following discharge plan of care.     Consults: None    Significant Diagnostic Studies:   CT head/Maxillofacial  And C-spine scan:  1. Moderate left frontal scalp hematoma without calvarial fracture or acute intracranial injury.  White matter changes suggesting chronic microvascular ischemia, and multifocal chronic lacunar infarcts.  2.  No acute cervical spine injury. Advanced multilevel degenerative change and prior C5-C7 fusion.  3. No acute maxillofacial fracture.     CXR:  Features of mild congestive failure.    Microbiology Results (last 7 days)     Procedure Component Value Units Date/Time    Blood culture #2 **CANNOT BE ORDERED STAT** [[id number]] Collected:  02/04/18 1920    Order Status:  Completed Specimen:  Blood Updated:  02/06/18 0115     Blood Culture, Routine No Growth to date    Blood culture #1 **CANNOT BE ORDERED STAT** [[id number]] Collected:  02/04/18 1911    Order Status:  Completed Specimen:  Blood Updated:  02/06/18 0115     Blood Culture, Routine No Growth to date    Urine culture **CANNOT BE ORDERED STAT** [[id number]] Collected:  02/04/18 1925    Order Status:  Sent Specimen:  Urine from Urine, Catheterized Updated:  02/05/18 1624        Special Treatments/Procedures: None  Disposition: Home or Self Care    Medications:  Reconciled Home Medications: Current Discharge Medication List      START taking these medications    Details   artificial tears (ISOPTO TEARS) 0.5 % ophthalmic solution Place 1 drop into both eyes as needed.      benzonatate (TESSALON) 100 MG capsule Take 1 capsule (100 mg total) by mouth 3 (three) times daily as needed for Cough.      gentamicin (GARAMYCIN) 0.3 % ophthalmic solution Place 2 drops into both eyes every 4 (four) hours.  Qty: 5 mL, Refills: 0         CONTINUE these medications which have NOT CHANGED    Details   acetaminophen (TYLENOL)  500 MG tablet Take 1,000 mg by mouth every 6 (six) hours as needed.      alendronate (FOSAMAX) 70 MG tablet once a week.   Refills: 3      albuterol sulfate 2.5 mg/0.5 mL Nebu Take 2.5 mg by nebulization every 4 (four) hours as needed. Rescue  Qty: 300 mg, Refills: 11      aspirin 81 MG Chew Take 81 mg by mouth once daily.      escitalopram oxalate (LEXAPRO) 10 MG tablet Take 10 mg by mouth every evening.      FERROUS SULFATE (IRON ORAL) Take by mouth.      FOLIC ACID ORAL Take by mouth.      LINZESS 145 mcg Cap capsule       losartan (COZAAR) 25 MG tablet Take 50 mg by mouth 2 (two) times daily.       metoprolol succinate (TOPROL-XL) 25 MG 24 hr tablet       pantoprazole (PROTONIX) 40 MG tablet Take 40 mg by mouth once daily.      simvastatin (ZOCOR) 40 MG tablet Take 40 mg by mouth every evening.      trospium (SANCTURA) 20 mg Tab tablet Take 20 mg by mouth nightly.             Discharge Procedure Orders  Ambulatory referral to Home Health   Referral Priority: Routine Referral Type: Home Health   Referral Reason: Specialty Services Required    Requested Specialty: Home Health Services    Number of Visits Requested: 1      Diet Cardiac     Activity as tolerated   Order Comments: Observe fall precautions.     Call MD for:   Order Comments: For worsening symptoms, chest pain, shortness of breath, increased abdominal pain, high grade fever, stroke or stroke like symptoms, immediately go to the nearest Emergency Room or call 911 as soon as possible.       Follow-up Information     Michael Martin MD In 1 week.    Specialty:  Family Medicine  Contact information:  [address] [place] [id number]  [phone]

## 2018-02-06 NOTE — NURSING
Pt eyes has yellow drainage and are shut together. Eye drops applied and warm compress. Pt stated her eyes feels better.

## 2018-02-06 NOTE — PROGRESS NOTES
PADILLA sent patient information to [facility] for home health through [facility] system for authorization and acceptance.PADILLA cardenas    Per Marva with PHN, [facility] is home health agency.Padilla cardenas

## 2018-02-06 NOTE — PLAN OF CARE
Problem: Patient Care Overview  Goal: Plan of Care Review  Outcome: Ongoing (interventions implemented as appropriate)  Pt sleeping. NAD noted. Confused at times. Swallows meds whole. Denies pain. Gentamycin applied to both eyes. [place] with hearing aid at bedside. Bed alarm on. Side rails upx4 for pt safety. With with assist and a walker to BR. SR on tele. Free of fall or injury. Call light in reach. Will continue to monitor.

## 2018-02-06 NOTE — PROGRESS NOTES
Discharge instructions went over with pt and pt's daughter. Both verbalize understanding and have no new questions at this time. VSS. IV removed, catheter intact. Telemetry monitor removed. AVS printed and given to pt along with printed prescriptions.

## 2018-02-06 NOTE — PROGRESS NOTES
02/06/18 0830   Patient Assessment/Suction   Level of Consciousness (AVPU) alert   All Lung Fields Breath Sounds clear;coarse   PRE-TX-O2-ETCO2   O2 Device (Oxygen Therapy) room air   SpO2 98 %   Pulse Oximetry Type Intermittent   $ Pulse Oximetry - Multiple Charge Pulse Oximetry - Multiple   Pulse 77   Resp 18   Aerosol Therapy   $ Aerosol Therapy Charges PRN treatment not required   Respiratory Treatment Status PRN treatment not required   Duoneb Q6prn, pox, no prn tx required this am, vitals as charted.

## 2018-02-07 ENCOUNTER — HOSPITAL ENCOUNTER (EMERGENCY)
Facility: HOSPITAL | Age: 83
Discharge: ANOTHER HOSPITAL | End: 2018-02-08
Attending: EMERGENCY MEDICINE
Payer: MEDICARE

## 2018-02-07 VITALS
OXYGEN SATURATION: 96 % | RESPIRATION RATE: 20 BRPM | BODY MASS INDEX: 23.09 KG/M2 | HEART RATE: 71 BPM | TEMPERATURE: 99 F | SYSTOLIC BLOOD PRESSURE: 102 MMHG | WEIGHT: 110 LBS | HEIGHT: 58 IN | DIASTOLIC BLOOD PRESSURE: 43 MMHG

## 2018-02-07 DIAGNOSIS — R53.1 WEAKNESS: ICD-10-CM

## 2018-02-07 DIAGNOSIS — S06.350A TRAUMATIC LEFT-SIDED INTRACEREBRAL HEMORRHAGE WITHOUT LOSS OF CONSCIOUSNESS, INITIAL ENCOUNTER: Primary | ICD-10-CM
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
## 2018-02-07 DIAGNOSIS — S32.010A CLOSED COMPRESSION FRACTURE OF FIRST LUMBAR VERTEBRA, INITIAL ENCOUNTER: ICD-10-CM
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
## 2018-02-07 LAB
ABO + RH BLD: NORMAL
ANION GAP SERPL CALC-SCNC: 12 MMOL/L
APTT BLDCRRT: 21 SEC
BASOPHILS # BLD AUTO: 0 K/UL
BASOPHILS NFR BLD: 0.2 %
BILIRUB UR QL STRIP: NEGATIVE
BLD GP AB SCN CELLS X3 SERPL QL: NORMAL
BUN SERPL-MCNC: 15 MG/DL
CALCIUM SERPL-MCNC: 8.4 MG/DL
CHLORIDE SERPL-SCNC: 103 MMOL/L
CLARITY UR: CLEAR
CO2 SERPL-SCNC: 21 MMOL/L
COLOR UR: YELLOW
CREAT SERPL-MCNC: 0.8 MG/DL
DIFFERENTIAL METHOD: ABNORMAL
EOSINOPHIL # BLD AUTO: 0.5 K/UL
EOSINOPHIL NFR BLD: 4.3 %
ERYTHROCYTE [DISTWIDTH] IN BLOOD BY AUTOMATED COUNT: 15.9 %
EST. GFR  (AFRICAN AMERICAN): >60 ML/MIN/1.73 M^2
EST. GFR  (NON AFRICAN AMERICAN): >60 ML/MIN/1.73 M^2
GLUCOSE SERPL-MCNC: 83 MG/DL
GLUCOSE UR QL STRIP: NEGATIVE
HCT VFR BLD AUTO: 37.3 %
HGB BLD-MCNC: 12.3 G/DL
HGB UR QL STRIP: NEGATIVE
INR PPP: 1
KETONES UR QL STRIP: NEGATIVE
LEUKOCYTE ESTERASE UR QL STRIP: NEGATIVE
LYMPHOCYTES # BLD AUTO: 1.9 K/UL
LYMPHOCYTES NFR BLD: 17.9 %
MCH RBC QN AUTO: 29.5 PG
MCHC RBC AUTO-ENTMCNC: 33.1 G/DL
MCV RBC AUTO: 89 FL
MONOCYTES # BLD AUTO: 1.1 K/UL
MONOCYTES NFR BLD: 9.9 %
NEUTROPHILS # BLD AUTO: 7.3 K/UL
NEUTROPHILS NFR BLD: 67.7 %
NITRITE UR QL STRIP: NEGATIVE
PH UR STRIP: 6 [PH] (ref 5–8)
PLATELET # BLD AUTO: 187 K/UL
PMV BLD AUTO: 8.5 FL
POTASSIUM SERPL-SCNC: 4.3 MMOL/L
PROT UR QL STRIP: NEGATIVE
PROTHROMBIN TIME: 10 SEC
RBC # BLD AUTO: 4.18 M/UL
SODIUM SERPL-SCNC: 136 MMOL/L
SP GR UR STRIP: <=1.005 (ref 1–1.03)
URN SPEC COLLECT METH UR: ABNORMAL
UROBILINOGEN UR STRIP-ACNC: 1 EU/DL
WBC # BLD AUTO: 10.7 K/UL

## 2018-02-07 PROCEDURE — 86850 RBC ANTIBODY SCREEN: CPT

## 2018-02-07 PROCEDURE — 85730 THROMBOPLASTIN TIME PARTIAL: CPT

## 2018-02-07 PROCEDURE — 81003 URINALYSIS AUTO W/O SCOPE: CPT

## 2018-02-07 PROCEDURE — 80048 BASIC METABOLIC PNL TOTAL CA: CPT

## 2018-02-07 PROCEDURE — 85610 PROTHROMBIN TIME: CPT

## 2018-02-07 PROCEDURE — 36415 COLL VENOUS BLD VENIPUNCTURE: CPT

## 2018-02-07 PROCEDURE — 85025 COMPLETE CBC W/AUTO DIFF WBC: CPT

## 2018-02-07 PROCEDURE — 93005 ELECTROCARDIOGRAM TRACING: CPT

## 2018-02-07 PROCEDURE — 99285 EMERGENCY DEPT VISIT HI MDM: CPT

## 2018-02-07 RX ORDER — CALCIUM CARBONATE/VITAMIN D3 600MG-5MCG
1 TABLET ORAL DAILY
COMMUNITY

## 2018-02-07 RX ORDER — FERROUS SULFATE 325(65) MG
325 TABLET, DELAYED RELEASE (ENTERIC COATED) ORAL DAILY
COMMUNITY

## 2018-02-07 RX ORDER — LUBIPROSTONE 8 UG/1
8 CAPSULE ORAL 2 TIMES DAILY PRN
COMMUNITY

## 2018-02-07 NOTE — ED PROVIDER NOTES
"Encounter Date: 2/7/2018    SCRIBE #1 NOTE: I, Niki Mcdermott, am scribing for, and in the presence of, Dr. Dolan.       History     Chief Complaint   Patient presents with    Fall     hit back of head  / recent fall with forehead / facial injury    Back Pain       02/07/2018 6:58 PM     Chief complaint: Head, back, & hip pain      Talia Alberts is a 89 y.o. female with HTN, CAD, and prior MI who presents to the ED with an onset of back of the head, lower back, and right hip pain status post fall early this morning at 5:30 AM, sustaining a hematoma right occipital region. The daughter, whom she resides with, reports hearing a loud "bang" this morning and found the patient on the flood. She was recently hospitalized after a fall, sustaining a hematoma to the left temporal region. Currently, the patient dies any pain. The daughter notes that she endorses a cough. The patient denies fevers, SOB, abdominal pain, or any other symptoms at this time. No pertinent SHx noted.       The history is provided by the patient and a relative (daughter).     Review of patient's allergies indicates:   Allergen Reactions    Morphine Swelling     Past Medical History:   Diagnosis Date    Cancer     Coronary artery disease     Hypertension     MI (myocardial infarction)     Skin cancer unsure    L and R arm, was removed by dr christianson    Squamous cell carcinoma      Past Surgical History:   Procedure Laterality Date    CARDIAC SURGERY      FRACTURE SURGERY       Family History   Problem Relation Age of Onset    Melanoma Neg Hx     Psoriasis Neg Hx     Lupus Neg Hx     Eczema Neg Hx      Social History   Substance Use Topics    Smoking status: Never Smoker    Smokeless tobacco: Never Used    Alcohol use No     Review of Systems   Constitutional: Negative for chills and fever.   HENT: Negative for nosebleeds.         Positive for head trauma.    Eyes: Negative for visual disturbance.   Respiratory: Positive for cough. " Negative for shortness of breath.    Cardiovascular: Negative for chest pain and palpitations.   Gastrointestinal: Negative for abdominal pain, diarrhea, nausea and vomiting.   Genitourinary: Negative for dysuria and hematuria.   Musculoskeletal: Positive for arthralgias (right hip) and back pain (lower). Negative for neck pain.   Skin: Negative for rash.   Neurological: Negative for seizures, syncope and headaches.     Physical Exam     Initial Vitals [02/07/18 1612]   BP Pulse Resp Temp SpO2   (!) 102/43 71 20 98.9 °F (37.2 °C) 96 %      MAP       62.67         Physical Exam    Nursing note and vitals reviewed.  Constitutional: She appears well-developed.   HENT:   Head: Normocephalic.   Hearing aids in the right and left ears. Tenderness of the occipital region with an abrasion. Old hematoma with scab of the left temporal region.    Eyes: EOM are normal. Pupils are equal, round, and reactive to light.   Neck: Neck supple.   Cardiovascular: Normal rate, normal heart sounds and intact distal pulses. An irregularly irregular rhythm present. Exam reveals no gallop and no friction rub.    No murmur heard.  Pulmonary/Chest: Breath sounds normal. No respiratory distress. She has no decreased breath sounds. She has no wheezes. She has no rhonchi. She has no rales. She exhibits no tenderness.   No CHF. No appreciated chest wall tenderness.    Abdominal: Soft. Bowel sounds are normal.   Musculoskeletal: Normal range of motion.   Abrasion to the left lower lumbar/sacral region. No spinal tenderness. Kyphosis of the thoracic spine.    Neurological: She is alert.   Able to stand up and ambulate without a limp.    Skin: Skin is warm and dry. Abrasion noted.   Psychiatric: She has a normal mood and affect.       ED Course   Procedures  Labs Reviewed   URINALYSIS   CBC W/ AUTO DIFFERENTIAL   BASIC METABOLIC PANEL     Imaging Results          X-Ray Lumbar Spine Ap And Lateral (Final result)  Result time 02/07/18 18:46:22    Final  result by Mirza Teixeira MD (02/07/18 18:46:22)                 Impression:      1. New relatively severe superior endplate compression fracture of the L1 vertebral body with associated posterior subluxation of L1.  2. Ulcer. The compression fracture of the L2 vertebral body which is unchanged and has been treated with kyphoplasty. There is also chronic mild posterior subluxation of L2.  3. Multilevel chronic degenerative disc disease.      Electronically signed by: Mirza Teixeira  Date:     02/07/18  Time:    18:46              Narrative:    AP and lateral views of the lumbar spine were obtained. Comparison is made to the prior study dated 10/15/15.    Since the prior study, there has been an interval new superior endplate compression fracture of the L1 vertebral body resulting in a wedge deformity at approximately 60 % loss of vertebral body height anteriorly. There is associated posterior subluxation of L1 on L2 over a distance of 5 mm causing spinal canal narrowing. There is an old superior endplate compression fracture of the L2 vertebral body resulting in approximately 25 % loss of vertebral body height and a wedge deformity. This compression fracture has been treated with kyphoplasty and is unchanged in the degree of vertebral body height loss. There is chronic posterior subluxation of L2 over a distance of 7 mm. There is chronic degenerative disc disease at the T12-L1, L1-2, and L2-3 levels with disc space narrowing.                             X-Ray Hips Bilateral 2 View Incl AP Pelvis (Final result)  Result time 02/07/18 18:40:35    Final result by Mirza Teixeira MD (02/07/18 18:40:35)                 Impression:     Healing fractures of the left superior and inferior pubic rami. No bilateral hip abnormalities are evident.          Electronically signed by: Mirza Teixeira  Date:     02/07/18  Time:    18:40              Narrative:    2 views of the bilateral hips and pelvis were obtained. Comparison  is made to the prior study dated 10/6/17.    No signs of ongoing healing of the fractures of the left superior and inferior pubic rami. Superior pubic ramus fracture is mildly displaced while the left inferior pubic ramus fracture is nondisplaced. No new fractures have developed since the prior study. The hip joints are well maintained and well aligned. No hip subluxation is identified. No significant arthritic changes are evident.                             CT Head Without Contrast (Final result)  Result time 02/07/18 18:25:03    Final result by Mirza Teixeira MD (02/07/18 18:25:03)                 Impression:      1. New small left parasagittal frontal lobe cortical hemorrhagic parenchymal contusion without evidence of mass effect or caroline hematoma.  2. Interval decrease in the size of the left frontal scalp hematoma.  3. Chronic microvascular ischemic changes of the bilateral cerebral white matter.  4. 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    Comment:  Dr. Toledo phoned with the results at 6:24 PM on 2/7/18.      Electronically signed by: Mirza Teixeira  Date:     02/07/18  Time:    18:25              Narrative:    Procedure:  5 mm thick transaxial CT imaging of the brain was performed. Sagittal and coronal reconstructions were generated.    Findings:  Comparison is made to the prior CT study dated 2/4/18.    Since the prior study, there has been interval development of a small cortical parenchymal hemorrhagic contusion involving the parasagittal aspect of the right frontal lobe measuring 1.3 cm. The contusion is not associated with mass effect or a caroline hematoma. There is no evidence of subdural or epidural hematoma. There is symmetric decreased attenuation involving bilateral cerebral white matter consistent with chronic microvascular ischemia. Old lacunar infarctions involving the right internal capsule and left thalamus are again noted. The ventricular system  is within normal limits for age. The calvarium is intact. Mastoid air cells and visible paranasal sinuses are well aerated and clear. A left frontal scalp hematoma has decreased in size since the prior study.                                  Medical Decision Making:   History:   Old Medical Records: I decided to obtain old medical records.  Clinical Tests:   Lab Tests: Reviewed and Ordered  Radiological Study: Reviewed and Ordered  Medical Tests: Ordered and Reviewed  Patient has a small parenchymal bleed on CT the head.  She has no neck pain.  Her x-rays showed a new compression fracture of L1.  She has a GCS of 15 and is pleasant.  She doesn't appear to be in that much distress.  She takes aspirin but no other anticoagulants or antiplatelets.  She has stable CBC and awaiting a chemistry and urinalysis at this time.  She will need at least consultation by neurosurgery and if they elect, transfer.  I will discuss this with neurosurgery.  She doesn't appear to be septic or toxic at this time.  She doesn't appear to have any unilateral focal neurologic deficits    9:34 PM patient has been accepted to the ER at [facility] for evaluation by neurosurgery.  GCS 15.  Stable for transfer.  No signs of coagulopathy.  Mental status unchanged.  She is not even asking for pain medicine for her back.  Family updated.        APC / Resident Notes:   PIT:  Fell getting in bathroom this morning.  She stood up, felt weak and collapsed, hitting her head and hurting her lower back.  Family concerned with possible UTI.  Will also check CBC, BMP, CT head and Xray lumbar spine/pelvis  for further evaluation.        Scribe Attestation:   Scribe #1: I performed the above scribed service and the documentation accurately describes the services I performed. I attest to the accuracy of the note.    I, Dr. Quintin Dolan personally performed the services described in this documentation. All medical record entries made by the scribe were at my  direction and in my presence.  I have reviewed the chart and agree that the record reflects my personal performance and is accurate and complete. Quintin Dolan MD.  7:26 PM 02/07/2018    DISCLAIMER: This note was prepared with Dragon NaturallySpeaking voice recognition transcription software. Garbled syntax, mangled pronouns, and other bizarre constructions may be attributed to that software system         ED Course      Clinical Impression:     1. Traumatic left-sided intracerebral hemorrhage without loss of consciousness, initial encounter    2. Fall    3. Weakness    4. Closed compression fracture of first lumbar vertebra, initial encounter    5. Fall, initial encounter                                 Quintin Dolan MD  02/07/18 1927       Quintin Dolan MD  02/07/18 2277

## 2018-02-07 NOTE — PLAN OF CARE
02/07/18 1606   Final Note   Assessment Type Final Discharge Note   Discharge Disposition Home-Health  ([facility] )

## 2018-02-08 ENCOUNTER — HOSPITAL ENCOUNTER (INPATIENT)
Facility: HOSPITAL | Age: 83
LOS: 7 days | Discharge: NURSING, REHAB OR LONG-TERM CARE | DRG: 551 | End: 2018-02-15
Attending: EMERGENCY MEDICINE | Admitting: HOSPITALIST
Payer: MEDICARE
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
## 2018-02-08 DIAGNOSIS — R55 SYNCOPE: ICD-10-CM

## 2018-02-08 DIAGNOSIS — I48.0 PAF (PAROXYSMAL ATRIAL FIBRILLATION): ICD-10-CM

## 2018-02-08 DIAGNOSIS — R40.4 TRANSIENT ALTERATION OF AWARENESS: ICD-10-CM

## 2018-02-08 DIAGNOSIS — S32.010A COMPRESSION FRACTURE OF FIRST LUMBAR VERTEBRA: ICD-10-CM

## 2018-02-08 DIAGNOSIS — G31.84 MILD COGNITIVE IMPAIRMENT: Chronic | ICD-10-CM

## 2018-02-08 DIAGNOSIS — S32.010A CLOSED COMPRESSION FRACTURE OF FIRST LUMBAR VERTEBRA, INITIAL ENCOUNTER: Primary | ICD-10-CM

## 2018-02-08 DIAGNOSIS — H10.9 CONJUNCTIVITIS, UNSPECIFIED CONJUNCTIVITIS TYPE, UNSPECIFIED LATERALITY: ICD-10-CM

## 2018-02-08 DIAGNOSIS — S32.019A CLOSED FRACTURE OF FIRST LUMBAR VERTEBRA, UNSPECIFIED FRACTURE MORPHOLOGY, INITIAL ENCOUNTER: Primary | ICD-10-CM

## 2018-02-08 DIAGNOSIS — H10.33 ACUTE BACTERIAL CONJUNCTIVITIS OF BOTH EYES: ICD-10-CM

## 2018-02-08 PROBLEM — I62.9 INTRACRANIAL HEMORRHAGE: Status: ACTIVE | Noted: 2018-02-08

## 2018-02-08 PROBLEM — L60.9 NAIL DISORDER: Status: ACTIVE | Noted: 2018-02-08

## 2018-02-08 LAB
ANION GAP SERPL CALC-SCNC: 10 MMOL/L
BASOPHILS # BLD AUTO: 0.07 K/UL
BASOPHILS NFR BLD: 0.7 %
BUN SERPL-MCNC: 13 MG/DL
CALCIUM SERPL-MCNC: 8.1 MG/DL
CHLORIDE SERPL-SCNC: 106 MMOL/L
CO2 SERPL-SCNC: 23 MMOL/L
CREAT SERPL-MCNC: 0.7 MG/DL
DIFFERENTIAL METHOD: ABNORMAL
EOSINOPHIL # BLD AUTO: 0.7 K/UL
EOSINOPHIL NFR BLD: 6.8 %
ERYTHROCYTE [DISTWIDTH] IN BLOOD BY AUTOMATED COUNT: 14.6 %
EST. GFR  (AFRICAN AMERICAN): >60 ML/MIN/1.73 M^2
EST. GFR  (NON AFRICAN AMERICAN): >60 ML/MIN/1.73 M^2
GLUCOSE SERPL-MCNC: 85 MG/DL
HCT VFR BLD AUTO: 34.4 %
HGB BLD-MCNC: 11.7 G/DL
IMM GRANULOCYTES # BLD AUTO: 0.08 K/UL
IMM GRANULOCYTES NFR BLD AUTO: 0.8 %
LYMPHOCYTES # BLD AUTO: 2.1 K/UL
LYMPHOCYTES NFR BLD: 21 %
MCH RBC QN AUTO: 30 PG
MCHC RBC AUTO-ENTMCNC: 34 G/DL
MCV RBC AUTO: 88 FL
MONOCYTES # BLD AUTO: 1 K/UL
MONOCYTES NFR BLD: 9.8 %
NEUTROPHILS # BLD AUTO: 6.1 K/UL
NEUTROPHILS NFR BLD: 60.9 %
NRBC BLD-RTO: 0 /100 WBC
PLATELET # BLD AUTO: 177 K/UL
PMV BLD AUTO: 10 FL
POTASSIUM SERPL-SCNC: 3.7 MMOL/L
RBC # BLD AUTO: 3.9 M/UL
SODIUM SERPL-SCNC: 139 MMOL/L
WBC # BLD AUTO: 10.01 K/UL

## 2018-02-08 PROCEDURE — 25000003 PHARM REV CODE 250: Performed by: PHYSICIAN ASSISTANT

## 2018-02-08 PROCEDURE — G8997 SWALLOW GOAL STATUS: HCPCS | Mod: CI

## 2018-02-08 PROCEDURE — 96366 THER/PROPH/DIAG IV INF ADDON: CPT

## 2018-02-08 PROCEDURE — 96365 THER/PROPH/DIAG IV INF INIT: CPT

## 2018-02-08 PROCEDURE — 99223 1ST HOSP IP/OBS HIGH 75: CPT | Mod: AI,GC,, | Performed by: HOSPITALIST

## 2018-02-08 PROCEDURE — A4216 STERILE WATER/SALINE, 10 ML: HCPCS | Performed by: STUDENT IN AN ORGANIZED HEALTH CARE EDUCATION/TRAINING PROGRAM

## 2018-02-08 PROCEDURE — 93005 ELECTROCARDIOGRAM TRACING: CPT

## 2018-02-08 PROCEDURE — 25000003 PHARM REV CODE 250: Performed by: STUDENT IN AN ORGANIZED HEALTH CARE EDUCATION/TRAINING PROGRAM

## 2018-02-08 PROCEDURE — 85025 COMPLETE CBC W/AUTO DIFF WBC: CPT

## 2018-02-08 PROCEDURE — 92610 EVALUATE SWALLOWING FUNCTION: CPT

## 2018-02-08 PROCEDURE — G8996 SWALLOW CURRENT STATUS: HCPCS | Mod: CI

## 2018-02-08 PROCEDURE — 11000001 HC ACUTE MED/SURG PRIVATE ROOM

## 2018-02-08 PROCEDURE — 93010 ELECTROCARDIOGRAM REPORT: CPT | Mod: ,,, | Performed by: INTERNAL MEDICINE

## 2018-02-08 PROCEDURE — 99285 EMERGENCY DEPT VISIT HI MDM: CPT | Mod: 25

## 2018-02-08 PROCEDURE — G8998 SWALLOW D/C STATUS: HCPCS | Mod: CI

## 2018-02-08 PROCEDURE — 94761 N-INVAS EAR/PLS OXIMETRY MLT: CPT

## 2018-02-08 PROCEDURE — 80048 BASIC METABOLIC PNL TOTAL CA: CPT

## 2018-02-08 RX ORDER — ONDANSETRON 8 MG/1
8 TABLET, ORALLY DISINTEGRATING ORAL EVERY 8 HOURS PRN
Status: DISCONTINUED | OUTPATIENT
Start: 2018-02-08 | End: 2018-02-15 | Stop reason: HOSPADM

## 2018-02-08 RX ORDER — LOSARTAN POTASSIUM 25 MG/1
25 TABLET ORAL
Status: COMPLETED | OUTPATIENT
Start: 2018-02-08 | End: 2018-02-08

## 2018-02-08 RX ORDER — ACETAMINOPHEN 325 MG/1
650 TABLET ORAL EVERY 8 HOURS
Status: DISCONTINUED | OUTPATIENT
Start: 2018-02-08 | End: 2018-02-15 | Stop reason: HOSPADM

## 2018-02-08 RX ORDER — SIMVASTATIN 40 MG/1
40 TABLET, FILM COATED ORAL NIGHTLY
Status: DISCONTINUED | OUTPATIENT
Start: 2018-02-08 | End: 2018-02-15 | Stop reason: HOSPADM

## 2018-02-08 RX ORDER — LOSARTAN POTASSIUM 50 MG/1
50 TABLET ORAL DAILY
Status: DISCONTINUED | OUTPATIENT
Start: 2018-02-09 | End: 2018-02-09

## 2018-02-08 RX ORDER — NICARDIPINE HYDROCHLORIDE 0.2 MG/ML
5 INJECTION INTRAVENOUS CONTINUOUS
Status: DISCONTINUED | OUTPATIENT
Start: 2018-02-08 | End: 2018-02-08

## 2018-02-08 RX ORDER — LOSARTAN POTASSIUM 50 MG/1
50 TABLET ORAL 2 TIMES DAILY
Status: DISCONTINUED | OUTPATIENT
Start: 2018-02-08 | End: 2018-02-08

## 2018-02-08 RX ORDER — FOLIC ACID 1 MG/1
1 TABLET ORAL DAILY
Status: DISCONTINUED | OUTPATIENT
Start: 2018-02-08 | End: 2018-02-15 | Stop reason: HOSPADM

## 2018-02-08 RX ORDER — ALBUTEROL SULFATE 0.83 MG/ML
2.5 SOLUTION RESPIRATORY (INHALATION) EVERY 4 HOURS PRN
Status: DISCONTINUED | OUTPATIENT
Start: 2018-02-08 | End: 2018-02-15 | Stop reason: HOSPADM

## 2018-02-08 RX ORDER — METOPROLOL SUCCINATE 25 MG/1
25 TABLET, EXTENDED RELEASE ORAL DAILY
Status: DISCONTINUED | OUTPATIENT
Start: 2018-02-08 | End: 2018-02-15 | Stop reason: HOSPADM

## 2018-02-08 RX ORDER — ERYTHROMYCIN 5 MG/G
OINTMENT OPHTHALMIC
Status: COMPLETED | OUTPATIENT
Start: 2018-02-08 | End: 2018-02-08

## 2018-02-08 RX ORDER — BENZONATATE 100 MG/1
100 CAPSULE ORAL 3 TIMES DAILY PRN
Status: DISCONTINUED | OUTPATIENT
Start: 2018-02-08 | End: 2018-02-09

## 2018-02-08 RX ORDER — ACETAMINOPHEN 500 MG
1000 TABLET ORAL
Status: COMPLETED | OUTPATIENT
Start: 2018-02-08 | End: 2018-02-08

## 2018-02-08 RX ORDER — PANTOPRAZOLE SODIUM 40 MG/1
40 TABLET, DELAYED RELEASE ORAL DAILY
Status: DISCONTINUED | OUTPATIENT
Start: 2018-02-08 | End: 2018-02-15 | Stop reason: HOSPADM

## 2018-02-08 RX ORDER — SODIUM CHLORIDE 9 MG/ML
5 INJECTION, SOLUTION INTRAMUSCULAR; INTRAVENOUS; SUBCUTANEOUS EVERY 8 HOURS
Status: DISCONTINUED | OUTPATIENT
Start: 2018-02-08 | End: 2018-02-15 | Stop reason: HOSPADM

## 2018-02-08 RX ORDER — FERROUS SULFATE, DRIED 160(50) MG
1 TABLET, EXTENDED RELEASE ORAL 2 TIMES DAILY
Status: DISCONTINUED | OUTPATIENT
Start: 2018-02-08 | End: 2018-02-15 | Stop reason: HOSPADM

## 2018-02-08 RX ORDER — RAMELTEON 8 MG/1
8 TABLET ORAL NIGHTLY PRN
Status: DISCONTINUED | OUTPATIENT
Start: 2018-02-08 | End: 2018-02-09

## 2018-02-08 RX ORDER — GENTAMICIN SULFATE 3 MG/ML
2 SOLUTION/ DROPS OPHTHALMIC EVERY 4 HOURS
Status: DISCONTINUED | OUTPATIENT
Start: 2018-02-08 | End: 2018-02-09

## 2018-02-08 RX ADMIN — PANTOPRAZOLE SODIUM 40 MG: 40 TABLET, DELAYED RELEASE ORAL at 11:02

## 2018-02-08 RX ADMIN — ACETAMINOPHEN 1000 MG: 500 TABLET ORAL at 05:02

## 2018-02-08 RX ADMIN — BENZONATATE 100 MG: 100 CAPSULE ORAL at 02:02

## 2018-02-08 RX ADMIN — THERA TABS 1 TABLET: TAB at 11:02

## 2018-02-08 RX ADMIN — SIMVASTATIN 40 MG: 40 TABLET, FILM COATED ORAL at 08:02

## 2018-02-08 RX ADMIN — OYSTER SHELL CALCIUM WITH VITAMIN D 1 TABLET: 500; 200 TABLET, FILM COATED ORAL at 11:02

## 2018-02-08 RX ADMIN — GENTAMICIN SULFATE 2 DROP: 3 SOLUTION/ DROPS OPHTHALMIC at 02:02

## 2018-02-08 RX ADMIN — ACETAMINOPHEN 650 MG: 325 TABLET, FILM COATED ORAL at 09:02

## 2018-02-08 RX ADMIN — ACETAMINOPHEN 650 MG: 325 TABLET, FILM COATED ORAL at 11:02

## 2018-02-08 RX ADMIN — OYSTER SHELL CALCIUM WITH VITAMIN D 1 TABLET: 500; 200 TABLET, FILM COATED ORAL at 08:02

## 2018-02-08 RX ADMIN — SODIUM CHLORIDE, PRESERVATIVE FREE 5 ML: 5 INJECTION INTRAVENOUS at 02:02

## 2018-02-08 RX ADMIN — METOPROLOL SUCCINATE 25 MG: 25 TABLET, EXTENDED RELEASE ORAL at 11:02

## 2018-02-08 RX ADMIN — LOSARTAN POTASSIUM 25 MG: 25 TABLET, FILM COATED ORAL at 07:02

## 2018-02-08 RX ADMIN — ESCITALOPRAM OXALATE 15 MG: 10 TABLET ORAL at 08:02

## 2018-02-08 RX ADMIN — GENTAMICIN SULFATE 2 DROP: 3 SOLUTION/ DROPS OPHTHALMIC at 05:02

## 2018-02-08 RX ADMIN — GENTAMICIN SULFATE 2 DROP: 3 SOLUTION/ DROPS OPHTHALMIC at 09:02

## 2018-02-08 RX ADMIN — NICARDIPINE HYDROCHLORIDE 5 MG/HR: 0.2 INJECTION, SOLUTION INTRAVENOUS at 01:02

## 2018-02-08 RX ADMIN — FOLIC ACID 1 MG: 1 TABLET ORAL at 11:02

## 2018-02-08 RX ADMIN — ERYTHROMYCIN 1 INCH: 5 OINTMENT OPHTHALMIC at 04:02

## 2018-02-08 NOTE — ASSESSMENT & PLAN NOTE
2/2 fall (most likely sustained from fall on Sunday)  - CTH at [facility]:   New small left parasagittal frontal lobe cortical hemorrhagic parenchymal contusion without evidence of mass effect or caroline hematoma.  - [place] [facility]:   Trace parenchymal hyperdensity noted within the paramedian aspect of the right frontal lobe, somewhat less conspicuous compared to prior outside CT examination which could represent trace component of parenchymal hemorrhage. No definite additional new intracranial hemorrhage is identified. There is no midline shift.  - Seen by Neurosurgery in ED, not for any surgical interventions  - Fall and delirium precautions  - Continue BP meds, goal SBP <140  - Holding ASA 81 and avoiding NSAIDs

## 2018-02-08 NOTE — ED NOTES
Patient has new depends placed. Positioned for comfort. NAD noted, respirations even and unlabored. Side rails x 2. Family at bedside. Will continue to monitor.

## 2018-02-08 NOTE — ED PROVIDER NOTES
Encounter Date: 2/8/2018       History     Chief Complaint   Patient presents with    ICH from [facility]     Pt fell yesterday morning at 0530 unwitness, wenrt to [facility] diagnosed with ICH, transfer for neuro consult.      89-year-old female presents to the ER as a transfer from [facility] for evaluation of an intracranial hemorrhage.  Patient sustained a mechanical trip and fall and hit her head.  No is no loss of consciousness.  CT scan performed at outside facility revealed an acute intracranial hemorrhage. GCS 15, pt Alert and Oriented.   Vital signs stable at outside facility patient was stable for transfer.   While in route she became hypertensive.        On arrival to our facility blood pressure greater than 200 systolic patient alert and oriented GCS 15.  She is complaining of a headache.  She denies any nausea vomiting or abdominal pain.  She denies any changes in vision or lightheadedness or dizziness.          Review of patient's allergies indicates:   Allergen Reactions    Morphine Swelling     Past Medical History:   Diagnosis Date    Cancer     Coronary artery disease     Hypertension     MI (myocardial infarction)     Skin cancer unsure    L and R arm, was removed by dr christianson    Squamous cell carcinoma      Past Surgical History:   Procedure Laterality Date    CARDIAC SURGERY      FRACTURE SURGERY       Family History   Problem Relation Age of Onset    Melanoma Neg Hx     Psoriasis Neg Hx     Lupus Neg Hx     Eczema Neg Hx      Social History   Substance Use Topics    Smoking status: Never Smoker    Smokeless tobacco: Never Used    Alcohol use No     Review of Systems   Constitutional: Negative for fever.   HENT: Negative for sore throat.    Eyes: Positive for redness.   Respiratory: Negative for shortness of breath.    Cardiovascular: Negative for chest pain.   Gastrointestinal: Negative for nausea.   Genitourinary: Negative for dysuria.   Musculoskeletal: Negative for back  pain.   Skin: Negative for rash.   Neurological: Positive for headaches. Negative for weakness.   Hematological: Does not bruise/bleed easily.       Physical Exam     Initial Vitals [02/08/18 0109]   BP Pulse Resp Temp SpO2   (!) 194/96 78 18 98 °F (36.7 °C) 99 %      MAP       128.67         Physical Exam    Constitutional: Vital signs are normal. She appears well-developed and well-nourished. She is not diaphoretic. No distress.   HENT:   Head: Normocephalic and atraumatic.   Right Ear: External ear normal.   Left Ear: External ear normal.   She has a hematoma to the back of the head without an open wound   Eyes: Conjunctivae are normal.   The conjunctiva and sclerae is injected on the left     Cardiovascular: Normal rate, regular rhythm and normal heart sounds. Exam reveals no gallop and no friction rub.    No murmur heard.  Pulmonary/Chest: Breath sounds normal. No respiratory distress. She has no wheezes. She has no rhonchi. She has no rales. She exhibits no tenderness.   Abdominal: Soft. Normal appearance and bowel sounds are normal. She exhibits no distension and no mass. There is no tenderness. There is no rebound and no guarding.   Musculoskeletal: Normal range of motion.   Neurological: She is alert and oriented to person, place, and time.   Alert and oriented  GCS 15  No acute neuro deficits  Normal neuro exam   Skin: Skin is warm and intact.   Psychiatric: She has a normal mood and affect. Her speech is normal and behavior is normal. Cognition and memory are normal.         ED Course   Procedures  Labs Reviewed   CBC W/ AUTO DIFFERENTIAL - Abnormal; Notable for the following:        Result Value    RBC 3.90 (*)     Hemoglobin 11.7 (*)     Hematocrit 34.4 (*)     RDW 14.6 (*)     Immature Granulocytes 0.8 (*)     Immature Grans (Abs) 0.08 (*)     Eos # 0.7 (*)     All other components within normal limits   BASIC METABOLIC PANEL - Abnormal; Notable for the following:     Calcium 8.1 (*)     All other  components within normal limits             Medical Decision Making:   ED Management:  89-year-old female with an acute intracranial hemorrhage  Patient was started on Cardene upon arrival to our facility  Blood pressure improved to 120 systolic  Neurosurgery was contacted immediately.  They did not recommend neuro critical care at this time until they evaluate the patient    R eye injected and starting to form some drainage. Will give erythromycin     Case was discussed with neurosurgery, they evaluated the patient in the ER  CT scan of the head does not demonstrate acute intracranial hemorrhage at this facility neurosurgery believes the findings at the outside facility was infarct  CT cervical spine no acute findings, though neurosurgery is suspicious more like an MRI for further evaluation  There is an acute L1 compression fracture  Plan: LSO brace, pain control, PT/ OT, admit to internal medicine for observation  Cardene drip was stopped, hypertension control deferred to admitting service                   ED Course      Clinical Impression:   The primary encounter diagnosis was Closed fracture of first lumbar vertebra, unspecified fracture morphology, initial encounter. A diagnosis of Conjunctivitis, unspecified conjunctivitis type, unspecified laterality was also pertinent to this visit.    Disposition:   Disposition: Admitted  Condition: Barbara Banks PA-C  02/08/18 0547

## 2018-02-08 NOTE — ED NOTES
"Pt daughter states that she fell earlier this week and was seen in ER healing bruises noted to left side of face and hematoma pt states swelling has decreased, today at 0500 pt got up to use the bathroom and daughter heard her fall found her on the floor pt states no LOC but daughter is not sure. C/o pain and swelling to back of right side of head and lower back/buttock pain states she felt weak and "Just fell down" alert calm daughter remains at bedside aware to notify nurse of needs or concerns.   "

## 2018-02-08 NOTE — ED TRIAGE NOTES
Pt transferred from [facility] via [facility]. Pt has had two falls within the last two days. Pt fell yesterday morning at 530, fall was unwitnessed. Pt daughter found about fall later in the day and brought pt to [facility] ED for eval. Pt has small ICH and was transferred for Neuro Consult.

## 2018-02-08 NOTE — ASSESSMENT & PLAN NOTE
Per daughter patient has had cognitive decline with sundowning over last 6 months  - TSH wnl, on folic acid and theragrain  - Family would like skilled nursing placement but patient has been refusing  - Delirium precautions  - Continue home escitalopram 15mg qhs  - Will refer for neurocognitive testing and consideration of ?donepezil/ memantine on discharge

## 2018-02-08 NOTE — ASSESSMENT & PLAN NOTE
- Evaluated by Neurosurgery in ED, appreciate recs  - Patient needs to wear back brace and follow up with Neurosurgery in clinic in 4 weeks (apt made)  - Tylenol 650mg q8 for analgesia  - Continue home calcium/vitamin D  - Continue home alendronate

## 2018-02-08 NOTE — PLAN OF CARE
Problem: SLP Goal  Goal: SLP Goal  Speech Therapy Short Term Goals  Goal expected to be met by 2/15  1. Pt will participate in a bedside swallow study to determine the safest and least restrictive possible po diet with possible updated goals to follow pending results. -MET      Outcome: Outcome(s) achieved Date Met: 02/08/18  BSS completed. Pt presented with no overt s/s of aspiration with all consistencies trialed. Recommend: Regular diet, thin liquids, small single bites and sips at a time, aspiration precautions. No further skilled acute ST services warranted at this time. Please re-consult as needed.   BAO Whitmore., CCC-SLP  Pager: [phone]  02/08/2018

## 2018-02-08 NOTE — ASSESSMENT & PLAN NOTE
- Fall at home, probably orthostatic hypotension, reports feeling dizzy immediately after standing, then tried to sit down but fell, denies chest pain or palpitations, no seizure-like activity  - Per daughter, patient has had worsening sun downing and increasing falls at home over last few months, patient lives with daughter as she requires 24/7 care and has been refusing residential care  - EKG with NSR  - Fall precautions  - Needs to wear back brace for next 4 weeks for L1 compression fracture  - PT/OT

## 2018-02-08 NOTE — ASSESSMENT & PLAN NOTE
- s/p CABG plus ?valve replacement ~5 years ago  - Holding ASA 81 in the setting of ICH  - Continue statin, BB and ARB

## 2018-02-08 NOTE — ED NOTES
Per Dr. Giraldo, they are awaiting clearance from neurosurgery for patient to eat. Questioned about c-collar that was not in place at shift change, informed patient has no neck pain at this time, stated to hold until they talk to neurosurgery as well. Verbalized understanding.

## 2018-02-08 NOTE — SUBJECTIVE & OBJECTIVE
(Not in a hospital admission)    Review of patient's allergies indicates:   Allergen Reactions    Morphine Swelling       Past Medical History:   Diagnosis Date    Cancer     Coronary artery disease     Hypertension     MI (myocardial infarction)     Skin cancer unsure    L and R arm, was removed by dr christianson    Squamous cell carcinoma      Past Surgical History:   Procedure Laterality Date    CARDIAC SURGERY      FRACTURE SURGERY       Family History     None        Social History Main Topics    Smoking status: Never Smoker    Smokeless tobacco: Never Used    Alcohol use No    Drug use: No    Sexual activity: No     Review of Systems   Constitutional: Negative for activity change, appetite change and chills.   HENT: Negative for congestion, dental problem and drooling.    Eyes: Positive for discharge and redness.   Respiratory: Negative for apnea, choking and chest tightness.    Cardiovascular: Negative for chest pain and leg swelling.   Gastrointestinal: Negative for abdominal distention and abdominal pain.   Endocrine: Negative for cold intolerance, heat intolerance and polydipsia.   Genitourinary: Negative for difficulty urinating, dyspareunia and dysuria.   Musculoskeletal: Negative for arthralgias and back pain.   Neurological: Negative for dizziness, facial asymmetry and headaches.   Psychiatric/Behavioral: Negative for agitation and behavioral problems.     Objective:        There is no height or weight on file to calculate BMI.  Vital Signs (Most Recent):  Temp: 98 °F (36.7 °C) (02/08/18 0109)  Pulse: 73 (02/08/18 0432)  Resp: (!) 24 (02/08/18 0424)  BP: (!) 161/70 (02/08/18 0432)  SpO2: 97 % (02/08/18 0432) Vital Signs (24h Range):  Temp:  [98 °F (36.7 °C)-98.9 °F (37.2 °C)] 98 °F (36.7 °C)  Pulse:  [69-78] 73  Resp:  [18-25] 24  SpO2:  [92 %-100 %] 97 %  BP: (102-194)/(43-96) 161/70                           Neurosurgery Physical Exam   -Alert and oriented x4  -PERRL, EOMI, face  symmetrical, tongue midline, facial sensation symmetric, shoulder shrug full strength and symmetric  -Motor: 5/5 throughout the upper and lower extremites bilaterally  -sensation intact to light touch throughout  -reflexes 2+ in patella and brachioradialis bilaterally  -coordination intact to finger to nose bilaterally      Significant Labs:    Recent Labs  Lab 02/07/18 1916 02/08/18  0131   GLU 83 85    139   K 4.3 3.7    106   CO2 21* 23   BUN 15 13   CREATININE 0.8 0.7   CALCIUM 8.4* 8.1*       Recent Labs  Lab 02/07/18 1916 02/08/18  0131   WBC 10.70 10.01   HGB 12.3 11.7*   HCT 37.3 34.4*    177       Recent Labs  Lab 02/07/18 1936   INR 1.0   APTT 21.0     Microbiology Results (last 7 days)     ** No results found for the last 168 hours. **        All pertinent labs from the last 24 hours have been reviewed.    Significant Diagnostics:  CT: Ct Head Without Contrast    Result Date: 2/7/2018  1. New small left parasagittal frontal lobe cortical hemorrhagic parenchymal contusion without evidence of mass effect or caroline hematoma. 2. Interval decrease in the size of the left frontal scalp hematoma. 3. Chronic microvascular ischemic changes of the bilateral cerebral white matter. 4. 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 Comment: Dr. Toledo phoned with the results at 6:24 PM on 2/7/18. Electronically signed by: Mirza Teixeira Date:     02/07/18 Time:    18:25

## 2018-02-08 NOTE — ASSESSMENT & PLAN NOTE
89F with HTN, CAD, prior MI on ASA 81, SCC that presents after mechanical fall today, xrays showing L1 compression fracture, complaining of low back pain. Neuro exam intact    -No acute neurosurgical intervention  -recommend admit to medicine for pain control  -LSO brace  -repeat CT of the head shows near resolution, possible artifact on original  -c-collar for neck pain  -will follow

## 2018-02-08 NOTE — PLAN OF CARE
Problem: Patient Care Overview  Goal: Plan of Care Review  Outcome: Ongoing (interventions implemented as appropriate)  Pt is free from injury and falls during shift. Pt shows no signs of acute distress. Pt denies pain. AAO although pt has periods of forgetfulness. Vitals stable. NSR on tele. QUINTON/SCD in place- educated pt on reason for placement- pt verbalized understanding. Pt repositions frequently- skin intact/no breakdown. Generalized bruising. Hematoma to left side of forehead. Pt able to ambulate to bathroom with 2 person assist. 1 BM during shift. Brief placed for urge incontinence episodes when coughing- otherwise, pt fully continent and able to ask for assistance using bedpan or walking to bathroom. Daughter at bedside. Bed is in low position with breaks locked. Side rails are up x 2. Environment is clutter free. Call light is within reach of the patient. Will continue to monitor.

## 2018-02-08 NOTE — CONSULTS
[facility]  Neurosurgery  Consult Note    Consults  Subjective:     Chief Complaint/Reason for Admission: mechanical fall    History of Present Illness: Ms Alberts is a 89F with history of HTN, CAD, prior MI on ASA 81, SCC that presents after 2 falls at home, first 4 days ago with negative CT of the head and again today, both mechanical in nature. CT head at OSH read as R parasagittal contusion. No acute fractures on CT cervical spine. Lumbar CT showing L1 compression fracture, not seen on xrays from 3 years ago. Denies any headaches, changes in vision, nausea/vomiting. Complains of mild neck pain, complains of lumbar back pain. Denies leg radiation, denies weakness, denies bowel/bladder incontinence.     No new subjective & objective note has been filed under this hospital service since the last note was generated.    Assessment/Plan:     * Compression fracture of first lumbar vertebra    89F with HTN, CAD, prior MI on ASA 81, SCC that presents after mechanical fall today, xrays showing L1 compression fracture, complaining of low back pain. Neuro exam intact    -No acute neurosurgical intervention  -recommend admit to medicine for pain control  -LSO brace  -repeat CT of the head shows near resolution, possible artifact on original  -c-collar for neck pain  -will follow             Masood Lozano MD  Neurosurgery  [facility]

## 2018-02-08 NOTE — HOSPITAL COURSE
02/08/2018: Admitted to [facility]. Seen by Neurosurgery who recommended no intervention for ICH, but patient should wear back brace for L1 compression fracture and follow up with them in clinic in 4 weeks.  02/09/2018: Complains of dry cough, but no other subjective complaints, pain well controlled. Episode of A fib overnight on telemetry, no RVR. Hypertensive this morning- home BP meds given early. Awaiting back brace and then can work with PT/OT.  02/10/2018 : No acute events overnight. Patient slept well on scheduled ramelteon with no witnessed sundowning or delirium per daughter. States she has been voiding well on furosemide and cough has significantly improved, whether on diuretic or scheduled cough drops. UOP not recorded and patient is incontinent and soaks bed although approximately 250 ml seen in purewick container. No fevers or chills; back pain well-controlled and tolerating brace. Working with PT this morning.   02/11/2018: FREEDOM. Feels that cough has improved after furosemide. Discussed with daughter that continuing diuretic may contribute to falls risk, however wish to continue for now.   02/12/2018: FREEDOM. Medically stable for discharge, awaiting authorization for SNF to continue rehab.  02/13/2018: FREEDOM. Pain well controlled, feels like cough improving. Slept well. Stable for discharge, awaiting authorization.  02/14/2018: FREEDOM. Stable, awaiting authorization for discharge to SNF. No subjective complaints. Working with PT.

## 2018-02-08 NOTE — ED NOTES
"Patient/family member aware that they are being admitted to hospital. Awaiting bed assignment at this time. Informed patient/family that nurse will keep updating patient status. Will continue to monitor at this time.  The patient is awake, alert and cooperative with a calm affect, patient is aware of environment. Airway is open and patent, respirations are spontaneous, normal respiratory effort and rate noted, skin warm and dry, moves all extremities well, bruises noted throughout body, appearance: no apparent distress noted. Patient states the back of her head is "sore" but unable to provide #. Side rails x 2. Call bell within reach. Will continue to monitor.          "

## 2018-02-08 NOTE — ED NOTES
Report called to receiving RN at [facility].  Assured nurse would notify them of when pt actually leaves our facility.

## 2018-02-08 NOTE — MEDICAL/APP STUDENT
HISTORY & PHYSICAL  Hospital Medicine    Team: [facility] 4    PRESENTING HISTORY     Chief Complaint/Reason for Admission: fall    History of Present Illness:  Ms. Talia Alberts is a 89 y.o. female with h/o HTN, CAD, prior MI on ASA 81, and SCC that presents as a transfer from [facility] after 2 falls at home. The first fall happened sunday with negative CT of the head. Second fall happened Wednesday morning preceded by an episode of lightheadedness. She presented to [facility] where CT head was performed and revealed an acute intracranial hemorrhage. Patient transferred to [facility] for neurological consult. On arrival to [facility], CT head revealed R parasagittal contusion. No acute fractures on CT cervical spine. Lumbar CT showed L1 compression fracture. This morning, patient complains of mild headache and backache. She is unable to characterize the pain further.     Patient usually ambulates with a walker, which she was not using at the time of the fall. There was no reported LOC and no personality changes or amnesia per the daughter's, Esthela, report. There was a possible associated episode of delirium where the patient thought she was rex food. The patient's daughter reports similar episodes that started 6 months ago, with no other neurological changes. She has a history of frequent falls and multiple associated hospitalizations. Currently, the patient is able to bathe and dress herself. She no longer drives and does not cook. Her daily routine involves helping her daughter fold laundry. Her daughter is her primary care taker, whom she has been living with for the past 3 years. Occasionally the patient's son will give Esthela (the daughter) a break and the patient will stay with him.        Review of Systems   Constitutional: Negative for chills and fever.   HENT: Negative.    Eyes: Positive for discharge and redness. Negative for pain.   Respiratory: Positive for cough. Negative for sputum  production, shortness of breath and wheezing.    Cardiovascular: Negative for chest pain and palpitations.   Gastrointestinal: Negative.    Genitourinary: Negative.    Musculoskeletal: Positive for back pain.   Skin: Negative.    Neurological: Positive for dizziness and headaches. Negative for loss of consciousness and weakness.   Endo/Heme/Allergies: Bruises/bleeds easily.   Psychiatric/Behavioral: Negative.        PAST HISTORY:     Past Medical History:   Diagnosis Date    Cancer     Coronary artery disease     Hypertension     MI (myocardial infarction)     Skin cancer unsure    L and R arm, was removed by dr christianson    Squamous cell carcinoma        Past Surgical History:   Procedure Laterality Date    CARDIAC SURGERY      FRACTURE SURGERY         Family History   Problem Relation Age of Onset    Melanoma Neg Hx     Psoriasis Neg Hx     Lupus Neg Hx     Eczema Neg Hx      Social History  · Marital status:   · Number of children: unknown. 1 daughter: Esthela, 1 son, other children     Smoking status: never smoker  Smokeless tobacco: never used  Alcohol: no  Drug use: no      MEDICATIONS & ALLERGIES:     No current facility-administered medications on file prior to encounter.      Current Outpatient Prescriptions on File Prior to Encounter   Medication Sig Dispense Refill    acetaminophen (TYLENOL) 500 MG tablet Take 1,000 mg by mouth every 6 (six) hours as needed.      albuterol sulfate 2.5 mg/0.5 mL Nebu Take 2.5 mg by nebulization every 4 (four) hours as needed. Rescue 300 mg 11    alendronate (FOSAMAX) 70 MG tablet once a week.   3    artificial tears (ISOPTO TEARS) 0.5 % ophthalmic solution Place 1 drop into both eyes as needed.      aspirin 81 MG Chew Take 81 mg by mouth once daily.      benzonatate (TESSALON) 100 MG capsule Take 1 capsule (100 mg total) by mouth 3 (three) times daily as needed for Cough.      calcium-vitamin D tablet 600 mg-200 units Take 1 tablet by mouth  once daily.      escitalopram oxalate (LEXAPRO) 10 MG tablet Take 15 mg by mouth every evening. 1 1/2 tablets = 15 mg every night      ferrous sulfate 325 (65 FE) MG EC tablet Take 325 mg by mouth once daily.      folic acid (FOLVITE) 1 MG tablet Take 1 mg by mouth once daily.       gentamicin (GARAMYCIN) 0.3 % ophthalmic solution Place 2 drops into both eyes every 4 (four) hours. 5 mL 0    losartan (COZAAR) 25 MG tablet Take 50 mg by mouth 2 (two) times daily.       lubiprostone (AMITIZA) 8 MCG Cap Take 8 mcg by mouth 2 (two) times daily with meals.      metoprolol succinate (TOPROL-XL) 25 MG 24 hr tablet Take 25 mg by mouth once daily.       multivitamin (THERAGRAN) tablet Take 1 tablet by mouth once daily.      pantoprazole (PROTONIX) 40 MG tablet Take 40 mg by mouth once daily.      simvastatin (ZOCOR) 40 MG tablet Take 40 mg by mouth every evening.          Review of patient's allergies indicates:   Allergen Reactions    Morphine Swelling       OBJECTIVE:     Vital Signs:  Temp:  [97.6 °F (36.4 °C)-98.9 °F (37.2 °C)] 97.6 °F (36.4 °C)  Pulse:  [69-78] 75  Resp:  [16-25] 16  SpO2:  [92 %-100 %] 98 %  BP: (102-194)/(43-96) 168/67  Body mass index is 22.99 kg/m².       Physical Exam   Constitutional: She is oriented to person, place, and time. She appears well-developed and well-nourished. No distress.   HENT:   Right Ear: External ear normal.   Left Ear: External ear normal.   Large hematoma on left forehead from. Bruise on left side of face.   Eyes: EOM are normal. Pupils are equal, round, and reactive to light. Left eye exhibits discharge.   Left eye is erythematous with a purulent exudate.   Cardiovascular: Normal rate, regular rhythm, normal heart sounds and intact distal pulses.    Pulmonary/Chest: Effort normal and breath sounds normal.   Abdominal: Soft. Bowel sounds are normal. She exhibits no distension. There is no tenderness.   Musculoskeletal: She exhibits no edema or deformity.    Neurological: She is alert and oriented to person, place, and time. She displays normal reflexes. No cranial nerve deficit or sensory deficit. She exhibits normal muscle tone. Coordination normal.   Skin: Skin is warm and dry.   Psychiatric: She has a normal mood and affect.     Laboratory  Lab Results   Component Value Date    WBC 10.01 02/08/2018    HGB 11.7 (L) 02/08/2018    HCT 34.4 (L) 02/08/2018    MCV 88 02/08/2018     02/08/2018       Recent Labs  Lab 02/08/18  0131   GLU 85      K 3.7      CO2 23   BUN 13   CREATININE 0.7   CALCIUM 8.1*     Lab Results   Component Value Date    INR 1.0 02/07/2018    INR 1.0 02/04/2018    INR 1.0 09/16/2017     No results found for: HGBA1C  No results for input(s): POCTGLUCOSE in the last 72 hours.    Diagnostic Results:  CT Head without Contrast  · Trace parenchymal hyperdensity within paramedian right frontal lobe, slightly less conspicuous compared to prior CT examination. No evidence of new intracranial hemorrhage or other detrimental interval change from prior study  · Generalized cerebral volume loss chronic microvascular ischemic change. Remote lacunar type infarcts within right basal ganglial, left thalamus, and bilateral cerebellar hemisphere  · Prominent soft tissue hematoma overlying the left frontal calvarium. Calvarium seems intact  · Age appropriate generalized cerebral atrophy with compensatory ventricular enlargement and sulcal widening    CT Cervical Spine without contrast  · Postoperative changes related to prior anterior cervical fusion at the level of C5-C6 and C6-C7.   · No definite evidence of acute fracture  · Cervical vertebral body heights appear well maintained  · Prominent atherosclerotic calcification of thoracic aorta    CT Lumbar Spine Without Contrast  · Moderate L1 compression deformity, new compared to prior lumbar spine radiograph 10/2015.   · Multilevel lumbar spondylosis most prominent at L4-5 resulting in moderate  spinal canal stenosis  · Chronic appearing left sacral alar fracture  · Trace right pleural effusion with mild associated compressive atelectasis    ASSESSMENT & PLAN:     Fall  -fall preceded by episode of dizziness workup for possible syncopal etiology   -EKG - normal rules out arrthymia   -Orthostatics no indicated due to lumbar compression fracture   -Posterior stroke - unlikely due to no visual changes   -Infection - afebrile, no leukocytosis   -electrolyte abnormality - CMP shows no abnormalities  -mechanical fall - PT/OT consult    Compression fracture of 1st lumbar vertebra  Moderate L1 compression deformity, new compared to prior lumbar spine radiograph 10/2015.   -back brace   -f/u neurosurgery outpatient    -consulting PT/OT    Intracranial hemorrhage  Trace parenchymal hyperdensity within paramedian right frontal lobe, slightly less conspicuous compared to prior CT examination. No evidence of new intracranial hemorrhage or other detrimental interval change from prior study   -continue to monitor   -hold ASA 81   -keep SBP <140    HTN  -continue losartan and metoprolol    HLD  -continue simvastatin    Disposition: consider placement snf        aTnia Corrales

## 2018-02-08 NOTE — H&P
"[facility] Medicine  History & Physical    Patient Name: Talia Alberts  MRN: [id number]  Admission Date: 2/8/2018   Attending Physician: Gerald Tyler MD   Primary Care Provider: Michael Martin MD    [facility] Medicine Team: [facility]OSP MED 4 Bety Giraldo MD     Patient information was obtained from patient, relative(s), past medical records and ER records.     Subjective:     Principal Problem:Compression fracture of first lumbar vertebra    Chief Complaint:   Chief Complaint   Patient presents with    ICH from [facility]     Pt fell yesterday morning at 0530 unwitness, wenrt to [facility] diagnosed with ICH, transfer for neuro consult.         HPI: 89yo F with CAD s/p CABG and SCC of skin, transferred from [facility] with ICH. Patient reports that she woke up around 5am yesterday morning and need to go to bathroom, states she stood up after finishing voiding and felt dizzy then tried to sit down but fell instead. Per daughter at bedside, she was awoken by a loud noise and found her mother on the floor confused- daughter says patient thought she was at the kitchen sink "rex". Daughter states patient has had worsening confusion and sundowning over the last few months and has had several falls before. Last big fall was Sunday. Daughter says patient usually ambulates with a walker, but was not using at time of fall. Patient is independent with bathing and dressing but does not drive or cook, lives with her daughter and has been refusing [facility] care. Patient takes ASA 81 for her CAD as well as Toprol and losartan.    In ED at [facility] she was found to have new small left parasagittal hemorrhage without evidence of mass effect, transferred to [facility] yesterday evening for Neurosurgery evaluation. In ED here CT spine also found compression fracture of L1.     Patient denies chest pain, palpitations and SOB. Reports chronic dry cough for which she has been taking pearls. No " fever/chills. No abdo pain/ nausea/ vomiting. No dysuria/ urgency.    Past Medical History:   Diagnosis Date    Cancer     Coronary artery disease     Hypertension     MI (myocardial infarction)     Skin cancer unsure    L and R arm, was removed by dr christianson    Squamous cell carcinoma        Past Surgical History:   Procedure Laterality Date    CARDIAC SURGERY      FRACTURE SURGERY         Review of patient's allergies indicates:   Allergen Reactions    Morphine Swelling       No current facility-administered medications on file prior to encounter.      Current Outpatient Prescriptions on File Prior to Encounter   Medication Sig    acetaminophen (TYLENOL) 500 MG tablet Take 1,000 mg by mouth every 6 (six) hours as needed.    albuterol sulfate 2.5 mg/0.5 mL Nebu Take 2.5 mg by nebulization every 4 (four) hours as needed. Rescue    alendronate (FOSAMAX) 70 MG tablet once a week.     artificial tears (ISOPTO TEARS) 0.5 % ophthalmic solution Place 1 drop into both eyes as needed.    aspirin 81 MG Chew Take 81 mg by mouth once daily.    benzonatate (TESSALON) 100 MG capsule Take 1 capsule (100 mg total) by mouth 3 (three) times daily as needed for Cough.    calcium-vitamin D tablet 600 mg-200 units Take 1 tablet by mouth once daily.    escitalopram oxalate (LEXAPRO) 10 MG tablet Take 15 mg by mouth every evening. 1 1/2 tablets = 15 mg every night    ferrous sulfate 325 (65 FE) MG EC tablet Take 325 mg by mouth once daily.    folic acid (FOLVITE) 1 MG tablet Take 1 mg by mouth once daily.     gentamicin (GARAMYCIN) 0.3 % ophthalmic solution Place 2 drops into both eyes every 4 (four) hours.    losartan (COZAAR) 25 MG tablet Take 50 mg by mouth 2 (two) times daily.     lubiprostone (AMITIZA) 8 MCG Cap Take 8 mcg by mouth 2 (two) times daily with meals.    metoprolol succinate (TOPROL-XL) 25 MG 24 hr tablet Take 25 mg by mouth once daily.     multivitamin (THERAGRAN) tablet Take 1 tablet by mouth  once daily.    pantoprazole (PROTONIX) 40 MG tablet Take 40 mg by mouth once daily.    simvastatin (ZOCOR) 40 MG tablet Take 40 mg by mouth every evening.     Family History     None        Social History Main Topics    Smoking status: Never Smoker    Smokeless tobacco: Never Used    Alcohol use No    Drug use: No    Sexual activity: No     Review of Systems   Constitutional: Negative for chills and fever.   Eyes: Negative for photophobia and visual disturbance.   Respiratory: Positive for cough. Negative for shortness of breath. Choking: chronic.    Cardiovascular: Negative for chest pain and leg swelling.   Gastrointestinal: Negative for abdominal pain, nausea and vomiting.   Genitourinary: Negative for dysuria.   Musculoskeletal: Positive for back pain.   Skin: Positive for wound.   Neurological: Positive for dizziness. Negative for seizures and headaches.     Objective:     Vital Signs (Most Recent):  Temp: 97.6 °F (36.4 °C) (02/08/18 0737)  Pulse: 67 (02/08/18 0801)  Resp: 16 (02/08/18 0737)  BP: 134/64 (02/08/18 0801)  SpO2: 96 % (02/08/18 0801) Vital Signs (24h Range):  Temp:  [97.6 °F (36.4 °C)-98.9 °F (37.2 °C)] 97.6 °F (36.4 °C)  Pulse:  [67-78] 67  Resp:  [16-25] 16  SpO2:  [92 %-100 %] 96 %  BP: (102-194)/(43-96) 134/64     Weight: 49.9 kg (110 lb)  Body mass index is 22.99 kg/m².    Physical Exam   Constitutional: She is oriented to person, place, and time. No distress.   Thin, frail   HENT:   Large hematoma over left forehead  Healing ecchymoses over face and arms    Eyes: EOM are normal. Pupils are equal, round, and reactive to light. Right conjunctiva is injected. Left conjunctiva is injected.   Mild ectropion of both eyes   Neck: Normal range of motion. Neck supple.   Cardiovascular: Normal rate, regular rhythm and intact distal pulses.    Murmur heard.   Systolic murmur is present with a grade of 2/6   Pulmonary/Chest: Effort normal and breath sounds normal. No respiratory distress. She has  no wheezes. She has no rales.   Abdominal: Soft. Bowel sounds are normal. She exhibits no distension.   Musculoskeletal: She exhibits no edema.   Neurological: She is alert and oriented to person, place, and time. She has normal strength. She displays normal reflexes. No sensory deficit.   Skin: She is not diaphoretic.         CRANIAL NERVES     CN II   Visual fields full to confrontation.     CN III, IV, VI   Pupils are equal, round, and reactive to light.  Extraocular motions are normal.   Right pupil: Size: 3 mm. Shape: regular. Reactivity: brisk.   Left pupil: Size: 3 mm. Shape: regular. Reactivity: brisk.   Nystagmus: none     CN V   Facial sensation intact.     CN VII   Facial expression full, symmetric.     CN VIII   Hearing: intact    CN IX, X   CN IX normal.   CN X normal.     CN XI   CN XI normal.     CN XII   CN XII normal.        Significant Labs:   Bilirubin:   Recent Labs  Lab 02/04/18 1905   BILITOT 0.6     CBC:   Recent Labs  Lab 02/07/18 1916 02/08/18  0131   WBC 10.70 10.01   HGB 12.3 11.7*   HCT 37.3 34.4*    177     CMP:   Recent Labs  Lab 02/07/18 1916 02/08/18  0131    139   K 4.3 3.7    106   CO2 21* 23   GLU 83 85   BUN 15 13   CREATININE 0.8 0.7   CALCIUM 8.4* 8.1*   ANIONGAP 12 10   EGFRNONAA >60 >60.0     Coagulation:   Recent Labs  Lab 02/07/18  1936   INR 1.0   APTT 21.0     TSH:   Recent Labs  Lab 02/04/18 1905   TSH 1.200     Urine Studies:   Recent Labs  Lab 02/07/18 2123   COLORU Yellow   APPEARANCEUA Clear   PHUR 6.0   SPECGRAV <=1.005*   PROTEINUA Negative   GLUCUA Negative   KETONESU Negative   BILIRUBINUA Negative   OCCULTUA Negative   NITRITE Negative   UROBILINOGEN 1.0   LEUKOCYTESUR Negative     All pertinent labs within the past 24 hours have been reviewed.    Significant Imaging:   Imaging Results          CT Head Without Contrast (Final result)  Result time 02/08/18 05:12:40    Final result by Grady Wilson MD (02/08/18 05:12:40)                  Impression:        1. Trace parenchymal hyperdensity within the paramedian right frontal lobe, slightly less conspicuous compared to prior CT examination, which could represent trace component of parenchymal hemorrhage/contusion. No evidence of new intracranial hemorrhage or other detrimental interval change from prior study.    2. Generalized cerebral volume loss chronic microvascular ischemic change. Remote lacunar type infarcts as above.    3. Prominent soft tissue hematoma overlying the left frontal calvarium.      Electronically signed by: ALEX GUTIERRES  Date:     02/08/18  Time:    05:12              Narrative:    Procedure comments: CT examination of the head was performed from the skull base through the vertex without the use of intravenous contrast using 5-mm axial images.    Comparison: CT head 2/7/2018 1802 hrs.     Findings:    There is trace parenchymal hyperdensity noted within the paramedian aspect of the right frontal lobe, somewhat less conspicuous compared to prior outside CT examination which could represent trace component of parenchymal hemorrhage. No definite additional new intracranial hemorrhage is identified. There is no midline shift.    There is age appropriate generalized cerebral atrophy with compensatory ventricular enlargement and sulcal widening. There are patchy regions of hypoattenuation in the supratentorial white matter likely consistent with chronic microvascular ischemic changes.Remote lacunar type infarcts are identified within the right basal ganglia left thalamus, and bilateral cerebellar hemispheres. The ventricular system is stable in size and configuration. No extra-axial collections are appreciated.The visualized paranasal sinuses and mastoid air cells are clear.There is a prominent soft tissue hematoma overlying the left frontal calvarium. The calvarium appears intact.                             CT Cervical Spine Without Contrast (Final result)  Result time 02/08/18  05:22:35    Final result by Alex Wilson MD (02/08/18 05:22:35)                 Impression:         1. No evidence of acute fracture or traumatic subluxation of the cervical spine. Stable postsurgical change and degenerative arthropathy of the cervical spine as above.    2. Partially visualized mildly enlarged precarinal lymph node measuring 1.4 cm. Correlation with patient history is advised. Further evaluation with dedicated thoracic imaging as clinically warranted.      Electronically signed by: ALEX WILSON  Date:     02/08/18  Time:    05:22              Narrative:    Procedure comment: 2-mm axial images through the cervical spine were obtained without the use of IV contrast.  Sagittal, coronal, and axial reformatted images were reviewed.    Comparison: CT cervical spine 2/4/2018    Findings:    There are postoperative changes related to prior anterior cervical fusion at the level of C5-C6 and C6-C7. There is mild anterolisthesis of C3 on C4 and C4 on C5, similar to prior examination. No definite evidence of acute fracture. Cervical vertebral body heights appear well-maintained.    There is prominent degenerative arthrosis about the dens with associated prominent panus formation which narrows the craniocervical junction and likely effaces the anterior aspect of the thecal sac. There is prominent uncovertebral spurring and facet arthropathy throughout the lower cervical spine resulting in multilevel neural foraminal narrowing, most pronounced at C5-C6 and C6-C7. Findings remain similar to prior CT examination.    There is prominent atherosclerotic calcification of the thoracic aorta. There is an apparent partially visualized mildly enlarged 1.4 cm precarinal lymph node.  There is partially visualized right-sided pleural fluid. The visualized lung apices are otherwise unremarkable.  Please refer to separate dictated CT head report for intracranial findings.                             CT Lumbar Spine  Without Contrast (Final result)  Result time 02/08/18 06:20:52    Final result by Alex Wilson MD (02/08/18 06:20:52)                 Impression:        1. Moderate L1 compression deformity, new compared to prior lumbar spine radiograph of 10/15/2015. Additional remote L2 compression deformity status post prior vertebroplasty. There is associated retropulsion of the L1 and L2 vertebral bodies resulting in moderate spinal canal stenosis and severe bilateral neuroforaminal narrowing as discussed above.    2.  Additional multilevel lumbar spondylosis most prominent at L4-5 resulting in moderate spinal canal stenosis. Please see above for level by level details.    3. Chronic appearing left sacral alar fracture.    4.  Trace right pleural effusion with mild associated compressive atelectasis.    5.  Additional findings as above.  ______________________________________     Electronically signed by resident: LOIS ZUÑIGA MD  Date:     02/08/18  Time:    05:47            As the supervising and teaching physician, I personally reviewed the images and resident's interpretation and I agree with the findings.          Electronically signed by: ALEX WILSON  Date:     02/08/18  Time:    06:20              Narrative:    CT lumbar spine    02/08/18 04:40:00    Accession# [id number]    CLINICAL INDICATION: 89 year old F with Spine fracture, traumatic, lumbar      TECHNIQUE:   Axial CT images obtained throughout the region of the lumbar spine without intravenous contrast. Axial, sagittal, and coronal reconstructions were performed.    COMPARISON: Lumbar spine radiograph AP/lateral 02/07/2018    FINDINGS:     Vertebral bodies: There is a moderate compression deformity of the L1 vertebral body with approximately 40% height loss. This is new compared to prior lumbar spine radiograph of 10/15/2015. There is slight retropulsion of the inferior posterior cortex of L1 with resulting mild to moderate spinal canal stenosis. There is  a remote compression deformity of the L2 vertebral body status post prior vertebroplasty. There is continued retropulsion of the posterior cortex of the L2 vertebral body with moderate spinal canal stenosis. The remaining lumbar vertebral body heights appear well-maintained    Sagittal alignment: There is mild retrolisthesis of L2 on L3.    Spondylosis: Disc space narrowing and vacuum disc deformity at L1-L2.    T12-L1: Minimal diffuse posterior disc bulge without significant spinal canal stenosis or neural foraminal narrowing.    L1-L2: Retropulsion of L1/L2, as described above, resulting in moderate spinal canal stenosis and severe bilateral neuroforaminal narrowing.    L2-L3: Mild diffuse posterior disc bulge and small amount of retrolisthesis results in mild spinal canal stenosis and severe bilateral neural foraminal narrowing.    L3-L4: Diffuse posterior disc bulge and bilateral facet arthropathy resulting in mild spinal canal stenosis and neural foraminal narrowing.    L4-L5: Diffuse posterior disc bulge, bilateral facet arthropathy, and ligamentum flavum hypertrophy resulting in moderate spinal canal stenosis and mild bilateral neural foraminal narrowing.    L5-S1: Diffuse posterior disc bulge and bilateral facet arthropathy resulting in mild spinal canal stenosis and moderate right neural foraminal narrowing.    There is a chronic appearing fracture of the left sacral ala.    Paraspinal soft tissues: There is a moderate-sized hiatal hernia. Trace right pleural effusion with mild associated compressive atelectasis.  Minimal left basilar atelectasis.  Extensive calcification of the visualized arterial system.                             I have reviewed and interpreted all pertinent imaging results/findings within the past 24 hours.    Assessment/Plan:     * Compression fracture of first lumbar vertebra    - Evaluated by Neurosurgery in ED, appreciate recs  - Patient needs to wear back brace and follow up with  Neurosurgery in clinic in 4 weeks (apt made)  - Tylenol 650mg q8 for analgesia  - Continue home calcium/vitamin D  - Continue home alendronate        Intracranial hemorrhage    2/2 fall (most likely sustained from fall on Sunday)  - CTH at [facility]:   New small left parasagittal frontal lobe cortical hemorrhagic parenchymal contusion without evidence of mass effect or caroline hematoma.  - CTH at [facility] [place]:   Trace parenchymal hyperdensity noted within the paramedian aspect of the right frontal lobe, somewhat less conspicuous compared to prior outside CT examination which could represent trace component of parenchymal hemorrhage. No definite additional new intracranial hemorrhage is identified. There is no midline shift.  - Seen by Neurosurgery in ED, not for any surgical interventions  - Fall and delirium precautions  - Continue BP meds, goal SBP <140  - Holding ASA 81 and avoiding NSAIDs        Fall    - Fall at home, probably orthostatic hypotension, reports feeling dizzy immediately after standing, then tried to sit down but fell, denies chest pain or palpitations, no seizure-like activity  - Per daughter, patient has had worsening sun downing and increasing falls at home over last few months, patient lives with daughter as she requires 24/7 care and has been refusing skilled nursing care  - EKG with NSR  - Fall precautions  - Needs to wear back brace for next 4 weeks for L1 compression fracture  - PT/OT         Mild cognitive impairment    Per daughter patient has had cognitive decline with sundowning over last 6 months  - TSH wnl, on folic acid and theragrain  - Family would like skilled nursing placement but patient has been refusing  - Delirium precautions  - Continue home escitalopram 15mg qhs  - Will refer for neurocognitive testing and consideration of ?donepezil/ memantine on discharge        Coronary artery disease involving native coronary artery of native heart without angina pectoris    - s/p CABG plus ?valve  replacement ~5 years ago  - Holding ASA 81 in the setting of ICH  - Continue statin, BB and ARB        Essential hypertension    - Continue losartan, goal SBP <140 given ICH        GERD (gastroesophageal reflux disease)    - Continue home PPI        Acute bacterial conjunctivitis of both eyes    - Continue gentamicin gtt + artifical tears PRN  - Follow up with ophtho on discharge        Nail disorder    Thickened dystrophic toenails, patient and daughter unable to cut them at home but does not meet criteria for inpatient Podiatry consult, requested referral to Podiatry in [place] on discharge          VTE Risk Mitigation         Ordered     Medium Risk of VTE  Once      02/08/18 1154     Reason for No Pharmacological VTE Prophylaxis  Once      02/08/18 1154     Place sequential compression device  Until discontinued      02/08/18 1154     Place QUINTON hose  Until discontinued      02/08/18 1154             Bety Giraldo MD  Department of Hospital Medicine   [facility]

## 2018-02-08 NOTE — PT/OT/SLP EVAL
"Speech Language Pathology Evaluation  Bedside Swallow  Discharge    Patient Name:  Talia Alberts   MRN:  [id number]   [id number]/[id number] B    Admitting Diagnosis: Compression fracture of first lumbar vertebra    Recommendations:                 General Recommendations:  Follow-up not indicated  Diet recommendations:  Chopped meat, Regular, Thin   Aspiration Precautions:   · 1 bite/sip at a time,   · Alternating bites/sips,   · Check for pocketing/oral residue,   · Frequent oral care,   · HOB to 90 degrees,   · Meds whole 1 at a time,   · Remain upright 30 minutes post meal,   · Small bites/sips and   · Strict aspiration precautions   General Precautions: Standard, aspiration, fall  Communication strategies:  none    History:     Past Medical History:   Diagnosis Date    Cancer     Coronary artery disease     Hypertension     MI (myocardial infarction)     Skin cancer unsure    L and R arm, was removed by dr christianson    Squamous cell carcinoma        Past Surgical History:   Procedure Laterality Date    CARDIAC SURGERY      FRACTURE SURGERY         HPI: 89yo F with CAD s/p CABG and SCC of skin, transferred from [facility] with [facility]. Patient reports that she woke up around 5am yesterday morning and need to go to bathroom, states she stood up after finishing voiding and felt dizzy then tried to sit down but fell instead. Per daughter at bedside, she was awoken by a loud noise and found her mother on the floor confused- daughter says patient thought she was at the kitchen sink "rex". Daughter states patient has had worsening confusion and sundowning over the last few months and has had several falls before. Last big fall was Sunday. Daughter says patient usually ambulates with a walker, but was not using at time of fall. Patient is independent with bathing and dressing but does not drive or cook, lives with her daughter and has been refusing penitentiary care. Patient takes ASA 81 for her CAD as well as Toprol " "and losartan.  In ED at [facility] she was found to have new small left parasagittal hemorrhage without evidence of mass effect, transferred to [facility] yesterday evening for Neurosurgery evaluation. In ED here CT spine also found compression fracture of L1.   Patient denies chest pain, palpitations and SOB. Reports chronic dry cough for which she has been taking pearls. No fever/chills. No abdo pain/ nausea/ vomiting. No dysuria/ urgency.    Chest X-Rays: 2/4/18: Features of mild congestive failure.    Prior diet: reg/thin per patient and daughter    Occupation/hobbies/homemaking: reading    Subjective     Pt awake, pleasant, and cooperative. Pt's daughter present in room.       Objective:     Oral Musculature Evaluation  · Oral Musculature: WFL, general weakness (weakness c/w age)  · Dentition: upper and lower dentures  · Secretion Management:  (throat clearing and coughing on secretions)  · Mucosal Quality: good  · Oral Labial Strength and Mobility: WFL  · Lingual Strength and Mobility: WFL  · Velar Elevation: WFL  · Buccal Strength and Mobility: decreased tone (c/w age)  · Volitional Cough: elicited  · Volitional Swallow: timely  · Voice Prior to PO Intake: clear    Bedside Swallow Eval:    Consistencies Assessed:  · Thin liquids sips of water via cup and straw x 6 ounces  · Puree tsp bite of pudding x2  · Solids bites of dariela cracer x3     Oral Phase:   · WFL    Pharyngeal Phase:   · no overt clinical signs/symptoms of aspiration  · no overt clinical signs/symptoms of pharyngeal dysphagia       Compensatory Strategies  · None    Treatment: Pt noted to be throat clearing/coughing on secretions in conversation in the absence of po trials. Per daughter, pt with a h/o pneumonia within past few years. Pt's daughter reports pt has been frequently coughing during meals for "a while now." Pt's daughter also reports pt "eats like a chipmunk" and takes multiple large bites and sips at a time. SLP provided extensive patient " and family education on SLP role, s/s and risks of aspiraiton, safe swallow precautions importance of changing eating habits, importance of excellent and frequent oral care, and POC. Pt and daughter verbalized understanding of all discussed.    Assessment:     Talia Alberts is a 89 y.o. female with no overt s/s of aspiration or difficulty swallowing regular solids and thin liquids. Pt likely with chronic age-related swallowing impairments (presbyphagia) resulting in difficulty managing secretions. Strategies and precautions provided. No further skilled acute ST services warranted at this time. Please re-consult as needed.     Goals:    SLP Goals     Not on file          Multidisciplinary Problems (Resolved)        Problem: SLP Goal    Goal Priority Disciplines Outcome   SLP Goal   (Resolved)     SLP Outcome(s) achieved   Description:  Speech Therapy Short Term Goals  Goal expected to be met by 2/15  1. Pt will participate in a bedside swallow study to determine the safest and least restrictive possible po diet with possible updated goals to follow pending results. -MET                        Plan:     · Patient to be seen:      · Plan of Care expires:     · Plan of Care reviewed with:  patient, daughter   · SLP Follow-Up:  No       Discharge recommendations:   (no ST needs, see PT/OT recs)       Time Tracking:     SLP Treatment Date:   02/08/18  Speech Start Time:  1500  Speech Stop Time:  1520     Speech Total Time (min):  20 min    Billable Minutes: Eval Swallow and Oral Function 20    HERBERT Merino, CCC-SLP   Pager: [phone]  02/08/2018

## 2018-02-08 NOTE — ASSESSMENT & PLAN NOTE
Thickened dystrophic toenails, patient and daughter unable to cut them at home but does not meet criteria for inpatient Podiatry consult, requested referral to Podiatry in [place] on discharge

## 2018-02-08 NOTE — ED NOTES
ED to ED [facility]   Call report to [phone]  Dr. Ballard   Ambulance transfer set up by transfer center

## 2018-02-08 NOTE — HPI
Ms Alberts is a 89F with history of HTN, CAD, prior MI on ASA 81, SCC that presents after 2 falls at home, first 4 days ago with negative CT of the head and again today, both mechanical in nature. CT head at OSH read as R parasagittal contusion. No acute fractures on CT cervical spine. Lumbar CT showing L1 compression fracture, not seen on xrays from 3 years ago. Denies any headaches, changes in vision, nausea/vomiting. Complains of mild neck pain, complains of lumbar back pain. Denies leg radiation, denies weakness, denies bowel/bladder incontinence.

## 2018-02-08 NOTE — SUBJECTIVE & OBJECTIVE
Past Medical History:   Diagnosis Date    Cancer     Coronary artery disease     Hypertension     MI (myocardial infarction)     Skin cancer unsure    L and R arm, was removed by dr christianson    Squamous cell carcinoma        Past Surgical History:   Procedure Laterality Date    CARDIAC SURGERY      FRACTURE SURGERY         Review of patient's allergies indicates:   Allergen Reactions    Morphine Swelling       No current facility-administered medications on file prior to encounter.      Current Outpatient Prescriptions on File Prior to Encounter   Medication Sig    acetaminophen (TYLENOL) 500 MG tablet Take 1,000 mg by mouth every 6 (six) hours as needed.    albuterol sulfate 2.5 mg/0.5 mL Nebu Take 2.5 mg by nebulization every 4 (four) hours as needed. Rescue    alendronate (FOSAMAX) 70 MG tablet once a week.     artificial tears (ISOPTO TEARS) 0.5 % ophthalmic solution Place 1 drop into both eyes as needed.    aspirin 81 MG Chew Take 81 mg by mouth once daily.    benzonatate (TESSALON) 100 MG capsule Take 1 capsule (100 mg total) by mouth 3 (three) times daily as needed for Cough.    calcium-vitamin D tablet 600 mg-200 units Take 1 tablet by mouth once daily.    escitalopram oxalate (LEXAPRO) 10 MG tablet Take 15 mg by mouth every evening. 1 1/2 tablets = 15 mg every night    ferrous sulfate 325 (65 FE) MG EC tablet Take 325 mg by mouth once daily.    folic acid (FOLVITE) 1 MG tablet Take 1 mg by mouth once daily.     gentamicin (GARAMYCIN) 0.3 % ophthalmic solution Place 2 drops into both eyes every 4 (four) hours.    losartan (COZAAR) 25 MG tablet Take 50 mg by mouth 2 (two) times daily.     lubiprostone (AMITIZA) 8 MCG Cap Take 8 mcg by mouth 2 (two) times daily with meals.    metoprolol succinate (TOPROL-XL) 25 MG 24 hr tablet Take 25 mg by mouth once daily.     multivitamin (THERAGRAN) tablet Take 1 tablet by mouth once daily.    pantoprazole (PROTONIX) 40 MG tablet Take 40 mg by  mouth once daily.    simvastatin (ZOCOR) 40 MG tablet Take 40 mg by mouth every evening.     Family History     None        Social History Main Topics    Smoking status: Never Smoker    Smokeless tobacco: Never Used    Alcohol use No    Drug use: No    Sexual activity: No     Review of Systems   Constitutional: Negative for chills and fever.   Eyes: Negative for photophobia and visual disturbance.   Respiratory: Positive for cough. Negative for shortness of breath. Choking: chronic.    Cardiovascular: Negative for chest pain and leg swelling.   Gastrointestinal: Negative for abdominal pain, nausea and vomiting.   Genitourinary: Negative for dysuria.   Musculoskeletal: Positive for back pain.   Skin: Positive for wound.   Neurological: Positive for dizziness. Negative for seizures and headaches.     Objective:     Vital Signs (Most Recent):  Temp: 97.6 °F (36.4 °C) (02/08/18 0737)  Pulse: 67 (02/08/18 0801)  Resp: 16 (02/08/18 0737)  BP: 134/64 (02/08/18 0801)  SpO2: 96 % (02/08/18 0801) Vital Signs (24h Range):  Temp:  [97.6 °F (36.4 °C)-98.9 °F (37.2 °C)] 97.6 °F (36.4 °C)  Pulse:  [67-78] 67  Resp:  [16-25] 16  SpO2:  [92 %-100 %] 96 %  BP: (102-194)/(43-96) 134/64     Weight: 49.9 kg (110 lb)  Body mass index is 22.99 kg/m².    Physical Exam   Constitutional: She is oriented to person, place, and time. No distress.   Thin, frail   HENT:   Large hematoma over left forehead  Healing ecchymoses over face and arms    Eyes: EOM are normal. Pupils are equal, round, and reactive to light. Right conjunctiva is injected. Left conjunctiva is injected.   Mild ectropion of both eyes   Neck: Normal range of motion. Neck supple.   Cardiovascular: Normal rate, regular rhythm and intact distal pulses.    Murmur heard.   Systolic murmur is present with a grade of 2/6   Pulmonary/Chest: Effort normal and breath sounds normal. No respiratory distress. She has no wheezes. She has no rales.   Abdominal: Soft. Bowel sounds are  normal. She exhibits no distension.   Musculoskeletal: She exhibits no edema.   Neurological: She is alert and oriented to person, place, and time. She has normal strength. She displays normal reflexes. No sensory deficit.   Skin: She is not diaphoretic.         CRANIAL NERVES     CN II   Visual fields full to confrontation.     CN III, IV, VI   Pupils are equal, round, and reactive to light.  Extraocular motions are normal.   Right pupil: Size: 3 mm. Shape: regular. Reactivity: brisk.   Left pupil: Size: 3 mm. Shape: regular. Reactivity: brisk.   Nystagmus: none     CN V   Facial sensation intact.     CN VII   Facial expression full, symmetric.     CN VIII   Hearing: intact    CN IX, X   CN IX normal.   CN X normal.     CN XI   CN XI normal.     CN XII   CN XII normal.        Significant Labs:   Bilirubin:   Recent Labs  Lab 02/04/18 1905   BILITOT 0.6     CBC:   Recent Labs  Lab 02/07/18 1916 02/08/18  0131   WBC 10.70 10.01   HGB 12.3 11.7*   HCT 37.3 34.4*    177     CMP:   Recent Labs  Lab 02/07/18 1916 02/08/18  0131    139   K 4.3 3.7    106   CO2 21* 23   GLU 83 85   BUN 15 13   CREATININE 0.8 0.7   CALCIUM 8.4* 8.1*   ANIONGAP 12 10   EGFRNONAA >60 >60.0     Coagulation:   Recent Labs  Lab 02/07/18 1936   INR 1.0   APTT 21.0     TSH:   Recent Labs  Lab 02/04/18 1905   TSH 1.200     Urine Studies:   Recent Labs  Lab 02/07/18 2123   COLORU Yellow   APPEARANCEUA Clear   PHUR 6.0   SPECGRAV <=1.005*   PROTEINUA Negative   GLUCUA Negative   KETONESU Negative   BILIRUBINUA Negative   OCCULTUA Negative   NITRITE Negative   UROBILINOGEN 1.0   LEUKOCYTESUR Negative     All pertinent labs within the past 24 hours have been reviewed.    Significant Imaging:   Imaging Results          CT Head Without Contrast (Final result)  Result time 02/08/18 05:12:40    Final result by Grady Wilson MD (02/08/18 05:12:40)                 Impression:        1. Trace parenchymal hyperdensity within the  paramedian right frontal lobe, slightly less conspicuous compared to prior CT examination, which could represent trace component of parenchymal hemorrhage/contusion. No evidence of new intracranial hemorrhage or other detrimental interval change from prior study.    2. Generalized cerebral volume loss chronic microvascular ischemic change. Remote lacunar type infarcts as above.    3. Prominent soft tissue hematoma overlying the left frontal calvarium.      Electronically signed by: ALEX WILSON  Date:     02/08/18  Time:    05:12              Narrative:    Procedure comments: CT examination of the head was performed from the skull base through the vertex without the use of intravenous contrast using 5-mm axial images.    Comparison: CT head 2/7/2018 1802 hrs.     Findings:    There is trace parenchymal hyperdensity noted within the paramedian aspect of the right frontal lobe, somewhat less conspicuous compared to prior outside CT examination which could represent trace component of parenchymal hemorrhage. No definite additional new intracranial hemorrhage is identified. There is no midline shift.    There is age appropriate generalized cerebral atrophy with compensatory ventricular enlargement and sulcal widening. There are patchy regions of hypoattenuation in the supratentorial white matter likely consistent with chronic microvascular ischemic changes.Remote lacunar type infarcts are identified within the right basal ganglia left thalamus, and bilateral cerebellar hemispheres. The ventricular system is stable in size and configuration. No extra-axial collections are appreciated.The visualized paranasal sinuses and mastoid air cells are clear.There is a prominent soft tissue hematoma overlying the left frontal calvarium. The calvarium appears intact.                             CT Cervical Spine Without Contrast (Final result)  Result time 02/08/18 05:22:35    Final result by Alex Wilson MD (02/08/18  05:22:35)                 Impression:         1. No evidence of acute fracture or traumatic subluxation of the cervical spine. Stable postsurgical change and degenerative arthropathy of the cervical spine as above.    2. Partially visualized mildly enlarged precarinal lymph node measuring 1.4 cm. Correlation with patient history is advised. Further evaluation with dedicated thoracic imaging as clinically warranted.      Electronically signed by: ALEX GUTIERRES  Date:     02/08/18  Time:    05:22              Narrative:    Procedure comment: 2-mm axial images through the cervical spine were obtained without the use of IV contrast.  Sagittal, coronal, and axial reformatted images were reviewed.    Comparison: CT cervical spine 2/4/2018    Findings:    There are postoperative changes related to prior anterior cervical fusion at the level of C5-C6 and C6-C7. There is mild anterolisthesis of C3 on C4 and C4 on C5, similar to prior examination. No definite evidence of acute fracture. Cervical vertebral body heights appear well-maintained.    There is prominent degenerative arthrosis about the dens with associated prominent panus formation which narrows the craniocervical junction and likely effaces the anterior aspect of the thecal sac. There is prominent uncovertebral spurring and facet arthropathy throughout the lower cervical spine resulting in multilevel neural foraminal narrowing, most pronounced at C5-C6 and C6-C7. Findings remain similar to prior CT examination.    There is prominent atherosclerotic calcification of the thoracic aorta. There is an apparent partially visualized mildly enlarged 1.4 cm precarinal lymph node.  There is partially visualized right-sided pleural fluid. The visualized lung apices are otherwise unremarkable.  Please refer to separate dictated CT head report for intracranial findings.                             CT Lumbar Spine Without Contrast (Final result)  Result time 02/08/18 06:20:52     Final result by Alex Wilson MD (02/08/18 06:20:52)                 Impression:        1. Moderate L1 compression deformity, new compared to prior lumbar spine radiograph of 10/15/2015. Additional remote L2 compression deformity status post prior vertebroplasty. There is associated retropulsion of the L1 and L2 vertebral bodies resulting in moderate spinal canal stenosis and severe bilateral neuroforaminal narrowing as discussed above.    2.  Additional multilevel lumbar spondylosis most prominent at L4-5 resulting in moderate spinal canal stenosis. Please see above for level by level details.    3. Chronic appearing left sacral alar fracture.    4.  Trace right pleural effusion with mild associated compressive atelectasis.    5.  Additional findings as above.  ______________________________________     Electronically signed by resident: LOIS ZUÑIGA MD  Date:     02/08/18  Time:    05:47            As the supervising and teaching physician, I personally reviewed the images and resident's interpretation and I agree with the findings.          Electronically signed by: ALEX WILSON  Date:     02/08/18  Time:    06:20              Narrative:    CT lumbar spine    02/08/18 04:40:00    Accession# [id number]    CLINICAL INDICATION: 89 year old F with Spine fracture, traumatic, lumbar      TECHNIQUE:   Axial CT images obtained throughout the region of the lumbar spine without intravenous contrast. Axial, sagittal, and coronal reconstructions were performed.    COMPARISON: Lumbar spine radiograph AP/lateral 02/07/2018    FINDINGS:     Vertebral bodies: There is a moderate compression deformity of the L1 vertebral body with approximately 40% height loss. This is new compared to prior lumbar spine radiograph of 10/15/2015. There is slight retropulsion of the inferior posterior cortex of L1 with resulting mild to moderate spinal canal stenosis. There is a remote compression deformity of the L2 vertebral body status  post prior vertebroplasty. There is continued retropulsion of the posterior cortex of the L2 vertebral body with moderate spinal canal stenosis. The remaining lumbar vertebral body heights appear well-maintained    Sagittal alignment: There is mild retrolisthesis of L2 on L3.    Spondylosis: Disc space narrowing and vacuum disc deformity at L1-L2.    T12-L1: Minimal diffuse posterior disc bulge without significant spinal canal stenosis or neural foraminal narrowing.    L1-L2: Retropulsion of L1/L2, as described above, resulting in moderate spinal canal stenosis and severe bilateral neuroforaminal narrowing.    L2-L3: Mild diffuse posterior disc bulge and small amount of retrolisthesis results in mild spinal canal stenosis and severe bilateral neural foraminal narrowing.    L3-L4: Diffuse posterior disc bulge and bilateral facet arthropathy resulting in mild spinal canal stenosis and neural foraminal narrowing.    L4-L5: Diffuse posterior disc bulge, bilateral facet arthropathy, and ligamentum flavum hypertrophy resulting in moderate spinal canal stenosis and mild bilateral neural foraminal narrowing.    L5-S1: Diffuse posterior disc bulge and bilateral facet arthropathy resulting in mild spinal canal stenosis and moderate right neural foraminal narrowing.    There is a chronic appearing fracture of the left sacral ala.    Paraspinal soft tissues: There is a moderate-sized hiatal hernia. Trace right pleural effusion with mild associated compressive atelectasis.  Minimal left basilar atelectasis.  Extensive calcification of the visualized arterial system.                             I have reviewed and interpreted all pertinent imaging results/findings within the past 24 hours.

## 2018-02-09 PROBLEM — R05.9 COUGH: Status: ACTIVE | Noted: 2018-02-09

## 2018-02-09 PROBLEM — I50.32 CHRONIC DIASTOLIC CONGESTIVE HEART FAILURE: Status: ACTIVE | Noted: 2018-02-09

## 2018-02-09 LAB
ALBUMIN SERPL BCP-MCNC: 2.6 G/DL
ALP SERPL-CCNC: 127 U/L
ALT SERPL W/O P-5'-P-CCNC: 21 U/L
ANION GAP SERPL CALC-SCNC: 8 MMOL/L
AORTIC VALVE REGURGITATION: ABNORMAL
AORTIC VALVE STENOSIS: ABNORMAL
AST SERPL-CCNC: 27 U/L
BASOPHILS # BLD AUTO: 0.08 K/UL
BASOPHILS NFR BLD: 0.8 %
BILIRUB SERPL-MCNC: 0.5 MG/DL
BNP SERPL-MCNC: 613 PG/ML
BUN SERPL-MCNC: 13 MG/DL
CALCIUM SERPL-MCNC: 8.9 MG/DL
CHLORIDE SERPL-SCNC: 105 MMOL/L
CO2 SERPL-SCNC: 28 MMOL/L
CREAT SERPL-MCNC: 0.7 MG/DL
DIASTOLIC DYSFUNCTION: YES
DIFFERENTIAL METHOD: ABNORMAL
EOSINOPHIL # BLD AUTO: 0.8 K/UL
EOSINOPHIL NFR BLD: 7.7 %
ERYTHROCYTE [DISTWIDTH] IN BLOOD BY AUTOMATED COUNT: 14.9 %
EST. GFR  (AFRICAN AMERICAN): >60 ML/MIN/1.73 M^2
EST. GFR  (NON AFRICAN AMERICAN): >60 ML/MIN/1.73 M^2
ESTIMATED PA SYSTOLIC PRESSURE: 38.28
GLUCOSE SERPL-MCNC: 83 MG/DL
HCT VFR BLD AUTO: 33.7 %
HGB BLD-MCNC: 11.3 G/DL
IMM GRANULOCYTES # BLD AUTO: 0.09 K/UL
IMM GRANULOCYTES NFR BLD AUTO: 0.9 %
LYMPHOCYTES # BLD AUTO: 2 K/UL
LYMPHOCYTES NFR BLD: 20.6 %
MAGNESIUM SERPL-MCNC: 1.9 MG/DL
MCH RBC QN AUTO: 29.6 PG
MCHC RBC AUTO-ENTMCNC: 33.5 G/DL
MCV RBC AUTO: 88 FL
MITRAL VALVE REGURGITATION: ABNORMAL
MONOCYTES # BLD AUTO: 1.1 K/UL
MONOCYTES NFR BLD: 11.2 %
NEUTROPHILS # BLD AUTO: 5.7 K/UL
NEUTROPHILS NFR BLD: 58.8 %
NRBC BLD-RTO: 0 /100 WBC
PHOSPHATE SERPL-MCNC: 4.6 MG/DL
PLATELET # BLD AUTO: 207 K/UL
PMV BLD AUTO: 10.8 FL
POTASSIUM SERPL-SCNC: 4.1 MMOL/L
PROCALCITONIN SERPL IA-MCNC: 0.08 NG/ML
PROT SERPL-MCNC: 5.9 G/DL
RBC # BLD AUTO: 3.82 M/UL
RETIRED EF AND QEF - SEE NOTES: 63 (ref 55–65)
SODIUM SERPL-SCNC: 141 MMOL/L
TRICUSPID VALVE REGURGITATION: ABNORMAL
WBC # BLD AUTO: 9.71 K/UL

## 2018-02-09 PROCEDURE — 93005 ELECTROCARDIOGRAM TRACING: CPT

## 2018-02-09 PROCEDURE — 84100 ASSAY OF PHOSPHORUS: CPT

## 2018-02-09 PROCEDURE — 93306 TTE W/DOPPLER COMPLETE: CPT

## 2018-02-09 PROCEDURE — 25000003 PHARM REV CODE 250: Performed by: STUDENT IN AN ORGANIZED HEALTH CARE EDUCATION/TRAINING PROGRAM

## 2018-02-09 PROCEDURE — 84145 PROCALCITONIN (PCT): CPT

## 2018-02-09 PROCEDURE — 93010 ELECTROCARDIOGRAM REPORT: CPT | Mod: ,,, | Performed by: INTERNAL MEDICINE

## 2018-02-09 PROCEDURE — 36415 COLL VENOUS BLD VENIPUNCTURE: CPT

## 2018-02-09 PROCEDURE — 93306 TTE W/DOPPLER COMPLETE: CPT | Mod: 26,,, | Performed by: INTERNAL MEDICINE

## 2018-02-09 PROCEDURE — A4216 STERILE WATER/SALINE, 10 ML: HCPCS | Performed by: STUDENT IN AN ORGANIZED HEALTH CARE EDUCATION/TRAINING PROGRAM

## 2018-02-09 PROCEDURE — 80053 COMPREHEN METABOLIC PANEL: CPT

## 2018-02-09 PROCEDURE — 83735 ASSAY OF MAGNESIUM: CPT

## 2018-02-09 PROCEDURE — 83880 ASSAY OF NATRIURETIC PEPTIDE: CPT

## 2018-02-09 PROCEDURE — 85025 COMPLETE CBC W/AUTO DIFF WBC: CPT

## 2018-02-09 PROCEDURE — 11000001 HC ACUTE MED/SURG PRIVATE ROOM

## 2018-02-09 PROCEDURE — 25000003 PHARM REV CODE 250: Performed by: PHYSICIAN ASSISTANT

## 2018-02-09 PROCEDURE — 99232 SBSQ HOSP IP/OBS MODERATE 35: CPT | Mod: GC,,, | Performed by: HOSPITALIST

## 2018-02-09 RX ORDER — GENTAMICIN SULFATE 3 MG/ML
2 SOLUTION/ DROPS OPHTHALMIC EVERY 4 HOURS
Status: DISCONTINUED | OUTPATIENT
Start: 2018-02-09 | End: 2018-02-12

## 2018-02-09 RX ORDER — HYDRALAZINE HYDROCHLORIDE 20 MG/ML
10 INJECTION INTRAMUSCULAR; INTRAVENOUS EVERY 8 HOURS PRN
Status: DISCONTINUED | OUTPATIENT
Start: 2018-02-09 | End: 2018-02-15 | Stop reason: HOSPADM

## 2018-02-09 RX ORDER — LOSARTAN POTASSIUM 50 MG/1
50 TABLET ORAL 2 TIMES DAILY
Status: DISCONTINUED | OUTPATIENT
Start: 2018-02-10 | End: 2018-02-15 | Stop reason: HOSPADM

## 2018-02-09 RX ORDER — FUROSEMIDE 20 MG/1
20 TABLET ORAL ONCE
Status: COMPLETED | OUTPATIENT
Start: 2018-02-09 | End: 2018-02-09

## 2018-02-09 RX ORDER — LOSARTAN POTASSIUM 50 MG/1
100 TABLET ORAL DAILY
Status: DISCONTINUED | OUTPATIENT
Start: 2018-02-09 | End: 2018-02-09

## 2018-02-09 RX ORDER — RAMELTEON 8 MG/1
8 TABLET ORAL NIGHTLY
Status: DISCONTINUED | OUTPATIENT
Start: 2018-02-09 | End: 2018-02-15 | Stop reason: HOSPADM

## 2018-02-09 RX ORDER — BENZONATATE 100 MG/1
100 CAPSULE ORAL 3 TIMES DAILY
Status: DISCONTINUED | OUTPATIENT
Start: 2018-02-09 | End: 2018-02-15 | Stop reason: HOSPADM

## 2018-02-09 RX ADMIN — OYSTER SHELL CALCIUM WITH VITAMIN D 1 TABLET: 500; 200 TABLET, FILM COATED ORAL at 09:02

## 2018-02-09 RX ADMIN — SODIUM CHLORIDE, PRESERVATIVE FREE 5 ML: 5 INJECTION INTRAVENOUS at 10:02

## 2018-02-09 RX ADMIN — SODIUM CHLORIDE, PRESERVATIVE FREE 5 ML: 5 INJECTION INTRAVENOUS at 02:02

## 2018-02-09 RX ADMIN — PANTOPRAZOLE SODIUM 40 MG: 40 TABLET, DELAYED RELEASE ORAL at 09:02

## 2018-02-09 RX ADMIN — ACETAMINOPHEN 650 MG: 325 TABLET, FILM COATED ORAL at 05:02

## 2018-02-09 RX ADMIN — METOPROLOL SUCCINATE 25 MG: 25 TABLET, EXTENDED RELEASE ORAL at 05:02

## 2018-02-09 RX ADMIN — FUROSEMIDE 20 MG: 20 TABLET ORAL at 02:02

## 2018-02-09 RX ADMIN — BENZONATATE 100 MG: 100 CAPSULE ORAL at 11:02

## 2018-02-09 RX ADMIN — SIMVASTATIN 40 MG: 40 TABLET, FILM COATED ORAL at 09:02

## 2018-02-09 RX ADMIN — FOLIC ACID 1 MG: 1 TABLET ORAL at 09:02

## 2018-02-09 RX ADMIN — GENTAMICIN SULFATE 2 DROP: 3 SOLUTION/ DROPS OPHTHALMIC at 10:02

## 2018-02-09 RX ADMIN — ESCITALOPRAM OXALATE 15 MG: 10 TABLET ORAL at 09:02

## 2018-02-09 RX ADMIN — RAMELTEON 8 MG: 8 TABLET, FILM COATED ORAL at 09:02

## 2018-02-09 RX ADMIN — GENTAMICIN SULFATE 2 DROP: 3 SOLUTION/ DROPS OPHTHALMIC at 11:02

## 2018-02-09 RX ADMIN — GENTAMICIN SULFATE 2 DROP: 3 SOLUTION/ DROPS OPHTHALMIC at 02:02

## 2018-02-09 RX ADMIN — ACETAMINOPHEN 650 MG: 325 TABLET, FILM COATED ORAL at 02:02

## 2018-02-09 RX ADMIN — BENZONATATE 100 MG: 100 CAPSULE ORAL at 09:02

## 2018-02-09 RX ADMIN — ACETAMINOPHEN 650 MG: 325 TABLET, FILM COATED ORAL at 09:02

## 2018-02-09 RX ADMIN — GENTAMICIN SULFATE 2 DROP: 3 SOLUTION/ DROPS OPHTHALMIC at 06:02

## 2018-02-09 RX ADMIN — LOSARTAN POTASSIUM 100 MG: 50 TABLET, FILM COATED ORAL at 09:02

## 2018-02-09 RX ADMIN — SODIUM CHLORIDE, PRESERVATIVE FREE 5 ML: 5 INJECTION INTRAVENOUS at 09:02

## 2018-02-09 RX ADMIN — THERA TABS 1 TABLET: TAB at 09:02

## 2018-02-09 RX ADMIN — GENTAMICIN SULFATE 2 DROP: 3 SOLUTION/ DROPS OPHTHALMIC at 05:02

## 2018-02-09 NOTE — NURSING
Notified IM 4 that pt's b/p is 185/91 no new orders noted. Instructed to continues to watch pt over night

## 2018-02-09 NOTE — ASSESSMENT & PLAN NOTE
- Continue gentamicin gtt + artifical tears PRN  - Warm compresses as required  - Follow up with ophtho on discharge

## 2018-02-09 NOTE — ASSESSMENT & PLAN NOTE
2/2 fall (most likely sustained from fall on Sunday)  - CTH at [facility]:   New small left parasagittal frontal lobe cortical hemorrhagic parenchymal contusion without evidence of mass effect or caroline hematoma.  - CTH at [facility]:   Trace parenchymal hyperdensity noted within the paramedian aspect of the right frontal lobe, somewhat less conspicuous compared to prior outside CT examination which could represent trace component of parenchymal hemorrhage. No definite additional new intracranial hemorrhage is identified. There is no midline shift.  - Seen by Neurosurgery in ED, not for any surgical interventions  - Fall and delirium precautions  - Continue BP meds, goal SBP <140, hydralazine 10mg IV q8 PRN for SBP >140  - Holding ASA 81 and avoiding NSAIDs

## 2018-02-09 NOTE — ASSESSMENT & PLAN NOTE
- Fall at home, probably orthostatic hypotension, reports feeling dizzy immediately after standing, then tried to sit down but fell, denies chest pain or palpitations, no seizure-like activity  - Per daughter, patient has had worsening sun downing and increasing falls at home over last few months, patient lives with daughter as she requires 24/7 care and has been refusing residential care  - EKG with NSR, however A fib on telemetry overnight, will repeat EKG this morning  - Fall precautions  - Needs to wear back brace for next 4 weeks for L1 compression fracture  - PT/OT

## 2018-02-09 NOTE — PHARMACY MED REC
"Admission Medication Reconciliation - Pharmacy Consult Note    The home medication history was taken by Maria A Brooks, Pharmacy Tech.  Based on information gathered and subsequent review by the clinical pharmacist, the items below may need attention.    You may go to "Admission" then "Reconcile Home Medications" tabs to review and/or act upon these items.        No issues noted with the medication reconciliation.        Please address this information as you see fit.  Feel free to contact us if you have any questions or require assistance.    Carla GraffD, [facility]  Internal Medicine Clinical Pharmacy Specialist  Spectra link: [phone]      Patient's prior to admission medication regimen was as follows:  Medication Sig    acetaminophen (TYLENOL) 500 MG tablet Take 1,000 mg by mouth every 6 (six) hours as needed for Pain.     albuterol sulfate 2.5 mg/0.5 mL Nebu Take 2.5 mg by nebulization every 4 (four) hours as needed.     alendronate (FOSAMAX) 70 MG tablet Take 70 mg by mouth every Thursday.     artificial tears (ISOPTO TEARS) 0.5 % ophthalmic solution  Place 1 drop into both eyes as needed (dry eye).     aspirin 81 MG Chew Take 81 mg by mouth once daily.    benzonatate (TESSALON) 100 MG capsule Take 1 capsule (100 mg total) by mouth 3 (three) times daily as needed for Cough.    calcium-vitamin D tablet 600 mg-200 units Take 1 tablet by mouth once daily.    escitalopram oxalate (LEXAPRO) 10 MG tablet Take 15 mg by mouth every evening.     ferrous sulfate 325 (65 FE) MG EC tablet Take 325 mg by mouth once daily.    folic acid (FOLVITE) 400 MCG tablet Take 400 mcg by mouth once daily.     losartan (COZAAR) 50 MG tablet Take 50 mg by mouth 2 (two) times daily.     lubiprostone (AMITIZA) 8 MCG Cap Take 8 mcg by mouth 2 (two) times daily as needed (constipation).     metoprolol succinate (TOPROL-XL) 25 MG 24 hr tablet Take 25 mg by mouth once daily.     multivitamin (THERAGRAN) tablet Take 1 tablet by " mouth once daily.    pantoprazole (PROTONIX) 40 MG tablet Take 40 mg by mouth once daily.    simvastatin (ZOCOR) 40 MG tablet Take 40 mg by mouth every evening.         .    .

## 2018-02-09 NOTE — PLAN OF CARE
"CM to bedside - pt provided assessment info. Pt w/ DME in place, lives w/ Monet lamar. Pt is active w/ [facility]. Pt & dgtr are in agreement for a SNF referral - awaiting therapy recs. Pt will be seen by therapy as soon as pt's LSO brace is delivered & fitted. CM contacted [facility] about brace and was relayed to Brace office and spoke w/ Grady who is enroute to pt's room for the fitting. Pt's choice for SNF is [facility] as she has been there before - referral info can be forwarded when PT/OT notes available.    CM provided patient anticipated [facility]ARY which will be update by nursing staff. Patient provided a Blue "My Health Packet" for d/c planning and health tool. Patient verbalized understanding.     02/09/18 1446   Discharge Assessment   Assessment Type Discharge Planning Assessment   Confirmed/corrected address and phone number on facesheet? Yes   Assessment information obtained from? Patient   Expected Length of Stay (days) 5   Communicated expected length of stay with patient/caregiver yes   Prior to hospitilization cognitive status: Alert/Oriented   Prior to hospitalization functional status: Assistive Equipment;Needs Assistance   Current cognitive status: Alert/Oriented   Current Functional Status: Assistive Equipment;Needs Assistance   Facility Arrived From: transfer from [facility] With child(ange), adult   Able to Return to Prior Arrangements unable to determine at this time (comments)   Is patient able to care for self after discharge? Unable to determine at this time (comments)   Who are your caregiver(s) and their phone number(s)? willard - Monet [phone]   Patient's perception of discharge disposition skilled nursing facility   Readmission Within The Last 30 Days no previous admission in last 30 days   Patient currently being followed by outpatient case management? No   Patient currently receives any other outside agency services? Yes   Name and contact number of agency or person providing " outside services [facility]   Is it the patient/care giver preference to resume care with the current outside agency? Yes   Equipment Currently Used at Home bedside commode;wheelchair;walker, standard;cane, straight;shower chair;nebulizer   Do you have any problems affording any of your prescribed medications? No   Is the patient taking medications as prescribed? yes   Does the patient have transportation home? Yes   Transportation Available family or friend will provide   Dialysis Name and Scheduled days N/A   Does the patient receive services at the [facility]? No   Discharge Plan A Skilled Nursing Facility   Discharge Plan B Home with family;Home Health   Patient/Family In Agreement With Plan yes

## 2018-02-09 NOTE — SUBJECTIVE & OBJECTIVE
Past Medical History:   Diagnosis Date    Cancer     Coronary artery disease     Hypertension     MI (myocardial infarction)     Skin cancer unsure    L and R arm, was removed by dr christianson    Squamous cell carcinoma        Past Surgical History:   Procedure Laterality Date    CARDIAC SURGERY      FRACTURE SURGERY         Review of patient's allergies indicates:   Allergen Reactions    Morphine Swelling       No current facility-administered medications on file prior to encounter.      Current Outpatient Prescriptions on File Prior to Encounter   Medication Sig    acetaminophen (TYLENOL) 500 MG tablet Take 1,000 mg by mouth every 6 (six) hours as needed for Pain.     albuterol sulfate 2.5 mg/0.5 mL Nebu Take 2.5 mg by nebulization every 4 (four) hours as needed. Rescue    alendronate (FOSAMAX) 70 MG tablet Take 70 mg by mouth every Thursday.     artificial tears (ISOPTO TEARS) 0.5 % ophthalmic solution Place 1 drop into both eyes as needed. (Patient taking differently: Place 1 drop into both eyes as needed (dry eye). )    aspirin 81 MG Chew Take 81 mg by mouth once daily.    benzonatate (TESSALON) 100 MG capsule Take 1 capsule (100 mg total) by mouth 3 (three) times daily as needed for Cough.    calcium-vitamin D tablet 600 mg-200 units Take 1 tablet by mouth once daily.    escitalopram oxalate (LEXAPRO) 10 MG tablet Take 15 mg by mouth every evening. 1 1/2 tablets = 15 mg every night    ferrous sulfate 325 (65 FE) MG EC tablet Take 325 mg by mouth once daily.    folic acid (FOLVITE) 400 MCG tablet Take 400 mcg by mouth once daily.     losartan (COZAAR) 50 MG tablet Take 50 mg by mouth 2 (two) times daily.     lubiprostone (AMITIZA) 8 MCG Cap Take 8 mcg by mouth 2 (two) times daily as needed (constipation).     metoprolol succinate (TOPROL-XL) 25 MG 24 hr tablet Take 25 mg by mouth once daily.     multivitamin (THERAGRAN) tablet Take 1 tablet by mouth once daily.    pantoprazole (PROTONIX)  40 MG tablet Take 40 mg by mouth once daily.    simvastatin (ZOCOR) 40 MG tablet Take 40 mg by mouth every evening.     Family History     None        Social History Main Topics    Smoking status: Never Smoker    Smokeless tobacco: Never Used    Alcohol use No    Drug use: No    Sexual activity: No     Review of Systems   Constitutional: Negative for chills and fever.   Eyes: Negative for photophobia and visual disturbance.   Respiratory: Positive for cough. Negative for shortness of breath. Choking: chronic.    Cardiovascular: Negative for chest pain and leg swelling.   Gastrointestinal: Negative for abdominal pain, nausea and vomiting.   Genitourinary: Negative for dysuria.   Musculoskeletal: Positive for back pain.   Skin: Positive for wound.   Neurological: Positive for dizziness. Negative for seizures and headaches.     Objective:     Vital Signs (Most Recent):  Temp: 97 °F (36.1 °C) (02/09/18 1248)  Pulse: 71 (02/09/18 1248)  Resp: 18 (02/09/18 1248)  BP: (!) 191/83 (02/09/18 1248)  SpO2: 96 % (02/09/18 1248) Vital Signs (24h Range):  Temp:  [97 °F (36.1 °C)-98.2 °F (36.8 °C)] 97 °F (36.1 °C)  Pulse:  [55-77] 71  Resp:  [16-20] 18  SpO2:  [93 %-98 %] 96 %  BP: (149-223)/(79-97) 191/83     Weight: 47.2 kg (104 lb)  Body mass index is 20.31 kg/m².    Physical Exam   Constitutional: She is oriented to person, place, and time. No distress.   Thin, frail   HENT:   Large hematoma over left forehead  Healing ecchymoses over face and arms    Eyes: EOM are normal. Pupils are equal, round, and reactive to light. Right conjunctiva is injected. Left conjunctiva is injected.   Mild ectropion of both eyes   Neck: Normal range of motion. Neck supple.   Cardiovascular: Normal rate, regular rhythm and intact distal pulses.    Murmur heard.   Systolic murmur is present with a grade of 2/6   Pulmonary/Chest: Effort normal and breath sounds normal. No respiratory distress. She has no wheezes. She has no rales.   Abdominal:  Soft. Bowel sounds are normal. She exhibits no distension.   Musculoskeletal: She exhibits no edema.   Neurological: She is alert and oriented to person, place, and time. She has normal strength. She displays normal reflexes. No sensory deficit.   Skin: She is not diaphoretic.         CRANIAL NERVES     CN II   Visual fields full to confrontation.     CN III, IV, VI   Pupils are equal, round, and reactive to light.  Extraocular motions are normal.   Right pupil: Size: 3 mm. Shape: regular. Reactivity: brisk.   Left pupil: Size: 3 mm. Shape: regular. Reactivity: brisk.   Nystagmus: none     CN V   Facial sensation intact.     CN VII   Facial expression full, symmetric.     CN VIII   Hearing: intact    CN IX, X   CN IX normal.   CN X normal.     CN XI   CN XI normal.     CN XII   CN XII normal.        Significant Labs:   Bilirubin:     Recent Labs  Lab 02/04/18 1905 02/09/18  0344   BILITOT 0.6 0.5     CBC:     Recent Labs  Lab 02/07/18 1916 02/08/18  0131 02/09/18  0344   WBC 10.70 10.01 9.71   HGB 12.3 11.7* 11.3*   HCT 37.3 34.4* 33.7*    177 207     CMP:     Recent Labs  Lab 02/07/18 1916 02/08/18  0131 02/09/18  0344    139 141   K 4.3 3.7 4.1    106 105   CO2 21* 23 28   GLU 83 85 83   BUN 15 13 13   CREATININE 0.8 0.7 0.7   CALCIUM 8.4* 8.1* 8.9   PROT  --   --  5.9*   ALBUMIN  --   --  2.6*   BILITOT  --   --  0.5   ALKPHOS  --   --  127   AST  --   --  27   ALT  --   --  21   ANIONGAP 12 10 8   EGFRNONAA >60 >60.0 >60.0        Ref. Range 2/9/2018 03:44   BNP Latest Ref Range: 0 - 99 pg/mL 613 (H)     All pertinent labs within the past 24 hours have been reviewed.    Significant Imaging:   Imaging Results          X-Ray Chest 1 View (Final result)  Result time 02/09/18 09:12:03    Final result by Antelmo Dawson III, MD (02/09/18 09:12:03)                 Impression:     Pulmonary edema versus pneumonia.      Electronically signed by: ANTELMO DAWSON MD  Date:     02/09/18  Time:    09:12               Narrative:    One view: There is cardiomegaly, mild edema, and postoperative change. There are right shoulder erosions.                          I have reviewed and interpreted all pertinent imaging results/findings within the past 24 hours.

## 2018-02-09 NOTE — ASSESSMENT & PLAN NOTE
- Complains of dry cough for last several days  - CXR with cardiomegaly, PNA vs pulmonary edema  - Afebrile and no other signs of URI/LRI, will add on pro-krish  - Schedule benzoate  - Elevated BNP, will also try furosemide 20mg PO today

## 2018-02-09 NOTE — PLAN OF CARE
SW made aware by CM, Odilia Ga, that Pt would likely be a placement. However, PT and OT has not yet evaluated Pt. Pt has reportedly been to [place] in the past and would like to go there if possible upon discharge. SW to complete LOCET once PT and OT evaluate Pt. Pt is assigned to both disciplines today. SW to continue to follow.     Kristina Ochoa, BALJEET

## 2018-02-09 NOTE — PT/OT/SLP PROGRESS
Physical Therapy      Patient Name:  Talia Alberts   MRN:  [id number]    PT eval and treat orders received and acknowledged.  PT/OT attempted evaluation this visit, but LSO brace not present at time of visit; IM 4 notified.  Per Team, pt needs LSO for OOB mobility. PT/OT unable to return this visit.  Will follow-up tomorrow as able pending brace presence.    Kayla Calles, PT   02/09/2018

## 2018-02-09 NOTE — ASSESSMENT & PLAN NOTE
Per daughter patient has had cognitive decline with sundowning over last 6 months  - TSH wnl, on folic acid and theragrain  - Family would like prison placement but patient has been refusing  - Delirium precautions  - Continue home escitalopram 15mg qhs  - Will refer for neurocognitive testing and consideration of ?donepezil/ memantine on discharge

## 2018-02-09 NOTE — PROGRESS NOTES
"[facility] Hospital Medicine  Progress Note    Patient Name: Talia Alberts  MRN: [id number]  Patient Class: IP- Inpatient   Admission Date: 2/8/2018  Length of Stay: 1 days  Attending Physician: Gerald Tyler MD  Primary Care Provider: Michael Martin MD    [facility] Medicine Team: [facility] HOSP MED 4 Bety Giraldo MD    Subjective:     Principal Problem:Compression fracture of first lumbar vertebra    HPI:  89yo F with CAD s/p CABG and SCC of skin, transferred from [facility] with ICH. Patient reports that she woke up around 5am yesterday morning and need to go to bathroom, states she stood up after finishing voiding and felt dizzy then tried to sit down but fell instead. Per daughter at bedside, she was awoken by a loud noise and found her mother on the floor confused- daughter says patient thought she was at the kitchen sink "rex". Daughter states patient has had worsening confusion and sundowning over the last few months and has had several falls before. Last big fall was Sunday. Daughter says patient usually ambulates with a walker, but was not using at time of fall. Patient is independent with bathing and dressing but does not drive or cook, lives with her daughter and has been refusing jail care. Patient takes ASA 81 for her CAD as well as Toprol and losartan.    In ED at [facility] she was found to have new small left parasagittal hemorrhage without evidence of mass effect, transferred to [facility] yesterday evening for Neurosurgery evaluation. In ED here CT spine also found compression fracture of L1.     Patient denies chest pain, palpitations and SOB. Reports chronic dry cough for which she has been taking pearls. No fever/chills. No abdo pain/ nausea/ vomiting. No dysuria/ urgency.    Hospital Course:  02/08/2018: Admitted to 4. Seen by Neurosurgery who recommended no intervention for ICH, but patient should wear back brace for L1 compression fracture and follow up with " them in clinic in 4 weeks.  02/09/2018: Complains of dry cough, but no other subjective complaints, pain well controlled. Episode of A fib overnight on telemetry, no RVR. Hypertensive this morning- home BP meds given early. Awaiting back brace and then can work with PT/OT.    Past Medical History:   Diagnosis Date    Cancer     Coronary artery disease     Hypertension     MI (myocardial infarction)     Skin cancer unsure    L and R arm, was removed by dr christianson    Squamous cell carcinoma        Past Surgical History:   Procedure Laterality Date    CARDIAC SURGERY      FRACTURE SURGERY         Review of patient's allergies indicates:   Allergen Reactions    Morphine Swelling       No current facility-administered medications on file prior to encounter.      Current Outpatient Prescriptions on File Prior to Encounter   Medication Sig    acetaminophen (TYLENOL) 500 MG tablet Take 1,000 mg by mouth every 6 (six) hours as needed for Pain.     albuterol sulfate 2.5 mg/0.5 mL Nebu Take 2.5 mg by nebulization every 4 (four) hours as needed. Rescue    alendronate (FOSAMAX) 70 MG tablet Take 70 mg by mouth every Thursday.     artificial tears (ISOPTO TEARS) 0.5 % ophthalmic solution Place 1 drop into both eyes as needed. (Patient taking differently: Place 1 drop into both eyes as needed (dry eye). )    aspirin 81 MG Chew Take 81 mg by mouth once daily.    benzonatate (TESSALON) 100 MG capsule Take 1 capsule (100 mg total) by mouth 3 (three) times daily as needed for Cough.    calcium-vitamin D tablet 600 mg-200 units Take 1 tablet by mouth once daily.    escitalopram oxalate (LEXAPRO) 10 MG tablet Take 15 mg by mouth every evening. 1 1/2 tablets = 15 mg every night    ferrous sulfate 325 (65 FE) MG EC tablet Take 325 mg by mouth once daily.    folic acid (FOLVITE) 400 MCG tablet Take 400 mcg by mouth once daily.     losartan (COZAAR) 50 MG tablet Take 50 mg by mouth 2 (two) times daily.      lubiprostone (AMITIZA) 8 MCG Cap Take 8 mcg by mouth 2 (two) times daily as needed (constipation).     metoprolol succinate (TOPROL-XL) 25 MG 24 hr tablet Take 25 mg by mouth once daily.     multivitamin (THERAGRAN) tablet Take 1 tablet by mouth once daily.    pantoprazole (PROTONIX) 40 MG tablet Take 40 mg by mouth once daily.    simvastatin (ZOCOR) 40 MG tablet Take 40 mg by mouth every evening.     Family History     None        Social History Main Topics    Smoking status: Never Smoker    Smokeless tobacco: Never Used    Alcohol use No    Drug use: No    Sexual activity: No     Review of Systems   Constitutional: Negative for chills and fever.   Eyes: Negative for photophobia and visual disturbance.   Respiratory: Positive for cough. Negative for shortness of breath. Choking: chronic.    Cardiovascular: Negative for chest pain and leg swelling.   Gastrointestinal: Negative for abdominal pain, nausea and vomiting.   Genitourinary: Negative for dysuria.   Musculoskeletal: Positive for back pain.   Skin: Positive for wound.   Neurological: Positive for dizziness. Negative for seizures and headaches.     Objective:     Vital Signs (Most Recent):  Temp: 97 °F (36.1 °C) (02/09/18 1248)  Pulse: 71 (02/09/18 1248)  Resp: 18 (02/09/18 1248)  BP: (!) 191/83 (02/09/18 1248)  SpO2: 96 % (02/09/18 1248) Vital Signs (24h Range):  Temp:  [97 °F (36.1 °C)-98.2 °F (36.8 °C)] 97 °F (36.1 °C)  Pulse:  [55-77] 71  Resp:  [16-20] 18  SpO2:  [93 %-98 %] 96 %  BP: (149-223)/(79-97) 191/83     Weight: 47.2 kg (104 lb)  Body mass index is 20.31 kg/m².    Physical Exam   Constitutional: She is oriented to person, place, and time. No distress.   Thin, frail   HENT:   Large hematoma over left forehead  Healing ecchymoses over face and arms    Eyes: EOM are normal. Pupils are equal, round, and reactive to light. Right conjunctiva is injected. Left conjunctiva is injected.   Mild ectropion of both eyes   Neck: Normal range of  motion. Neck supple.   Cardiovascular: Normal rate, regular rhythm and intact distal pulses.    Murmur heard.   Systolic murmur is present with a grade of 2/6   Pulmonary/Chest: Effort normal and breath sounds normal. No respiratory distress. She has no wheezes. She has no rales.   Abdominal: Soft. Bowel sounds are normal. She exhibits no distension.   Musculoskeletal: She exhibits no edema.   Neurological: She is alert and oriented to person, place, and time. She has normal strength. She displays normal reflexes. No sensory deficit.   Skin: She is not diaphoretic.         CRANIAL NERVES     CN II   Visual fields full to confrontation.     CN III, IV, VI   Pupils are equal, round, and reactive to light.  Extraocular motions are normal.   Right pupil: Size: 3 mm. Shape: regular. Reactivity: brisk.   Left pupil: Size: 3 mm. Shape: regular. Reactivity: brisk.   Nystagmus: none     CN V   Facial sensation intact.     CN VII   Facial expression full, symmetric.     CN VIII   Hearing: intact    CN IX, X   CN IX normal.   CN X normal.     CN XI   CN XI normal.     CN XII   CN XII normal.        Significant Labs:   Bilirubin:     Recent Labs  Lab 02/04/18 1905 02/09/18  0344   BILITOT 0.6 0.5     CBC:     Recent Labs  Lab 02/07/18 1916 02/08/18  0131 02/09/18  0344   WBC 10.70 10.01 9.71   HGB 12.3 11.7* 11.3*   HCT 37.3 34.4* 33.7*    177 207     CMP:     Recent Labs  Lab 02/07/18 1916 02/08/18  0131 02/09/18  0344    139 141   K 4.3 3.7 4.1    106 105   CO2 21* 23 28   GLU 83 85 83   BUN 15 13 13   CREATININE 0.8 0.7 0.7   CALCIUM 8.4* 8.1* 8.9   PROT  --   --  5.9*   ALBUMIN  --   --  2.6*   BILITOT  --   --  0.5   ALKPHOS  --   --  127   AST  --   --  27   ALT  --   --  21   ANIONGAP 12 10 8   EGFRNONAA >60 >60.0 >60.0        Ref. Range 2/9/2018 03:44   BNP Latest Ref Range: 0 - 99 pg/mL 613 (H)     All pertinent labs within the past 24 hours have been reviewed.    Significant Imaging:   Imaging  Results          X-Ray Chest 1 View (Final result)  Result time 02/09/18 09:12:03    Final result by Antelmo Dawson III, MD (02/09/18 09:12:03)                 Impression:     Pulmonary edema versus pneumonia.      Electronically signed by: ANTELMO DAWSON MD  Date:     02/09/18  Time:    09:12              Narrative:    One view: There is cardiomegaly, mild edema, and postoperative change. There are right shoulder erosions.                          I have reviewed and interpreted all pertinent imaging results/findings within the past 24 hours.    Assessment/Plan:      * Compression fracture of first lumbar vertebra    - Evaluated by Neurosurgery in ED, appreciate recs  - Patient needs to wear back brace and follow up with Neurosurgery in clinic in 4 weeks (apt made)  - Tylenol 650mg q8 for analgesia  - Continue home calcium/vitamin D  - Continue home alendronate        Cough    - Complains of dry cough for last several days  - CXR with cardiomegaly, PNA vs pulmonary edema  - Afebrile and no other signs of URI/LRI, will add on pro-krish  - Schedule benzoate  - Elevated BNP, will also try furosemide 20mg PO today        Intracranial hemorrhage    2/2 fall (most likely sustained from fall on Sunday)  - CTH at [facility]:   New small left parasagittal frontal lobe cortical hemorrhagic parenchymal contusion without evidence of mass effect or caroline hematoma.  - CTH at [facility]:   Trace parenchymal hyperdensity noted within the paramedian aspect of the right frontal lobe, somewhat less conspicuous compared to prior outside CT examination which could represent trace component of parenchymal hemorrhage. No definite additional new intracranial hemorrhage is identified. There is no midline shift.  - Seen by Neurosurgery in ED, not for any surgical interventions  - Fall and delirium precautions  - Continue BP meds, goal SBP <140, hydralazine 10mg IV q8 PRN for SBP >140  - Holding ASA 81 and avoiding NSAIDs        Fall    - Fall at  home, probably orthostatic hypotension, reports feeling dizzy immediately after standing, then tried to sit down but fell, denies chest pain or palpitations, no seizure-like activity  - Per daughter, patient has had worsening sun downing and increasing falls at home over last few months, patient lives with daughter as she requires 24/7 care and has been refusing snf care  - EKG with NSR, however A fib on telemetry overnight, will repeat EKG this morning  - Fall precautions  - Needs to wear back brace for next 4 weeks for L1 compression fracture  - PT/OT         Mild cognitive impairment    Per daughter patient has had cognitive decline with sundowning over last 6 months  - TSH wnl, on folic acid and theragrain  - Family would like snf placement but patient has been refusing  - Delirium precautions  - Continue home escitalopram 15mg qhs  - Will refer for neurocognitive testing and consideration of ?donepezil/ memantine on discharge        Coronary artery disease involving native coronary artery of native heart without angina pectoris    - s/p CABG plus ?valve replacement ~5 years ago  - Holding ASA 81 in the setting of ICH  - Continue statin, BB and ARB        Essential hypertension    - Continue losartan, goal SBP <140 given ICH  - Hydralazine 10mg IV q8 PRN for SBP >140        GERD (gastroesophageal reflux disease)    - Continue home PPI        Acute bacterial conjunctivitis of both eyes    - Continue gentamicin gtt + artifical tears PRN  - Warm compresses as required  - Follow up with ophtho on discharge        Nail disorder    Thickened dystrophic toenails, patient and daughter unable to cut them at home but does not meet criteria for inpatient Podiatry consult, requested referral to Podiatry in [place] on discharge          VTE Risk Mitigation         Ordered     Medium Risk of VTE  Once      02/08/18 1154     Reason for No Pharmacological VTE Prophylaxis  Once      02/08/18 1154     Place sequential  compression device  Until discontinued      02/08/18 1154     Place QUINTON hose  Until discontinued      02/08/18 1154              Bety Giraldo MD  Department of Hospital Medicine   [facility]

## 2018-02-09 NOTE — PLAN OF CARE
Problem: Fall Risk (Adult)  Goal: Absence of Falls  Patient will demonstrate the desired outcomes by discharge/transition of care.   Fall precaution maintained this shift call bell in reach bed in low position no acute distress noted. Family at bedside. Instructed to call for assistance    Problem: Pressure Ulcer Risk (Daniel Scale) (Adult,Obstetrics,Pediatric)  Goal: Skin Integrity  Patient will demonstrate the desired outcomes by discharge/transition of care.   No new breakdown noted this shift

## 2018-02-09 NOTE — PT/OT/SLP PROGRESS
Occupational Therapy      Patient Name:  Talia Alberts   MRN:  [id number]    OT eval and treat orders received and acknowledged.  PT/OT attempted evaluation this visit, but LSO brace not present at time of visit; IM 4 notified.  Per Team, pt needs LSO for OOB mobility. PT/OT unable to return this visit.  Will follow-up tomorrow as able pending brace presence.    [facility] [place]  2/9/2018

## 2018-02-10 PROBLEM — I50.33 ACUTE ON CHRONIC DIASTOLIC CONGESTIVE HEART FAILURE: Status: ACTIVE | Noted: 2018-02-09

## 2018-02-10 LAB
ALBUMIN SERPL BCP-MCNC: 2.3 G/DL
ALP SERPL-CCNC: 129 U/L
ALT SERPL W/O P-5'-P-CCNC: 17 U/L
ANION GAP SERPL CALC-SCNC: 8 MMOL/L
AST SERPL-CCNC: 19 U/L
BACTERIA BLD CULT: NORMAL
BACTERIA BLD CULT: NORMAL
BASOPHILS # BLD AUTO: 0.08 K/UL
BASOPHILS NFR BLD: 0.8 %
BILIRUB SERPL-MCNC: 0.3 MG/DL
BUN SERPL-MCNC: 17 MG/DL
CALCIUM SERPL-MCNC: 9 MG/DL
CHLORIDE SERPL-SCNC: 103 MMOL/L
CO2 SERPL-SCNC: 27 MMOL/L
CREAT SERPL-MCNC: 0.9 MG/DL
DIFFERENTIAL METHOD: ABNORMAL
EOSINOPHIL # BLD AUTO: 0.7 K/UL
EOSINOPHIL NFR BLD: 6.7 %
ERYTHROCYTE [DISTWIDTH] IN BLOOD BY AUTOMATED COUNT: 15 %
EST. GFR  (AFRICAN AMERICAN): >60 ML/MIN/1.73 M^2
EST. GFR  (NON AFRICAN AMERICAN): 55.7 ML/MIN/1.73 M^2
GLUCOSE SERPL-MCNC: 101 MG/DL
HCT VFR BLD AUTO: 31.1 %
HGB BLD-MCNC: 10.5 G/DL
IMM GRANULOCYTES # BLD AUTO: 0.1 K/UL
IMM GRANULOCYTES NFR BLD AUTO: 1 %
LYMPHOCYTES # BLD AUTO: 1.8 K/UL
LYMPHOCYTES NFR BLD: 19 %
MAGNESIUM SERPL-MCNC: 1.8 MG/DL
MCH RBC QN AUTO: 30.2 PG
MCHC RBC AUTO-ENTMCNC: 33.8 G/DL
MCV RBC AUTO: 89 FL
MONOCYTES # BLD AUTO: 1 K/UL
MONOCYTES NFR BLD: 10.5 %
NEUTROPHILS # BLD AUTO: 6 K/UL
NEUTROPHILS NFR BLD: 62 %
NRBC BLD-RTO: 0 /100 WBC
PHOSPHATE SERPL-MCNC: 5.6 MG/DL
PLATELET # BLD AUTO: 197 K/UL
PMV BLD AUTO: 10.3 FL
POTASSIUM SERPL-SCNC: 3.9 MMOL/L
PROT SERPL-MCNC: 5.4 G/DL
RBC # BLD AUTO: 3.48 M/UL
SODIUM SERPL-SCNC: 138 MMOL/L
WBC # BLD AUTO: 9.67 K/UL

## 2018-02-10 PROCEDURE — 97165 OT EVAL LOW COMPLEX 30 MIN: CPT

## 2018-02-10 PROCEDURE — 94761 N-INVAS EAR/PLS OXIMETRY MLT: CPT

## 2018-02-10 PROCEDURE — 63600175 PHARM REV CODE 636 W HCPCS: Performed by: STUDENT IN AN ORGANIZED HEALTH CARE EDUCATION/TRAINING PROGRAM

## 2018-02-10 PROCEDURE — A4216 STERILE WATER/SALINE, 10 ML: HCPCS | Performed by: STUDENT IN AN ORGANIZED HEALTH CARE EDUCATION/TRAINING PROGRAM

## 2018-02-10 PROCEDURE — 99232 SBSQ HOSP IP/OBS MODERATE 35: CPT | Mod: GC,,, | Performed by: HOSPITALIST

## 2018-02-10 PROCEDURE — 97535 SELF CARE MNGMENT TRAINING: CPT

## 2018-02-10 PROCEDURE — 83735 ASSAY OF MAGNESIUM: CPT

## 2018-02-10 PROCEDURE — 36415 COLL VENOUS BLD VENIPUNCTURE: CPT

## 2018-02-10 PROCEDURE — 80053 COMPREHEN METABOLIC PANEL: CPT

## 2018-02-10 PROCEDURE — G8988 SELF CARE GOAL STATUS: HCPCS | Mod: CJ

## 2018-02-10 PROCEDURE — 25000003 PHARM REV CODE 250: Performed by: PHYSICIAN ASSISTANT

## 2018-02-10 PROCEDURE — 11000001 HC ACUTE MED/SURG PRIVATE ROOM

## 2018-02-10 PROCEDURE — 84100 ASSAY OF PHOSPHORUS: CPT

## 2018-02-10 PROCEDURE — 25000242 PHARM REV CODE 250 ALT 637 W/ HCPCS: Performed by: STUDENT IN AN ORGANIZED HEALTH CARE EDUCATION/TRAINING PROGRAM

## 2018-02-10 PROCEDURE — 94640 AIRWAY INHALATION TREATMENT: CPT

## 2018-02-10 PROCEDURE — 97161 PT EVAL LOW COMPLEX 20 MIN: CPT

## 2018-02-10 PROCEDURE — 25000003 PHARM REV CODE 250: Performed by: STUDENT IN AN ORGANIZED HEALTH CARE EDUCATION/TRAINING PROGRAM

## 2018-02-10 PROCEDURE — 97116 GAIT TRAINING THERAPY: CPT

## 2018-02-10 PROCEDURE — G8987 SELF CARE CURRENT STATUS: HCPCS | Mod: CK

## 2018-02-10 PROCEDURE — 85025 COMPLETE CBC W/AUTO DIFF WBC: CPT

## 2018-02-10 RX ORDER — FUROSEMIDE 10 MG/ML
20 INJECTION INTRAMUSCULAR; INTRAVENOUS ONCE
Status: COMPLETED | OUTPATIENT
Start: 2018-02-10 | End: 2018-02-10

## 2018-02-10 RX ADMIN — THERA TABS 1 TABLET: TAB at 09:02

## 2018-02-10 RX ADMIN — GENTAMICIN SULFATE 2 DROP: 3 SOLUTION/ DROPS OPHTHALMIC at 01:02

## 2018-02-10 RX ADMIN — BENZONATATE 100 MG: 100 CAPSULE ORAL at 01:02

## 2018-02-10 RX ADMIN — GENTAMICIN SULFATE 2 DROP: 3 SOLUTION/ DROPS OPHTHALMIC at 03:02

## 2018-02-10 RX ADMIN — OYSTER SHELL CALCIUM WITH VITAMIN D 1 TABLET: 500; 200 TABLET, FILM COATED ORAL at 09:02

## 2018-02-10 RX ADMIN — LOSARTAN POTASSIUM 50 MG: 50 TABLET, FILM COATED ORAL at 09:02

## 2018-02-10 RX ADMIN — FOLIC ACID 1 MG: 1 TABLET ORAL at 09:02

## 2018-02-10 RX ADMIN — BENZONATATE 100 MG: 100 CAPSULE ORAL at 05:02

## 2018-02-10 RX ADMIN — GENTAMICIN SULFATE 2 DROP: 3 SOLUTION/ DROPS OPHTHALMIC at 05:02

## 2018-02-10 RX ADMIN — SODIUM CHLORIDE, PRESERVATIVE FREE 5 ML: 5 INJECTION INTRAVENOUS at 01:02

## 2018-02-10 RX ADMIN — ACETAMINOPHEN 650 MG: 325 TABLET, FILM COATED ORAL at 09:02

## 2018-02-10 RX ADMIN — ACETAMINOPHEN 650 MG: 325 TABLET, FILM COATED ORAL at 01:02

## 2018-02-10 RX ADMIN — ALBUTEROL SULFATE 2.5 MG: 2.5 SOLUTION RESPIRATORY (INHALATION) at 09:02

## 2018-02-10 RX ADMIN — SODIUM CHLORIDE, PRESERVATIVE FREE 5 ML: 5 INJECTION INTRAVENOUS at 10:02

## 2018-02-10 RX ADMIN — FUROSEMIDE 20 MG: 10 INJECTION, SOLUTION INTRAMUSCULAR; INTRAVENOUS at 03:02

## 2018-02-10 RX ADMIN — SODIUM CHLORIDE, PRESERVATIVE FREE 5 ML: 5 INJECTION INTRAVENOUS at 05:02

## 2018-02-10 RX ADMIN — PANTOPRAZOLE SODIUM 40 MG: 40 TABLET, DELAYED RELEASE ORAL at 09:02

## 2018-02-10 RX ADMIN — RAMELTEON 8 MG: 8 TABLET, FILM COATED ORAL at 09:02

## 2018-02-10 RX ADMIN — BENZONATATE 100 MG: 100 CAPSULE ORAL at 09:02

## 2018-02-10 RX ADMIN — GENTAMICIN SULFATE 2 DROP: 3 SOLUTION/ DROPS OPHTHALMIC at 06:02

## 2018-02-10 RX ADMIN — GENTAMICIN SULFATE 2 DROP: 3 SOLUTION/ DROPS OPHTHALMIC at 09:02

## 2018-02-10 RX ADMIN — ESCITALOPRAM OXALATE 15 MG: 10 TABLET ORAL at 09:02

## 2018-02-10 RX ADMIN — ACETAMINOPHEN 650 MG: 325 TABLET, FILM COATED ORAL at 05:02

## 2018-02-10 RX ADMIN — SIMVASTATIN 40 MG: 40 TABLET, FILM COATED ORAL at 09:02

## 2018-02-10 RX ADMIN — METOPROLOL SUCCINATE 25 MG: 25 TABLET, EXTENDED RELEASE ORAL at 09:02

## 2018-02-10 NOTE — PLAN OF CARE
Problem: Physical Therapy Goal  Goal: Physical Therapy Goal  Goals to be met by: 2018     Patient will increase functional independence with mobility by performin. Sit to stand transfer with Modified [place]  2. Bed to chair transfer with Supervision using Rolling Walker  3. Gait  x 80 feet with Supervision using Rolling Walker.   4. Lower extremity exercise program x30 reps per handout, with independence    Outcome: Ongoing (interventions implemented as appropriate)  Eval completed and POC established    Aniceto Neal DPT  2/10/2018

## 2018-02-10 NOTE — PT/OT/SLP EVAL
Physical Therapy Evaluation    Patient Name:  Talia Alberts   MRN:  [id number]    Recommendations:     Discharge Recommendations:  nursing facility, skilled   Discharge Equipment Recommendations:     Barriers to discharge: Inaccessible home and Decreased caregiver support    Assessment:     Talia Alberts is a 89 y.o. female admitted with a medical diagnosis of Compression fracture of first lumbar vertebra.  She presents with the following impairments/functional limitations:  weakness, gait instability, impaired balance, impaired endurance, impaired self care skills, impaired functional mobilty, decreased lower extremity function, decreased safety awareness.  Pt demonstrates decreased functional mobility and poor safety awareness.  Pt performed bed mobility c S, transfer c min A, and gait c CGA.  Pt amb 60ftx2 c RW CGA - slowed gait speed, mild unsteadiness noted, increased unsteadiness c dual task activities noted, 1x LOB requiring CGA for correction.  Pt would benefit from continued skilled acute PT while admitted to improve functional mobility and safety awareness.  Recommending SNF following d/c once medically cleared.    Rehab Prognosis:  good; patient would benefit from acute skilled PT services to address these deficits and reach maximum level of function.      Recent Surgery: * No surgery found *      Plan:     During this hospitalization, patient to be seen 4 x/week to address the above listed problems via gait training, therapeutic activities, therapeutic exercises, neuromuscular re-education  · Plan of Care Expires:  03/10/18   Plan of Care Reviewed with: patient, daughter    Subjective     Communicated with RN prior to session.  Patient found up in chair c daughter present upon PT entry to room, agreeable to evaluation.      Chief Complaint: falls  Patient comments/goals: to get stronger  Pain/Comfort:  · Pain Rating 1: 0/10    Patients cultural, spiritual, Anglican conflicts given the current  situation: none stated    Living Environment:  Pt lives c daughter in [facility], [facility]STE.  Pt's daughter reports pt fall frequently (2x in past week).  Pt states she falls backwards often (daughter added pt falls backwards when turning head to side and looking back while walking).  Pt's daughter also states pt tends to get up and amb without walker at time causing her falls.  Prior to admission, patients level of function was mod I c functional mobility and ADLs.  Patient has the following equipment: walker, standard, rollator, wheelchair, 3-in-1 commode, grab bar.  DME owned (not currently used): none.  Upon discharge, patient will have assistance from family as needed.    Objective:     Patient found with: telemetry (LSO)     General Precautions: Standard, fall, aspiration   Orthopedic Precautions:N/A   Braces: LSO     Exams:  · Cognitive Exam:  Patient is oriented to Person, Place, Time and Situation and follows 100% of all commands   · Gross Motor Coordination:  WFL  · Sensation:    · -       Intact  · RLE ROM: WFL  · RLE Strength: WFL  · LLE ROM: WFL  · LLE Strength: WFL    Functional Mobility:  · Bed Mobility:     · Rolling Right: supervision  · Scooting: supervision  · Sit to Supine: supervision  · Transfers:     · Sit to Stand:  Minimal assistance with rolling walker  · Bed to Chair: contact guard assistance with  rolling walker  using  Step Transfer  · Gait: 60ftx2 c RW CGA - unsteadiness noted c increase in unsteadiness c dual task   · Balance: static standing (S); dynamic standing (CGA)    AM-PAC 6 CLICK MOBILITY  Total Score:17       Therapeutic Activities and Exercises:  Educated pt on PT role and POC; safety c mobility; donning LSO c all activities OOB; proper techniques for transfer and gait c Rw; d/c recommendation  Amb 60ftx2 c RW  Static standing 2x2min  Sit<>stand 4x c RW    Patient left HOB elevated with all lines intact, call button in reach, RN notified and daughter present.    GOALS:    Physical  Therapy Goals        Problem: Physical Therapy Goal    Goal Priority Disciplines Outcome Goal Variances Interventions   Physical Therapy Goal     PT/OT, PT Ongoing (interventions implemented as appropriate)     Description:  Goals to be met by: 2018     Patient will increase functional independence with mobility by performin. Sit to stand transfer with Modified [place]  2. Bed to chair transfer with Supervision using Rolling Walker  3. Gait  x 80 feet with Supervision using Rolling Walker.   4. Lower extremity exercise program x30 reps per handout, with independence                       History:     Past Medical History:   Diagnosis Date    Cancer     Coronary artery disease     Hypertension     MI (myocardial infarction)     Skin cancer unsure    L and R arm, was removed by dr christianson    Squamous cell carcinoma        Past Surgical History:   Procedure Laterality Date    CARDIAC SURGERY      FRACTURE SURGERY         Clinical Decision Making:     History  Co-morbidities and personal factors that may impact the plan of care Examination  Body Structures and Functions, activity limitations and participation restrictions that may impact the plan of care Clinical Presentation   Decision Making/ Complexity Score   Co-morbidities:   [] Time since onset of injury / illness / exacerbation  [] Status of current condition  []Patient's cognitive status and safety concerns    [] Multiple Medical Problems (see med hx)  Personal Factors:   [] Patient's age  [] Prior Level of function   [] Patient's home situation (environment and family support)  [] Patient's level of motivation  [] Expected progression of patient      HISTORY:(criteria)    [x] 27060 - no personal factors/history    [] 22845 - has 1-2 personal factor/comorbidity     [] 86259 - has >3 personal factor/comorbidity     Body Regions:  [] Objective examination findings  [] Head     []  Neck  [] Trunk   [] Upper Extremity  [] Lower  Extremity    Body Systems:  [] For communication ability, affect, cognition, language, and learning style: the assessment of the ability to make needs known, consciousness, orientation (person, place, and time), expected emotional /behavioral responses, and learning preferences (eg, learning barriers, education  needs)  [] For the neuromuscular system: a general assessment of gross coordinated movement (eg, balance, gait, locomotion, transfers, and transitions) and motor function  (motor control and motor learning)  [] For the musculoskeletal system: the assessment of gross symmetry, gross range of motion, gross strength, height, and weight  [] For the integumentary system: the assessment of pliability(texture), presence of scar formation, skin color, and skin integrity  [] For cardiovascular/pulmonary system: the assessment of heart rate, respiratory rate, blood pressure, and edema     Activity limitations:    [] Patient's cognitive status and saf ety concerns          [] Status of current condition      [] Weight bearing restriction  [] Cardiopulmunary Restriction    Participation Restrictions:   [] Goals and goal agreement with the patient     [] Rehab potential (prognosis) and probable outcome      Examination of Body System: (criteria)    [x] 09807 - addressing 1-2 elements    [] 15320 - addressing a total of 3 or more elements     [] 48604 -  Addressing a total of 4 or more elements         Clinical Presentation: (criteria)  Stable - 68324     On examination of body system using standardized tests and measures patient presents with 1-2 elements from any of the following: body structures and functions, activity limitations, and/or participation restrictions.  Leading to a clinical presentation that is considered stable and/or uncomplicated                              Clinical Decision Making  (Eval Complexity):  Low- 35658     Time Tracking:     PT Received On: 02/10/18  PT Start Time: 1053     PT Stop Time:  1121  PT Total Time (min): 28 min     Billable Minutes: Evaluation 18 min and Therapeutic Activity 10 min      Aniceto Neal, PT  02/10/2018

## 2018-02-10 NOTE — PT/OT/SLP EVAL
Occupational Therapy   Evaluation    Name: Talia Alberts  MRN: [id number]  Admitting Diagnosis:  Compression fracture of first lumbar vertebra      Recommendations:     Discharge Recommendations: nursing facility, skilled  Discharge Equipment Recommendations:  none  Barriers to discharge:       History:     Occupational Profile:  Living Environment & PLOF: Pt resides with daughter in 1 story house with 1 step to enter.  Pt has a walk-in shower for bathing.  Pt used SW with ambulation (pt's daughter reports that she often will only walk ~ 2/3 of the way to destination with walker & then will walk without it).  Pt is independent with dressing & toileting & has supervision with bathing.  Pt's daughter reports that at times pt will loose balance while walking if she looks to the side to talk with someone.  Pt was a housewife.  Pt enjoys reading, sean, painting, & sewing.  Equipment Owned:   (3 in 1 commode, w/c, SC, SW, shower chair, rollator, grab bars)      Past Medical History:   Diagnosis Date    Cancer     Coronary artery disease     Hypertension     MI (myocardial infarction)     Skin cancer unsure    L and R arm, was removed by dr christianson    Squamous cell carcinoma        Past Surgical History:   Procedure Laterality Date    CARDIAC SURGERY      FRACTURE SURGERY         Subjective   Pt reported that she needed to go to the bathroom & was willing to sit in chair.  Chief Complaint: none  Patient/Family stated goals: for pt to get stronger  Communicated with: RN prior to session.  Pain/Comfort:  · Pain Rating 1: 0/10  · Pain Rating Post-Intervention 1: 0/10    Patients cultural, spiritual, Restoration conflicts given the current situation: Congregational    Objective:     Patient found with: [employer], telemetry    General Precautions: Standard, fall, aspiration (cardiac)   Orthopedic Precautions:  spinal precautions  Braces: LSO     Occupational Performance:    Bed Mobility:    · Patient completed Supine to Sit  with minimum assistance    Functional Mobility/Transfers:  · Patient completed Sit <> Stand Transfer with minimum assistance  with  no assistive device   · Patient completed Bed <> Chair Transfer using Stand Pivot technique with minimum assistance with no assistive device  · Patient completed Toilet Transfer Stand Pivot technique with minimum assistance with  no AD  · Functional Mobility: Pt performed functional mobility to bathroom & back during ADL's with Minimal (A).    Activities of Daily Living:  · Grooming: minimum assistance while standing at sink with (A) for balance  · UB Dressing: minimum assistance seated E OB  · LB Dressing: moderate assistance donning socks seated in chair with education regarding technique to limit bending forward  · Toileting: minimum assistance from standard commode    Cognitive/Visual Perceptual:  Cognitive/Psychosocial Skills:     -       Oriented to: Person, Place, Time and Situation   -       Follows Commands/attention:Follows one-step commands  -       Communication: clear/fluent  -       Memory: questionable short term memory  -       Safety awareness/insight to disability: impaired   -       Mood/Affect/Coping skills/emotional control: Appropriate to situation    Physical Exam:  Postural examination/scapula alignment:    -       Rounded shoulders  -       Forward head  -       Posterior pelvic tilt  Skin integrity: Thin  Sensation:    -       Intact  Dominant hand:    -       right  Upper Extremity Range of Motion:     -       Right Upper Extremity: WFL except 90* shoulder flexion  -       Left Upper Extremity: WFL except 110* shoulder flexion  Upper Extremity Strength:    -       Right Upper Extremity: WFL except 3-/5 shoulder flexion  -       Left Upper Extremity: WFL except 3-/5 shoulder flexion   Strength:    -       Right Upper Extremity: WFL  -       Left Upper Extremity: WFL  Fine Motor Coordination:    -       Intact  Gross motor coordination:   WFL    Patient left  "up in chair with all lines intact, call button in reach, RN notified, daughter present and white board updated.    [facility] 6 Click:  [facility] Total Score: 15    Treatment & Education:  Provided education to pt & daughter regarding role of OT, POC, discharge recommendations, reasons for brace, safety, need for (A) with transfers & OOB activities, & spinal precautions.  Pt required SBA-Min (A) with postural control while seated EOB & min (A) while standing due to posterior leaning.  Pt had no further questions & when asked whether there were any concerns pt reported none.    Education:    Assessment:     Talia Alberts is a 89 y.o. female with a medical diagnosis of Compression fracture of first lumbar vertebra.  She presents with high risk for falls. Pt with good participation and motivation.  Performance deficits affecting function are weakness, impaired endurance, impaired self care skills, impaired functional mobilty, impaired balance, impaired cognition, decreased coordination, decreased upper extremity function, decreased lower extremity function, decreased safety awareness.      Rehab Prognosis:  good; patient would benefit from acute skilled OT services to address these deficits and reach maximum level of function.         Clinical Decision Makin.  OT Low:  "Pt evaluation falls under low complexity for evaluation coding due to performance deficits noted in 1-3 areas as stated above and 0 co-morbities affecting current functional status. Data obtained from problem focused assessments. No modifications or assistance was required for completion of evaluation. Only brief occupational profile and history review completed."     Plan:     Patient to be seen 4 x/week to address the above listed problems via self-care/home management, therapeutic activities, therapeutic exercises  · Plan of Care Expires: 18  · Plan of Care Reviewed with: patient, daughter    This Plan of care has been discussed with the " patient who was involved in its development and understands and is in agreement with the identified goals and treatment plan    GOALS:    Occupational Therapy Goals        Problem: Occupational Therapy Goal    Goal Priority Disciplines Outcome Interventions   Occupational Therapy Goal     OT, PT/OT Ongoing (interventions implemented as appropriate)    Description:  Goals to be met by: 2/17/18     Patient will increase functional independence with ADLs by performing:    UE Dressing with Modified [place].  LE Dressing with Modified [place].  Grooming while standing with Supervision.  Toileting from toilet with Supervision for hygiene and clothing management.   Rolling to Bilateral with Modified [place].   Supine to sit with Modified [place].  Stand pivot transfers with Modified [place] using RW.                      Time Tracking:     OT Date of Treatment: 02/10/18  OT Start Time: 0854  OT Stop Time: 0928  OT Total Time (min): 34 min    Billable Minutes:Evaluation 20  Self Care/Home Management 14    PHUC Cox  2/10/2018

## 2018-02-10 NOTE — PLAN OF CARE
Problem: Occupational Therapy Goal  Goal: Occupational Therapy Goal  Goals to be met by: 2/17/18     Patient will increase functional independence with ADLs by performing:    UE Dressing with Modified [place].  LE Dressing with Modified [place].  Grooming while standing with Supervision.  Toileting from toilet with Supervision for hygiene and clothing management.   Rolling to Bilateral with Modified [place].   Supine to sit with Modified [place].  Stand pivot transfers with Modified [place] using RW.    Outcome: Ongoing (interventions implemented as appropriate)  OT eval completed.

## 2018-02-10 NOTE — NURSING
Pt is sitting in bed, daughter at bedside.  Pt has no complaints of SOB or discomfort at this time. Will continue to monitor.

## 2018-02-11 LAB
ALBUMIN SERPL BCP-MCNC: 2.4 G/DL
ALP SERPL-CCNC: 125 U/L
ALT SERPL W/O P-5'-P-CCNC: 15 U/L
ANION GAP SERPL CALC-SCNC: 12 MMOL/L
AST SERPL-CCNC: 21 U/L
BASOPHILS # BLD AUTO: 0.08 K/UL
BASOPHILS NFR BLD: 0.8 %
BILIRUB SERPL-MCNC: 0.3 MG/DL
BUN SERPL-MCNC: 19 MG/DL
CALCIUM SERPL-MCNC: 9.2 MG/DL
CHLORIDE SERPL-SCNC: 101 MMOL/L
CO2 SERPL-SCNC: 27 MMOL/L
CREAT SERPL-MCNC: 0.8 MG/DL
DIFFERENTIAL METHOD: ABNORMAL
EOSINOPHIL # BLD AUTO: 0.6 K/UL
EOSINOPHIL NFR BLD: 5.8 %
ERYTHROCYTE [DISTWIDTH] IN BLOOD BY AUTOMATED COUNT: 14.7 %
EST. GFR  (AFRICAN AMERICAN): >60 ML/MIN/1.73 M^2
EST. GFR  (NON AFRICAN AMERICAN): >60 ML/MIN/1.73 M^2
GLUCOSE SERPL-MCNC: 94 MG/DL
HCT VFR BLD AUTO: 33.2 %
HGB BLD-MCNC: 11.1 G/DL
IMM GRANULOCYTES # BLD AUTO: 0.13 K/UL
IMM GRANULOCYTES NFR BLD AUTO: 1.2 %
LYMPHOCYTES # BLD AUTO: 1.8 K/UL
LYMPHOCYTES NFR BLD: 17 %
MAGNESIUM SERPL-MCNC: 1.6 MG/DL
MCH RBC QN AUTO: 29.9 PG
MCHC RBC AUTO-ENTMCNC: 33.4 G/DL
MCV RBC AUTO: 90 FL
MONOCYTES # BLD AUTO: 1.1 K/UL
MONOCYTES NFR BLD: 10.5 %
NEUTROPHILS # BLD AUTO: 6.8 K/UL
NEUTROPHILS NFR BLD: 64.7 %
NRBC BLD-RTO: 0 /100 WBC
PHOSPHATE SERPL-MCNC: 6.4 MG/DL
PLATELET # BLD AUTO: 202 K/UL
PMV BLD AUTO: 10.4 FL
POTASSIUM SERPL-SCNC: 4 MMOL/L
PROT SERPL-MCNC: 5.5 G/DL
RBC # BLD AUTO: 3.71 M/UL
SODIUM SERPL-SCNC: 140 MMOL/L
WBC # BLD AUTO: 10.47 K/UL

## 2018-02-11 PROCEDURE — 25000003 PHARM REV CODE 250: Performed by: PHYSICIAN ASSISTANT

## 2018-02-11 PROCEDURE — 97110 THERAPEUTIC EXERCISES: CPT

## 2018-02-11 PROCEDURE — 84100 ASSAY OF PHOSPHORUS: CPT

## 2018-02-11 PROCEDURE — 25000003 PHARM REV CODE 250: Performed by: STUDENT IN AN ORGANIZED HEALTH CARE EDUCATION/TRAINING PROGRAM

## 2018-02-11 PROCEDURE — 97535 SELF CARE MNGMENT TRAINING: CPT

## 2018-02-11 PROCEDURE — 83735 ASSAY OF MAGNESIUM: CPT

## 2018-02-11 PROCEDURE — A4216 STERILE WATER/SALINE, 10 ML: HCPCS | Performed by: STUDENT IN AN ORGANIZED HEALTH CARE EDUCATION/TRAINING PROGRAM

## 2018-02-11 PROCEDURE — 11000001 HC ACUTE MED/SURG PRIVATE ROOM

## 2018-02-11 PROCEDURE — 97530 THERAPEUTIC ACTIVITIES: CPT

## 2018-02-11 PROCEDURE — 63600175 PHARM REV CODE 636 W HCPCS: Performed by: STUDENT IN AN ORGANIZED HEALTH CARE EDUCATION/TRAINING PROGRAM

## 2018-02-11 PROCEDURE — 36415 COLL VENOUS BLD VENIPUNCTURE: CPT

## 2018-02-11 PROCEDURE — 80053 COMPREHEN METABOLIC PANEL: CPT

## 2018-02-11 PROCEDURE — 85025 COMPLETE CBC W/AUTO DIFF WBC: CPT

## 2018-02-11 PROCEDURE — 99231 SBSQ HOSP IP/OBS SF/LOW 25: CPT | Mod: GC,,, | Performed by: HOSPITALIST

## 2018-02-11 RX ORDER — FUROSEMIDE 10 MG/ML
20 INJECTION INTRAMUSCULAR; INTRAVENOUS ONCE
Status: COMPLETED | OUTPATIENT
Start: 2018-02-11 | End: 2018-02-11

## 2018-02-11 RX ADMIN — THERA TABS 1 TABLET: TAB at 09:02

## 2018-02-11 RX ADMIN — GENTAMICIN SULFATE 2 DROP: 3 SOLUTION/ DROPS OPHTHALMIC at 06:02

## 2018-02-11 RX ADMIN — GENTAMICIN SULFATE 2 DROP: 3 SOLUTION/ DROPS OPHTHALMIC at 09:02

## 2018-02-11 RX ADMIN — RAMELTEON 8 MG: 8 TABLET, FILM COATED ORAL at 08:02

## 2018-02-11 RX ADMIN — SODIUM CHLORIDE, PRESERVATIVE FREE 5 ML: 5 INJECTION INTRAVENOUS at 06:02

## 2018-02-11 RX ADMIN — METOPROLOL SUCCINATE 25 MG: 25 TABLET, EXTENDED RELEASE ORAL at 09:02

## 2018-02-11 RX ADMIN — MAGNESIUM SULFATE HEPTAHYDRATE 2 G: 500 INJECTION, SOLUTION INTRAMUSCULAR; INTRAVENOUS at 02:02

## 2018-02-11 RX ADMIN — OYSTER SHELL CALCIUM WITH VITAMIN D 1 TABLET: 500; 200 TABLET, FILM COATED ORAL at 08:02

## 2018-02-11 RX ADMIN — LOSARTAN POTASSIUM 50 MG: 50 TABLET, FILM COATED ORAL at 09:02

## 2018-02-11 RX ADMIN — OYSTER SHELL CALCIUM WITH VITAMIN D 1 TABLET: 500; 200 TABLET, FILM COATED ORAL at 10:02

## 2018-02-11 RX ADMIN — MAGNESIUM SULFATE HEPTAHYDRATE 2 G: 500 INJECTION, SOLUTION INTRAMUSCULAR; INTRAVENOUS at 09:02

## 2018-02-11 RX ADMIN — GENTAMICIN SULFATE 2 DROP: 3 SOLUTION/ DROPS OPHTHALMIC at 02:02

## 2018-02-11 RX ADMIN — LOSARTAN POTASSIUM 50 MG: 50 TABLET, FILM COATED ORAL at 08:02

## 2018-02-11 RX ADMIN — ACETAMINOPHEN 650 MG: 325 TABLET, FILM COATED ORAL at 08:02

## 2018-02-11 RX ADMIN — ACETAMINOPHEN 650 MG: 325 TABLET, FILM COATED ORAL at 02:02

## 2018-02-11 RX ADMIN — SODIUM CHLORIDE, PRESERVATIVE FREE 5 ML: 5 INJECTION INTRAVENOUS at 02:02

## 2018-02-11 RX ADMIN — PANTOPRAZOLE SODIUM 40 MG: 40 TABLET, DELAYED RELEASE ORAL at 09:02

## 2018-02-11 RX ADMIN — BENZONATATE 100 MG: 100 CAPSULE ORAL at 06:02

## 2018-02-11 RX ADMIN — SIMVASTATIN 40 MG: 40 TABLET, FILM COATED ORAL at 08:02

## 2018-02-11 RX ADMIN — ACETAMINOPHEN 650 MG: 325 TABLET, FILM COATED ORAL at 06:02

## 2018-02-11 RX ADMIN — BENZONATATE 100 MG: 100 CAPSULE ORAL at 08:02

## 2018-02-11 RX ADMIN — BENZONATATE 100 MG: 100 CAPSULE ORAL at 02:02

## 2018-02-11 RX ADMIN — SODIUM CHLORIDE, PRESERVATIVE FREE 5 ML: 5 INJECTION INTRAVENOUS at 09:02

## 2018-02-11 RX ADMIN — ESCITALOPRAM OXALATE 15 MG: 10 TABLET ORAL at 08:02

## 2018-02-11 RX ADMIN — FUROSEMIDE 20 MG: 10 INJECTION, SOLUTION INTRAMUSCULAR; INTRAVENOUS at 02:02

## 2018-02-11 RX ADMIN — FOLIC ACID 1 MG: 1 TABLET ORAL at 09:02

## 2018-02-11 NOTE — ASSESSMENT & PLAN NOTE
Per daughter patient has had cognitive decline with sundowning over last 6 months  - TSH wnl, on folic acid and theragrain  - Family would like care home placement but patient has been refusing  - Delirium precautions  - Continue home escitalopram 15mg qhs  - Will refer for neurocognitive testing and consideration of ?donepezil/ memantine on discharge  - Scheduled ramelteon helping with sundowning - continue.

## 2018-02-11 NOTE — NURSING
"Pt is having a "coughing spell" that is causing pt discomfort in her chest.  Called respiratory to administer PRN treatment, RT says she will make it to the unit soon to give scheduled and PRN treatments.  Called IM 4 and asked MD if pt could have anything other than homer morelos MD says she is not familiar with the pt and needs to review chart.  Will continue to monitor.   "

## 2018-02-11 NOTE — ASSESSMENT & PLAN NOTE
Thickened dystrophic toenails, patient and daughter unable to cut them at home but does not meet criteria for inpatient Podiatry consult, requested referral to Podiatry in [place] on discharge

## 2018-02-11 NOTE — PT/OT/SLP PROGRESS
Occupational Therapy   Treatment    Name: Talia Alberts  MRN: [id number]  Admitting Diagnosis:  Compression fracture of first lumbar vertebra       Recommendations:     Discharge Recommendations: nursing facility, skilled  Discharge Equipment Recommendations:  none    Subjective   Pt reported that she wanted to sit up in chair.  Communicated with: RN prior to session.  Pain/Comfort:  · Pain Rating 1: 0/10  · Pain Rating Post-Intervention 1: 0/10    Patients cultural, spiritual, Buddhism conflicts given the current situation: Christian    Objective:     Patient found with: [place]    General Precautions: Standard, aspiration, fall   Orthopedic Precautions:spinal precautions   Braces: LSO     Occupational Performance:    Bed Mobility:    · Patient completed Supine to Sit with contact guard assistance     Functional Mobility/Transfers:  · Patient completed Sit <> Stand Transfer with minimum assistance  with  rolling walker   · Patient completed Bed <> Chair Transfer using Stand Pivot technique with minimum assistance with rolling walker  · Patient completed Toilet Transfer Stand Pivot technique with minimum assistance with  rolling walker  · Functional Mobility: Pt performed functional mobility to bathroom & back using RW with CGA.    Activities of Daily Living:  · Feeding:  set up (A) while seated in chair  · Grooming: contact guard assistance while standing at sink  · UB Dressing: total (A) donning brace while seated EOB  · LB Dressing: contact guard assistance donning socks seated in chair with education on techniques to limit flexion at hips/lower trunk  · Toileting: minimum assistance from standard commode with (A) for clothing management    Patient left up in chair with all lines intact, call button in reach, RN notified and white board updated.    AMPA 6 Click:  AMPAC Total Score: 18    Treatment & Education:  Pt performed AROM exercises with BUE in all planes x 10 reps each while seated in chair.  Provided  education regarding need for (A) with all OOB activities & spinal precautions.  Pt had no further questions & when asked whether there were any concerns pt reported none.    Education:    Assessment:     Talia Alberts is a 89 y.o. female with a medical diagnosis of Compression fracture of first lumbar vertebra.  She presents with good participation and motivation.  Pt with increased overall mobility on this date.  Performance deficits affecting function are weakness, impaired endurance, impaired self care skills, impaired functional mobilty, impaired balance, impaired cognition, decreased upper extremity function, decreased lower extremity function, decreased safety awareness.      Rehab Prognosis:  fair; patient would benefit from acute skilled OT services to address these deficits and reach maximum level of function.       Plan:     Patient to be seen 4 x/week to address the above listed problems via self-care/home management, therapeutic activities, therapeutic exercises  · Plan of Care Expires: 03/12/18  · Plan of Care Reviewed with: patient    This Plan of care has been discussed with the patient who was involved in its development and understands and is in agreement with the identified goals and treatment plan    GOALS:    Occupational Therapy Goals        Problem: Occupational Therapy Goal    Goal Priority Disciplines Outcome Interventions   Occupational Therapy Goal     OT, PT/OT Ongoing (interventions implemented as appropriate)    Description:  Goals to be met by: 2/17/18     Patient will increase functional independence with ADLs by performing:    UE Dressing with Modified [place].  LE Dressing with Modified [place].  Grooming while standing with Supervision.  Toileting from toilet with Supervision for hygiene and clothing management.   Rolling to Bilateral with Modified [place].   Supine to sit with Modified [place].  Stand pivot transfers with Modified [place] using RW.                       Time Tracking:     OT Date of Treatment: 02/11/18  OT Start Time: 1129  OT Stop Time: 1206  OT Total Time (min): 37 min    Billable Minutes:Self Care/Home Management 20  Therapeutic Exercise 15    PHUC Cox  2/11/2018

## 2018-02-11 NOTE — ASSESSMENT & PLAN NOTE
- Complains of dry cough for last several days  - CXR with cardiomegaly, PNA vs pulmonary edema  - Afebrile and no other signs of URI/LRI, will add on pro-krish  - Schedule benzoate  - Elevated BNP with pulmonary edema on CXR although echo with negative SNIFF suggesting normal right atrial pressure.  - IV furosemide 20 mg on 2/10 and 2/11; patient's cough improving. Discussed with daughter regarding risk of orthostatic hypotension and falls with continuing furosemide on discharge, will plan to discharge on furosemide PRN weight gain.

## 2018-02-11 NOTE — PLAN OF CARE
Problem: Patient Care Overview  Goal: Plan of Care Review  Outcome: Ongoing (interventions implemented as appropriate)  Pt AAOx4 and achieving adequate pain relief from prescribed meds. Frequent rounds made to assess for safety and discomfort, fall precautions maintained. Bed locked in lowest position. Call bell within reach. See corresponding flowsheets for full documentation. Will continue to monitor.

## 2018-02-11 NOTE — PLAN OF CARE
Problem: Physical Therapy Goal  Goal: Physical Therapy Goal  Goals to be met by: 2018     Patient will increase functional independence with mobility by performin. Sit to stand transfer with Modified [place]  2. Bed to chair transfer with Supervision using Rolling Walker  3. Gait  x 80 feet with Supervision using Rolling Walker.   4. Lower extremity exercise program x30 reps per handout, with independence        Discharge Recommendations: SNF    Pt safe to transfer OOB/BTB with RN/PCT via SPT: Use RW/no AD with mod A of 1 person.    Goals remain appropriate.     Lo Godinez, PTA.   [phone]   2018

## 2018-02-11 NOTE — ASSESSMENT & PLAN NOTE
Per daughter patient has had cognitive decline with sundowning over last 6 months  - TSH wnl, on folic acid and theragrain  - Family would like alf placement but patient has been refusing  - Delirium precautions  - Continue home escitalopram 15mg qhs  - Will refer for neurocognitive testing and consideration of ?donepezil/ memantine on discharge  - Scheduled ramelteon helping with sundowning - continue.

## 2018-02-11 NOTE — ASSESSMENT & PLAN NOTE
2/2 fall (most likely sustained from fall on Sunday)  - C[place]:   New small left parasagittal frontal lobe cortical hemorrhagic parenchymal contusion without evidence of mass effect or caroline hematoma.  - CTH at [facility] [place]:   Trace parenchymal hyperdensity noted within the paramedian aspect of the right frontal lobe, somewhat less conspicuous compared to prior outside CT examination which could represent trace component of parenchymal hemorrhage. No definite additional new intracranial hemorrhage is identified. There is no midline shift.  - Seen by Neurosurgery in ED, not for any surgical interventions  - Fall and delirium precautions  - Continue BP meds, goal SBP <140, hydralazine 10mg IV q8 PRN for SBP >140  - Holding ASA 81 and avoiding NSAIDs

## 2018-02-11 NOTE — SUBJECTIVE & OBJECTIVE
Review of Systems   Constitutional: Negative for chills and fever.   Eyes: Negative for photophobia and visual disturbance.   Respiratory: Positive for cough. Negative for shortness of breath. Choking: chronic.    Cardiovascular: Negative for chest pain and leg swelling.   Gastrointestinal: Negative for abdominal pain, nausea and vomiting.   Genitourinary: Negative for dysuria.   Musculoskeletal: Positive for back pain.   Skin: Positive for wound.   Neurological: Positive for dizziness. Negative for seizures and headaches.     Objective:     Vital Signs (Most Recent):  Temp: 97.8 °F (36.6 °C) (02/10/18 1616)  Pulse: 65 (02/10/18 1616)  Resp: 18 (02/10/18 1616)  BP: (!) 146/65 (02/10/18 1616)  SpO2: 96 % (02/10/18 1616) Vital Signs (24h Range):  Temp:  [97.8 °F (36.6 °C)-98.3 °F (36.8 °C)] 97.8 °F (36.6 °C)  Pulse:  [54-79] 65  Resp:  [16-18] 18  SpO2:  [94 %-98 %] 96 %  BP: (121-190)/(58-79) 146/65     Weight: 49.6 kg (109 lb 5.6 oz)  Body mass index is 21.36 kg/m².    Intake/Output Summary (Last 24 hours) at 02/10/18 1906  Last data filed at 02/10/18 1800   Gross per 24 hour   Intake              500 ml   Output                0 ml   Net              500 ml      Physical Exam   Constitutional: She is oriented to person, place, and time. No distress.   Thin, frail   HENT:   Large hematoma over left forehead  Healing ecchymoses over face and arms    Eyes: EOM are normal. Pupils are equal, round, and reactive to light. Right conjunctiva is injected. Left conjunctiva is injected.   Mild ectropion of both eyes   Neck: Normal range of motion. Neck supple.   Cardiovascular: Normal rate, regular rhythm and intact distal pulses.    Murmur heard.   Systolic murmur is present with a grade of 2/6   Pulmonary/Chest: Effort normal. No respiratory distress. She has no wheezes. She has rales (Mild intermittent inspiratory crackles at bases; difficult to place stethoscope secondary to back brace. ).   Abdominal: Soft. Bowel  sounds are normal. She exhibits no distension.   Musculoskeletal: She exhibits no edema.   Neurological: She is alert and oriented to person, place, and time. She has normal strength. She displays normal reflexes. No sensory deficit.   Skin: She is not diaphoretic.       Significant Labs:   BMP:   Recent Labs  Lab 02/10/18  0348         K 3.9      CO2 27   BUN 17   CREATININE 0.9   CALCIUM 9.0   MG 1.8     CBC:   Recent Labs  Lab 02/09/18  0344 02/10/18  0348   WBC 9.71 9.67   HGB 11.3* 10.5*   HCT 33.7* 31.1*    197       Significant Imaging:   Imaging Results          X-Ray Chest 1 View (Final result)  Result time 02/09/18 09:12:03    Final result by Antelmo Dawson III, MD (02/09/18 09:12:03)                 Impression:     Pulmonary edema versus pneumonia.      Electronically signed by: ANTELMO DAWSON MD  Date:     02/09/18  Time:    09:12              Narrative:    One view: There is cardiomegaly, mild edema, and postoperative change. There are right shoulder erosions.                             CT Head Without Contrast (Final result)  Result time 02/08/18 05:12:40    Final result by Grady Wilson MD (02/08/18 05:12:40)                 Impression:        1. Trace parenchymal hyperdensity within the paramedian right frontal lobe, slightly less conspicuous compared to prior CT examination, which could represent trace component of parenchymal hemorrhage/contusion. No evidence of new intracranial hemorrhage or other detrimental interval change from prior study.    2. Generalized cerebral volume loss chronic microvascular ischemic change. Remote lacunar type infarcts as above.    3. Prominent soft tissue hematoma overlying the left frontal calvarium.      Electronically signed by: GRADY WILSON  Date:     02/08/18  Time:    05:12              Narrative:    Procedure comments: CT examination of the head was performed from the skull base through the vertex without the use of  intravenous contrast using 5-mm axial images.    Comparison: CT head 2/7/2018 1802 hrs.     Findings:    There is trace parenchymal hyperdensity noted within the paramedian aspect of the right frontal lobe, somewhat less conspicuous compared to prior outside CT examination which could represent trace component of parenchymal hemorrhage. No definite additional new intracranial hemorrhage is identified. There is no midline shift.    There is age appropriate generalized cerebral atrophy with compensatory ventricular enlargement and sulcal widening. There are patchy regions of hypoattenuation in the supratentorial white matter likely consistent with chronic microvascular ischemic changes.Remote lacunar type infarcts are identified within the right basal ganglia left thalamus, and bilateral cerebellar hemispheres. The ventricular system is stable in size and configuration. No extra-axial collections are appreciated.The visualized paranasal sinuses and mastoid air cells are clear.There is a prominent soft tissue hematoma overlying the left frontal calvarium. The calvarium appears intact.                             CT Cervical Spine Without Contrast (Final result)  Result time 02/08/18 05:22:35    Final result by Alex Wilson MD (02/08/18 05:22:35)                 Impression:         1. No evidence of acute fracture or traumatic subluxation of the cervical spine. Stable postsurgical change and degenerative arthropathy of the cervical spine as above.    2. Partially visualized mildly enlarged precarinal lymph node measuring 1.4 cm. Correlation with patient history is advised. Further evaluation with dedicated thoracic imaging as clinically warranted.      Electronically signed by: ALEX WILSON  Date:     02/08/18  Time:    05:22              Narrative:    Procedure comment: 2-mm axial images through the cervical spine were obtained without the use of IV contrast.  Sagittal, coronal, and axial reformatted images  were reviewed.    Comparison: CT cervical spine 2/4/2018    Findings:    There are postoperative changes related to prior anterior cervical fusion at the level of C5-C6 and C6-C7. There is mild anterolisthesis of C3 on C4 and C4 on C5, similar to prior examination. No definite evidence of acute fracture. Cervical vertebral body heights appear well-maintained.    There is prominent degenerative arthrosis about the dens with associated prominent panus formation which narrows the craniocervical junction and likely effaces the anterior aspect of the thecal sac. There is prominent uncovertebral spurring and facet arthropathy throughout the lower cervical spine resulting in multilevel neural foraminal narrowing, most pronounced at C5-C6 and C6-C7. Findings remain similar to prior CT examination.    There is prominent atherosclerotic calcification of the thoracic aorta. There is an apparent partially visualized mildly enlarged 1.4 cm precarinal lymph node.  There is partially visualized right-sided pleural fluid. The visualized lung apices are otherwise unremarkable.  Please refer to separate dictated CT head report for intracranial findings.                             CT Lumbar Spine Without Contrast (Final result)  Result time 02/08/18 06:20:52    Final result by Grady Wilson MD (02/08/18 06:20:52)                 Impression:        1. Moderate L1 compression deformity, new compared to prior lumbar spine radiograph of 10/15/2015. Additional remote L2 compression deformity status post prior vertebroplasty. There is associated retropulsion of the L1 and L2 vertebral bodies resulting in moderate spinal canal stenosis and severe bilateral neuroforaminal narrowing as discussed above.    2.  Additional multilevel lumbar spondylosis most prominent at L4-5 resulting in moderate spinal canal stenosis. Please see above for level by level details.    3. Chronic appearing left sacral alar fracture.    4.  Trace right pleural  effusion with mild associated compressive atelectasis.    5.  Additional findings as above.  ______________________________________     Electronically signed by resident: LOIS ZUÑIGA MD  Date:     02/08/18  Time:    05:47            As the supervising and teaching physician, I personally reviewed the images and resident's interpretation and I agree with the findings.          Electronically signed by: ALEX GUTIERRES  Date:     02/08/18  Time:    06:20              Narrative:    CT lumbar spine    02/08/18 04:40:00    Accession# [id number]    CLINICAL INDICATION: 89 year old F with Spine fracture, traumatic, lumbar      TECHNIQUE:   Axial CT images obtained throughout the region of the lumbar spine without intravenous contrast. Axial, sagittal, and coronal reconstructions were performed.    COMPARISON: Lumbar spine radiograph AP/lateral 02/07/2018    FINDINGS:     Vertebral bodies: There is a moderate compression deformity of the L1 vertebral body with approximately 40% height loss. This is new compared to prior lumbar spine radiograph of 10/15/2015. There is slight retropulsion of the inferior posterior cortex of L1 with resulting mild to moderate spinal canal stenosis. There is a remote compression deformity of the L2 vertebral body status post prior vertebroplasty. There is continued retropulsion of the posterior cortex of the L2 vertebral body with moderate spinal canal stenosis. The remaining lumbar vertebral body heights appear well-maintained    Sagittal alignment: There is mild retrolisthesis of L2 on L3.    Spondylosis: Disc space narrowing and vacuum disc deformity at L1-L2.    T12-L1: Minimal diffuse posterior disc bulge without significant spinal canal stenosis or neural foraminal narrowing.    L1-L2: Retropulsion of L1/L2, as described above, resulting in moderate spinal canal stenosis and severe bilateral neuroforaminal narrowing.    L2-L3: Mild diffuse posterior disc bulge and small amount of  retrolisthesis results in mild spinal canal stenosis and severe bilateral neural foraminal narrowing.    L3-L4: Diffuse posterior disc bulge and bilateral facet arthropathy resulting in mild spinal canal stenosis and neural foraminal narrowing.    L4-L5: Diffuse posterior disc bulge, bilateral facet arthropathy, and ligamentum flavum hypertrophy resulting in moderate spinal canal stenosis and mild bilateral neural foraminal narrowing.    L5-S1: Diffuse posterior disc bulge and bilateral facet arthropathy resulting in mild spinal canal stenosis and moderate right neural foraminal narrowing.    There is a chronic appearing fracture of the left sacral ala.    Paraspinal soft tissues: There is a moderate-sized hiatal hernia. Trace right pleural effusion with mild associated compressive atelectasis.  Minimal left basilar atelectasis.  Extensive calcification of the visualized arterial system.                            2D echo with CFD CONCLUSIONS     1 - Normal left ventricular systolic function (EF 60-65%).     2 - Concentric remodeling.     3 - No wall motion abnormalities.     4 - Impaired LV relaxation, elevated LAP (grade 2 diastolic dysfunction).     5 - Biatrial enlargement.     6 - Normal right ventricular systolic function .     7 - The estimated PA systolic pressure is 38 mmHg.     8 - Moderate aortic stenosis, SILVESTRE = 1.09 cm2, AVAi = 0.77 cm2/m2, peak velocity = 2.57 m/s, mean gradient = 13 mmHg.     9 - Trivial aortic regurgitation.     10 - Trivial mitral regurgitation.     11 - Mild tricuspid regurgitation.     This document has been electronically    SIGNED BY: Cody Schulte MD On: 02/09/2018 15:31

## 2018-02-11 NOTE — PT/OT/SLP PROGRESS
"Physical Therapy Treatment    Patient Name:  Talia Alberts   MRN:  [id number]  Admitting Diagnosis: Compression fracture of first lumbar vertebra  Recent Surgery: * No surgery found *      Recommendations:     Discharge Recommendations:  nursing facility, skilled   Discharge Equipment Recommendations: none   Barriers to discharge: Inaccessible home and Decreased caregiver support    Plan:     During this hospitalization, patient to be seen 4 x/week to address the above listed problems via gait training, therapeutic activities, therapeutic exercises, neuromuscular re-education  · Plan of Care Expires:  03/10/18   Plan of Care Reviewed with: patient    This Plan of care has been discussed with the patient who was involved in its development and understands and is in agreement with the identified goals and treatment plan    Subjective     Communicated with RN (Marium) prior to session.     Patient comments: "I'm still coughing"  "They told me I'm not supposed to get OOB by myself"  Pt unable to recall Spinal prec    Pain/Comfort:  · Pain Rating 1: 0/10  · Pain Rating Post-Intervention 1: 0/10    Objective:     Patient found with: PureWick, peripheral IV, bed alarm, SCD    Patient found sup in bed upon PT entry to room, agreeable to treatment.  No family present in the room.    2 attempts for tx session 2* RN (Marium) reports "she just got BTB.  Could you come back later?" (2:45 pm)  General Precautions: Standard, Cardiac aspiration, fall   Orthopedic Precautions:N/A   Braces: N/A       BED MOBILITY (with vc's for sequencing and safe technique of functional mobility):        Rolling to the R with mod A (to maintain prec) no use of bedrail       Rolling to the L with mod A (to maintain prec) no use of bedrail       Sup > sit at the EOB with mod A (for trunk elevation and mgt of B LE's from L side lying        Sit > sup with mod A for lowering of trunk and mgt of B LE's       Scooting hips to the EOB upon sitting with min " A x2 scoots    Pt found with diaper wet with urine requiring assistance for diaper change and hygiene prior to sitting at the EOB        Pt requires assistance donning LSO upon sitting at the EOB     SITTING AT THE EDGE OF THE BED    Assistance Level Required: SBA for safety with B UE support     Postural deviations noted: flexed thoracic spine, PPT, rounded shoulders, forward head   Encouraged: upright posture and safety        TRANSFERS   Patient completed Sit <> Stand Transfer from EOB with min A with rolling walker    Patient completed Stand <> Sit Transfer to EOB with min A with rolling walker       STANDING        Pt tolerates standing with RW requiring CGA for safety with vc's.      GAIT:   Patient ambulated: 3-4 lateral steps to the L (limited distance 2* orthostatic hypotension)   Patient required: mod/min A for balance and mgt of AD   Patient used:  Rolling Walker   Gait Pattern observed: 2-point gait   Gait Deviation(s): decreased ying, increased time in double stance, decreased step length and decreased weight-shifting ability    Comments: vc's for directional guidance, placement of AD, step length and safety      RN checked BP throughout tx session and she found pt to be orthostatic hypotensive.  Please refer to Nursing flowsheets for numerical BP values.    EDUCATION  Education provided to pt regarding: spinal precautions, bed mob, transfers and importance and benefits of performing exercises and functional mobility.    Whiteboard updated with correct mobility information. RN/PCT notified.  Pt safe to transfer OOB/BTB with RN/PCT via SPT: Use RW/no AD with mod A of 1 person    Patient left supine, with  all lines intact, call button in reach, bed alarm on and RN notified    AM-PAC 6 CLICK MOBILITY  Turning over in bed (including adjusting bedclothes, sheets and blankets)?: 2  Sitting down on and standing up from a chair with arms (e.g., wheelchair, bedside commode, etc.): 3  Moving from lying on  back to sitting on the side of the bed?: 3  Moving to and from a bed to a chair (including a wheelchair)?: 3  Need to walk in hospital room?: 3  Climbing 3-5 steps with a railing?: 2  Total Score: 16     Assessment:     Talia Alberts is a 89 y.o. female admitted with a medical diagnosis of Compression fracture of first lumbar vertebra.  She presents with the following impairments/functional limitations:  weakness, impaired endurance, impaired self care skills, impaired functional mobilty, gait instability, impaired balance, impaired cognition, decreased safety awareness, orthopedic precautions. requiring assistance and verbal cues for bed mob, transfers and gait to prevent non-compliance of spinal prec during functional mobility.   In light of pt's current functional level and deficits, it is anticipated that pt will need to participate in an intense rehab program consisting of PT and OT in order to achieve full rehab potential to return to previous level of function and roles.  Pt will cont to benefit from skilled PT intervention to address deficits and improve functional mobility.       Rehab Prognosis:  Good; patient would benefit from acute skilled PT services to address these deficits and reach maximum level of function.      GOALS:    Physical Therapy Goals        Problem: Physical Therapy Goal    Goal Priority Disciplines Outcome Goal Variances Interventions   Physical Therapy Goal     PT/OT, PT Ongoing (interventions implemented as appropriate)     Description:  Goals to be met by: 2018     Patient will increase functional independence with mobility by performin. Sit to stand transfer with Modified [place]  2. Bed to chair transfer with Supervision using Rolling Walker  3. Gait  x 80 feet with Supervision using Rolling Walker.   4. Lower extremity exercise program x30 reps per handout, with independence                       Time Tracking:     PT Received On: 18  PT Start Time: 6157      PT Stop Time: 1642  PT Total Time (min): 38 min     Billable Minutes: Therapeutic Activity 38    Treatment Type: Treatment  PT/PTA: PTA     PTA Visit Number: 1       Lo Godinez PTA.  Pager [phone]    2/11/2018    .

## 2018-02-11 NOTE — SUBJECTIVE & OBJECTIVE
Review of Systems   Constitutional: Negative for chills and fever.   Eyes: Negative for photophobia and visual disturbance.   Respiratory: Positive for cough (improving). Negative for shortness of breath.    Cardiovascular: Negative for chest pain and leg swelling.   Gastrointestinal: Negative for abdominal pain, nausea and vomiting.   Genitourinary: Negative for dysuria.   Musculoskeletal: Positive for back pain.   Skin: Positive for wound.   Neurological: Positive for dizziness. Negative for seizures and headaches.     Objective:     Vital Signs (Most Recent):  Temp: 96.8 °F (36 °C) (02/11/18 0747)  Pulse: (!) 58 (02/11/18 0747)  Resp: 18 (02/11/18 0747)  BP: (!) 186/77 (02/11/18 0747)  SpO2: 96 % (02/11/18 0747) Vital Signs (24h Range):  Temp:  [96.3 °F (35.7 °C)-98.2 °F (36.8 °C)] 96.8 °F (36 °C)  Pulse:  [54-67] 58  Resp:  [16-18] 18  SpO2:  [95 %-97 %] 96 %  BP: (121-186)/(58-77) 186/77     Weight: 49.6 kg (109 lb 5.6 oz)  Body mass index is 21.36 kg/m².    Intake/Output Summary (Last 24 hours) at 02/11/18 1135  Last data filed at 02/11/18 0600   Gross per 24 hour   Intake              350 ml   Output              400 ml   Net              -50 ml      Physical Exam   Constitutional: She is oriented to person, place, and time. No distress.   Thin, frail   HENT:   Large hematoma over left forehead  Healing ecchymoses over face and arms    Eyes: Left conjunctiva is injected.   Cardiovascular: Normal rate, regular rhythm and intact distal pulses.    Murmur heard.   Systolic murmur is present with a grade of 2/6   Pulmonary/Chest: Effort normal. No respiratory distress. She has no wheezes. She has rales (Mild intermittent inspiratory crackles at bases).   Abdominal: Soft. Bowel sounds are normal. She exhibits no distension.   Musculoskeletal: She exhibits no edema.   Neurological: She is alert and oriented to person, place, and time. She has normal strength. She displays normal reflexes. No sensory deficit.    Skin: She is not diaphoretic.       Significant Labs:   BMP:     Recent Labs  Lab 02/11/18  0338   GLU 94      K 4.0      CO2 27   BUN 19   CREATININE 0.8   CALCIUM 9.2   MG 1.6     CBC:     Recent Labs  Lab 02/10/18  0348 02/11/18  0338   WBC 9.67 10.47   HGB 10.5* 11.1*   HCT 31.1* 33.2*    202       Significant Imaging: No interval imaging.

## 2018-02-11 NOTE — PROGRESS NOTES
"[facility] Medicine  Progress Note    Patient Name: Talia Alberts  MRN: [id number]  Patient Class: IP- Inpatient   Admission Date: 2/8/2018  Length of Stay: 2 days  Attending Physician: Gerald Tyler MD  Primary Care Provider: Michael Martin MD    [facility] Medicine Team: [facility] HOSP MED 4 Baljinder Lutz MD    Subjective:     Principal Problem:Compression fracture of first lumbar vertebra    HPI:  89yo F with CAD s/p CABG and SCC of skin, transferred from [facility] with ICH. Patient reports that she woke up around 5am yesterday morning and need to go to bathroom, states she stood up after finishing voiding and felt dizzy then tried to sit down but fell instead. Per daughter at bedside, she was awoken by a loud noise and found her mother on the floor confused- daughter says patient thought she was at the kitchen sink "rex". Daughter states patient has had worsening confusion and sundowning over the last few months and has had several falls before. Last big fall was Sunday. Daughter says patient usually ambulates with a walker, but was not using at time of fall. Patient is independent with bathing and dressing but does not drive or cook, lives with her daughter and has been refusing residential care. Patient takes ASA 81 for her CAD as well as Toprol and losartan.    In ED at [facility] she was found to have new small left parasagittal hemorrhage without evidence of mass effect, transferred to [facility] yesterday evening for Neurosurgery evaluation. In ED here CT spine also found compression fracture of L1.     Patient denies chest pain, palpitations and SOB. Reports chronic dry cough for which she has been taking pearls. No fever/chills. No abdo pain/ nausea/ vomiting. No dysuria/ urgency.    Hospital Course:  02/08/2018: Admitted to 4. Seen by Neurosurgery who recommended no intervention for ICH, but patient should wear back brace for L1 compression fracture and follow up " with them in clinic in 4 weeks.  02/09/2018: Complains of dry cough, but no other subjective complaints, pain well controlled. Episode of A fib overnight on telemetry, no RVR. Hypertensive this morning- home BP meds given early. Awaiting back brace and then can work with PT/OT.  02/10/2018 : No acute events overnight. Patient slept well on scheduled ramelteon with no witnessed sundowning or delirium per daughter. States she has been voiding well on furosemide and cough has significantly improved, whether on diuretic or scheduled cough drops. UOP not recorded and patient is incontinent and soaks bed although approximately 250 ml seen in purewick container. No fevers or chills; back pain well-controlled and tolerating brace. Working with PT this morning.       Review of Systems   Constitutional: Negative for chills and fever.   Eyes: Negative for photophobia and visual disturbance.   Respiratory: Positive for cough. Negative for shortness of breath. Choking: chronic.    Cardiovascular: Negative for chest pain and leg swelling.   Gastrointestinal: Negative for abdominal pain, nausea and vomiting.   Genitourinary: Negative for dysuria.   Musculoskeletal: Positive for back pain.   Skin: Positive for wound.   Neurological: Positive for dizziness. Negative for seizures and headaches.     Objective:     Vital Signs (Most Recent):  Temp: 97.8 °F (36.6 °C) (02/10/18 1616)  Pulse: 65 (02/10/18 1616)  Resp: 18 (02/10/18 1616)  BP: (!) 146/65 (02/10/18 1616)  SpO2: 96 % (02/10/18 1616) Vital Signs (24h Range):  Temp:  [97.8 °F (36.6 °C)-98.3 °F (36.8 °C)] 97.8 °F (36.6 °C)  Pulse:  [54-79] 65  Resp:  [16-18] 18  SpO2:  [94 %-98 %] 96 %  BP: (121-190)/(58-79) 146/65     Weight: 49.6 kg (109 lb 5.6 oz)  Body mass index is 21.36 kg/m².    Intake/Output Summary (Last 24 hours) at 02/10/18 1906  Last data filed at 02/10/18 1800   Gross per 24 hour   Intake              500 ml   Output                0 ml   Net              500 ml       Physical Exam   Constitutional: She is oriented to person, place, and time. No distress.   Thin, frail   HENT:   Large hematoma over left forehead  Healing ecchymoses over face and arms    Eyes: EOM are normal. Pupils are equal, round, and reactive to light. Right conjunctiva is injected. Left conjunctiva is injected.   Mild ectropion of both eyes   Neck: Normal range of motion. Neck supple.   Cardiovascular: Normal rate, regular rhythm and intact distal pulses.    Murmur heard.   Systolic murmur is present with a grade of 2/6   Pulmonary/Chest: Effort normal. No respiratory distress. She has no wheezes. She has rales (Mild intermittent inspiratory crackles at bases; difficult to place stethoscope secondary to back brace. ).   Abdominal: Soft. Bowel sounds are normal. She exhibits no distension.   Musculoskeletal: She exhibits no edema.   Neurological: She is alert and oriented to person, place, and time. She has normal strength. She displays normal reflexes. No sensory deficit.   Skin: She is not diaphoretic.       Significant Labs:   BMP:   Recent Labs  Lab 02/10/18  0348         K 3.9      CO2 27   BUN 17   CREATININE 0.9   CALCIUM 9.0   MG 1.8     CBC:   Recent Labs  Lab 02/09/18  0344 02/10/18  0348   WBC 9.71 9.67   HGB 11.3* 10.5*   HCT 33.7* 31.1*    197       Significant Imaging:   Imaging Results          X-Ray Chest 1 View (Final result)  Result time 02/09/18 09:12:03    Final result by Antelmo Dawson III, MD (02/09/18 09:12:03)                 Impression:     Pulmonary edema versus pneumonia.      Electronically signed by: ANTELMO DAWSON MD  Date:     02/09/18  Time:    09:12              Narrative:    One view: There is cardiomegaly, mild edema, and postoperative change. There are right shoulder erosions.                             CT Head Without Contrast (Final result)  Result time 02/08/18 05:12:40    Final result by Grady Wilson MD (02/08/18 05:12:40)                  Impression:        1. Trace parenchymal hyperdensity within the paramedian right frontal lobe, slightly less conspicuous compared to prior CT examination, which could represent trace component of parenchymal hemorrhage/contusion. No evidence of new intracranial hemorrhage or other detrimental interval change from prior study.    2. Generalized cerebral volume loss chronic microvascular ischemic change. Remote lacunar type infarcts as above.    3. Prominent soft tissue hematoma overlying the left frontal calvarium.      Electronically signed by: ALEX GUTIERRES  Date:     02/08/18  Time:    05:12              Narrative:    Procedure comments: CT examination of the head was performed from the skull base through the vertex without the use of intravenous contrast using 5-mm axial images.    Comparison: CT head 2/7/2018 1802 hrs.     Findings:    There is trace parenchymal hyperdensity noted within the paramedian aspect of the right frontal lobe, somewhat less conspicuous compared to prior outside CT examination which could represent trace component of parenchymal hemorrhage. No definite additional new intracranial hemorrhage is identified. There is no midline shift.    There is age appropriate generalized cerebral atrophy with compensatory ventricular enlargement and sulcal widening. There are patchy regions of hypoattenuation in the supratentorial white matter likely consistent with chronic microvascular ischemic changes.Remote lacunar type infarcts are identified within the right basal ganglia left thalamus, and bilateral cerebellar hemispheres. The ventricular system is stable in size and configuration. No extra-axial collections are appreciated.The visualized paranasal sinuses and mastoid air cells are clear.There is a prominent soft tissue hematoma overlying the left frontal calvarium. The calvarium appears intact.                             CT Cervical Spine Without Contrast (Final result)  Result time 02/08/18  05:22:35    Final result by Alex Wilson MD (02/08/18 05:22:35)                 Impression:         1. No evidence of acute fracture or traumatic subluxation of the cervical spine. Stable postsurgical change and degenerative arthropathy of the cervical spine as above.    2. Partially visualized mildly enlarged precarinal lymph node measuring 1.4 cm. Correlation with patient history is advised. Further evaluation with dedicated thoracic imaging as clinically warranted.      Electronically signed by: ALEX WILSON  Date:     02/08/18  Time:    05:22              Narrative:    Procedure comment: 2-mm axial images through the cervical spine were obtained without the use of IV contrast.  Sagittal, coronal, and axial reformatted images were reviewed.    Comparison: CT cervical spine 2/4/2018    Findings:    There are postoperative changes related to prior anterior cervical fusion at the level of C5-C6 and C6-C7. There is mild anterolisthesis of C3 on C4 and C4 on C5, similar to prior examination. No definite evidence of acute fracture. Cervical vertebral body heights appear well-maintained.    There is prominent degenerative arthrosis about the dens with associated prominent panus formation which narrows the craniocervical junction and likely effaces the anterior aspect of the thecal sac. There is prominent uncovertebral spurring and facet arthropathy throughout the lower cervical spine resulting in multilevel neural foraminal narrowing, most pronounced at C5-C6 and C6-C7. Findings remain similar to prior CT examination.    There is prominent atherosclerotic calcification of the thoracic aorta. There is an apparent partially visualized mildly enlarged 1.4 cm precarinal lymph node.  There is partially visualized right-sided pleural fluid. The visualized lung apices are otherwise unremarkable.  Please refer to separate dictated CT head report for intracranial findings.                             CT Lumbar Spine  Without Contrast (Final result)  Result time 02/08/18 06:20:52    Final result by Alex Wilson MD (02/08/18 06:20:52)                 Impression:        1. Moderate L1 compression deformity, new compared to prior lumbar spine radiograph of 10/15/2015. Additional remote L2 compression deformity status post prior vertebroplasty. There is associated retropulsion of the L1 and L2 vertebral bodies resulting in moderate spinal canal stenosis and severe bilateral neuroforaminal narrowing as discussed above.    2.  Additional multilevel lumbar spondylosis most prominent at L4-5 resulting in moderate spinal canal stenosis. Please see above for level by level details.    3. Chronic appearing left sacral alar fracture.    4.  Trace right pleural effusion with mild associated compressive atelectasis.    5.  Additional findings as above.  ______________________________________     Electronically signed by resident: LOIS ZUÑIGA MD  Date:     02/08/18  Time:    05:47            As the supervising and teaching physician, I personally reviewed the images and resident's interpretation and I agree with the findings.          Electronically signed by: ALEX WILSON  Date:     02/08/18  Time:    06:20              Narrative:    CT lumbar spine    02/08/18 04:40:00    Accession# [id number]    CLINICAL INDICATION: 89 year old F with Spine fracture, traumatic, lumbar      TECHNIQUE:   Axial CT images obtained throughout the region of the lumbar spine without intravenous contrast. Axial, sagittal, and coronal reconstructions were performed.    COMPARISON: Lumbar spine radiograph AP/lateral 02/07/2018    FINDINGS:     Vertebral bodies: There is a moderate compression deformity of the L1 vertebral body with approximately 40% height loss. This is new compared to prior lumbar spine radiograph of 10/15/2015. There is slight retropulsion of the inferior posterior cortex of L1 with resulting mild to moderate spinal canal stenosis. There is  a remote compression deformity of the L2 vertebral body status post prior vertebroplasty. There is continued retropulsion of the posterior cortex of the L2 vertebral body with moderate spinal canal stenosis. The remaining lumbar vertebral body heights appear well-maintained    Sagittal alignment: There is mild retrolisthesis of L2 on L3.    Spondylosis: Disc space narrowing and vacuum disc deformity at L1-L2.    T12-L1: Minimal diffuse posterior disc bulge without significant spinal canal stenosis or neural foraminal narrowing.    L1-L2: Retropulsion of L1/L2, as described above, resulting in moderate spinal canal stenosis and severe bilateral neuroforaminal narrowing.    L2-L3: Mild diffuse posterior disc bulge and small amount of retrolisthesis results in mild spinal canal stenosis and severe bilateral neural foraminal narrowing.    L3-L4: Diffuse posterior disc bulge and bilateral facet arthropathy resulting in mild spinal canal stenosis and neural foraminal narrowing.    L4-L5: Diffuse posterior disc bulge, bilateral facet arthropathy, and ligamentum flavum hypertrophy resulting in moderate spinal canal stenosis and mild bilateral neural foraminal narrowing.    L5-S1: Diffuse posterior disc bulge and bilateral facet arthropathy resulting in mild spinal canal stenosis and moderate right neural foraminal narrowing.    There is a chronic appearing fracture of the left sacral ala.    Paraspinal soft tissues: There is a moderate-sized hiatal hernia. Trace right pleural effusion with mild associated compressive atelectasis.  Minimal left basilar atelectasis.  Extensive calcification of the visualized arterial system.                            2D echo with CFD CONCLUSIONS     1 - Normal left ventricular systolic function (EF 60-65%).     2 - Concentric remodeling.     3 - No wall motion abnormalities.     4 - Impaired LV relaxation, elevated LAP (grade 2 diastolic dysfunction).     5 - Biatrial enlargement.     6 -  Normal right ventricular systolic function .     7 - The estimated PA systolic pressure is 38 mmHg.     8 - Moderate aortic stenosis, SILVESTRE = 1.09 cm2, AVAi = 0.77 cm2/m2, peak velocity = 2.57 m/s, mean gradient = 13 mmHg.     9 - Trivial aortic regurgitation.     10 - Trivial mitral regurgitation.     11 - Mild tricuspid regurgitation.     This document has been electronically    SIGNED BY: Cody Schulte MD On: 02/09/2018 15:31         Assessment/Plan:      * Compression fracture of first lumbar vertebra    - Evaluated by Neurosurgery in ED, appreciate recs  - Patient needs to wear back brace and follow up with Neurosurgery in clinic in 4 weeks (apt made)  - Tylenol 650mg q8 for analgesia  - Continue home calcium/vitamin D  - Continue home alendronate  - Back brace placed on 2/10 and patient working with PT/OT        Chronic diastolic congestive heart failure    - Complains of dry cough for last several days  - CXR with cardiomegaly, PNA vs pulmonary edema  - Afebrile and no other signs of URI/LRI, will add on pro-krish  - Schedule benzoate  - Elevated BNP with pulmonary edema on CXR although echo with negative SNIFF suggesting normal right atrial pressure.  - IV furosemide 20 mg once on 2/10 and continue on furosemide 20 mg PO daily; patient's cough improving.         Closed fracture of first lumbar vertebra    - See above.           Coronary artery disease involving native coronary artery of native heart without angina pectoris    - s/p CABG plus ?valve replacement ~5 years ago  - Holding ASA 81 in the setting of ICH  - Continue statin, BB and ARB        Mild cognitive impairment    Per daughter patient has had cognitive decline with sundowning over last 6 months  - TSH wnl, on folic acid and theragrain  - Family would like [facility] placement but patient has been refusing  - Delirium precautions  - Continue home escitalopram 15mg qhs  - Will refer for neurocognitive testing and consideration of ?donepezil/ memantine  on discharge  - Scheduled ramelteon helping with sundowning - continue.        Essential hypertension    - Continue losartan, goal SBP <140 given ICH  - Hydralazine 10mg IV q8 PRN for SBP >140        Fall    - Fall at home, probably orthostatic hypotension, reports feeling dizzy immediately after standing, then tried to sit down but fell, denies chest pain or palpitations, no seizure-like activity  - Per daughter, patient has had worsening sun downing and increasing falls at home over last few months, patient lives with daughter as she requires 24/7 care and has been refusing [facility] care  - EKG with NSR, however A fib on telemetry overnight, will repeat EKG this morning  - Fall precautions  - Needs to wear back brace for next 4 weeks for L1 compression fracture  - PT/OT         GERD (gastroesophageal reflux disease)    - Continue home PPI        Intracranial hemorrhage    2/2 fall (most likely sustained from fall on Sunday)  - [place]:   New small left parasagittal frontal lobe cortical hemorrhagic parenchymal contusion without evidence of mass effect or caroline hematoma.  - [place] at [facility]:   Trace parenchymal hyperdensity noted within the paramedian aspect of the right frontal lobe, somewhat less conspicuous compared to prior outside CT examination which could represent trace component of parenchymal hemorrhage. No definite additional new intracranial hemorrhage is identified. There is no midline shift.  - Seen by Neurosurgery in ED, not for any surgical interventions  - Fall and delirium precautions  - Continue BP meds, goal SBP <140, hydralazine 10mg IV q8 PRN for SBP >140  - Holding ASA 81 and avoiding NSAIDs        Nail disorder    Thickened dystrophic toenails, patient and daughter unable to cut them at home but does not meet criteria for inpatient Podiatry consult, requested referral to Podiatry in [place] on discharge        Acute bacterial conjunctivitis of both eyes    - Continue gentamicin gtt +  artifical tears PRN  - Warm compresses as required  - Follow up with ophtho on discharge          VTE Risk Mitigation         Ordered     Medium Risk of VTE  Once      02/08/18 1154     Reason for No Pharmacological VTE Prophylaxis  Once      02/08/18 1154     Place sequential compression device  Until discontinued      02/08/18 1154     Place QUINTON hose  Until discontinued      02/08/18 1154        Disposition: Patient with back brace now and pain well-controlled- working with PT/OT and awaiting placement to rehab on Monday 2/12. Otherwise, with mild pulmonary edema and acute on chronic diastolic HF; one IV dose of lasix given today but patient remains on room air and decreased coughing on diuretics and cough medication. Patient stable. Delirium improved on nighttime ramelteon.     Baljinder Lutz MD  PGY-2 Internal Medicine  [phone]    Department of Hospital Medicine   [facility]

## 2018-02-11 NOTE — ASSESSMENT & PLAN NOTE
- Evaluated by Neurosurgery in ED, appreciate recs  - Patient needs to wear back brace and follow up with Neurosurgery in clinic in 4 weeks (apt made)  - Tylenol 650mg q8 for analgesia  - Continue home calcium/vitamin D  - Continue home alendronate  - Back brace placed on 2/10 and patient working with PT/OT

## 2018-02-11 NOTE — ASSESSMENT & PLAN NOTE
2/2 fall (most likely sustained from fall on Sunday)  - CTH at [facility]:   New small left parasagittal frontal lobe cortical hemorrhagic parenchymal contusion without evidence of mass effect or caroline hematoma.  - CTH at [facility]:   Trace parenchymal hyperdensity noted within the paramedian aspect of the right frontal lobe, somewhat less conspicuous compared to prior outside CT examination which could represent trace component of parenchymal hemorrhage. No definite additional new intracranial hemorrhage is identified. There is no midline shift.  - Seen by Neurosurgery in ED, not for any surgical interventions  - Fall and delirium precautions  - Continue BP meds, goal SBP <140, hydralazine 10mg IV q8 PRN for SBP >140  - Holding ASA 81 and avoiding NSAIDs

## 2018-02-11 NOTE — PLAN OF CARE
Problem: Occupational Therapy Goal  Goal: Occupational Therapy Goal  Goals to be met by: 2/17/18     Patient will increase functional independence with ADLs by performing:    UE Dressing with Modified [place].  LE Dressing with Modified [place].  Grooming while standing with Supervision.  Toileting from toilet with Supervision for hygiene and clothing management.   Rolling to Bilateral with Modified [place].   Supine to sit with Modified [place].  Stand pivot transfers with Modified [place] using RW.     Outcome: Ongoing (interventions implemented as appropriate)  Goals remain appropriate

## 2018-02-11 NOTE — ASSESSMENT & PLAN NOTE
- Fall at home, probably orthostatic hypotension, reports feeling dizzy immediately after standing, then tried to sit down but fell, denies chest pain or palpitations, no seizure-like activity  - Per daughter, patient has had worsening sun downing and increasing falls at home over last few months, patient lives with daughter as she requires 24/7 care and has been refusing skilled nursing care  - EKG with NSR, however A fib on telemetry overnight, will repeat EKG this morning  - Fall precautions  - Needs to wear back brace for next 4 weeks for L1 compression fracture  - PT/OT

## 2018-02-11 NOTE — ASSESSMENT & PLAN NOTE
Thickened dystrophic toenails, patient and daughter unable to cut them at home but does not meet criteria for inpatient Podiatry consult, requested referral to Podiatry in [place] on discharge

## 2018-02-11 NOTE — ASSESSMENT & PLAN NOTE
- Evaluated by Neurosurgery in ED, appreciate recs  - Patient needs to wear back brace and follow up with Neurosurgery in clinic in 4 weeks (apt made)  - Tylenol 650mg q8 for analgesia  - Continue home calcium/vitamin D  - Continue home alendronate  - Back brace placed on 2/10 and patient working with PT/OT  - Dispo: planning for SNF to continue PT/OT

## 2018-02-11 NOTE — ASSESSMENT & PLAN NOTE
- Fall at home, probably orthostatic hypotension, reports feeling dizzy immediately after standing, then tried to sit down but fell, denies chest pain or palpitations, no seizure-like activity  - Per daughter, patient has had worsening sun downing and increasing falls at home over last few months, patient lives with daughter as she requires 24/7 care and has been refusing snf care  - EKG with NSR, however A fib on telemetry overnight, will repeat EKG this morning  - Fall precautions  - Needs to wear back brace for next 4 weeks for L1 compression fracture  - PT/OT

## 2018-02-11 NOTE — PROGRESS NOTES
"[facility] Hospital Medicine  Progress Note    Patient Name: Talia Alberts  MRN: [id number]  Patient Class: IP- Inpatient   Admission Date: 2/8/2018  Length of Stay: 3 days  Attending Physician: Gerald Tyler MD  Primary Care Provider: Michael Martin MD    [place] [facility] Medicine Team: [facility] MED 4 Bety Giraldo MD    Subjective:     Principal Problem:Compression fracture of first lumbar vertebra    HPI:  89yo F with CAD s/p CABG and SCC of skin, transferred from [facility] with ICH. Patient reports that she woke up around 5am yesterday morning and need to go to bathroom, states she stood up after finishing voiding and felt dizzy then tried to sit down but fell instead. Per daughter at bedside, she was awoken by a loud noise and found her mother on the floor confused- daughter says patient thought she was at the kitchen sink "rex". Daughter states patient has had worsening confusion and sundowning over the last few months and has had several falls before. Last big fall was Sunday. Daughter says patient usually ambulates with a walker, but was not using at time of fall. Patient is independent with bathing and dressing but does not drive or cook, lives with her daughter and has been refusing [facility] care. Patient takes ASA 81 for her CAD as well as Toprol and losartan.    In ED at [facility] she was found to have new small left parasagittal hemorrhage without evidence of mass effect, transferred to [facility] yesterday evening for Neurosurgery evaluation. In ED here CT spine also found compression fracture of L1.     Patient denies chest pain, palpitations and SOB. Reports chronic dry cough for which she has been taking pearls. No fever/chills. No abdo pain/ nausea/ vomiting. No dysuria/ urgency.    Hospital Course:  02/08/2018: Admitted to 4. Seen by Neurosurgery who recommended no intervention for ICH, but patient should wear back brace for L1 compression fracture and follow up with " them in clinic in 4 weeks.  02/09/2018: Complains of dry cough, but no other subjective complaints, pain well controlled. Episode of A fib overnight on telemetry, no RVR. Hypertensive this morning- home BP meds given early. Awaiting back brace and then can work with PT/OT.  02/10/2018 : No acute events overnight. Patient slept well on scheduled ramelteon with no witnessed sundowning or delirium per daughter. States she has been voiding well on furosemide and cough has significantly improved, whether on diuretic or scheduled cough drops. UOP not recorded and patient is incontinent and soaks bed although approximately 250 ml seen in purewick container. No fevers or chills; back pain well-controlled and tolerating brace. Working with PT this morning.   02/11/2018: FREEDOM. Feels that cough has improved after furosemide. Discussed with daughter that continuing diuretic may contribute to falls risk, however wish to continue for now.       Review of Systems   Constitutional: Negative for chills and fever.   Eyes: Negative for photophobia and visual disturbance.   Respiratory: Positive for cough (improving). Negative for shortness of breath.    Cardiovascular: Negative for chest pain and leg swelling.   Gastrointestinal: Negative for abdominal pain, nausea and vomiting.   Genitourinary: Negative for dysuria.   Musculoskeletal: Positive for back pain.   Skin: Positive for wound.   Neurological: Positive for dizziness. Negative for seizures and headaches.     Objective:     Vital Signs (Most Recent):  Temp: 96.8 °F (36 °C) (02/11/18 0747)  Pulse: (!) 58 (02/11/18 0747)  Resp: 18 (02/11/18 0747)  BP: (!) 186/77 (02/11/18 0747)  SpO2: 96 % (02/11/18 0747) Vital Signs (24h Range):  Temp:  [96.3 °F (35.7 °C)-98.2 °F (36.8 °C)] 96.8 °F (36 °C)  Pulse:  [54-67] 58  Resp:  [16-18] 18  SpO2:  [95 %-97 %] 96 %  BP: (121-186)/(58-77) 186/77     Weight: 49.6 kg (109 lb 5.6 oz)  Body mass index is 21.36 kg/m².    Intake/Output Summary  (Last 24 hours) at 02/11/18 1135  Last data filed at 02/11/18 0600   Gross per 24 hour   Intake              350 ml   Output              400 ml   Net              -50 ml      Physical Exam   Constitutional: She is oriented to person, place, and time. No distress.   Thin, frail   HENT:   Large hematoma over left forehead  Healing ecchymoses over face and arms    Eyes: Left conjunctiva is injected.   Cardiovascular: Normal rate, regular rhythm and intact distal pulses.    Murmur heard.   Systolic murmur is present with a grade of 2/6   Pulmonary/Chest: Effort normal. No respiratory distress. She has no wheezes. She has rales (Mild intermittent inspiratory crackles at bases).   Abdominal: Soft. Bowel sounds are normal. She exhibits no distension.   Musculoskeletal: She exhibits no edema.   Neurological: She is alert and oriented to person, place, and time. She has normal strength. She displays normal reflexes. No sensory deficit.   Skin: She is not diaphoretic.       Significant Labs:   BMP:     Recent Labs  Lab 02/11/18  0338   GLU 94      K 4.0      CO2 27   BUN 19   CREATININE 0.8   CALCIUM 9.2   MG 1.6     CBC:     Recent Labs  Lab 02/10/18  0348 02/11/18  0338   WBC 9.67 10.47   HGB 10.5* 11.1*   HCT 31.1* 33.2*    202       Significant Imaging: No interval imaging.        Assessment/Plan:      * Compression fracture of first lumbar vertebra    - Evaluated by Neurosurgery in ED, appreciate recs  - Patient needs to wear back brace and follow up with Neurosurgery in clinic in 4 weeks (apt made)  - Tylenol 650mg q8 for analgesia  - Continue home calcium/vitamin D  - Continue home alendronate  - Back brace placed on 2/10 and patient working with PT/OT  - Dispo: planning for SNF to continue PT/OT        Acute on chronic diastolic congestive heart failure    - Complains of dry cough for last several days  - CXR with cardiomegaly, PNA vs pulmonary edema  - Afebrile and no other signs of URI/LRI, will  add on pro-krish  - Schedule benzoate  - Elevated BNP with pulmonary edema on CXR although echo with negative SNIFF suggesting normal right atrial pressure.  - IV furosemide 20 mg on 2/10 and 2/11; patient's cough improving. Discussed with daughter regarding risk of orthostatic hypotension and falls with continuing furosemide on discharge, will plan to discharge on furosemide PRN weight gain.         Intracranial hemorrhage    2/2 fall (most likely sustained from fall on Sunday)  - CTH at [facility]:   New small left parasagittal frontal lobe cortical hemorrhagic parenchymal contusion without evidence of mass effect or caroline hematoma.  - [place] [facility]   Trace parenchymal hyperdensity noted within the paramedian aspect of the right frontal lobe, somewhat less conspicuous compared to prior outside CT examination which could represent trace component of parenchymal hemorrhage. No definite additional new intracranial hemorrhage is identified. There is no midline shift.  - Seen by Neurosurgery in ED, not for any surgical interventions  - Fall and delirium precautions  - Continue BP meds, goal SBP <140, hydralazine 10mg IV q8 PRN for SBP >140  - Holding ASA 81 and avoiding NSAIDs        Fall    - Fall at home, probably orthostatic hypotension, reports feeling dizzy immediately after standing, then tried to sit down but fell, denies chest pain or palpitations, no seizure-like activity  - Per daughter, patient has had worsening sun downing and increasing falls at home over last few months, patient lives with daughter as she requires 24/7 care and has been refusing senior care care  - EKG with NSR, however A fib on telemetry overnight, will repeat EKG this morning  - Fall precautions  - Needs to wear back brace for next 4 weeks for L1 compression fracture  - PT/OT         Mild cognitive impairment    Per daughter patient has had cognitive decline with sundowning over last 6 months  - TSH wnl, on folic acid and theragrain  - Family  would like snf placement but patient has been refusing  - Delirium precautions  - Continue home escitalopram 15mg qhs  - Will refer for neurocognitive testing and consideration of ?donepezil/ memantine on discharge  - Scheduled ramelteon helping with sundowning - continue.        Coronary artery disease involving native coronary artery of native heart without angina pectoris    - s/p CABG plus ?valve replacement ~5 years ago  - Holding ASA 81 in the setting of ICH  - Continue statin, BB and ARB        Essential hypertension    - Continue losartan, goal SBP <140 given ICH  - Hydralazine 10mg IV q8 PRN for SBP >140        GERD (gastroesophageal reflux disease)    - Continue home PPI        Acute bacterial conjunctivitis of both eyes    - Continue gentamicin gtt + artifical tears PRN  - Warm compresses as required  - Follow up with ophtho on discharge        Nail disorder    Thickened dystrophic toenails, patient and daughter unable to cut them at home but does not meet criteria for inpatient Podiatry consult, requested referral to Podiatry in [place] on discharge        Closed fracture of first lumbar vertebra    - See above.           VTE Risk Mitigation         Ordered     Medium Risk of VTE  Once      02/08/18 1154     Reason for No Pharmacological VTE Prophylaxis  Once      02/08/18 1154     Place sequential compression device  Until discontinued      02/08/18 1154     Place QUINTON hose  Until discontinued      02/08/18 1154              Bety Giraldo MD  Department of Hospital Medicine   [facility]

## 2018-02-11 NOTE — ASSESSMENT & PLAN NOTE
- Complains of dry cough for last several days  - CXR with cardiomegaly, PNA vs pulmonary edema  - Afebrile and no other signs of URI/LRI, will add on pro-krish  - Schedule benzoate  - Elevated BNP with pulmonary edema on CXR although echo with negative SNIFF suggesting normal right atrial pressure.  - IV furosemide 20 mg once on 2/10 and continue on furosemide 20 mg PO daily; patient's cough improving.

## 2018-02-11 NOTE — NURSING
Orthostatic VS performed while pt is working with physical therapy.      Laying 116/53, HR 65    Sitting 122/54, HR 65    Standing 89/42, HR 68    While pt is standing up she started to feel dizzy. PT will says she is not going to have pt ambulate as she is feeling bad.  Will continue to monitor.

## 2018-02-12 LAB
ALBUMIN SERPL BCP-MCNC: 2.4 G/DL
ALP SERPL-CCNC: 115 U/L
ALT SERPL W/O P-5'-P-CCNC: 19 U/L
ANION GAP SERPL CALC-SCNC: 11 MMOL/L
AST SERPL-CCNC: 27 U/L
BASOPHILS # BLD AUTO: 0.09 K/UL
BASOPHILS NFR BLD: 0.9 %
BILIRUB SERPL-MCNC: 0.4 MG/DL
BUN SERPL-MCNC: 20 MG/DL
CALCIUM SERPL-MCNC: 9.1 MG/DL
CHLORIDE SERPL-SCNC: 98 MMOL/L
CO2 SERPL-SCNC: 27 MMOL/L
CREAT SERPL-MCNC: 0.8 MG/DL
DIFFERENTIAL METHOD: ABNORMAL
EOSINOPHIL # BLD AUTO: 0.8 K/UL
EOSINOPHIL NFR BLD: 7.6 %
ERYTHROCYTE [DISTWIDTH] IN BLOOD BY AUTOMATED COUNT: 14.8 %
EST. GFR  (AFRICAN AMERICAN): >60 ML/MIN/1.73 M^2
EST. GFR  (NON AFRICAN AMERICAN): >60 ML/MIN/1.73 M^2
GLUCOSE SERPL-MCNC: 92 MG/DL
HCT VFR BLD AUTO: 32.8 %
HGB BLD-MCNC: 10.8 G/DL
IMM GRANULOCYTES # BLD AUTO: 0.1 K/UL
IMM GRANULOCYTES NFR BLD AUTO: 1 %
LYMPHOCYTES # BLD AUTO: 1.9 K/UL
LYMPHOCYTES NFR BLD: 18.3 %
MAGNESIUM SERPL-MCNC: 2.3 MG/DL
MCH RBC QN AUTO: 29.3 PG
MCHC RBC AUTO-ENTMCNC: 32.9 G/DL
MCV RBC AUTO: 89 FL
MONOCYTES # BLD AUTO: 1 K/UL
MONOCYTES NFR BLD: 10.2 %
NEUTROPHILS # BLD AUTO: 6.4 K/UL
NEUTROPHILS NFR BLD: 62 %
NRBC BLD-RTO: 0 /100 WBC
PHOSPHATE SERPL-MCNC: 4.8 MG/DL
PLATELET # BLD AUTO: 212 K/UL
PMV BLD AUTO: 10.4 FL
POTASSIUM SERPL-SCNC: 4.1 MMOL/L
PROT SERPL-MCNC: 5.7 G/DL
RBC # BLD AUTO: 3.69 M/UL
SODIUM SERPL-SCNC: 136 MMOL/L
WBC # BLD AUTO: 10.23 K/UL

## 2018-02-12 PROCEDURE — 36415 COLL VENOUS BLD VENIPUNCTURE: CPT

## 2018-02-12 PROCEDURE — 99231 SBSQ HOSP IP/OBS SF/LOW 25: CPT | Mod: GC,,, | Performed by: HOSPITALIST

## 2018-02-12 PROCEDURE — 80053 COMPREHEN METABOLIC PANEL: CPT

## 2018-02-12 PROCEDURE — 25000003 PHARM REV CODE 250: Performed by: PHYSICIAN ASSISTANT

## 2018-02-12 PROCEDURE — A4216 STERILE WATER/SALINE, 10 ML: HCPCS | Performed by: STUDENT IN AN ORGANIZED HEALTH CARE EDUCATION/TRAINING PROGRAM

## 2018-02-12 PROCEDURE — 25000003 PHARM REV CODE 250: Performed by: STUDENT IN AN ORGANIZED HEALTH CARE EDUCATION/TRAINING PROGRAM

## 2018-02-12 PROCEDURE — 11000001 HC ACUTE MED/SURG PRIVATE ROOM

## 2018-02-12 PROCEDURE — 86580 TB INTRADERMAL TEST: CPT | Performed by: STUDENT IN AN ORGANIZED HEALTH CARE EDUCATION/TRAINING PROGRAM

## 2018-02-12 PROCEDURE — 83735 ASSAY OF MAGNESIUM: CPT

## 2018-02-12 PROCEDURE — 85025 COMPLETE CBC W/AUTO DIFF WBC: CPT

## 2018-02-12 PROCEDURE — 84100 ASSAY OF PHOSPHORUS: CPT

## 2018-02-12 PROCEDURE — 63600175 PHARM REV CODE 636 W HCPCS: Performed by: STUDENT IN AN ORGANIZED HEALTH CARE EDUCATION/TRAINING PROGRAM

## 2018-02-12 RX ORDER — ACETAMINOPHEN 325 MG/1
650 TABLET ORAL EVERY 8 HOURS
Refills: 0 | Status: ON HOLD | COMMUNITY
Start: 2018-02-12 | End: 2019-09-27 | Stop reason: HOSPADM

## 2018-02-12 RX ORDER — NAPROXEN SODIUM 220 MG/1
81 TABLET, FILM COATED ORAL DAILY
Refills: 0 | COMMUNITY
Start: 2018-02-12 | End: 2018-11-05

## 2018-02-12 RX ADMIN — OYSTER SHELL CALCIUM WITH VITAMIN D 1 TABLET: 500; 200 TABLET, FILM COATED ORAL at 09:02

## 2018-02-12 RX ADMIN — ESCITALOPRAM OXALATE 15 MG: 10 TABLET ORAL at 09:02

## 2018-02-12 RX ADMIN — BENZONATATE 100 MG: 100 CAPSULE ORAL at 05:02

## 2018-02-12 RX ADMIN — SODIUM CHLORIDE, PRESERVATIVE FREE 5 ML: 5 INJECTION INTRAVENOUS at 09:02

## 2018-02-12 RX ADMIN — SIMVASTATIN 40 MG: 40 TABLET, FILM COATED ORAL at 09:02

## 2018-02-12 RX ADMIN — GENTAMICIN SULFATE 2 DROP: 3 SOLUTION/ DROPS OPHTHALMIC at 09:02

## 2018-02-12 RX ADMIN — LOSARTAN POTASSIUM 50 MG: 50 TABLET, FILM COATED ORAL at 09:02

## 2018-02-12 RX ADMIN — METOPROLOL SUCCINATE 25 MG: 25 TABLET, EXTENDED RELEASE ORAL at 09:02

## 2018-02-12 RX ADMIN — ACETAMINOPHEN 650 MG: 325 TABLET, FILM COATED ORAL at 02:02

## 2018-02-12 RX ADMIN — BENZONATATE 100 MG: 100 CAPSULE ORAL at 09:02

## 2018-02-12 RX ADMIN — ACETAMINOPHEN 650 MG: 325 TABLET, FILM COATED ORAL at 09:02

## 2018-02-12 RX ADMIN — SODIUM CHLORIDE, PRESERVATIVE FREE 5 ML: 5 INJECTION INTRAVENOUS at 02:02

## 2018-02-12 RX ADMIN — FOLIC ACID 1 MG: 1 TABLET ORAL at 09:02

## 2018-02-12 RX ADMIN — ACETAMINOPHEN 650 MG: 325 TABLET, FILM COATED ORAL at 05:02

## 2018-02-12 RX ADMIN — PANTOPRAZOLE SODIUM 40 MG: 40 TABLET, DELAYED RELEASE ORAL at 09:02

## 2018-02-12 RX ADMIN — GENTAMICIN SULFATE 2 DROP: 3 SOLUTION/ DROPS OPHTHALMIC at 05:02

## 2018-02-12 RX ADMIN — GENTAMICIN SULFATE 2 DROP: 3 SOLUTION/ DROPS OPHTHALMIC at 01:02

## 2018-02-12 RX ADMIN — SODIUM CHLORIDE, PRESERVATIVE FREE 5 ML: 5 INJECTION INTRAVENOUS at 06:02

## 2018-02-12 RX ADMIN — BENZONATATE 100 MG: 100 CAPSULE ORAL at 02:02

## 2018-02-12 RX ADMIN — RAMELTEON 8 MG: 8 TABLET, FILM COATED ORAL at 09:02

## 2018-02-12 RX ADMIN — Medication 5 UNITS: at 12:02

## 2018-02-12 RX ADMIN — THERA TABS 1 TABLET: TAB at 09:02

## 2018-02-12 NOTE — PLAN OF CARE
PREETI has had prior discussion w/ pt & choice for [facility] but had been waiting on delivery of LSO brace and therapy [facility] - pt was seen over the wknd. CM emailed PADILLA Chandler for referral to be sent for SNF.     02/12/18 4092   Discharge Reassessment   Assessment Type Discharge Planning Reassessment   Do you have any problems affording any of your prescribed medications? No   Discharge Plan A Skilled Nursing Facility   Discharge Plan B Home with family;Home Health   Patient choice form signed by patient/caregiver No   Can the patient answer the patient profile reliably? Yes, cognitively intact   How does the patient rate their overall health at the present time? Fair   Describe the patient's ability to walk at the present time. Walks with the help of equipment  (LSO brace)   How often would a person be available to care for the patient? Often   Number of comorbid conditions (as recorded on the chart) Three   During the past month, has the patient often been bothered by feeling down, depressed or hopeless? Yes   During the past month, has the patient often been bothered by little interest or pleasure in doing things? Yes

## 2018-02-12 NOTE — PLAN OF CARE
Problem: Patient Care Overview  Goal: Plan of Care Review  Outcome: Ongoing (interventions implemented as appropriate)  Pt in room with no s/s of distress. Pt resting well overnight. Pure wick in place overnight but not capturing well. Replaced and still not capturing urine well. Pt given shamar care. Monitoring.

## 2018-02-12 NOTE — ASSESSMENT & PLAN NOTE
- Continue gentamicin gtt (last day today) + artifical tears PRN  - Warm compresses as required  - Improving, follow up with ophtho on discharge

## 2018-02-12 NOTE — SUBJECTIVE & OBJECTIVE
Review of Systems   Constitutional: Negative for chills and fever.   Eyes: Negative for photophobia and visual disturbance.   Respiratory: Positive for cough (improving). Negative for shortness of breath.    Cardiovascular: Negative for chest pain and leg swelling.   Gastrointestinal: Negative for abdominal pain, nausea and vomiting.   Genitourinary: Negative for dysuria.   Musculoskeletal: Positive for back pain.   Skin: Positive for wound.   Neurological: Positive for dizziness. Negative for seizures and headaches.     Objective:     Vital Signs (Most Recent):  Temp: 96.3 °F (35.7 °C) (02/12/18 0733)  Pulse: 62 (02/12/18 0923)  Resp: 20 (02/12/18 0733)  BP: (!) 167/73 (02/12/18 0733)  SpO2: 97 % (02/12/18 0733) Vital Signs (24h Range):  Temp:  [96.3 °F (35.7 °C)-97.8 °F (36.6 °C)] 96.3 °F (35.7 °C)  Pulse:  [58-68] 62  Resp:  [16-20] 20  SpO2:  [92 %-98 %] 97 %  BP: ()/(42-73) 167/73     Weight: 49.6 kg (109 lb 5.6 oz)  Body mass index is 21.36 kg/m².    Intake/Output Summary (Last 24 hours) at 02/12/18 1140  Last data filed at 02/12/18 0500   Gross per 24 hour   Intake                0 ml   Output             1050 ml   Net            -1050 ml      Physical Exam   Constitutional: She is oriented to person, place, and time. No distress.   Thin, frail   HENT:   Large hematoma over left forehead  Healing ecchymoses over face and arms    Eyes: Left conjunctiva is injected.   Cardiovascular: Normal rate, regular rhythm and intact distal pulses.    Murmur heard.   Systolic murmur is present with a grade of 2/6   Pulmonary/Chest: Effort normal. No respiratory distress. She has no wheezes. She has rales (Mild intermittent inspiratory crackles at bases).   Abdominal: Soft. Bowel sounds are normal. She exhibits no distension.   Musculoskeletal: She exhibits no edema.   Neurological: She is alert and oriented to person, place, and time. She has normal strength. She displays normal reflexes. No sensory deficit.    Skin: She is not diaphoretic.       Significant Labs:   BMP:     Recent Labs  Lab 02/12/18  0332   GLU 92      K 4.1   CL 98   CO2 27   BUN 20   CREATININE 0.8   CALCIUM 9.1   MG 2.3     CBC:     Recent Labs  Lab 02/11/18  0338 02/12/18  0333   WBC 10.47 10.23   HGB 11.1* 10.8*   HCT 33.2* 32.8*    212       Significant Imaging: No interval imaging.

## 2018-02-12 NOTE — MEDICAL/APP STUDENT
"Progress Note  [facility] Medicine    Patient Name: Talia Alberts  YOB: 1926    Admit Date: 2/8/2018                     LOS: 4    SUBJECTIVE:     Reason for Admission:  Compression fracture of first lumbar vertebra    HPI: 89 y/o lady with CAD s/p CABG and SCC of skin, transferred from [facility] with ICH. Patient reports that she woke up around 5am and needed to go to the bathroom, states that she stood up after voiding and felt dizzy then tried to sit down but fell instead. Per daughter at bedside, she was awoken by a loud noise and found her mother on the floor confused. Daughter says patient thought she was at the kitchen skin "rex". Daughter states patient has had worsening confusion and sundowning over the last six months. Last big fall was Sunday. Daughter says patient usually ambulates with a walker, but was not using at the time of the fall. Patient is independent with bathing and dressing but does not  or cook, lives with her daughter and has been refusing [facility] care. Patient takes ASA 81 for CAD as well as toprol and losartan. Patient denies chest pain, palpitations and SOB. Reports chronic dry cough for which she has been taking pearls. No fever/chills. No abdo pain, nausea, or vomiting. No dysuria or urgency.    In ED at [facility] she was found to have new small left parasagittal hemorrhage without evidence of mass effect, trasnferred to [facility] for neurosurgery evaluation. In ED at [facility] CT spine also found compression fracture of L1.      Hospital Course:   2/8/18: admitted to 4. See by neurosurgery who recommended no intervention for ICH, but patient should wear back brace for L1 compression fracture and f/u with clinic in 4 weeks (appointment already made)  2/9/18: Complains of dry cough, but no other subjective complains. Pain well controlled. Episode of Afib overnight on telemetry, no RVR. Hypertensive tbis morning - home BP meds given early. Awaiting back " brace and then can work with PT/OT.   2/10/18: No acute events overnight. Patient slept well on ramelton with not witnessed sundowning or delirium per daughter. States she has been voiding well on furosemide and cough has significantly improved - taking scheduled furosemide and cough drops. UOP not recorded and patient is incontinent and soaks bed. 250 ml seen in purewick container. No fevers or chills. Pain well controlled and tolerating brace. Working with PT this morning  2/11/18: Feels that cough has improved after furosemide. Discussed with daughter that continuing diuretic may increase fall risk. Daughter wishes to continue the furosemide.    Interval History  2/12/18: No acute over night. Patient still complaining of cough. Pain is well controlled. Patient complaining of burning on urination of two days. No associated SOB, palpitations, dizziness, nausea, vomiting, or leg swelling.    Review of Systems   Constitutional: Negative for chills and fever.   HENT: Negative.    Eyes: Positive for discharge.   Respiratory: Positive for cough. Negative for hemoptysis, sputum production and shortness of breath.    Cardiovascular: Negative for chest pain, palpitations and leg swelling.   Gastrointestinal: Negative for abdominal pain, nausea and vomiting.   Genitourinary: Positive for dysuria. Negative for frequency and urgency.        (currently has purewick)   Musculoskeletal: Negative for back pain, falls and neck pain.   Neurological: Negative for dizziness and headaches.   Endo/Heme/Allergies: Negative.      OBJECTIVE:     Vital Signs Range (Last 24H):  Temp:  [96.3 °F (35.7 °C)-97.8 °F (36.6 °C)]   Pulse:  [58-68]   Resp:  [16-20]   BP: ()/(42-73)   SpO2:  [92 %-98 %] Body mass index is 21.36 kg/m².  Wt Readings from Last 1 Encounters:   02/10/18 0500 49.6 kg (109 lb 5.6 oz)   02/08/18 1225 47.2 kg (104 lb)   02/08/18 0737 49.9 kg (110 lb)       I & O (Last 24H):  Intake/Output Summary (Last 24 hours) at  02/12/18 1003  Last data filed at 02/12/18 0500   Gross per 24 hour   Intake                0 ml   Output             1050 ml   Net            -1050 ml       Physical Exam   Constitutional: She is oriented to person, place, and time. She appears well-developed and well-nourished. No distress.   HENT:   Left forehead hematoma   Eyes: EOM are normal. Left eye exhibits discharge.   (Improving)   Neck: Normal range of motion. Neck supple.   Cardiovascular: Normal rate, regular rhythm and normal heart sounds.    No murmur heard.  Pulmonary/Chest: Effort normal. No respiratory distress.   Abdominal: Soft. Bowel sounds are normal. There is no tenderness.   Musculoskeletal: She exhibits no edema.   Neurological: She is alert and oriented to person, place, and time.   Skin: Skin is warm and dry.     Diagnostic Results:  Lab Results   Component Value Date    WBC 10.23 02/12/2018    HGB 10.8 (L) 02/12/2018    HCT 32.8 (L) 02/12/2018    MCV 89 02/12/2018     02/12/2018       Recent Labs  Lab 02/12/18  0332   GLU 92      K 4.1   CL 98   CO2 27   BUN 20   CREATININE 0.8   CALCIUM 9.1   MG 2.3     Lab Results   Component Value Date    INR 1.0 02/07/2018    INR 1.0 02/04/2018    INR 1.0 09/16/2017       ASSESSMENT/PLAN:     Compression fracture of first lumbar vertebra  Evaluated by neurosurgery - back brace for moving, f/u 4 weeks in neurosurgery clinic (appointment already made)  -back brace placed 2/10, working with PT/OT   -dispo: discharged to snf for rehab  -tylenol 650mg q8 analgesia  -continue home calcium, vitamin D, and alendronate    Intracranial hemorrhage  2/2 fall (most likely sustained from fall on Sunday)  - CTH at [facility]:   New small left parasagittal frontal lobe cortical hemorrhagic parenchymal contusion without evidence of mass effect or caroline hematoma.  - [place] [facility] [place]   [place] parenchymal hyperdensity noted within the paramedian aspect of the right frontal lobe, somewhat less conspicuous compared  to prior outside CT examination which could represent trace component of parenchymal hemorrhage. No definite additional new intracranial hemorrhage is identified. There is no midline shift.  - Seen by Neurosurgery in ED, not for any surgical interventions  - Fall and delirium precautions  - Continue BP meds, goal SBP <140  - Holding ASA 81 and avoiding NSAIDs    Dysuria  -complains of burning on urination for two days  -Purewick has been placed, consider removal   -Ua, if positive for UTI consider  nitrofuratoin,TMP-SMX or fosfomycin    Fall  - Fall at home, probably orthostatic hypotension, reports feeling dizzy immediately after standing, then tried to sit down but fell, denies chest pain or palpitations, no seizure-like activity  - Per daughter, patient has had worsening sun downing and increasing falls at home over last few months, patient lives with daughter as she requires 24/7 care and has been refusing jail care  - EKG with NSR  - Fall precautions  - Needs to wear back brace for next 4 weeks for L1 compression fracture  - PT/OT    Mild cognitive impairment  Per daughter patient has had cognitive decline with sundowning over last 6 months  - TSH wnl, on folic acid and theragrain  - Family would like jail placement but patient has been refusing  - Delirium precautions  - Continue home escitalopram 15mg qhs  - Will refer for neurocognitive testing and consideration of ?donepezil/ memantine on discharge    Acute bacterial conjunctivitis of both eyes  - Continue gentamicin gtt + artifical tears PRN  - Follow up with ophtho on discharge    Coronary artery disease involving native coronary artery of native heart without angina pectoris  - s/p CABG plus ?valve replacement ~5 years ago  - Holding ASA 81 in the setting of ICH  - Continue statin, BB and ARB    Essential hypertension  - Continue losartan, goal SBP <140 given ICH    GERD  -Continue home PPI    Nail disorder  Thickened dystrophic toenails,  patient and daughter unable to cut them at home but does not meet criteria for inpatient Podiatry consult, requested referral to Podiatry in [place] on discharge      DISCHARGE PLANNING: Discharge to skilled nursing facility short term for rehab. Consider discussing longer term senior care care.        Tania Corrales

## 2018-02-12 NOTE — ASSESSMENT & PLAN NOTE
- Complains of dry cough for last several days  - CXR with cardiomegaly, PNA vs pulmonary edema  - Afebrile and no other signs of URI/LRI, will add on pro-krish  - Schedule benzoate  - Elevated BNP with pulmonary edema on CXR although echo with negative SNIFF suggesting normal right atrial pressure.  - IV furosemide 20 mg on 2/10 and 2/11; patient's cough improving. Discussed with daughter regarding risk of orthostatic hypotension and falls with continuing furosemide on discharge, will plan to discharge on furosemide PRN weight gain, follow up with her usual Cardiologist on discharge.

## 2018-02-12 NOTE — PLAN OF CARE
[facility]     Department of Hospital Medicine     [address] [place] [address]     [phone] after hours  [phone] fax       NURSING HOME ORDERS    02/15/2018    Admit to Nursing Home:  Skilled Bed                                             Diagnoses:  Active Hospital Problems    Diagnosis  POA    *Compression fracture of first lumbar vertebra [S32.010A]  Unknown     Priority: 1 - High    Acute on chronic diastolic congestive heart failure [I50.33]  Yes     Priority: 2     Intracranial hemorrhage [I62.9]  Yes     Priority: 2     Fall [W19.XXXA]  Yes     Priority: 2     Mild cognitive impairment [G31.84]  Yes     Priority: 3      Chronic    Coronary artery disease involving native coronary artery of native heart without angina pectoris [I25.10]  Yes     Priority: 3      Chronic    Essential hypertension [I10]  Yes     Priority: 4     GERD (gastroesophageal reflux disease) [K21.9]  Yes     Priority: 4     Acute bacterial conjunctivitis of both eyes [H10.33]  Yes     Priority: 5     Nail disorder [L60.9]  Yes     Priority: 6     Closed fracture of first lumbar vertebra [S32.019A]  Yes     Priority: 7       Resolved Hospital Problems    Diagnosis Date Resolved POA   No resolved problems to display.       Patient is homebound due to:  Compression fracture of first lumbar vertebra    Allergies:  Review of patient's allergies indicates:   Allergen Reactions    Morphine Swelling       Vitals:       Every shift (Skilled Nursing patients)    Diet: Cardiac Diet   Supplement:  1 can every three times a day with meals                         Type:  House        Acitivities:    - Must wear back brace when out of bed  - Ambulate with assistance    LABS:  Per facility protocol   CMP, CBC each month for 3 months   Pre-albumin each month for 3 months   TSH every year    Nursing Precautions:     - Aspiration precautions:             - Total assistance with  meals            -  Upright 90 degrees befor during and after meals             -  Suction at bedside          - Fall precautions per nursing home protocol   - Seizure precaution per [facility] protocol   - Decubitus precautions:        -  for positioning   - Pressure reducing foam mattress   - Turn patient every two hours. Use wedge pillows to anchor patient    CONSULTS:      Physical Therapy to evaluate and treat     Occupational Therapy to evaluate and treat     Speech Therapy  to evaluate and treat     Nutrition to evaluate and recommend diet     Psychiatry to evaluate and follow patients for delirium    MISCELLANEOUS CARE:       Routine Skin for Bedridden Patients:  Apply moisture barrier cream to all    skin folds and wet areas in perineal area daily and after baths and                           all bowel movements.              Medications: Discontinue all previous medication orders, if any. See new list below.     Talia Alberts   Home Medication Instructions JUAN:[id number]    Printed on:02/12/18 0505   Medication Information                      acetaminophen (TYLENOL) 325 MG tablet  Take 2 tablets (650 mg total) by mouth every 8 (eight) hours.             albuterol sulfate 2.5 mg/0.5 mL Nebu  Take 2.5 mg by nebulization every 4 (four) hours as needed. Rescue             alendronate (FOSAMAX) 70 MG tablet  Take 70 mg by mouth every Thursday.              artificial tears (ISOPTO TEARS) 0.5 % ophthalmic solution  Place 1 drop into both eyes as needed.             aspirin 81 MG Chew  Take 1 tablet (81 mg total) by mouth once daily. Hold this medicine until seen by Neurosurgery in clinic             benzonatate (TESSALON) 100 MG capsule  Take 1 capsule (100 mg total) by mouth 3 (three) times daily as needed for Cough.             calcium-vitamin D tablet 600 mg-200 units  Take 1 tablet by mouth once daily.             escitalopram oxalate (LEXAPRO) 10 MG tablet  Take 15 mg by mouth every evening.  1 1/2 tablets = 15 mg every night             ferrous sulfate 325 (65 FE) MG EC tablet  Take 325 mg by mouth once daily.             folic acid (FOLVITE) 400 MCG tablet  Take 400 mcg by mouth once daily.              losartan (COZAAR) 50 MG tablet  Take 50 mg by mouth 2 (two) times daily.              lubiprostone (AMITIZA) 8 MCG Cap  Take 8 mcg by mouth 2 (two) times daily as needed (constipation).              metoprolol succinate (TOPROL-XL) 25 MG 24 hr tablet  Take 25 mg by mouth once daily.              multivitamin (THERAGRAN) tablet  Take 1 tablet by mouth once daily.             pantoprazole (PROTONIX) 40 MG tablet  Take 40 mg by mouth once daily.             simvastatin (ZOCOR) 40 MG tablet  Take 40 mg by mouth every evening.                       _________________________________  Baljinder Lutz MD  PGY-2 Internal Medicine  [phone]    02/15/2018

## 2018-02-12 NOTE — ASSESSMENT & PLAN NOTE
- Fall at home, probably orthostatic hypotension, reports feeling dizzy immediately after standing, then tried to sit down but fell, denies chest pain or palpitations, no seizure-like activity  - Per daughter, patient has had worsening sun downing and increasing falls at home over last few months, patient lives with daughter as she requires 24/7 care and has been refusing [facility] care  - EKG with NSR, however A fib on telemetry overnight, will repeat EKG this morning  - Fall precautions  - Needs to wear back brace for next 4 weeks for L1 compression fracture  - PT/OT

## 2018-02-12 NOTE — ASSESSMENT & PLAN NOTE
2/2 fall (most likely sustained from fall on Sunday)  - CTH at [facility]:   New small left parasagittal frontal lobe cortical hemorrhagic parenchymal contusion without evidence of mass effect or caroline hematoma.  - [place] [facility]   Trace parenchymal hyperdensity noted within the paramedian aspect of the right frontal lobe, somewhat less conspicuous compared to prior outside CT examination which could represent trace component of parenchymal hemorrhage. No definite additional new intracranial hemorrhage is identified. There is no midline shift.  - Seen by Neurosurgery in ED, not for any surgical interventions  - Fall and delirium precautions  - Continue BP meds, goal SBP <140, hydralazine 10mg IV q8 PRN for SBP >140  - Holding ASA 81 and avoiding NSAIDs

## 2018-02-12 NOTE — NURSING
Pt has had pain throughout the day in her back and head, relieved with tylenol.  IV lasix given, pt is diuresing well, purewick in place.  Pts daughter will not be staying at hospital tonight. Bed alarm set and call light within pt's reach.  HB locked and in lowest position.  Pt had 2 magnesium riders today, tolerated well.

## 2018-02-12 NOTE — ASSESSMENT & PLAN NOTE
Thickened dystrophic toenails, patient and daughter unable to cut them at home but does not meet criteria for inpatient Podiatry consult, requested referral to Podiatry in [place] on discharge

## 2018-02-12 NOTE — PROGRESS NOTES
"[facility] Hospital Medicine  Progress Note    Patient Name: Talia Alberts  MRN: [id number]  Patient Class: IP- Inpatient   Admission Date: 2/8/2018  Length of Stay: 4 days  Attending Physician: Gerald Tyler MD  Primary Care Provider: Michael Martin MD    [facility] Medicine Team: [facility] 4 Bety Giraldo MD    Subjective:     Principal Problem:Compression fracture of first lumbar vertebra    HPI:  89yo F with CAD s/p CABG and SCC of skin, transferred from [facility] with ICH. Patient reports that she woke up around 5am yesterday morning and need to go to bathroom, states she stood up after finishing voiding and felt dizzy then tried to sit down but fell instead. Per daughter at bedside, she was awoken by a loud noise and found her mother on the floor confused- daughter says patient thought she was at the kitchen sink "rex". Daughter states patient has had worsening confusion and sundowning over the last few months and has had several falls before. Last big fall was Sunday. Daughter says patient usually ambulates with a walker, but was not using at time of fall. Patient is independent with bathing and dressing but does not drive or cook, lives with her daughter and has been refusing half-way care. Patient takes ASA 81 for her CAD as well as Toprol and losartan.    In ED at [facility] she was found to have new small left parasagittal hemorrhage without evidence of mass effect, [place] [facility] [place] for Neurosurgery evaluation. In ED here CT spine also found compression fracture of L1.     Patient denies chest pain, palpitations and SOB. Reports chronic dry cough for which she has been taking pearls. No fever/chills. No abdo pain/ nausea/ vomiting. No dysuria/ urgency.    Hospital Course:  02/08/2018: Admitted to 4. Seen by Neurosurgery who recommended no intervention for ICH, but patient should wear back brace for L1 compression fracture and follow up with " them in clinic in 4 weeks.  02/09/2018: Complains of dry cough, but no other subjective complaints, pain well controlled. Episode of A fib overnight on telemetry, no RVR. Hypertensive this morning- home BP meds given early. Awaiting back brace and then can work with PT/OT.  02/10/2018 : No acute events overnight. Patient slept well on scheduled ramelteon with no witnessed sundowning or delirium per daughter. States she has been voiding well on furosemide and cough has significantly improved, whether on diuretic or scheduled cough drops. UOP not recorded and patient is incontinent and soaks bed although approximately 250 ml seen in purewick container. No fevers or chills; back pain well-controlled and tolerating brace. Working with PT this morning.   02/11/2018: FREEDOM. Feels that cough has improved after furosemide. Discussed with daughter that continuing diuretic may contribute to falls risk, however wish to continue for now.   02/12/2018: FREEDOM. Medically stable for discharge, awaiting authorization for SNF to continue rehab.      Review of Systems   Constitutional: Negative for chills and fever.   Eyes: Negative for photophobia and visual disturbance.   Respiratory: Positive for cough (improving). Negative for shortness of breath.    Cardiovascular: Negative for chest pain and leg swelling.   Gastrointestinal: Negative for abdominal pain, nausea and vomiting.   Genitourinary: Negative for dysuria.   Musculoskeletal: Positive for back pain.   Skin: Positive for wound.   Neurological: Positive for dizziness. Negative for seizures and headaches.     Objective:     Vital Signs (Most Recent):  Temp: 96.3 °F (35.7 °C) (02/12/18 0733)  Pulse: 62 (02/12/18 0923)  Resp: 20 (02/12/18 0733)  BP: (!) 167/73 (02/12/18 0733)  SpO2: 97 % (02/12/18 0733) Vital Signs (24h Range):  Temp:  [96.3 °F (35.7 °C)-97.8 °F (36.6 °C)] 96.3 °F (35.7 °C)  Pulse:  [58-68] 62  Resp:  [16-20] 20  SpO2:  [92 %-98 %] 97 %  BP: ()/(42-73)  167/73     Weight: 49.6 kg (109 lb 5.6 oz)  Body mass index is 21.36 kg/m².    Intake/Output Summary (Last 24 hours) at 02/12/18 1140  Last data filed at 02/12/18 0500   Gross per 24 hour   Intake                0 ml   Output             1050 ml   Net            -1050 ml      Physical Exam   Constitutional: She is oriented to person, place, and time. No distress.   Thin, frail   HENT:   Large hematoma over left forehead  Healing ecchymoses over face and arms    Eyes: Left conjunctiva is injected.   Cardiovascular: Normal rate, regular rhythm and intact distal pulses.    Murmur heard.   Systolic murmur is present with a grade of 2/6   Pulmonary/Chest: Effort normal. No respiratory distress. She has no wheezes. She has rales (Mild intermittent inspiratory crackles at bases).   Abdominal: Soft. Bowel sounds are normal. She exhibits no distension.   Musculoskeletal: She exhibits no edema.   Neurological: She is alert and oriented to person, place, and time. She has normal strength. She displays normal reflexes. No sensory deficit.   Skin: She is not diaphoretic.       Significant Labs:   BMP:     Recent Labs  Lab 02/12/18  0332   GLU 92      K 4.1   CL 98   CO2 27   BUN 20   CREATININE 0.8   CALCIUM 9.1   MG 2.3     CBC:     Recent Labs  Lab 02/11/18  0338 02/12/18  0333   WBC 10.47 10.23   HGB 11.1* 10.8*   HCT 33.2* 32.8*    212       Significant Imaging: No interval imaging.        Assessment/Plan:      * Compression fracture of first lumbar vertebra    - Evaluated by Neurosurgery in ED, appreciate recs  - Patient needs to wear back brace and follow up with Neurosurgery in clinic in 4 weeks (apt made)  - Tylenol 650mg q8 for analgesia  - Continue home calcium/vitamin D  - Continue home alendronate  - Back brace placed on 2/10 and patient working with PT/OT  - Dispo: planning for SNF to continue PT/OT        Acute on chronic diastolic congestive heart failure    - Complains of dry cough for last several  days  - CXR with cardiomegaly, PNA vs pulmonary edema  - Afebrile and no other signs of URI/LRI, will add on pro-krish  - Schedule benzoate  - Elevated BNP with pulmonary edema on CXR although echo with negative SNIFF suggesting normal right atrial pressure.  - IV furosemide 20 mg on 2/10 and 2/11; patient's cough improving. Discussed with daughter regarding risk of orthostatic hypotension and falls with continuing furosemide on discharge, will plan to discharge on furosemide PRN weight gain, follow up with her usual Cardiologist on discharge.         Intracranial hemorrhage    2/2 fall (most likely sustained from fall on Sunday)  - CTH at [facility]:   New small left parasagittal frontal lobe cortical hemorrhagic parenchymal contusion without evidence of mass effect or caroline hematoma.  - [facility]:   Trace parenchymal hyperdensity noted within the paramedian aspect of the right frontal lobe, somewhat less conspicuous compared to prior outside CT examination which could represent trace component of parenchymal hemorrhage. No definite additional new intracranial hemorrhage is identified. There is no midline shift.  - Seen by Neurosurgery in ED, not for any surgical interventions  - Fall and delirium precautions  - Continue BP meds, goal SBP <140, hydralazine 10mg IV q8 PRN for SBP >140  - Holding ASA 81 and avoiding NSAIDs        Fall    - Fall at home, probably orthostatic hypotension, reports feeling dizzy immediately after standing, then tried to sit down but fell, denies chest pain or palpitations, no seizure-like activity  - Per daughter, patient has had worsening sun downing and increasing falls at home over last few months, patient lives with daughter as she requires 24/7 care and has been refusing longterm care  - EKG with NSR, however A fib on telemetry overnight, will repeat EKG this morning  - Fall precautions  - Needs to wear back brace for next 4 weeks for L1 compression fracture  - PT/OT         Mild  cognitive impairment    Per daughter patient has had cognitive decline with sundowning over last 6 months  - TSH wnl, on folic acid and theragrain  - Family would like [facility] placement but patient has been refusing  - Delirium precautions  - Continue home escitalopram 15mg qhs  - Will refer for neurocognitive testing and consideration of ?donepezil/ memantine on discharge  - Scheduled ramelteon helping with sundowning - continue.        Coronary artery disease involving native coronary artery of native heart without angina pectoris    - s/p CABG plus ?valve replacement ~5 years ago  - Holding ASA 81 in the setting of ICH  - Continue statin, BB and ARB        Essential hypertension    - Continue losartan, goal SBP <140 given ICH  - Hydralazine 10mg IV q8 PRN for SBP >140        GERD (gastroesophageal reflux disease)    - Continue home PPI        Acute bacterial conjunctivitis of both eyes    - Continue gentamicin gtt (last day today) + artifical tears PRN  - Warm compresses as required  - Improving, follow up with ophtho on discharge        Nail disorder    Thickened dystrophic toenails, patient and daughter unable to cut them at home but does not meet criteria for inpatient Podiatry consult, requested referral to Podiatry in [place] on discharge        Closed fracture of first lumbar vertebra    - See above.           VTE Risk Mitigation         Ordered     Medium Risk of VTE  Once      02/08/18 1154     Reason for No Pharmacological VTE Prophylaxis  Once      02/08/18 1154     Place sequential compression device  Until discontinued      02/08/18 1154     Place QUINTON hose  Until discontinued      02/08/18 1154              Bety Giraldo MD  Department of Hospital Medicine   [facility]

## 2018-02-12 NOTE — ASSESSMENT & PLAN NOTE
Per daughter patient has had cognitive decline with sundowning over last 6 months  - TSH wnl, on folic acid and theragrain  - Family would like [facility] placement but patient has been refusing  - Delirium precautions  - Continue home escitalopram 15mg qhs  - Will refer for neurocognitive testing and consideration of ?donepezil/ memantine on discharge  - Scheduled ramelteon helping with sundowning - continue.

## 2018-02-13 LAB
ALBUMIN SERPL BCP-MCNC: 2.4 G/DL
ALP SERPL-CCNC: 124 U/L
ALT SERPL W/O P-5'-P-CCNC: 19 U/L
ANION GAP SERPL CALC-SCNC: 11 MMOL/L
AST SERPL-CCNC: 24 U/L
BILIRUB SERPL-MCNC: 0.4 MG/DL
BNP SERPL-MCNC: 195 PG/ML
BUN SERPL-MCNC: 22 MG/DL
CALCIUM SERPL-MCNC: 9.1 MG/DL
CHLORIDE SERPL-SCNC: 101 MMOL/L
CO2 SERPL-SCNC: 25 MMOL/L
CREAT SERPL-MCNC: 0.8 MG/DL
EST. GFR  (AFRICAN AMERICAN): >60 ML/MIN/1.73 M^2
EST. GFR  (NON AFRICAN AMERICAN): >60 ML/MIN/1.73 M^2
GLUCOSE SERPL-MCNC: 96 MG/DL
MAGNESIUM SERPL-MCNC: 1.6 MG/DL
PHOSPHATE SERPL-MCNC: 4.5 MG/DL
POTASSIUM SERPL-SCNC: 4.6 MMOL/L
PROT SERPL-MCNC: 5.8 G/DL
SODIUM SERPL-SCNC: 137 MMOL/L

## 2018-02-13 PROCEDURE — 63600175 PHARM REV CODE 636 W HCPCS: Performed by: STUDENT IN AN ORGANIZED HEALTH CARE EDUCATION/TRAINING PROGRAM

## 2018-02-13 PROCEDURE — 80053 COMPREHEN METABOLIC PANEL: CPT

## 2018-02-13 PROCEDURE — 99231 SBSQ HOSP IP/OBS SF/LOW 25: CPT | Mod: GC,,, | Performed by: HOSPITALIST

## 2018-02-13 PROCEDURE — 83880 ASSAY OF NATRIURETIC PEPTIDE: CPT

## 2018-02-13 PROCEDURE — 36415 COLL VENOUS BLD VENIPUNCTURE: CPT

## 2018-02-13 PROCEDURE — A4216 STERILE WATER/SALINE, 10 ML: HCPCS | Performed by: STUDENT IN AN ORGANIZED HEALTH CARE EDUCATION/TRAINING PROGRAM

## 2018-02-13 PROCEDURE — 25000003 PHARM REV CODE 250: Performed by: STUDENT IN AN ORGANIZED HEALTH CARE EDUCATION/TRAINING PROGRAM

## 2018-02-13 PROCEDURE — 25000003 PHARM REV CODE 250: Performed by: PHYSICIAN ASSISTANT

## 2018-02-13 PROCEDURE — 11000001 HC ACUTE MED/SURG PRIVATE ROOM

## 2018-02-13 PROCEDURE — 83735 ASSAY OF MAGNESIUM: CPT

## 2018-02-13 PROCEDURE — 84100 ASSAY OF PHOSPHORUS: CPT

## 2018-02-13 RX ADMIN — METOPROLOL SUCCINATE 25 MG: 25 TABLET, EXTENDED RELEASE ORAL at 08:02

## 2018-02-13 RX ADMIN — FOLIC ACID 1 MG: 1 TABLET ORAL at 08:02

## 2018-02-13 RX ADMIN — ESCITALOPRAM OXALATE 15 MG: 10 TABLET ORAL at 09:02

## 2018-02-13 RX ADMIN — PANTOPRAZOLE SODIUM 40 MG: 40 TABLET, DELAYED RELEASE ORAL at 08:02

## 2018-02-13 RX ADMIN — SODIUM CHLORIDE, PRESERVATIVE FREE 5 ML: 5 INJECTION INTRAVENOUS at 10:02

## 2018-02-13 RX ADMIN — LOSARTAN POTASSIUM 50 MG: 50 TABLET, FILM COATED ORAL at 08:02

## 2018-02-13 RX ADMIN — SIMVASTATIN 40 MG: 40 TABLET, FILM COATED ORAL at 09:02

## 2018-02-13 RX ADMIN — ACETAMINOPHEN 650 MG: 325 TABLET, FILM COATED ORAL at 09:02

## 2018-02-13 RX ADMIN — OYSTER SHELL CALCIUM WITH VITAMIN D 1 TABLET: 500; 200 TABLET, FILM COATED ORAL at 08:02

## 2018-02-13 RX ADMIN — SODIUM CHLORIDE, PRESERVATIVE FREE 5 ML: 5 INJECTION INTRAVENOUS at 02:02

## 2018-02-13 RX ADMIN — ACETAMINOPHEN 650 MG: 325 TABLET, FILM COATED ORAL at 02:02

## 2018-02-13 RX ADMIN — THERA TABS 1 TABLET: TAB at 08:02

## 2018-02-13 RX ADMIN — MAGNESIUM SULFATE HEPTAHYDRATE 2 G: 500 INJECTION, SOLUTION INTRAMUSCULAR; INTRAVENOUS at 10:02

## 2018-02-13 RX ADMIN — LOSARTAN POTASSIUM 50 MG: 50 TABLET, FILM COATED ORAL at 09:02

## 2018-02-13 RX ADMIN — BENZONATATE 100 MG: 100 CAPSULE ORAL at 06:02

## 2018-02-13 RX ADMIN — RAMELTEON 8 MG: 8 TABLET, FILM COATED ORAL at 09:02

## 2018-02-13 RX ADMIN — BENZONATATE 100 MG: 100 CAPSULE ORAL at 02:02

## 2018-02-13 RX ADMIN — ACETAMINOPHEN 650 MG: 325 TABLET, FILM COATED ORAL at 06:02

## 2018-02-13 RX ADMIN — OYSTER SHELL CALCIUM WITH VITAMIN D 1 TABLET: 500; 200 TABLET, FILM COATED ORAL at 09:02

## 2018-02-13 RX ADMIN — BENZONATATE 100 MG: 100 CAPSULE ORAL at 09:02

## 2018-02-13 RX ADMIN — MAGNESIUM SULFATE HEPTAHYDRATE 2 G: 500 INJECTION, SOLUTION INTRAMUSCULAR; INTRAVENOUS at 12:02

## 2018-02-13 NOTE — ASSESSMENT & PLAN NOTE
2/2 fall (most likely sustained from fall on Sunday)  - CTH at [facility]:   New small left parasagittal frontal lobe cortical hemorrhagic parenchymal contusion without evidence of mass effect or caroline hematoma.  - [place] at [facility]:   Trace parenchymal hyperdensity noted within the paramedian aspect of the right frontal lobe, somewhat less conspicuous compared to prior outside CT examination which could represent trace component of parenchymal hemorrhage. No definite additional new intracranial hemorrhage is identified. There is no midline shift.  - Seen by Neurosurgery, not for any surgical interventions  - Fall and delirium precautions  - Continue BP meds, goal SBP <140, hydralazine 10mg IV q8 PRN for SBP >140  - Holding ASA 81 and avoiding NSAIDs

## 2018-02-13 NOTE — ASSESSMENT & PLAN NOTE
- Complains of dry cough for last several days  - CXR with cardiomegaly, PNA vs pulmonary edema  - Afebrile and no other signs of URI/LRI, pro-krish negative  - Schedule benzoate  - Elevated BNP with pulmonary edema on CXR although echo with negative SNIFF suggesting normal right atrial pressure.  - IV furosemide 20 mg on 2/10 and 2/11; patient's cough improving. Discussed with daughter regarding risk of orthostatic hypotension and falls with continuing furosemide on discharge, will plan to discharge on furosemide PRN weight gain, follow up with her usual Cardiologist on discharge.

## 2018-02-13 NOTE — ASSESSMENT & PLAN NOTE
Per daughter patient has had cognitive decline with sundowning over last 6 months  - TSH wnl, on folic acid and theragrain  - Family would like [facility] placement but patient has been refusing  - Delirium precautions  - Continue home escitalopram 15mg qhs  - Will refer for neurocognitive testing and consideration of ?donepezil/ memantine on discharge  - Scheduled ramelteon helping with sundowning - continue.

## 2018-02-13 NOTE — ASSESSMENT & PLAN NOTE
Thickened dystrophic toenails, patient and daughter unable to cut them at home but does not meet criteria for inpatient Podiatry consult, requested referral to Podiatry in [place] on discharge

## 2018-02-13 NOTE — PROGRESS NOTES
"[facility] Medicine  Progress Note    Patient Name: Talia Alberts  MRN: [id number]  Patient Class: IP- Inpatient   Admission Date: 2/8/2018  Length of Stay: 5 days  Attending Physician: Gerald Tyler MD  Primary Care Provider: Michael Martin MD    [facility] Medicine Team: [facility] 4 Bety Giraldo MD    Subjective:     Principal Problem:Compression fracture of first lumbar vertebra    HPI:  89yo F with CAD s/p CABG and SCC of skin, transferred from [facility] with ICH. Patient reports that she woke up around 5am yesterday morning and need to go to bathroom, states she stood up after finishing voiding and felt dizzy then tried to sit down but fell instead. Per daughter at bedside, she was awoken by a loud noise and found her mother on the floor confused- daughter says patient thought she was at the kitchen sink "rex". Daughter states patient has had worsening confusion and sundowning over the last few months and has had several falls before. Last big fall was Sunday. Daughter says patient usually ambulates with a walker, but was not using at time of fall. Patient is independent with bathing and dressing but does not drive or cook, lives with her daughter and has been refusing [facility] care. Patient takes ASA 81 for her CAD as well as Toprol and losartan.    In ED at [facility] she was found to have new small left parasagittal hemorrhage without evidence of mass effect, [place] [facility] [place] evaluation. In ED here CT spine also found compression fracture of L1.     Patient denies chest pain, palpitations and SOB. Reports chronic dry cough for which she has been taking pearls. No fever/chills. No abdo pain/ nausea/ vomiting. No dysuria/ urgency.    Hospital Course:  02/08/2018: Admitted to 4. Seen by Neurosurgery who recommended no intervention for ICH, but patient should wear back brace for L1 compression fracture and follow up with " them in clinic in 4 weeks.  02/09/2018: Complains of dry cough, but no other subjective complaints, pain well controlled. Episode of A fib overnight on telemetry, no RVR. Hypertensive this morning- home BP meds given early. Awaiting back brace and then can work with PT/OT.  02/10/2018 : No acute events overnight. Patient slept well on scheduled ramelteon with no witnessed sundowning or delirium per daughter. States she has been voiding well on furosemide and cough has significantly improved, whether on diuretic or scheduled cough drops. UOP not recorded and patient is incontinent and soaks bed although approximately 250 ml seen in purewick container. No fevers or chills; back pain well-controlled and tolerating brace. Working with PT this morning.   02/11/2018: FREEDOM. Feels that cough has improved after furosemide. Discussed with daughter that continuing diuretic may contribute to falls risk, however wish to continue for now.   02/12/2018: FREEDOM. Medically stable for discharge, awaiting authorization for SNF to continue rehab.  02/13/2018: FREEDOM. Pain well controlled, feels like cough improving. Slept well. Stable for discharge, awaiting authorization.      Review of Systems   Constitutional: Negative for chills and fever.   Eyes: Negative for photophobia and visual disturbance.   Respiratory: Positive for cough (improving). Negative for shortness of breath.    Cardiovascular: Negative for chest pain and leg swelling.   Gastrointestinal: Negative for abdominal pain, nausea and vomiting.   Genitourinary: Negative for dysuria.   Musculoskeletal: Positive for back pain.   Skin: Positive for wound.   Neurological: Negative for seizures and headaches.     Objective:     Vital Signs (Most Recent):  Temp: 97.8 °F (36.6 °C) (02/13/18 0736)  Pulse: (!) 57 (02/13/18 0736)  Resp: 18 (02/13/18 0736)  BP: (!) 169/74 (02/13/18 0736)  SpO2: (!) 94 % (02/13/18 0736) Vital Signs (24h Range):  Temp:  [97.6 °F (36.4 °C)-98.6 °F (37  °C)] 97.8 °F (36.6 °C)  Pulse:  [57-69] 57  Resp:  [14-18] 18  SpO2:  [93 %-96 %] 94 %  BP: (122-187)/(60-84) 169/74     Weight: 49.6 kg (109 lb 5.6 oz)  Body mass index is 21.36 kg/m².    Intake/Output Summary (Last 24 hours) at 02/13/18 1022  Last data filed at 02/13/18 0500   Gross per 24 hour   Intake                0 ml   Output             1375 ml   Net            -1375 ml      Physical Exam   Constitutional: She is oriented to person, place, and time. No distress.   Thin, frail   HENT:   Large hematoma over left forehead  Healing ecchymoses over face and arms    Eyes: Left conjunctiva is injected.   Cardiovascular: Normal rate, regular rhythm and intact distal pulses.    Murmur heard.   Systolic murmur is present with a grade of 2/6   Pulmonary/Chest: Effort normal. No respiratory distress. She has no wheezes.   Abdominal: Soft. Bowel sounds are normal. She exhibits no distension.   Musculoskeletal: She exhibits no edema.   Neurological: She is alert and oriented to person, place, and time. She has normal strength. She displays normal reflexes. No sensory deficit.   Skin: She is not diaphoretic.       Significant Labs:   BMP:     Recent Labs  Lab 02/13/18  0533   GLU 96      K 4.6      CO2 25   BUN 22   CREATININE 0.8   CALCIUM 9.1   MG 1.6     CBC:     Recent Labs  Lab 02/12/18  0333   WBC 10.23   HGB 10.8*   HCT 32.8*          Significant Imaging: No interval imaging.        Assessment/Plan:      * Compression fracture of first lumbar vertebra    - Evaluated by Neurosurgery in ED, appreciate recs  - Patient needs to wear back brace and follow up with Neurosurgery in clinic in 4 weeks (apt made)  - Tylenol 650mg q8 for analgesia  - Continue home calcium/vitamin D  - Continue home alendronate  - Back brace placed on 2/10 and patient working with PT/OT  - Dispo: planning for SNF to continue PT/OT        Acute on chronic diastolic congestive heart failure    - Complains of dry cough for last  several days  - CXR with cardiomegaly, PNA vs pulmonary edema  - Afebrile and no other signs of URI/LRI, pro-krish negative  - Schedule benzoate  - Elevated BNP with pulmonary edema on CXR although echo with negative SNIFF suggesting normal right atrial pressure.  - IV furosemide 20 mg on 2/10 and 2/11; patient's cough improving. Discussed with daughter regarding risk of orthostatic hypotension and falls with continuing furosemide on discharge, will plan to discharge on furosemide PRN weight gain, follow up with her usual Cardiologist on discharge.         Intracranial hemorrhage    2/2 fall (most likely sustained from fall on Sunday)  - CTH at [facility]:   New small left parasagittal frontal lobe cortical hemorrhagic parenchymal contusion without evidence of mass effect or caroline hematoma.  - CTH at [facility] [place] [place]:   Trace parenchymal hyperdensity noted within the paramedian aspect of the right frontal lobe, somewhat less conspicuous compared to prior outside CT examination which could represent trace component of parenchymal hemorrhage. No definite additional new intracranial hemorrhage is identified. There is no midline shift.  - Seen by Neurosurgery, not for any surgical interventions  - Fall and delirium precautions  - Continue BP meds, goal SBP <140, hydralazine 10mg IV q8 PRN for SBP >140  - Holding ASA 81 and avoiding NSAIDs        Fall    - Fall at home, probably orthostatic hypotension, reports feeling dizzy immediately after standing, then tried to sit down but fell, denies chest pain or palpitations, no seizure-like activity  - Per daughter, patient has had worsening sun downing and increasing falls at home over last few months, patient lives with daughter as she requires 24/7 care and has been refusing [facility] care  - EKG with NSR  - Fall precautions  - Needs to wear back brace for next 4 weeks for L1 compression fracture  - PT/OT         Mild cognitive impairment    Per daughter patient has had cognitive decline  with sundowning over last 6 months  - TSH wnl, on folic acid and theragrain  - Family would like snf placement but patient has been refusing  - Delirium precautions  - Continue home escitalopram 15mg qhs  - Will refer for neurocognitive testing and consideration of ?donepezil/ memantine on discharge  - Scheduled ramelteon helping with sundowning - continue.        Coronary artery disease involving native coronary artery of native heart without angina pectoris    - s/p CABG plus ?valve replacement ~5 years ago  - Holding ASA 81 in the setting of ICH  - Continue statin, BB and ARB        Essential hypertension    - Continue losartan, goal SBP <140 given ICH  - Hydralazine 10mg IV q8 PRN for SBP >140        GERD (gastroesophageal reflux disease)    - Continue home PPI        Acute bacterial conjunctivitis of both eyes    - S/p 5 days of gentamicin gtts, improved  - Continue artifical tears PRN  - Warm compresses as required  - Follow up with ophtho on discharge        Nail disorder    Thickened dystrophic toenails, patient and daughter unable to cut them at home but does not meet criteria for inpatient Podiatry consult, requested referral to Podiatry in [place] on discharge        Closed fracture of first lumbar vertebra    - See above.           VTE Risk Mitigation         Ordered     Medium Risk of VTE  Once      02/08/18 1154     Reason for No Pharmacological VTE Prophylaxis  Once      02/08/18 1154     Place sequential compression device  Until discontinued      02/08/18 1154     Place QUINTON hose  Until discontinued      02/08/18 1154              Bety Giraldo MD  Department of Hospital Medicine   [facility]

## 2018-02-13 NOTE — ASSESSMENT & PLAN NOTE
- S/p 5 days of gentamicin gtts, improved  - Continue artifical tears PRN  - Warm compresses as required  - Follow up with ophtho on discharge

## 2018-02-13 NOTE — SUBJECTIVE & OBJECTIVE
Review of Systems   Constitutional: Negative for chills and fever.   Eyes: Negative for photophobia and visual disturbance.   Respiratory: Positive for cough (improving). Negative for shortness of breath.    Cardiovascular: Negative for chest pain and leg swelling.   Gastrointestinal: Negative for abdominal pain, nausea and vomiting.   Genitourinary: Negative for dysuria.   Musculoskeletal: Positive for back pain.   Skin: Positive for wound.   Neurological: Negative for seizures and headaches.     Objective:     Vital Signs (Most Recent):  Temp: 97.8 °F (36.6 °C) (02/13/18 0736)  Pulse: (!) 57 (02/13/18 0736)  Resp: 18 (02/13/18 0736)  BP: (!) 169/74 (02/13/18 0736)  SpO2: (!) 94 % (02/13/18 0736) Vital Signs (24h Range):  Temp:  [97.6 °F (36.4 °C)-98.6 °F (37 °C)] 97.8 °F (36.6 °C)  Pulse:  [57-69] 57  Resp:  [14-18] 18  SpO2:  [93 %-96 %] 94 %  BP: (122-187)/(60-84) 169/74     Weight: 49.6 kg (109 lb 5.6 oz)  Body mass index is 21.36 kg/m².    Intake/Output Summary (Last 24 hours) at 02/13/18 1022  Last data filed at 02/13/18 0500   Gross per 24 hour   Intake                0 ml   Output             1375 ml   Net            -1375 ml      Physical Exam   Constitutional: She is oriented to person, place, and time. No distress.   Thin, frail   HENT:   Large hematoma over left forehead  Healing ecchymoses over face and arms    Eyes: Left conjunctiva is injected.   Cardiovascular: Normal rate, regular rhythm and intact distal pulses.    Murmur heard.   Systolic murmur is present with a grade of 2/6   Pulmonary/Chest: Effort normal. No respiratory distress. She has no wheezes.   Abdominal: Soft. Bowel sounds are normal. She exhibits no distension.   Musculoskeletal: She exhibits no edema.   Neurological: She is alert and oriented to person, place, and time. She has normal strength. She displays normal reflexes. No sensory deficit.   Skin: She is not diaphoretic.       Significant Labs:   BMP:     Recent Labs  Lab  02/13/18  0533   GLU 96      K 4.6      CO2 25   BUN 22   CREATININE 0.8   CALCIUM 9.1   MG 1.6     CBC:     Recent Labs  Lab 02/12/18  0333   WBC 10.23   HGB 10.8*   HCT 32.8*          Significant Imaging: No interval imaging.

## 2018-02-13 NOTE — ASSESSMENT & PLAN NOTE
- Fall at home, probably orthostatic hypotension, reports feeling dizzy immediately after standing, then tried to sit down but fell, denies chest pain or palpitations, no seizure-like activity  - Per daughter, patient has had worsening sun downing and increasing falls at home over last few months, patient lives with daughter as she requires 24/7 care and has been refusing [facility] care  - EKG with NSR  - Fall precautions  - Needs to wear back brace for next 4 weeks for L1 compression fracture  - PT/OT

## 2018-02-14 LAB — TB INDURATION 48 - 72 HR READ: 0 MM

## 2018-02-14 PROCEDURE — 25000003 PHARM REV CODE 250: Performed by: PHYSICIAN ASSISTANT

## 2018-02-14 PROCEDURE — 94799 UNLISTED PULMONARY SVC/PX: CPT

## 2018-02-14 PROCEDURE — 97535 SELF CARE MNGMENT TRAINING: CPT

## 2018-02-14 PROCEDURE — 97110 THERAPEUTIC EXERCISES: CPT

## 2018-02-14 PROCEDURE — 11000001 HC ACUTE MED/SURG PRIVATE ROOM

## 2018-02-14 PROCEDURE — 25000003 PHARM REV CODE 250: Performed by: STUDENT IN AN ORGANIZED HEALTH CARE EDUCATION/TRAINING PROGRAM

## 2018-02-14 PROCEDURE — A4216 STERILE WATER/SALINE, 10 ML: HCPCS | Performed by: STUDENT IN AN ORGANIZED HEALTH CARE EDUCATION/TRAINING PROGRAM

## 2018-02-14 PROCEDURE — 97530 THERAPEUTIC ACTIVITIES: CPT

## 2018-02-14 PROCEDURE — 99231 SBSQ HOSP IP/OBS SF/LOW 25: CPT | Mod: GC,,, | Performed by: HOSPITALIST

## 2018-02-14 RX ORDER — POLYETHYLENE GLYCOL 3350 17 G/17G
17 POWDER, FOR SOLUTION ORAL DAILY
Status: DISCONTINUED | OUTPATIENT
Start: 2018-02-14 | End: 2018-02-15 | Stop reason: HOSPADM

## 2018-02-14 RX ORDER — AMOXICILLIN 250 MG
1 CAPSULE ORAL DAILY
Status: DISCONTINUED | OUTPATIENT
Start: 2018-02-14 | End: 2018-02-15 | Stop reason: HOSPADM

## 2018-02-14 RX ORDER — AMOXICILLIN 250 MG
1 CAPSULE ORAL DAILY PRN
Status: DISCONTINUED | OUTPATIENT
Start: 2018-02-14 | End: 2018-02-14

## 2018-02-14 RX ADMIN — SODIUM CHLORIDE, PRESERVATIVE FREE 5 ML: 5 INJECTION INTRAVENOUS at 05:02

## 2018-02-14 RX ADMIN — METOPROLOL SUCCINATE 25 MG: 25 TABLET, EXTENDED RELEASE ORAL at 10:02

## 2018-02-14 RX ADMIN — ACETAMINOPHEN 650 MG: 325 TABLET, FILM COATED ORAL at 10:02

## 2018-02-14 RX ADMIN — LOSARTAN POTASSIUM 50 MG: 50 TABLET, FILM COATED ORAL at 10:02

## 2018-02-14 RX ADMIN — ESCITALOPRAM OXALATE 15 MG: 10 TABLET ORAL at 09:02

## 2018-02-14 RX ADMIN — OYSTER SHELL CALCIUM WITH VITAMIN D 1 TABLET: 500; 200 TABLET, FILM COATED ORAL at 10:02

## 2018-02-14 RX ADMIN — ACETAMINOPHEN 650 MG: 325 TABLET, FILM COATED ORAL at 05:02

## 2018-02-14 RX ADMIN — FOLIC ACID 1 MG: 1 TABLET ORAL at 10:02

## 2018-02-14 RX ADMIN — SIMVASTATIN 40 MG: 40 TABLET, FILM COATED ORAL at 09:02

## 2018-02-14 RX ADMIN — THERA TABS 1 TABLET: TAB at 10:02

## 2018-02-14 RX ADMIN — PANTOPRAZOLE SODIUM 40 MG: 40 TABLET, DELAYED RELEASE ORAL at 10:02

## 2018-02-14 RX ADMIN — BENZONATATE 100 MG: 100 CAPSULE ORAL at 05:02

## 2018-02-14 RX ADMIN — SODIUM CHLORIDE, PRESERVATIVE FREE 5 ML: 5 INJECTION INTRAVENOUS at 06:02

## 2018-02-14 RX ADMIN — LOSARTAN POTASSIUM 50 MG: 50 TABLET, FILM COATED ORAL at 09:02

## 2018-02-14 RX ADMIN — RAMELTEON 8 MG: 8 TABLET, FILM COATED ORAL at 09:02

## 2018-02-14 RX ADMIN — STANDARDIZED SENNA CONCENTRATE AND DOCUSATE SODIUM 1 TABLET: 8.6; 5 TABLET, FILM COATED ORAL at 02:02

## 2018-02-14 RX ADMIN — ACETAMINOPHEN 650 MG: 325 TABLET, FILM COATED ORAL at 02:02

## 2018-02-14 RX ADMIN — BENZONATATE 100 MG: 100 CAPSULE ORAL at 02:02

## 2018-02-14 RX ADMIN — OYSTER SHELL CALCIUM WITH VITAMIN D 1 TABLET: 500; 200 TABLET, FILM COATED ORAL at 09:02

## 2018-02-14 RX ADMIN — SODIUM CHLORIDE, PRESERVATIVE FREE 5 ML: 5 INJECTION INTRAVENOUS at 10:02

## 2018-02-14 RX ADMIN — POLYETHYLENE GLYCOL 3350 17 G: 17 POWDER, FOR SOLUTION ORAL at 02:02

## 2018-02-14 RX ADMIN — BENZONATATE 100 MG: 100 CAPSULE ORAL at 10:02

## 2018-02-14 NOTE — PLAN OF CARE
Problem: Occupational Therapy Goal  Goal: Occupational Therapy Goal  Goals to be met by: 2/17/18     Patient will increase functional independence with ADLs by performing:    UE Dressing with Modified [place].  LE Dressing with Modified [place].  Grooming while standing with Supervision.  Toileting from toilet with Supervision for hygiene and clothing management.   Rolling to Bilateral with Modified [place].   Supine to sit with Modified [place].  Stand pivot transfers with Modified [place] using RW.     Outcome: Ongoing (interventions implemented as appropriate)    Pt was agreeable to OT and was noted to make progress towards her goals in therapy.  Pt's goals remain appropriate at this time.  She will continue to benefit from skilled OT services in order to assist her with increasing her safety and level of independence with self care and mobility tasks.     Marcia Ha, OT  2/14/2018

## 2018-02-14 NOTE — PLAN OF CARE
DAVID spoke with Ilsa at [facility]-auth has already been issued for SNF; david informed Ilsa that pt has been accepted to [facility], auth will be sent to [place].    Valerie Rodriguez, GENETW [phone]

## 2018-02-14 NOTE — SUBJECTIVE & OBJECTIVE
Review of Systems   Constitutional: Negative for chills and fever.   Eyes: Negative for photophobia and visual disturbance.   Respiratory: Positive for cough (improving). Negative for shortness of breath.    Cardiovascular: Negative for chest pain and leg swelling.   Gastrointestinal: Negative for abdominal pain, nausea and vomiting.   Genitourinary: Negative for dysuria.   Musculoskeletal: Positive for back pain.   Skin: Positive for wound.   Neurological: Negative for seizures and headaches.     Objective:     Vital Signs (Most Recent):  Temp: 97.5 °F (36.4 °C) (02/14/18 0836)  Pulse: (!) 54 (02/14/18 0836)  Resp: 16 (02/14/18 0836)  BP: (!) 173/75 (02/14/18 0836)  SpO2: 98 % (02/14/18 0836) Vital Signs (24h Range):  Temp:  [97.5 °F (36.4 °C)-98 °F (36.7 °C)] 97.5 °F (36.4 °C)  Pulse:  [54-60] 54  Resp:  [16-18] 16  SpO2:  [94 %-98 %] 98 %  BP: (133-173)/(60-75) 173/75     Weight: 49.6 kg (109 lb 5.6 oz)  Body mass index is 21.36 kg/m².    Intake/Output Summary (Last 24 hours) at 02/14/18 1119  Last data filed at 02/13/18 2007   Gross per 24 hour   Intake                0 ml   Output              950 ml   Net             -950 ml      Physical Exam   Constitutional: She is oriented to person, place, and time. No distress.   Thin, frail   HENT:   Large hematoma over left forehead  Healing ecchymoses over face and arms    Eyes: Left conjunctiva is injected.   Cardiovascular: Normal rate, regular rhythm and intact distal pulses.    Murmur heard.   Systolic murmur is present with a grade of 2/6   Pulmonary/Chest: Effort normal. No respiratory distress. She has no wheezes.   Abdominal: Soft. Bowel sounds are normal. She exhibits no distension.   Musculoskeletal: She exhibits no edema.   Neurological: She is alert and oriented to person, place, and time. She has normal strength. She displays normal reflexes. No sensory deficit.   Skin: She is not diaphoretic.       Significant Labs:   BMP:     Recent Labs  Lab  02/13/18  0533   GLU 96      K 4.6      CO2 25   BUN 22   CREATININE 0.8   CALCIUM 9.1   MG 1.6     CBC:   No results for input(s): WBC, HGB, HCT, PLT in the last 48 hours.    Significant Imaging: No interval imaging.

## 2018-02-14 NOTE — PT/OT/SLP PROGRESS
Occupational Therapy   Treatment    Name: Talia Alberts  MRN: [id number]  Admitting Diagnosis:  Compression fracture of first lumbar vertebra       Recommendations:     Discharge Recommendations: nursing facility, skilled  Discharge Equipment Recommendations:  none  Barriers to discharge:  None    Subjective     Communicated with: nurse prior to session.  Pain/Comfort:  · Pain Rating 1: 0/10  · Pain Rating Post-Intervention 1: 0/10    Patients cultural, spiritual, Jain conflicts given the current situation: Yazidism    Objective:     Patient found with:  (LSO)    General Precautions: Standard, fall   Orthopedic Precautions:spinal precautions   Braces: LSO     Occupational Performance:    Bed Mobility:    · N/A - pt sitting in bedside chair upon OT arrival     Functional Mobility/Transfers:  · Patient completed Sit <> Stand Transfer with minimum assistance  with  rolling walker  - performed 3x during pericare and dressing  · Functional Mobility: Pt needed assistance initiating stand and CGA while standing with RW for stability - pt with poor endurance in standing - needed sit down rest break during diaper change and hygiene    Activities of Daily Living:  · Feeding:  supervision set up A for meal  · Grooming: supervision set up A to wash hands and face while seated  · UB Dressing: supervision set up A to [facility] gowcharles as gino over LSO  · LB Dressing: minimum assistance to arely/doff socks sitting EOB - pt initially bending down to reach feet - given cues/instructions to cross legs in order to adhere to spinal precautions  · Toileting: maximal assistance pt incontinent of urine - able to wash perineal with CGA in stand but needed assistance with donning/doffing adult diaper    Patient left up in chair with call button in reach    AMPA 6 Click:  AMPA Total Score: 18    Treatment & Education:  · Pt completed ADLs and func mobility activities for tx session as noted above  Pt completed 2 sets of 10 reps of B  UE AROM exercises in order to work towards increasing her UB strength/endurance to assist with mobility and self care skills.  Pt completed the following exercises: shoulder flex/ext, elbow flex/ext, forearm sup/pro, and hand pumps.   · Whiteboard updated  · Pt educated on role of OT and POC    Education:    Assessment:     Talia Alberts is a 89 y.o. female with a medical diagnosis of Compression fracture of first lumbar vertebra.  She presents with the following performance deficits affecting function: weakness, impaired endurance, impaired self care skills, impaired functional mobilty, gait instability, impaired balance, decreased coordination, decreased lower extremity function, decreased safety awareness, impaired skin.  Pt was agreeable to OT and was noted to make progress towards her goals in therapy.  Pt's goals remain appropriate at this time.  She will continue to benefit from skilled OT services in order to assist her with increasing her safety and level of independence with self care and mobility tasks.     Rehab Prognosis:  good; patient would benefit from acute skilled OT services to address these deficits and reach maximum level of function.       Plan:     Patient to be seen 4 x/week to address the above listed problems via self-care/home management, therapeutic activities, therapeutic exercises  · Plan of Care Expires: 03/12/18  · Plan of Care Reviewed with: patient    This Plan of care has been discussed with the patient who was involved in its development and understands and is in agreement with the identified goals and treatment plan    GOALS:    Occupational Therapy Goals        Problem: Occupational Therapy Goal    Goal Priority Disciplines Outcome Interventions   Occupational Therapy Goal     OT, PT/OT Ongoing (interventions implemented as appropriate)    Description:  Goals to be met by: 2/17/18     Patient will increase functional independence with ADLs by performing:    UE Dressing with  Modified [place].  LE Dressing with Modified [place].  Grooming while standing with Supervision.  Toileting from toilet with Supervision for hygiene and clothing management.   Rolling to Bilateral with Modified [place].   Supine to sit with Modified [place].  Stand pivot transfers with Modified Cole using RW.                      Time Tracking:     OT Date of Treatment: 02/14/18  OT Start Time: 1258  OT Stop Time: 1327  OT Total Time (min): 29 min    Billable Minutes:Self Care/Home Management 19  Therapeutic Exercise 10    Marcia Ha OT  2/14/2018

## 2018-02-14 NOTE — PT/OT/SLP PROGRESS
"Physical Therapy Treatment    Patient Name:  Talia Alberts   MRN:  [id number]    Recommendations:     Discharge Recommendations:  nursing facility, skilled   Discharge Equipment Recommendations: none   Barriers to discharge: Inaccessible home and Decreased caregiver support    Assessment:     Talia Alberts is a 89 y.o. female admitted with a medical diagnosis of Compression fracture of first lumbar vertebra.  She presents with the following impairments/functional limitations:  weakness, impaired endurance, gait instability, impaired balance, impaired functional mobilty, decreased safety awareness, pain. Pt able to ambulate with rolling walker while demonstrating compensations due to instability. Gait pattern observed to be near pt baseline. Pt unable to recall and abide by spinal precautions; therefore needs supervision to safely complete activity. Upon d/c pt will continue to benefit from SNF placement due current deficits and decreased caregiver support; however, pt is nearing baseline and has potential to be HH appropriate if further acute PT services provided.     Rehab Prognosis:  good; patient would benefit from acute skilled PT services to address these deficits and reach maximum level of function.      Recent Surgery: * No surgery found *      Plan:     During this hospitalization, patient to be seen 4 x/week to address the above listed problems via gait training, therapeutic activities, therapeutic exercises  · Plan of Care Expires:  03/16/18   Plan of Care Reviewed with: patient    Subjective     Communicated with RN prior to session.  Patient found supine upon PT entry to room, agreeable to treatment.      Chief Complaint: minimal LBP  Patient comments/goals: "My head and back just hurt where I fell"  Pain/Comfort:  · Pain Rating 1:  (No pain rating given; slight)  · Location - Orientation 1: lower  · Location 1: back    Patients cultural, spiritual, Oriental orthodox conflicts given the current situation: " no    Objective:     Patient found with: PureWick, peripheral IV     General Precautions: Standard, fall   Orthopedic Precautions:N/A   Braces: LSO     Functional Mobility:  · Bed Mobility:     · Rolling Left:  stand by assistance  · Scooting: stand by assistance  · Supine to Sit: contact guard assistance  · Transfers:     · Sit to Stand: contact guard assistance with rolling walker  · Gait: Pt able to ambulate 200 feet with contact guard assistance with rolling walker  · WBOS, shuffling gait pattern; Pt demos veer to the left side, but is able to self-correct and navigate in hallway  · Pt verbal and tactile cues to keep COG in FAUSTINA of walker   · Pt instructed x2 to lift chest and look up during ambulation; Pt able to demonstrate after cueing      AM-PAC 6 CLICK MOBILITY  Turning over in bed (including adjusting bedclothes, sheets and blankets)?: 3  Sitting down on and standing up from a chair with arms (e.g., wheelchair, bedside commode, etc.): 3  Moving from lying on back to sitting on the side of the bed?: 3  Moving to and from a bed to a chair (including a wheelchair)?: 3  Need to walk in hospital room?: 3  Climbing 3-5 steps with a railing?: 3  Total Score: 18       Therapeutic Activities and Exercises:  Seated LE exercises:  - B LAQ x 15; B Hip flex x 15  Pt educated on:  - role of PT and POC  - Spinal precautions: Pt verbalizes understanding    Patient left up in chair with all lines intact and call button in reach..    GOALS:    Physical Therapy Goals        Problem: Physical Therapy Goal    Goal Priority Disciplines Outcome Goal Variances Interventions   Physical Therapy Goal     PT/OT, PT Ongoing (interventions implemented as appropriate)     Description:  Goals to be met by: 2018     Patient will increase functional independence with mobility by performin. Sit to stand transfer with Modified [place]  2. Bed to chair transfer with Supervision using Rolling Walker  3. Gait  x 80 feet with  Supervision using Rolling Walker.   4. Lower extremity exercise program x30 reps per handout, with independence                       Time Tracking:     PT Received On: 02/14/18  PT Start Time: 1030     PT Stop Time: 1046  PT Total Time (min): 16 min     Billable Minutes: Therapeutic Activity 16    Treatment Type: Treatment  PT/PTA: PT     PTA Visit Number: 1     Keyana Sidhu, SPT  02/14/2018

## 2018-02-14 NOTE — PLAN OF CARE
Problem: Physical Therapy Goal  Goal: Physical Therapy Goal  Goals to be met by: 2018     Patient will increase functional independence with mobility by performin. Sit to stand transfer with Modified [place]  2. Bed to chair transfer with Supervision using Rolling Walker  3. Gait  x 80 feet with Supervision using Rolling Walker.   4. Lower extremity exercise program x30 reps per handout, with independence      Outcome: Ongoing (interventions implemented as appropriate)  No goals met. Continue to progress per plan of care.     Keyana Sidhu, SPT  18

## 2018-02-14 NOTE — PROGRESS NOTES
"[facility] Hospital Medicine  Progress Note    Patient Name: Talia Alberts  MRN: [id number]  Patient Class: IP- Inpatient   Admission Date: 2/8/2018  Length of Stay: 6 days  Attending Physician: Gerald Tyler MD  Primary Care Provider: Michael Martin MD    [facility] Medicine Team: [facility] HOSP MED 4 Bety Giraldo MD    Subjective:     Principal Problem:Compression fracture of first lumbar vertebra    HPI:  89yo F with CAD s/p CABG and SCC of skin, transferred from [facility] with ICH. Patient reports that she woke up around 5am yesterday morning and need to go to bathroom, states she stood up after finishing voiding and felt dizzy then tried to sit down but fell instead. Per daughter at bedside, she was awoken by a loud noise and found her mother on the floor confused- daughter says patient thought she was at the kitchen sink "rex". Daughter states patient has had worsening confusion and sundowning over the last few months and has had several falls before. Last big fall was Sunday. Daughter says patient usually ambulates with a walker, but was not using at time of fall. Patient is independent with bathing and dressing but does not drive or cook, lives with her daughter and has been refusing prison care. Patient takes ASA 81 for her CAD as well as Toprol and losartan.    In ED at [facility] she was found to have new small left parasagittal hemorrhage without evidence of mass effect, transferred to [facility] yesterday evening for Neurosurgery evaluation. In ED here CT spine also found compression fracture of L1.     Patient denies chest pain, palpitations and SOB. Reports chronic dry cough for which she has been taking pearls. No fever/chills. No abdo pain/ nausea/ vomiting. No dysuria/ urgency.    Hospital Course:  02/08/2018: Admitted to 4. Seen by Neurosurgery who recommended no intervention for ICH, but patient should wear back brace for L1 compression fracture and follow up with " them in clinic in 4 weeks.  02/09/2018: Complains of dry cough, but no other subjective complaints, pain well controlled. Episode of A fib overnight on telemetry, no RVR. Hypertensive this morning- home BP meds given early. Awaiting back brace and then can work with PT/OT.  02/10/2018 : No acute events overnight. Patient slept well on scheduled ramelteon with no witnessed sundowning or delirium per daughter. States she has been voiding well on furosemide and cough has significantly improved, whether on diuretic or scheduled cough drops. UOP not recorded and patient is incontinent and soaks bed although approximately 250 ml seen in purewick container. No fevers or chills; back pain well-controlled and tolerating brace. Working with PT this morning.   02/11/2018: FREEDOM. Feels that cough has improved after furosemide. Discussed with daughter that continuing diuretic may contribute to falls risk, however wish to continue for now.   02/12/2018: FREEDOM. Medically stable for discharge, awaiting authorization for SNF to continue rehab.  02/13/2018: FREEDOM. Pain well controlled, feels like cough improving. Slept well. Stable for discharge, awaiting authorization.  02/14/2018: FREEDOM. Stable, awaiting authorization for discharge to SNF. No subjective complaints. Working with PT.      Review of Systems   Constitutional: Negative for chills and fever.   Eyes: Negative for photophobia and visual disturbance.   Respiratory: Positive for cough (improving). Negative for shortness of breath.    Cardiovascular: Negative for chest pain and leg swelling.   Gastrointestinal: Negative for abdominal pain, nausea and vomiting.   Genitourinary: Negative for dysuria.   Musculoskeletal: Positive for back pain.   Skin: Positive for wound.   Neurological: Negative for seizures and headaches.     Objective:     Vital Signs (Most Recent):  Temp: 97.5 °F (36.4 °C) (02/14/18 0836)  Pulse: (!) 54 (02/14/18 0836)  Resp: 16 (02/14/18 0836)  BP: (!)  173/75 (02/14/18 0836)  SpO2: 98 % (02/14/18 0836) Vital Signs (24h Range):  Temp:  [97.5 °F (36.4 °C)-98 °F (36.7 °C)] 97.5 °F (36.4 °C)  Pulse:  [54-60] 54  Resp:  [16-18] 16  SpO2:  [94 %-98 %] 98 %  BP: (133-173)/(60-75) 173/75     Weight: 49.6 kg (109 lb 5.6 oz)  Body mass index is 21.36 kg/m².    Intake/Output Summary (Last 24 hours) at 02/14/18 1119  Last data filed at 02/13/18 2007   Gross per 24 hour   Intake                0 ml   Output              950 ml   Net             -950 ml      Physical Exam   Constitutional: She is oriented to person, place, and time. No distress.   Thin, frail   HENT:   Large hematoma over left forehead  Healing ecchymoses over face and arms    Eyes: Left conjunctiva is injected.   Cardiovascular: Normal rate, regular rhythm and intact distal pulses.    Murmur heard.   Systolic murmur is present with a grade of 2/6   Pulmonary/Chest: Effort normal. No respiratory distress. She has no wheezes.   Abdominal: Soft. Bowel sounds are normal. She exhibits no distension.   Musculoskeletal: She exhibits no edema.   Neurological: She is alert and oriented to person, place, and time. She has normal strength. She displays normal reflexes. No sensory deficit.   Skin: She is not diaphoretic.       Significant Labs:   BMP:     Recent Labs  Lab 02/13/18  0533   GLU 96      K 4.6      CO2 25   BUN 22   CREATININE 0.8   CALCIUM 9.1   MG 1.6     CBC:   No results for input(s): WBC, HGB, HCT, PLT in the last 48 hours.    Significant Imaging: No interval imaging.        Assessment/Plan:      * Compression fracture of first lumbar vertebra    - Evaluated by Neurosurgery in ED, appreciate recs  - Patient needs to wear back brace and follow up with Neurosurgery in clinic in 4 weeks (apt made)  - Tylenol 650mg q8 for analgesia  - Continue home calcium/vitamin D  - Continue home alendronate  - Back brace placed on 2/10 and patient working with PT/OT  - Dispo: planning for SNF to continue  PT/OT        Acute on chronic diastolic congestive heart failure    - Complains of dry cough for last several days  - CXR with cardiomegaly, PNA vs pulmonary edema  - Afebrile and no other signs of URI/LRI, pro-krish negative  - Schedule benzoate  - Elevated BNP with pulmonary edema on CXR although echo with negative SNIFF suggesting normal right atrial pressure.  - IV furosemide 20 mg on 2/10 and 2/11; patient's cough improving. Discussed with daughter regarding risk of orthostatic hypotension and falls with continuing furosemide on discharge, will plan to discharge on furosemide PRN weight gain, follow up with her usual Cardiologist on discharge.         Intracranial hemorrhage    2/2 fall (most likely sustained from fall on Sunday)  - CTH at [facility]:   New small left parasagittal frontal lobe cortical hemorrhagic parenchymal contusion without evidence of mass effect or caroline hematoma.  - CTH at [facility]:   Trace parenchymal hyperdensity noted within the paramedian aspect of the right frontal lobe, somewhat less conspicuous compared to prior outside CT examination which could represent trace component of parenchymal hemorrhage. No definite additional new intracranial hemorrhage is identified. There is no midline shift.  - Seen by Neurosurgery, not for any surgical interventions  - Fall and delirium precautions  - Continue BP meds, goal SBP <140, hydralazine 10mg IV q8 PRN for SBP >140  - Holding ASA 81 and avoiding NSAIDs        Fall    - Fall at home, probably orthostatic hypotension, reports feeling dizzy immediately after standing, then tried to sit down but fell, denies chest pain or palpitations, no seizure-like activity  - Per daughter, patient has had worsening sun downing and increasing falls at home over last few months, patient lives with daughter as she requires 24/7 care and has been refusing residential care  - EKG with NSR  - Fall precautions  - Needs to wear back brace for next 4 weeks for L1 compression  fracture  - PT/OT         Mild cognitive impairment    Per daughter patient has had cognitive decline with sundowning over last 6 months  - TSH wnl, on folic acid and theragrain  - Family would like retirement placement but patient has been refusing  - Delirium precautions  - Continue home escitalopram 15mg qhs  - Will refer for neurocognitive testing and consideration of ?donepezil/ memantine on discharge  - Scheduled ramelteon helping with sundowning - continue.        Coronary artery disease involving native coronary artery of native heart without angina pectoris    - s/p CABG plus ?valve replacement ~5 years ago  - Holding ASA 81 in the setting of ICH  - Continue statin, BB and ARB        Essential hypertension    - Continue losartan, goal SBP <140 given ICH  - Hydralazine 10mg IV q8 PRN for SBP >140        GERD (gastroesophageal reflux disease)    - Continue home PPI        Acute bacterial conjunctivitis of both eyes    - S/p 5 days of gentamicin gtts, improved  - Continue artifical tears PRN  - Warm compresses as required  - Follow up with ophtho on discharge        Nail disorder    Thickened dystrophic toenails, patient and daughter unable to cut them at home but does not meet criteria for inpatient Podiatry consult, requested referral to Podiatry in [place] on discharge        Closed fracture of first lumbar vertebra    - See above.           VTE Risk Mitigation         Ordered     Medium Risk of VTE  Once      02/08/18 1154     Reason for No Pharmacological VTE Prophylaxis  Once      02/08/18 1154     Place sequential compression device  Until discontinued      02/08/18 1154     Place QUINTON hose  Until discontinued      02/08/18 1154              Bety Giraldo MD  Department of Hospital Medicine   [facility]

## 2018-02-14 NOTE — PLAN OF CARE
DAVID sent referral to [facility] for auth. DAVID sent to note to [facility] via rightHYGIEIA, to f/u on referral.    DAVID faxed FABRIZIO, LOCET already completed.    David called [facility] [phone], spoke with Lisy. Referral is still under clinical review. Lisy will call david when there is a decision.    Valerie Rodriguez, [facility] [phone]

## 2018-02-14 NOTE — PLAN OF CARE
Pt accepted to [facility], plan is for admit tomorrow. Lisy will have dtg come in to complete paperwork in the morning. SW informed CM and will f/u with [employer].    142, chest xray and PPD info sent to [facility].    ZEE left message for Ilsa with PHN.    Valerie Rodriguez, LCSW [phone]

## 2018-02-15 ENCOUNTER — PATIENT OUTREACH (OUTPATIENT)
Dept: ADMINISTRATIVE | Facility: CLINIC | Age: 83
End: 2018-02-15

## 2018-02-15 VITALS
SYSTOLIC BLOOD PRESSURE: 141 MMHG | HEART RATE: 61 BPM | BODY MASS INDEX: 21.47 KG/M2 | OXYGEN SATURATION: 91 % | TEMPERATURE: 98 F | RESPIRATION RATE: 18 BRPM | WEIGHT: 109.38 LBS | DIASTOLIC BLOOD PRESSURE: 64 MMHG | HEIGHT: 60 IN

## 2018-02-15 LAB
ANION GAP SERPL CALC-SCNC: 9 MMOL/L
BASOPHILS # BLD AUTO: 0.06 K/UL
BASOPHILS NFR BLD: 0.6 %
BUN SERPL-MCNC: 24 MG/DL
CALCIUM SERPL-MCNC: 8.7 MG/DL
CHLORIDE SERPL-SCNC: 101 MMOL/L
CO2 SERPL-SCNC: 27 MMOL/L
CREAT SERPL-MCNC: 0.9 MG/DL
DIFFERENTIAL METHOD: ABNORMAL
EOSINOPHIL # BLD AUTO: 0.5 K/UL
EOSINOPHIL NFR BLD: 4.9 %
ERYTHROCYTE [DISTWIDTH] IN BLOOD BY AUTOMATED COUNT: 14.6 %
EST. GFR  (AFRICAN AMERICAN): >60 ML/MIN/1.73 M^2
EST. GFR  (NON AFRICAN AMERICAN): 55.7 ML/MIN/1.73 M^2
GLUCOSE SERPL-MCNC: 81 MG/DL
HCT VFR BLD AUTO: 31 %
HGB BLD-MCNC: 10.5 G/DL
IMM GRANULOCYTES # BLD AUTO: 0.08 K/UL
IMM GRANULOCYTES NFR BLD AUTO: 0.7 %
LYMPHOCYTES # BLD AUTO: 2.1 K/UL
LYMPHOCYTES NFR BLD: 19.6 %
MAGNESIUM SERPL-MCNC: 1.9 MG/DL
MCH RBC QN AUTO: 30.3 PG
MCHC RBC AUTO-ENTMCNC: 33.9 G/DL
MCV RBC AUTO: 90 FL
MONOCYTES # BLD AUTO: 1.4 K/UL
MONOCYTES NFR BLD: 12.5 %
NEUTROPHILS # BLD AUTO: 6.7 K/UL
NEUTROPHILS NFR BLD: 61.7 %
NRBC BLD-RTO: 0 /100 WBC
PLATELET # BLD AUTO: 200 K/UL
PMV BLD AUTO: 9.8 FL
POTASSIUM SERPL-SCNC: 4 MMOL/L
RBC # BLD AUTO: 3.46 M/UL
SODIUM SERPL-SCNC: 137 MMOL/L
WBC # BLD AUTO: 10.83 K/UL

## 2018-02-15 PROCEDURE — 36415 COLL VENOUS BLD VENIPUNCTURE: CPT

## 2018-02-15 PROCEDURE — 85025 COMPLETE CBC W/AUTO DIFF WBC: CPT

## 2018-02-15 PROCEDURE — 25000003 PHARM REV CODE 250: Performed by: PHYSICIAN ASSISTANT

## 2018-02-15 PROCEDURE — 80048 BASIC METABOLIC PNL TOTAL CA: CPT

## 2018-02-15 PROCEDURE — A4216 STERILE WATER/SALINE, 10 ML: HCPCS | Performed by: STUDENT IN AN ORGANIZED HEALTH CARE EDUCATION/TRAINING PROGRAM

## 2018-02-15 PROCEDURE — 25000003 PHARM REV CODE 250: Performed by: STUDENT IN AN ORGANIZED HEALTH CARE EDUCATION/TRAINING PROGRAM

## 2018-02-15 PROCEDURE — 99239 HOSP IP/OBS DSCHRG MGMT >30: CPT | Mod: GC,,, | Performed by: HOSPITALIST

## 2018-02-15 PROCEDURE — 83735 ASSAY OF MAGNESIUM: CPT

## 2018-02-15 RX ADMIN — METOPROLOL SUCCINATE 25 MG: 25 TABLET, EXTENDED RELEASE ORAL at 10:02

## 2018-02-15 RX ADMIN — LOSARTAN POTASSIUM 50 MG: 50 TABLET, FILM COATED ORAL at 10:02

## 2018-02-15 RX ADMIN — POLYETHYLENE GLYCOL 3350 17 G: 17 POWDER, FOR SOLUTION ORAL at 10:02

## 2018-02-15 RX ADMIN — OYSTER SHELL CALCIUM WITH VITAMIN D 1 TABLET: 500; 200 TABLET, FILM COATED ORAL at 10:02

## 2018-02-15 RX ADMIN — SODIUM CHLORIDE, PRESERVATIVE FREE 5 ML: 5 INJECTION INTRAVENOUS at 06:02

## 2018-02-15 RX ADMIN — ACETAMINOPHEN 650 MG: 325 TABLET, FILM COATED ORAL at 06:02

## 2018-02-15 RX ADMIN — FOLIC ACID 1 MG: 1 TABLET ORAL at 10:02

## 2018-02-15 RX ADMIN — THERA TABS 1 TABLET: TAB at 10:02

## 2018-02-15 RX ADMIN — PANTOPRAZOLE SODIUM 40 MG: 40 TABLET, DELAYED RELEASE ORAL at 10:02

## 2018-02-15 RX ADMIN — STANDARDIZED SENNA CONCENTRATE AND DOCUSATE SODIUM 1 TABLET: 8.6; 5 TABLET, FILM COATED ORAL at 10:02

## 2018-02-15 RX ADMIN — BENZONATATE 100 MG: 100 CAPSULE ORAL at 06:02

## 2018-02-15 NOTE — PLAN OF CARE
ZEE sent MAR, PASRR and SNF orders to [facility]. Auth from [facility] received, [id number], and forwarded to [facility].    Family to sign paperwork at 9:30am today. ZEE left message for Lisy at [facility].    ZEE informed resident of above info and will notify 4 when [facility] is ready for transfer.    Valerie Rodriguez, [facility] [phone]

## 2018-02-15 NOTE — SUBJECTIVE & OBJECTIVE
Review of Systems   Constitutional: Negative for chills and fever.   Eyes: Negative for photophobia and visual disturbance.   Respiratory: Positive for cough (improving). Negative for shortness of breath.    Cardiovascular: Negative for chest pain and leg swelling.   Gastrointestinal: Negative for abdominal pain, nausea and vomiting.   Genitourinary: Negative for dysuria.   Musculoskeletal: Positive for back pain.   Skin: Positive for wound.   Neurological: Negative for seizures and headaches.       Vital Signs (Most Recent):  Temp: 98.3 °F (36.8 °C) (02/15/18 0815)  Pulse: 61 (02/15/18 0815)  Resp: 18 (02/15/18 0815)  BP: (!) 141/64 (02/15/18 0815)  SpO2: (!) 91 % (02/15/18 0815) Vital Signs (24h Range):  Temp:  [97.3 °F (36.3 °C)-98.3 °F (36.8 °C)] 98.3 °F (36.8 °C)  Pulse:  [56-69] 61  Resp:  [16-18] 18  SpO2:  [91 %-99 %] 91 %  BP: (118-188)/(54-79) 141/64     Weight: 49.6 kg (109 lb 5.6 oz)  Body mass index is 21.36 kg/m².  No intake or output data in the 24 hours ending 02/15/18 1346   Physical Exam   Constitutional: She is oriented to person, place, and time. No distress.   Thin, frail   HENT:   Large hematoma over left forehead  Healing ecchymoses over face and arms    Eyes: Right conjunctiva is not injected. Left conjunctiva is not injected.   Cardiovascular: Normal rate, regular rhythm and intact distal pulses.    Murmur heard.   Systolic murmur is present with a grade of 2/6   Pulmonary/Chest: Effort normal. No respiratory distress. She has no wheezes.   Abdominal: Soft. Bowel sounds are normal. She exhibits no distension.   Musculoskeletal: She exhibits no edema.   Neurological: She is alert and oriented to person, place, and time. She has normal strength. She displays normal reflexes. No sensory deficit.   Skin: She is not diaphoretic.

## 2018-02-15 NOTE — DISCHARGE SUMMARY
"DISCHARGE SUMMARY  Hospital Medicine    [place] [facility] [place]    Patient Name: Talia Alberts  YOB: 1926    Admit Date: 2/8/2018    Discharge Date: 02/15/2018    Discharge Attending Physician: JORJE HANSEN MD    Diagnoses:  Active Hospital Problems    Diagnosis  POA    *Compression fracture of first lumbar vertebra [S32.010A]  Unknown     Priority: 1 - High    Acute on chronic diastolic congestive heart failure [I50.33]  Yes     Priority: 2     Closed fracture of first lumbar vertebra [S32.019A]  Yes     Priority: 2     Coronary artery disease involving native coronary artery of native heart without angina pectoris [I25.10]  Yes     Priority: 3      Chronic    Mild cognitive impairment [G31.84]  Yes     Priority: 4      Chronic    Essential hypertension [I10]  Yes     Priority: 5     Fall [W19.XXXA]  Yes     Priority: 6     GERD (gastroesophageal reflux disease) [K21.9]  Yes     Priority: 7     Intracranial hemorrhage [I62.9]  Yes     Priority: 8     Nail disorder [L60.9]  Yes    Acute bacterial conjunctivitis of both eyes [H10.33]  Yes      Resolved Hospital Problems    Diagnosis Date Resolved POA   No resolved problems to display.       Discharged Condition: admit problems have stabilized       HOSPITAL COURSE:    Initial Presentation:  89yo F with CAD s/p CABG and SCC of skin, transferred from [facility] with ICH. Patient reports that she woke up around 5am yesterday morning and need to go to bathroom, states she stood up after finishing voiding and felt dizzy then tried to sit down but fell instead. Per daughter at bedside, she was awoken by a loud noise and found her mother on the floor confused- daughter says patient thought she was at the kitchen sink "rex". Daughter states patient has had worsening confusion and sundowning over the last few months and has had several falls before. Last big fall was Sunday. Daughter says patient usually ambulates with a walker, " but was not using at time of fall. Patient is independent with bathing and dressing but does not drive or cook, lives with her daughter and has been refusing jail care. Patient takes ASA 81 for her CAD as well as Toprol and losartan.    In ED at [facility] she was found to have new small left parasagittal hemorrhage without evidence of mass effect, [place] [facility] [place]. In ED here CT spine also found compression fracture of L1.     Patient denies chest pain, palpitations and SOB. Reports chronic dry cough for which she has been taking pearls. No fever/chills. No abdo pain/ nausea/ vomiting. No dysuria/ urgency.    Course of Principle Problem for Admission:  02/08/2018: Admitted to 4. Seen by Neurosurgery who recommended no intervention for ICH, but patient should wear back brace for L1 compression fracture and follow up with them in clinic in 4 weeks.  02/09/2018: Complains of dry cough, but no other subjective complaints, pain well controlled. Episode of A fib overnight on telemetry, no RVR. Hypertensive this morning- home BP meds given early. Awaiting back brace and then can work with PT/OT.  02/10/2018 : No acute events overnight. Patient slept well on scheduled ramelteon with no witnessed sundowning or delirium per daughter. States she has been voiding well on furosemide and cough has significantly improved, whether on diuretic or scheduled cough drops. UOP not recorded and patient is incontinent and soaks bed although approximately 250 ml seen in purewick container. No fevers or chills; back pain well-controlled and tolerating brace. Working with PT this morning.   02/11/2018: F[facility]EDOM. Feels that cough has improved after furosemide. Discussed with daughter that continuing diuretic may contribute to falls risk, however wish to continue for now.   02/12/2018: F[facility]EDOM. Medically stable for discharge, awaiting authorization for SNF to continue rehab.  02/13/2018:  FREEDOM. Pain well controlled, feels like cough improving. Slept well. Stable for discharge, awaiting authorization.  02/14/2018: [facility]. Stable, awaiting authorization for discharge to SNF. No subjective complaints. Working with PT.  02/15/2018: Discharged in stable condition to [facility].     Medical Problems Addressed in the Hospital:  Compression fracture of first lumbar vertebra     - Neurosurgery recommended back brace and follow up in clinic in 4 weeks (apt made). Back brace placed on 2/10 and patient working with PT/OT  - Tylenol 650mg q8 for analgesia  - Continued on home calcium/vitamin D and alendronate  - Dispo: SNF to continue PT/OT 2/15       Acute on chronic diastolic congestive heart failure     - CXR with cardiomegaly, PNA vs pulmonary edema. Elevated BNP with pulmonary edema on CXR although echo with negative SNIFF suggesting normal right atrial pressure. Normal procalcitonin.  - IV furosemide 20 mg on 2/10 and 2/11; patient's cough improved. Discussed with daughter regarding risk of orthostatic hypotension and falls with continuing furosemide on discharge, will plan to discharge on furosemide PRN weight gain, follow up with her usual Cardiologist on discharge.        Intracranial hemorrhage     2/2 fall (most likely sustained from fall on Sunday)  - CTH at [facility]:   New small left parasagittal frontal lobe cortical hemorrhagic parenchymal contusion without evidence of mass effect or caroline hematoma.  - CTH at [facility]:   Trace parenchymal hyperdensity noted within the paramedian aspect of the right frontal lobe, somewhat less conspicuous compared to prior outside CT examination which could represent trace component of parenchymal hemorrhage. No definite additional new intracranial hemorrhage is identified. There is no midline shift.  - Seen by Neurosurgery, not for any surgical interventions  - Continued on BP meds, goal SBP <140, hydralazine 10mg IV q8 PRN for SBP >140  - Held ASA 81 and avoiding  NSAIDs       Fall     - Fall at home, probably orthostatic hypotension, reports feeling dizzy immediately after standing, then tried to sit down but fell, denies chest pain or palpitations, no seizure-like activity. Per daughter, patient has had worsening sun downing and increasing falls at home over last few months, patient lives with daughter as she requires 24/7 care and has been refusing detention care.  - Vitals   - EKG with NSR  - Fall precautions  - Needs to wear back brace for next 4 weeks for L1 compression fracture  - PT/OT        Mild cognitive impairment     Per daughter patient has had cognitive decline with sundowning over last 6 months  - TSH wnl, on folic acid and theragrain  - Family would like detention placement but patient has been refusing  - Delirium precautions  - Continued home escitalopram 15mg qhs  - Will refer for neurocognitive testing and consideration of ?donepezil/ memantine on discharge; neurology consult placed on discharge.   - Scheduled ramelteon helping with sundowning - continue.       Coronary artery disease involving native coronary artery of native heart without angina pectoris     - s/p CABG plus ?valve replacement ~5 years ago.  - Held ASA 81 in the setting of ICH.  - Continued statin, BB and ARB       Essential hypertension     - Continued on losartan, goal SBP <140 given ICH  - Hydralazine 10mg IV q8 PRN for SBP >140       GERD (gastroesophageal reflux disease)     - Continue home PPI       Acute bacterial conjunctivitis of both eyes     - S/p 5 days of gentamicin gtts, improved  - Continued artifical tears PRN  - Warm compresses as required  - Follow up with ophtho on discharge       Nail disorder     Thickened dystrophic toenails, patient and daughter unable to cut them at home but does not meet criteria for inpatient Podiatry consult, requested referral to Podiatry in [place] on discharge       Closed fracture of first lumbar vertebra     - See above.          Review of  Systems   Constitutional: Negative for chills and fever.   Eyes: Negative for photophobia and visual disturbance.   Respiratory: Positive for cough (improving). Negative for shortness of breath.    Cardiovascular: Negative for chest pain and leg swelling.   Gastrointestinal: Negative for abdominal pain, nausea and vomiting.   Genitourinary: Negative for dysuria.   Musculoskeletal: Positive for back pain.   Skin: Positive for wound.   Neurological: Negative for seizures and headaches.       Vital Signs (Most Recent):  Temp: 98.3 °F (36.8 °C) (02/15/18 0815)  Pulse: 61 (02/15/18 0815)  Resp: 18 (02/15/18 0815)  BP: (!) 141/64 (02/15/18 0815)  SpO2: (!) 91 % (02/15/18 0815) Vital Signs (24h Range):  Temp:  [97.3 °F (36.3 °C)-98.3 °F (36.8 °C)] 98.3 °F (36.8 °C)  Pulse:  [56-69] 61  Resp:  [16-18] 18  SpO2:  [91 %-99 %] 91 %  BP: (118-188)/(54-79) 141/64     Weight: 49.6 kg (109 lb 5.6 oz)  Body mass index is 21.36 kg/m².  No intake or output data in the 24 hours ending 02/15/18 1346   Physical Exam   Constitutional: She is oriented to person, place, and time. No distress.   Thin, frail   HENT:   Large hematoma over left forehead  Healing ecchymoses over face and arms    Eyes: Right conjunctiva is not injected. Left conjunctiva is not injected.   Cardiovascular: Normal rate, regular rhythm and intact distal pulses.    Murmur heard.   Systolic murmur is present with a grade of 2/6   Pulmonary/Chest: Effort normal. No respiratory distress. She has no wheezes.   Abdominal: Soft. Bowel sounds are normal. She exhibits no distension.   Musculoskeletal: She exhibits no edema.   Neurological: She is alert and oriented to person, place, and time. She has normal strength. She displays normal reflexes. No sensory deficit.   Skin: She is not diaphoretic.       CONSULTS:  - Neurosurgery.     Pertinent/Significant Diagnostic Studies:    Imaging Results          X-Ray Chest 1 View (Final result)  Result time 02/09/18 09:12:03    Final  result by Antelmo Dawson III, MD (02/09/18 09:12:03)                 Impression:     Pulmonary edema versus pneumonia.      Electronically signed by: ANTELMO DAWSON MD  Date:     02/09/18  Time:    09:12              Narrative:    One view: There is cardiomegaly, mild edema, and postoperative change. There are right shoulder erosions.                             CT Head Without Contrast (Final result)  Result time 02/08/18 05:12:40    Final result by Grady Wilson MD (02/08/18 05:12:40)                 Impression:        1. Trace parenchymal hyperdensity within the paramedian right frontal lobe, slightly less conspicuous compared to prior CT examination, which could represent trace component of parenchymal hemorrhage/contusion. No evidence of new intracranial hemorrhage or other detrimental interval change from prior study.    2. Generalized cerebral volume loss chronic microvascular ischemic change. Remote lacunar type infarcts as above.    3. Prominent soft tissue hematoma overlying the left frontal calvarium.      Electronically signed by: GRADY WILSON  Date:     02/08/18  Time:    05:12              Narrative:    Procedure comments: CT examination of the head was performed from the skull base through the vertex without the use of intravenous contrast using 5-mm axial images.    Comparison: CT head 2/7/2018 1802 hrs.     Findings:    There is trace parenchymal hyperdensity noted within the paramedian aspect of the right frontal lobe, somewhat less conspicuous compared to prior outside CT examination which could represent trace component of parenchymal hemorrhage. No definite additional new intracranial hemorrhage is identified. There is no midline shift.    There is age appropriate generalized cerebral atrophy with compensatory ventricular enlargement and sulcal widening. There are patchy regions of hypoattenuation in the supratentorial white matter likely consistent with chronic microvascular ischemic  changes.Remote lacunar type infarcts are identified within the right basal ganglia left thalamus, and bilateral cerebellar hemispheres. The ventricular system is stable in size and configuration. No extra-axial collections are appreciated.The visualized paranasal sinuses and mastoid air cells are clear.There is a prominent soft tissue hematoma overlying the left frontal calvarium. The calvarium appears intact.                             CT Cervical Spine Without Contrast (Final result)  Result time 02/08/18 05:22:35    Final result by Alex Wilson MD (02/08/18 05:22:35)                 Impression:         1. No evidence of acute fracture or traumatic subluxation of the cervical spine. Stable postsurgical change and degenerative arthropathy of the cervical spine as above.    2. Partially visualized mildly enlarged precarinal lymph node measuring 1.4 cm. Correlation with patient history is advised. Further evaluation with dedicated thoracic imaging as clinically warranted.      Electronically signed by: ALEX WILSON  Date:     02/08/18  Time:    05:22              Narrative:    Procedure comment: 2-mm axial images through the cervical spine were obtained without the use of IV contrast.  Sagittal, coronal, and axial reformatted images were reviewed.    Comparison: CT cervical spine 2/4/2018    Findings:    There are postoperative changes related to prior anterior cervical fusion at the level of C5-C6 and C6-C7. There is mild anterolisthesis of C3 on C4 and C4 on C5, similar to prior examination. No definite evidence of acute fracture. Cervical vertebral body heights appear well-maintained.    There is prominent degenerative arthrosis about the dens with associated prominent panus formation which narrows the craniocervical junction and likely effaces the anterior aspect of the thecal sac. There is prominent uncovertebral spurring and facet arthropathy throughout the lower cervical spine resulting in multilevel  neural foraminal narrowing, most pronounced at C5-C6 and C6-C7. Findings remain similar to prior CT examination.    There is prominent atherosclerotic calcification of the thoracic aorta. There is an apparent partially visualized mildly enlarged 1.4 cm precarinal lymph node.  There is partially visualized right-sided pleural fluid. The visualized lung apices are otherwise unremarkable.  Please refer to separate dictated CT head report for intracranial findings.                             CT Lumbar Spine Without Contrast (Final result)  Result time 02/08/18 06:20:52    Final result by Alex Wilson MD (02/08/18 06:20:52)                 Impression:        1. Moderate L1 compression deformity, new compared to prior lumbar spine radiograph of 10/15/2015. Additional remote L2 compression deformity status post prior vertebroplasty. There is associated retropulsion of the L1 and L2 vertebral bodies resulting in moderate spinal canal stenosis and severe bilateral neuroforaminal narrowing as discussed above.    2.  Additional multilevel lumbar spondylosis most prominent at L4-5 resulting in moderate spinal canal stenosis. Please see above for level by level details.    3. Chronic appearing left sacral alar fracture.    4.  Trace right pleural effusion with mild associated compressive atelectasis.    5.  Additional findings as above.  ______________________________________     Electronically signed by resident: LOIS ZUÑIGA MD  Date:     02/08/18  Time:    05:47            As the supervising and teaching physician, I personally reviewed the images and resident's interpretation and I agree with the findings.          Electronically signed by: ALEX WILSON  Date:     02/08/18  Time:    06:20              Narrative:    CT lumbar spine    02/08/18 04:40:00    Accession# [id number]    CLINICAL INDICATION: 89 year old F with Spine fracture, traumatic, lumbar      TECHNIQUE:   Axial CT images obtained throughout the region  of the lumbar spine without intravenous contrast. Axial, sagittal, and coronal reconstructions were performed.    COMPARISON: Lumbar spine radiograph AP/lateral 02/07/2018    FINDINGS:     Vertebral bodies: There is a moderate compression deformity of the L1 vertebral body with approximately 40% height loss. This is new compared to prior lumbar spine radiograph of 10/15/2015. There is slight retropulsion of the inferior posterior cortex of L1 with resulting mild to moderate spinal canal stenosis. There is a remote compression deformity of the L2 vertebral body status post prior vertebroplasty. There is continued retropulsion of the posterior cortex of the L2 vertebral body with moderate spinal canal stenosis. The remaining lumbar vertebral body heights appear well-maintained    Sagittal alignment: There is mild retrolisthesis of L2 on L3.    Spondylosis: Disc space narrowing and vacuum disc deformity at L1-L2.    T12-L1: Minimal diffuse posterior disc bulge without significant spinal canal stenosis or neural foraminal narrowing.    L1-L2: Retropulsion of L1/L2, as described above, resulting in moderate spinal canal stenosis and severe bilateral neuroforaminal narrowing.    L2-L3: Mild diffuse posterior disc bulge and small amount of retrolisthesis results in mild spinal canal stenosis and severe bilateral neural foraminal narrowing.    L3-L4: Diffuse posterior disc bulge and bilateral facet arthropathy resulting in mild spinal canal stenosis and neural foraminal narrowing.    L4-L5: Diffuse posterior disc bulge, bilateral facet arthropathy, and ligamentum flavum hypertrophy resulting in moderate spinal canal stenosis and mild bilateral neural foraminal narrowing.    L5-S1: Diffuse posterior disc bulge and bilateral facet arthropathy resulting in mild spinal canal stenosis and moderate right neural foraminal narrowing.    There is a chronic appearing fracture of the left sacral ala.    Paraspinal soft tissues: There  is a moderate-sized hiatal hernia. Trace right pleural effusion with mild associated compressive atelectasis.  Minimal left basilar atelectasis.  Extensive calcification of the visualized arterial system.                              Special Treatments/Procedures:   - PT/OT    Disposition:  SNF    Future Scheduled Appointments:  Future Appointments  Date Time Provider Department Center   3/6/2018 9:15 AM [facility]   3/6/2018 10:20 AM Shraddha Tang PA-C [facility] NEUROS7 [facility]   4/19/2018 10:00 AM Eugenia Kiran MD [facility] [place]       Last CBC/BMP/HgbA1c (if applicable):  Recent Results (from the past 336 hour(s))   CBC auto differential    Collection Time: 02/15/18 12:29 AM   Result Value Ref Range    WBC 10.83 3.90 - 12.70 K/uL    Hemoglobin 10.5 (L) 12.0 - 16.0 g/dL    Hematocrit 31.0 (L) 37.0 - 48.5 %    Platelets 200 150 - 350 K/uL   CBC auto differential    Collection Time: 02/12/18  3:33 AM   Result Value Ref Range    WBC 10.23 3.90 - 12.70 K/uL    Hemoglobin 10.8 (L) 12.0 - 16.0 g/dL    Hematocrit 32.8 (L) 37.0 - 48.5 %    Platelets 212 150 - 350 K/uL   CBC auto differential    Collection Time: 02/11/18  3:38 AM   Result Value Ref Range    WBC 10.47 3.90 - 12.70 K/uL    Hemoglobin 11.1 (L) 12.0 - 16.0 g/dL    Hematocrit 33.2 (L) 37.0 - 48.5 %    Platelets 202 150 - 350 K/uL     Recent Results (from the past 336 hour(s))   Basic metabolic panel    Collection Time: 02/15/18 12:29 AM   Result Value Ref Range    Sodium 137 136 - 145 mmol/L    Potassium 4.0 3.5 - 5.1 mmol/L    Chloride 101 95 - 110 mmol/L    CO2 27 23 - 29 mmol/L    BUN, Bld 24 10 - 30 mg/dL    Creatinine 0.9 0.5 - 1.4 mg/dL    Calcium 8.7 8.7 - 10.5 mg/dL    Anion Gap 9 8 - 16 mmol/L   Basic metabolic panel    Collection Time: 02/08/18  1:31 AM   Result Value Ref Range    Sodium 139 136 - 145 mmol/L    Potassium 3.7 3.5 - 5.1 mmol/L    Chloride 106 95 - 110 mmol/L    CO2 23 23 - 29 mmol/L    BUN, Bld 13 10 - 30  mg/dL    Creatinine 0.7 0.5 - 1.4 mg/dL    Calcium 8.1 (L) 8.7 - 10.5 mg/dL    Anion Gap 10 8 - 16 mmol/L   Basic metabolic panel    Collection Time: 02/07/18  7:16 PM   Result Value Ref Range    Sodium 136 136 - 145 mmol/L    Potassium 4.3 3.5 - 5.1 mmol/L    Chloride 103 95 - 110 mmol/L    CO2 21 (L) 23 - 29 mmol/L    BUN, Bld 15 10 - 30 mg/dL    Creatinine 0.8 0.5 - 1.4 mg/dL    Calcium 8.4 (L) 8.7 - 10.5 mg/dL    Anion Gap 12 8 - 16 mmol/L     No results found for: HGBA1C    Discharge Medication List:     Medication List      CHANGE how you take these medications    acetaminophen 325 MG tablet  Commonly known as:  TYLENOL  Take 2 tablets (650 mg total) by mouth every 8 (eight) hours.  What changed:  · medication strength  · how much to take  · when to take this  · reasons to take this     artificial tears 0.5 % ophthalmic solution  Commonly known as:  ISOPTO TEARS  Place 1 drop into both eyes as needed.  What changed:  reasons to take this     aspirin 81 MG Chew  Take 1 tablet (81 mg total) by mouth once daily. Hold this medicine until seen by Neurosurgery in clinic  What changed:  additional instructions        CONTINUE taking these medications    albuterol sulfate 2.5 mg/0.5 mL Nebu  Take 2.5 mg by nebulization every 4 (four) hours as needed. Rescue     alendronate 70 MG tablet  Commonly known as:  FOSAMAX     benzonatate 100 MG capsule  Commonly known as:  TESSALON  Take 1 capsule (100 mg total) by mouth 3 (three) times daily as needed for Cough.     CALCIUM 600 + D(3) 600 mg(1,500mg) -200 unit Tab  Generic drug:  calcium-vitamin D3     escitalopram oxalate 10 MG tablet  Commonly known as:  LEXAPRO     ferrous sulfate 325 (65 FE) MG EC tablet     folic acid 400 MCG tablet  Commonly known as:  FOLVITE     losartan 50 MG tablet  Commonly known as:  COZAAR     lubiprostone 8 MCG Cap  Commonly known as:  AMITIZA     metoprolol succinate 25 MG 24 hr tablet  Commonly known as:  TOPROL-XL     multivitamin  tablet  Commonly known as:  THERAGRAN     pantoprazole 40 MG tablet  Commonly known as:  PROTONIX     simvastatin 40 MG tablet  Commonly known as:  ZOCOR           Where to Get Your Medications      You can get these medications from any pharmacy    You don't need a prescription for these medications  · acetaminophen 325 MG tablet  · aspirin 81 MG Chew         Patient Instructions:    Discharge Procedure Orders  Ambulatory Referral to Podiatry   Referral Priority: Routine Referral Type: Consultation   Referral Reason: Specialty Services Required    Requested Specialty: Podiatry    Number of Visits Requested: 1      Ambulatory Referral to Ophthalmology   Referral Priority: Routine Referral Type: Consultation   Referral Reason: Specialty Services Required    Requested Specialty: Ophthalmology    Number of Visits Requested: 1      Ambulatory Referral to Psychology   Referral Priority: Routine Referral Type: Psychiatric   Referral Reason: Specialty Services Required    Requested Specialty: Psychology    Number of Visits Requested: 1      Notify your health care provider if you experience any of the following:  persistent dizziness, light-headedness, or visual disturbances     Notify your health care provider if you experience any of the following:  increased confusion or weakness     Notify your health care provider if you experience any of the following:  difficulty breathing or increased cough     Notify your health care provider if you experience any of the following:  redness, tenderness, or signs of infection (pain, swelling, redness, odor or green/yellow discharge around incision site)         Signing Physician:    Baljinder Lutz MD  PGY-2 Internal Medicine  [phone]

## 2018-02-15 NOTE — PLAN OF CARE
Lisy from [facility] reviewed orders, pt can be transferred today. SW informed Im4 and pts nurse.    Nurse to call report to [phone], to [facility] nurse[facility]ista. Pt going to room C-9.    Transport arranged through [facility] [phone],  time 1:30pm.    No further needs.    Valerie Rodriguez, hospitalsW [id number]

## 2018-02-16 NOTE — PT/OT/SLP DISCHARGE
Physical Therapy Discharge Summary    Name: Talia Alberts  MRN: [id number]   Principal Problem: Compression fracture of first lumbar vertebra     Patient Discharged from acute Physical Therapy on 2/15/2018.  Please refer to prior PT noted date on 2018 for functional status.     Assessment:     Patient was discharged unexpectedly.  Information required to complete an accurate discharge summary is unknown.  Refer to therapy initial evaluation and last progress note for initial and most recent functional status and goal achievement.  Recommendations made may be found in medical record.    Objective:     GOALS:    Physical Therapy Goals        Problem: Physical Therapy Goal    Goal Priority Disciplines Outcome Goal Variances Interventions   Physical Therapy Goal     PT/OT, PT Ongoing (interventions implemented as appropriate)     Description:  Goals to be met by: 2018     Patient will increase functional independence with mobility by performin. Sit to stand transfer with Modified [place]  2. Bed to chair transfer with Supervision using Rolling Walker  3. Gait  x 80 feet with Supervision using Rolling Walker.   4. Lower extremity exercise program x30 reps per handout, with independence                       Reasons for Discontinuation of Therapy Services  Transfer to alternate level of care.      Plan:     Patient Discharged to: Skilled Nursing Facility.    Aniceto Neal, PT  2018

## 2018-02-19 ENCOUNTER — TELEPHONE (OUTPATIENT)
Dept: CARDIOLOGY | Facility: HOSPITAL | Age: 83
End: 2018-02-19

## 2018-02-19 NOTE — PT/OT/SLP DISCHARGE
Occupational Therapy Discharge Summary    Talia Alberts  MRN: [id number]   Principal Problem: Compression fracture of first lumbar vertebra      Patient Discharged from acute Occupational Therapy on 02/15/18.  Please refer to prior OT note dated 02/14/18 for functional status.    Assessment:      Patient appropriate for care in another setting.    Objective:     GOALS:    Occupational Therapy Goals        Problem: Occupational Therapy Goal    Goal Priority Disciplines Outcome Interventions   Occupational Therapy Goal     OT, PT/OT Ongoing (interventions implemented as appropriate)    Description:  Goals to be met by: 2/17/18     Patient will increase functional independence with ADLs by performing:    UE Dressing with Modified [place].  LE Dressing with Modified [place].  Grooming while standing with Supervision.  Toileting from toilet with Supervision for hygiene and clothing management.   Rolling to Bilateral with Modified [place].   Supine to sit with Modified [place].  Stand pivot transfers with Modified [place] using RW.                      Reasons for Discontinuation of Therapy Services  Transfer to alternate level of care.      Plan:     Patient Discharged to: Skilled Nursing Facility    Marcia Ha, OT  2/19/2018

## 2018-02-28 NOTE — PLAN OF CARE
Pt transferred to [facility].     02/28/18 0906   Final Note   Assessment Type Final Discharge Note   Discharge Disposition SNF   What phone number can be called within the next 1-3 days to see how you are doing after discharge? [phone]   Right Care Referral Info   Post Acute Recommendation SNF / Sub-Acute Rehab   Referral Type skilled facility   Facility Name [facility]

## 2018-09-26 ENCOUNTER — TELEPHONE (OUTPATIENT)
Dept: ORTHOPEDICS | Facility: CLINIC | Age: 83
End: 2018-09-26

## 2018-09-26 NOTE — TELEPHONE ENCOUNTER
Called and left message for patient's daughter to return call to make an appointment sooner for her mother.

## 2018-09-26 NOTE — TELEPHONE ENCOUNTER
----- Message from Odalys Patterson sent at 9/25/2018  4:55 PM CDT -----  Contact: Monet Gutierrez (Daughter)  Monet Matt (Daughter) calling wants to get the pt in sooner than 10/4 for pain in left leg/pt at the nursing home and they gave pt xray and they found something she is not sure if it was a fracture or what....[phone]

## 2018-09-27 ENCOUNTER — HOSPITAL ENCOUNTER (OUTPATIENT)
Dept: RADIOLOGY | Facility: HOSPITAL | Age: 83
Discharge: HOME | End: 2018-09-27
Attending: ORTHOPAEDIC SURGERY
Payer: MEDICARE

## 2018-09-27 ENCOUNTER — OFFICE VISIT (OUTPATIENT)
Dept: ORTHOPEDICS | Facility: CLINIC | Age: 83
End: 2018-09-27
Payer: MEDICARE

## 2018-09-27 VITALS
HEIGHT: 60 IN | DIASTOLIC BLOOD PRESSURE: 80 MMHG | BODY MASS INDEX: 21.4 KG/M2 | SYSTOLIC BLOOD PRESSURE: 193 MMHG | HEART RATE: 58 BPM | WEIGHT: 109 LBS

## 2018-09-27 DIAGNOSIS — M19.011 PRIMARY OSTEOARTHRITIS OF RIGHT SHOULDER: Primary | ICD-10-CM

## 2018-09-27 DIAGNOSIS — M25.561 PAIN IN BOTH KNEES, UNSPECIFIED CHRONICITY: ICD-10-CM

## 2018-09-27 DIAGNOSIS — M25.519 SHOULDER PAIN, UNSPECIFIED CHRONICITY, UNSPECIFIED LATERALITY: ICD-10-CM

## 2018-09-27 DIAGNOSIS — M25.561 PAIN IN BOTH KNEES, UNSPECIFIED CHRONICITY: Primary | ICD-10-CM

## 2018-09-27 DIAGNOSIS — M25.562 PAIN IN BOTH KNEES, UNSPECIFIED CHRONICITY: ICD-10-CM

## 2018-09-27 DIAGNOSIS — M25.562 PAIN IN BOTH KNEES, UNSPECIFIED CHRONICITY: Primary | ICD-10-CM

## 2018-09-27 PROCEDURE — 99213 OFFICE O/P EST LOW 20 MIN: CPT | Mod: PBBFAC,25,PN | Performed by: ORTHOPAEDIC SURGERY

## 2018-09-27 PROCEDURE — 73564 X-RAY EXAM KNEE 4 OR MORE: CPT | Mod: 26,LT,, | Performed by: RADIOLOGY

## 2018-09-27 PROCEDURE — 1101F PT FALLS ASSESS-DOCD LE1/YR: CPT | Mod: CPTII,,, | Performed by: ORTHOPAEDIC SURGERY

## 2018-09-27 PROCEDURE — 73030 X-RAY EXAM OF SHOULDER: CPT | Mod: TC,PN,RT

## 2018-09-27 PROCEDURE — 20610 DRAIN/INJ JOINT/BURSA W/O US: CPT | Mod: PBBFAC,PN | Performed by: ORTHOPAEDIC SURGERY

## 2018-09-27 PROCEDURE — 73564 X-RAY EXAM KNEE 4 OR MORE: CPT | Mod: TC,50,PN

## 2018-09-27 PROCEDURE — 99999 PR PBB SHADOW E&M-EST. PATIENT-LVL III: CPT | Mod: PBBFAC,,, | Performed by: ORTHOPAEDIC SURGERY

## 2018-09-27 PROCEDURE — 73564 X-RAY EXAM KNEE 4 OR MORE: CPT | Mod: 26,RT,, | Performed by: RADIOLOGY

## 2018-09-27 PROCEDURE — 99213 OFFICE O/P EST LOW 20 MIN: CPT | Mod: 25,S$PBB,, | Performed by: ORTHOPAEDIC SURGERY

## 2018-09-27 PROCEDURE — 73030 X-RAY EXAM OF SHOULDER: CPT | Mod: 26,RT,, | Performed by: RADIOLOGY

## 2018-09-27 RX ORDER — TRIAMCINOLONE ACETONIDE 40 MG/ML
40 INJECTION, SUSPENSION INTRA-ARTICULAR; INTRAMUSCULAR
Status: DISCONTINUED | OUTPATIENT
Start: 2018-09-27 | End: 2018-09-27 | Stop reason: HOSPADM

## 2018-09-27 RX ADMIN — TRIAMCINOLONE ACETONIDE 40 MG: 40 INJECTION, SUSPENSION INTRA-ARTICULAR; INTRAMUSCULAR at 04:09

## 2018-09-27 NOTE — PROGRESS NOTES
Past Medical History:   Diagnosis Date    Cancer     Coronary artery disease     Hypertension     MI (myocardial infarction)     Skin cancer unsure    L and R arm, was removed by dr christianson    Squamous cell carcinoma        Past Surgical History:   Procedure Laterality Date    CARDIAC SURGERY      ESOPHAGOGASTRODUODENOSCOPY (EGD) N/A 3/23/2015    Performed by Estiven [facility]i [facility] at [facility] ENDO    FRACTURE SURGERY         Current Outpatient Medications   Medication Sig    acetaminophen (TYLENOL) 325 MG tablet Take 2 tablets (650 mg total) by mouth every 8 (eight) hours.    alendronate (FOSAMAX) 70 MG tablet Take 70 mg by mouth every Thursday.     artificial tears (ISOPTO TEARS) 0.5 % ophthalmic solution Place 1 drop into both eyes as needed. (Patient taking differently: Place 1 drop into both eyes as needed (dry eye). )    aspirin 81 MG Chew Take 1 tablet (81 mg total) by mouth once daily. Hold this medicine until seen by Neurosurgery in clinic    calcium-vitamin D tablet 600 mg-200 units Take 1 tablet by mouth once daily.    escitalopram oxalate (LEXAPRO) 10 MG tablet Take 15 mg by mouth every evening. 1 1/2 tablets = 15 mg every night    ferrous sulfate 325 (65 FE) MG EC tablet Take 325 mg by mouth once daily.    folic acid (FOLVITE) 400 MCG tablet Take 400 mcg by mouth once daily.     losartan (COZAAR) 50 MG tablet Take 50 mg by mouth 2 (two) times daily.     lubiprostone (AMITIZA) 8 MCG Cap Take 8 mcg by mouth 2 (two) times daily as needed (constipation).     metoprolol succinate (TOPROL-XL) 25 MG 24 hr tablet Take 25 mg by mouth once daily.     multivitamin (THERAGRAN) tablet Take 1 tablet by mouth once daily.    pantoprazole (PROTONIX) 40 MG tablet Take 40 mg by mouth once daily.    simvastatin (ZOCOR) 40 MG tablet Take 40 mg by mouth every evening.    albuterol sulfate 2.5 mg/0.5 mL Nebu Take 2.5 mg by nebulization every 4 (four) hours as needed. Rescue     No current facility-administered  medications for this visit.        Review of patient's allergies indicates:   Allergen Reactions    Morphine Swelling       Family History   Problem Relation Age of Onset    No Known Problems Mother     No Known Problems Father     Melanoma Neg Hx     Psoriasis Neg Hx     Lupus Neg Hx     Eczema Neg Hx        Social History     Socioeconomic History    Marital status:      Spouse name: Not on file    Number of children: Not on file    Years of education: Not on file    Highest education level: Not on file   Social Needs    Financial resource strain: Not on file    Food insecurity - worry: Not on file    Food insecurity - inability: Not on file    Transportation needs - medical: Not on file    Transportation needs - non-medical: Not on file   Occupational History    Not on file   Tobacco Use    Smoking status: Never Smoker    Smokeless tobacco: Never Used   Substance and Sexual Activity    Alcohol use: No    Drug use: No    Sexual activity: No   Other Topics Concern    Are you pregnant or think you may be? Not Asked    Breast-feeding Not Asked   Social History Narrative    Not on file       Chief Complaint:   Chief Complaint   Patient presents with    Knee Pain     bilateral     Shoulder Pain       History of present illness: Is a 89-year-old female seen for right shoulder and bilateral knee pain.  Patient has had both knees replaced in the past.  Also had a distal femur fracture that has a long plate on the left.  History of right shoulder fracture with worsening pain particularly at night.  Ongoing for about a week now.  Pain is 10/10.  She lives at [facility].    Review of systems:   Musculoskeletal:  See HPI      Physical Examination:    Vital Signs:    There were no vitals filed for this visit.    Body mass index is 21.29 kg/m².    This a well-developed, well nourished patient in no acute distress.  They are alert and oriented and cooperative to examination.  Pt.  Comes in a  wheelchair    Examination of the right shoulder shows no rashes or erythema. There are no masses, ecchymosis, or atrophy. The patient has limited active and range of motion in forward flexion, external rotation, and internal rotation to the hip. The patient has minimally positive impingement signs.   2+ radial pulse. Intact axillary, radial, median and ulnar sensation. 5 out of 5 resisted forward flexion, external rotation, and negative lift off test.        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  No obvious fracture or complications.  Also a left distal femoral plate is noted. There is some patellar tilt on the right particularly     Assessment::  Right shoulder arthritis with rotator cuff arthropathy    Plan:  I reviewed the x-rays with her and her daughter today.  I recommended another cortisone injection.  She's been having trouble sleeping.  Hopefully this will help somewhat down her night pain.      This note was created using Dragon voice recognition software that occasionally misinterpreted phrases or words.    Consult note is delivered via Epic messaging service.

## 2018-09-27 NOTE — PROCEDURES
Large Joint Aspiration/Injection: R subacromial bursa  Date/Time: 9/27/2018 4:25 PM  Performed by: Baljinder Roger MD  Authorized by: Baljinder Roger MD     Consent Done?:  Yes (Verbal)  Indications:  Pain  Procedure site marked: Yes    Timeout: Prior to procedure the correct patient, procedure, and site was verified      Location:  Shoulder  Site:  R subacromial bursa  Prep: Patient was prepped and draped in usual sterile fashion    Ultrasonic Guidance for needle placement: No  Needle size:  20 G  Approach:  Posterior  Medications:  40 mg triamcinolone acetonide 40 mg/mL  Patient tolerance:  Patient tolerated the procedure well with no immediate complications

## 2018-11-05 ENCOUNTER — HOSPITAL ENCOUNTER (EMERGENCY)
Facility: HOSPITAL | Age: 83
Discharge: HOME | End: 2018-11-05
Attending: EMERGENCY MEDICINE
Payer: MEDICARE

## 2018-11-05 VITALS
WEIGHT: 140 LBS | RESPIRATION RATE: 16 BRPM | BODY MASS INDEX: 25.76 KG/M2 | HEIGHT: 62 IN | DIASTOLIC BLOOD PRESSURE: 73 MMHG | HEART RATE: 68 BPM | SYSTOLIC BLOOD PRESSURE: 172 MMHG | TEMPERATURE: 98 F | OXYGEN SATURATION: 95 %

## 2018-11-05 DIAGNOSIS — S01.81XA FACIAL LACERATION, INITIAL ENCOUNTER: ICD-10-CM

## 2018-11-05 DIAGNOSIS — S09.90XA CLOSED HEAD INJURY, INITIAL ENCOUNTER: Primary | ICD-10-CM

## 2018-11-05 LAB
ALBUMIN SERPL BCP-MCNC: 3.4 G/DL
ALP SERPL-CCNC: 169 U/L
ALT SERPL W/O P-5'-P-CCNC: 66 U/L
ANION GAP SERPL CALC-SCNC: 11 MMOL/L
AST SERPL-CCNC: 51 U/L
BASOPHILS # BLD AUTO: 0 K/UL
BASOPHILS NFR BLD: 0.4 %
BILIRUB SERPL-MCNC: 0.5 MG/DL
BUN SERPL-MCNC: 30 MG/DL
CALCIUM SERPL-MCNC: 8.9 MG/DL
CHLORIDE SERPL-SCNC: 103 MMOL/L
CO2 SERPL-SCNC: 23 MMOL/L
CREAT SERPL-MCNC: 0.9 MG/DL
DIFFERENTIAL METHOD: ABNORMAL
EOSINOPHIL # BLD AUTO: 0.2 K/UL
EOSINOPHIL NFR BLD: 2.6 %
ERYTHROCYTE [DISTWIDTH] IN BLOOD BY AUTOMATED COUNT: 15.1 %
EST. GFR  (AFRICAN AMERICAN): >60 ML/MIN/1.73 M^2
EST. GFR  (NON AFRICAN AMERICAN): 56 ML/MIN/1.73 M^2
GLUCOSE SERPL-MCNC: 97 MG/DL
HCT VFR BLD AUTO: 35.9 %
HGB BLD-MCNC: 12.5 G/DL
LYMPHOCYTES # BLD AUTO: 1.7 K/UL
LYMPHOCYTES NFR BLD: 23.3 %
MCH RBC QN AUTO: 31.8 PG
MCHC RBC AUTO-ENTMCNC: 34.8 G/DL
MCV RBC AUTO: 91 FL
MONOCYTES # BLD AUTO: 0.8 K/UL
MONOCYTES NFR BLD: 10.6 %
NEUTROPHILS # BLD AUTO: 4.7 K/UL
NEUTROPHILS NFR BLD: 63.1 %
PLATELET # BLD AUTO: 143 K/UL
PMV BLD AUTO: 7.9 FL
POTASSIUM SERPL-SCNC: 4.4 MMOL/L
PROT SERPL-MCNC: 6.3 G/DL
RBC # BLD AUTO: 3.94 M/UL
SODIUM SERPL-SCNC: 137 MMOL/L
WBC # BLD AUTO: 7.4 K/UL

## 2018-11-05 PROCEDURE — 85025 COMPLETE CBC W/AUTO DIFF WBC: CPT

## 2018-11-05 PROCEDURE — 25000003 PHARM REV CODE 250: Performed by: EMERGENCY MEDICINE

## 2018-11-05 PROCEDURE — 80053 COMPREHEN METABOLIC PANEL: CPT

## 2018-11-05 PROCEDURE — 12001 RPR S/N/AX/GEN/TRNK 2.5CM/<: CPT

## 2018-11-05 PROCEDURE — 99284 EMERGENCY DEPT VISIT MOD MDM: CPT | Mod: 25

## 2018-11-05 PROCEDURE — 36415 COLL VENOUS BLD VENIPUNCTURE: CPT

## 2018-11-05 RX ORDER — LIDOCAINE HYDROCHLORIDE 20 MG/ML
5 INJECTION, SOLUTION EPIDURAL; INFILTRATION; INTRACAUDAL; PERINEURAL
Status: COMPLETED | OUTPATIENT
Start: 2018-11-05 | End: 2018-11-05

## 2018-11-05 RX ORDER — TROLAMINE SALICYLATE 10 G/100G
1 CREAM TOPICAL 3 TIMES DAILY PRN
COMMUNITY

## 2018-11-05 RX ORDER — CLONAZEPAM 0.25 MG/1
0.25 TABLET, ORALLY DISINTEGRATING ORAL NIGHTLY
Status: ON HOLD | COMMUNITY
End: 2019-09-27 | Stop reason: HOSPADM

## 2018-11-05 RX ORDER — DOCUSATE SODIUM 100 MG/1
100 CAPSULE, LIQUID FILLED ORAL 2 TIMES DAILY
COMMUNITY

## 2018-11-05 RX ADMIN — LIDOCAINE HYDROCHLORIDE 100 MG: 20 INJECTION, SOLUTION EPIDURAL; INFILTRATION; INTRACAUDAL; PERINEURAL at 07:11

## 2018-11-05 NOTE — DISCHARGE INSTRUCTIONS
Sutures used in laceration closure are absorbable and do not require removal.  Minor elevation of your liver function tests AST/ALT to be followed up with your PCP for further monitoring/recheck/workup as necessary.

## 2018-11-05 NOTE — ED PROVIDER NOTES
Encounter Date: 11/5/2018       History     Chief Complaint   Patient presents with    Fall     facial lacerations.  No LOC      Patient is a 89-year-old female with past hypertension coronary artery disease mi per hours cancer who presents emergency department for evaluation of mechanical fall.  The patient was using a walker when she fell at [facility].  It was an unwitnessed fall.  She has a laceration to the anterior forehead.  She has no other complaints at this time.  It is unclear whether or not she had loss of consciousness.  She denies any current chest pain shortness of breath upper lower extremity pain vision changes.          Review of patient's allergies indicates:   Allergen Reactions    Morphine Swelling     Past Medical History:   Diagnosis Date    Cancer     Coronary artery disease     Hypertension     MI (myocardial infarction)     Skin cancer unsure    L and R arm, was removed by dr christianson    Squamous cell carcinoma      Past Surgical History:   Procedure Laterality Date    CARDIAC SURGERY      ESOPHAGOGASTRODUODENOSCOPY (EGD) N/A 3/23/2015    Performed by Estiven [facility] MD at [facility] ENDO    FRACTURE SURGERY       Family History   Problem Relation Age of Onset    No Known Problems Mother     No Known Problems Father     Melanoma Neg Hx     Psoriasis Neg Hx     Lupus Neg Hx     Eczema Neg Hx      Social History     Tobacco Use    Smoking status: Never Smoker    Smokeless tobacco: Never Used   Substance Use Topics    Alcohol use: No    Drug use: No     Review of Systems   Constitutional: Negative for fatigue and fever.   HENT: Negative for congestion and sore throat.    Eyes: Negative for visual disturbance.   Respiratory: Negative for cough and shortness of breath.    Cardiovascular: Negative for chest pain.   Gastrointestinal: Negative for abdominal pain, nausea and vomiting.   Genitourinary: Negative for dysuria, flank pain and pelvic pain.   Musculoskeletal:  Negative for arthralgias, back pain, myalgias and neck pain.   Skin: Positive for wound.   Neurological: Positive for headaches. Negative for weakness and numbness.   Psychiatric/Behavioral: Negative for agitation and confusion.       Physical Exam     Initial Vitals [11/05/18 0548]   BP Pulse Resp Temp SpO2   (!) 204/79 69 16 97.8 °F (36.6 °C) 98 %      MAP       --         Physical Exam    Nursing note and vitals reviewed.  Constitutional: She appears well-developed.   Elderly female lying in bed in no significant distress with a C-collar in place.   HENT:   Head: Normocephalic.   Right Ear: External ear normal.   Left Ear: External ear normal.   Mouth/Throat: Oropharynx is clear and moist.   To lacerations to the forehead.  One of them is stellate in the left central portion of her forehead and the other is on the left and is linear and approximately 1.5 cm.  Superficial abrasion to posterior occiput.   Eyes: Conjunctivae and EOM are normal. Pupils are equal, round, and reactive to light.   Neck: Neck supple.   C-collar in place.  Mild tenderness to the posterior cervical spine.  I did not check range of motion   Cardiovascular: Normal rate, regular rhythm, normal heart sounds and intact distal pulses. Exam reveals no gallop and no friction rub.    No murmur heard.  Pulmonary/Chest: Breath sounds normal. She has no wheezes. She has no rhonchi. She has no rales.   Abdominal: Soft. There is no tenderness.   Neurological: She is alert and oriented to person, place, and time. She has normal strength. No sensory deficit.   Skin: Skin is warm and dry. Capillary refill takes less than 2 seconds.   Psychiatric: She has a normal mood and affect.         ED Course   Procedures  Labs Reviewed   CBC W/ AUTO DIFFERENTIAL   COMPREHENSIVE METABOLIC PANEL          Imaging Results          CT Head Without Contrast (In process)                  Medical Decision Making:   Initial Assessment:   Unwitnessed fall 89-year-old likely  mechanical from wheelchair.  Given age and confusion regarding fall ordered CT of the head and CT cervical spine with basic labs.  She will need stitches and forehead.  Care transferred to Dr. Lizama.                    ED Course as of Nov 07 2201   Mon Nov 05, 2018   0726 CT-H:    Small forehead subcutaneous tissue laceration and hematoma. No definite acute intracranial abnormality. (radiology reading, visualized by me)  CT-CSp:  No fx or subluxation. (rad read)  [MR]   0728 .la  [MR]   0729 LACERATION REPAIR:  Analgesia prior to repair using infiltration 2% lidocaine without epinephrine.  The wound was copiously irrigated using normal saline and explored without sign of foreign body.  A 1.5 cm laceration on the left frontal region was repaired using 5-0 vicryl rapide via simple interrupted technique.  3 sutures total.  There was good approximation of the wound margins.  The patient tolerated the procedure well and there were no recognized complications.      [MR]   0729 LACERATION REPAIR:  Analgesia prior to repair using infiltration 2% lidocaine without epinephrine.  The wound was copiously irrigated using normal saline and explored without sign of foreign body.  A 1 cm laceration on the left frontal region was repaired using 5-0 vicryl rapide via simple interrupted technique.  1 sutures total.  There was good approximation of the wound margins.  The patient tolerated the procedure well and there were no recognized complications.    [MR]      ED Course User Index  [MR] Grady Lizama MD     Clinical Impression:   The primary encounter diagnosis was Closed head injury, initial encounter. A diagnosis of Facial laceration, initial encounter was also pertinent to this visit.                             Quintin Dolan MD  11/07/18 2201

## 2018-11-05 NOTE — ED NOTES
To CT per stretcher. Lacerations to forehead cleaned with sterile saline. Still oozing blood from both lacerations. Pressure dressing applied to biggest laceration. Pt alert and able to tell me she fell while walking to the bathroom. Just slipped and fell and denies any loss of consciousness. Pt has cervical collar present. Pt complains of pain to left lateral thigh. Pt does say it hurts sometime. No noted deformity. Pt has no noted peripheral edema.

## 2018-11-05 NOTE — PROVIDER PROGRESS NOTES - EMERGENCY DEPT.
Encounter Date: 11/5/2018    ED Physician Progress Notes        Physician Note:   The patient was signed out to me by Dr. Dolan pending laboratories for evaluation of mechanical fall by history.  Patient recalls fall and states she lost her balance moving from bed to bathroom.  Nursing home confirms limited image savanna status and mostly wheelchair bound.  She has had prior similar falls.  Very low suspicion for alternative, acute medical condition causing fall today.  Laboratories reviewed with nonspecific minor AST and ALT elevation with recommended outpatient follow-up and monitoring.  She has no complaints here other than mild tenderness over the left frontal lacerations with closure.  I do not think prophylactic antibiotics are indicated.  There is no sign of foreign body.  Head CT shows no sign of intracranial hemorrhage.  No sign of cervical vertebral fracture or subluxation on my exam with no posterior midline cervical tenderness and excellent range of motion and lateral rotation as well as AP flexion and extension without pain for the patient.  No sign of other major injury. Extremities are atraumatic. Heart is regular rate and rhythm without murmur or rub.  Lungs are clear to auscultation with unlabored respirations abdomen soft and nontender to palpation. No distention.  5/5 strength and sensation.  CN III through XII intact.  GCS 15.  She is appropriate for discharge back to nursing facility with outpatient PCP follow-up.  Detailed return precautions reviewed.    ED Course as of Nov 05 0745  ------------------------------------------------------------  Time: 11/05 0726  Comment: CT-H:    Small forehead subcutaneous tissue laceration and hematoma. No definite acute intracranial abnormality. (radiology reading, visualized by me)  CT-CSp:  No fx or subluxation. (rad read)  By: Grady Lizama MD  ------------------------------------------------------------  Time: 11/05 0728  Comment: .la  By: Grady  BATSHEVA Lizama MD  ------------------------------------------------------------  Time: 11/05 0729  Comment: LACERATION REPAIR:  Analgesia prior to repair using infiltration 2% lidocaine without epinephrine.  The wound was copiously irrigated using normal saline and explored without sign of foreign body.  A 1.5 cm laceration on the left frontal region was repaired using 5-0 vicryl rapide via simple interrupted technique.  3 sutures total.  There was good approximation of the wound margins.  The patient tolerated the procedure well and there were no recognized complications.      By: Grady Lizama MD  ------------------------------------------------------------  Time: 11/05 0729  Comment: LACERATION REPAIR:  Analgesia prior to repair using infiltration 2% lidocaine without epinephrine.  The wound was copiously irrigated using normal saline and explored without sign of foreign body.  A 1 cm laceration on the left frontal region was repaired using 5-0 vicryl rapide via simple interrupted technique.  1 sutures total.  There was good approximation of the wound margins.  The patient tolerated the procedure well and there were no recognized complications.    By: Grady Lizama MD

## 2019-01-18 DIAGNOSIS — G96.89 SUBPIAL SIDEROSIS: ICD-10-CM

## 2019-01-18 DIAGNOSIS — R13.19 CONSTANT LOW-GRADE DYSPHAGIA: ICD-10-CM

## 2019-01-18 DIAGNOSIS — K21.9 ESOPHAGEAL REFLUX: ICD-10-CM

## 2019-01-18 DIAGNOSIS — I69.291 DYSPHAGIA FOLLOWING OTHER NONTRAUMATIC INTRACRANIAL HEMORRHAGE: Primary | ICD-10-CM

## 2019-02-04 ENCOUNTER — HOSPITAL ENCOUNTER (OUTPATIENT)
Dept: RADIOLOGY | Facility: HOSPITAL | Age: 84
Discharge: HOME | End: 2019-02-04
Attending: PEDIATRICS
Payer: MEDICARE

## 2019-02-04 DIAGNOSIS — G96.89 SUBPIAL SIDEROSIS: ICD-10-CM

## 2019-02-04 DIAGNOSIS — K21.9 ESOPHAGEAL REFLUX: ICD-10-CM

## 2019-02-04 DIAGNOSIS — R13.19 CONSTANT LOW-GRADE DYSPHAGIA: ICD-10-CM

## 2019-02-04 DIAGNOSIS — R13.12 OROPHARYNGEAL DYSPHAGIA: ICD-10-CM

## 2019-02-04 PROCEDURE — 92611 MOTION FLUOROSCOPY/SWALLOW: CPT

## 2019-02-04 PROCEDURE — 74230 FL MODIFIED BARIUM SWALLOW SPEECH STUDY: ICD-10-PCS | Mod: 26,,, | Performed by: RADIOLOGY

## 2019-02-04 PROCEDURE — 74230 X-RAY XM SWLNG FUNCJ C+: CPT | Mod: TC

## 2019-02-04 PROCEDURE — G8997 SWALLOW GOAL STATUS: HCPCS | Mod: CI

## 2019-02-04 PROCEDURE — G8998 SWALLOW D/C STATUS: HCPCS | Mod: CI

## 2019-02-04 PROCEDURE — G8996 SWALLOW CURRENT STATUS: HCPCS | Mod: CI

## 2019-02-04 PROCEDURE — 74230 X-RAY XM SWLNG FUNCJ C+: CPT | Mod: 26,,, | Performed by: RADIOLOGY

## 2019-02-04 NOTE — PROGRESS NOTES
Modified Barium Swallowing Study    Past Medical History:   Diagnosis Date    Cancer     Coronary artery disease     Hypertension     MI (myocardial infarction)     Skin cancer unsure    L and R arm, was removed by dr christianson    Squamous cell carcinoma      Past Surgical History:   Procedure Laterality Date    CARDIAC SURGERY      ESOPHAGOGASTRODUODENOSCOPY (EGD) N/A 3/23/2015    Performed by Estiven Parisi MD at [facility] ENDO    FRACTURE SURGERY       Pt was referred for eval due to frequent coughing during po intake.  Daughter acted as historian as pt was very hearing impaired & a poor historian.  Pt has no hx CVA, altho November CT head showed chronic lacunar infarcts  In thalamus & basal ganglia.  Pt has hx AECD C5-6.  She has GED diagnosis & takes meds daily.  She last had pneumonia in 2017.  She is a resident of [facility], where her diet is listed as mechanical soft with thin liquids; cdauter reported they cut her meats.   Today, pt was found to have prolonged oral prep & piecemeal transit of soft solids & shortbread cookie.  She held a barium pill in her mouth through 4 swallows of thin liquid, swallowing it after presentation of tsp applesauce.  Pharyngeal stage was WFL except on liquids via straw, where SILENT aspiration of a drop was seen x2 trials.  An esophageal sweep found aerophagia, but nothing which affected pharyngeal swallow.   Recommend finely chopped foods, no bread, thin liquids via cup sip only.  Pills whole in puree followed by cup swallows of liquid.  Allow soft cookies given supervision.   G Codes Swallowing Current CI     Goal CI     D/C CI     02/04/19 1010   Speech Time Calculation   Speech Start Time 1010   Speech Stop Time 1042   Speech Total (min) 32 min   General Information   SLP Treatment Date 02/04/19   CT/MRI results CT head 11/5 chronic lacunar infarcts..left thalamus & right basal ganglia; CT Cervical spine  C5-6 previous antior fusion...blateral uncovertebral  spurring & facet joint arthropathy   Current Respiratory Status room air   General Precautions fall;hearing impaired   Current Diet   Current Diet Textures (mecahnical soft)   Current Liquid Consistencies Thin   Subjective   Patient states I cough sometimes   Family states she coughs at every meal.   Oral Musculature Evaluation   Oral Musculature WFL   Dentition upper dentures  (lower denture lost per daughter)   Secretion Management adequate   Mucosal Quality adequate   Mandibular Strength and Mobility WFL   Oral Labial Strength and Mobility WFL   Lingual Strength and Mobility WFL   Velar Elevation WFL   Buccal Strength and Mobility WFL   Volitional Cough weak   Volitional Swallow wfl   Voice Prior to PO Intake clear, frail   MBS Evaluation   Additional Documentation Yes       MBS Eval: Thin Liquid Trial   Mode of Presentation, Thin Liquid spoon;fed by clinician;cup;straw;self fed   Volume of Thin Liquid Presented tsp, cup sip, straw sip, serial straw swallows   Oral Prep/Phase, Thin Liquid piecemeal deglutition   Pharyngeal Phase, Thin Liquid cervical protrusion (comment);delayed epiglottic inversion;delayed pharyngeal swallow;premature spillage  (C5-6 hardware & protrusion)   Rosenbeck's 8-Point Penetration-Aspiration Scale, Thin Liquids (8) Material enters the airway, passes below the vocal folds, and no effort is made to eject.  (2x on straw swallow during swallow)   Penetration/Aspiration, Thin Liquid high penetration during cup sips; penetration & immedicate aspiration of drop during swllow via straw   Esophageal Phase, Thin aerophagia   Successful Strategies Trialed During Procedure, Thin Liquid (no straw; present pill in puree)   Additional Comments WFL; Silent aspiration on liquid via straw       MBS Eval: Pureed Trial   Mode of Presentation, Puree spoon;fed by clinician   Volume of Puree Presented tsp   Oral Prep/Phase, Puree WFL;piecemeal deglutition   Pharyngeal Phase, Puree cervical protrusion  (comment)   Rosenbeck's 8-Point Penetration-Aspiration Scale, Pureed (2) Material enters the airway, remains above the vocal folds, and is ejected from the airway.   Penetration/Aspiration, Pureed high penetration during swallow due to delayed pharyngeal swallow   Diagnostic Statement WFL oropharyngeal stages       MBS Eval: Soft Solid Trial   Mode of Presentation, Semisolid spoon   Volume of Semisolid Food Presented 2 pcs peachi in thin barium   Oral Prep/Phase, Semisolid prolonged chewing;spitting out of food/liquid  (barely masticated pieces removed after very prolonged chewin)   Pharyngeal Phase, Semisolid WFL  (liquid split from solids)   Rosenbeck's 8-Point Penetration-Aspiration Scale, Semisolid (1) Material does not enter airway.   Diagnostic Statement Moderate oral prep dysphagia       MBS Eval: Solid Food Texture Trial   Mode of Presentation, Solid spoon   Volume of Solid Food Presented 1/4 cookie with barium paste   Oral Prep/Phase, Solid prolonged chewing;piecemeal deglutition;premature spillage   Pharyngeal Phase, Solid cervical protrusion (comment)   Rosenbeck's 8-Point Penetration-Aspiration Scale, Solid (1) Material does not enter airway.   Diagnostic Statement moderate oral prep & transit dysphagia   Recommendations   Solid Diet Level Mechanical soft;Finely chopped meat  (cut all foods to the size of a grain of rice, no bread)   Liquid Diet Level Thin  (NO STRAWS)   Additional Recommendations pills whole in puree followed by cup swallows of liquid; allow soft cookies for pleasure with supervision   Treatment/Billable Minutes   Motion Fluoro Swallow, Cine/Vid 32   Total Time 32

## 2019-02-05 NOTE — PROGRESS NOTES
"Progress Note    Admit Date: 9/16/2017   LOS: 1 day     SUBJECTIVE:     HPI: Pt is a young 89 yo  admitted to the services of hospital medicine via the ER for CAP. Presents with c/o acute onset of extreme weakness "I couldn't stand on my legs, they were too weak" c/o acute onset of productive cough which started Friday night. Denies any chest pain or SOB. Felt feverish but did not measure temp. Denies any sick contacts. No hx of lung disease. Hx of bioprosthetic heart valve, CAD and HTN, all stable with medications    Interval history   Patient seen and examined doing well. No acute complaints.     Scheduled Meds:   albuterol sulfate  2.5 mg Nebulization Q6H    aspirin  81 mg Oral Daily    azithromycin  250 mg Intravenous Q24H    cefTRIAXone (ROCEPHIN) IVPB  1 g Intravenous Daily    escitalopram oxalate  10 mg Oral QHS    losartan  25 mg Oral Nightly    metoprolol succinate  25 mg Oral Daily    pantoprazole  40 mg Oral Daily    simvastatin  40 mg Oral QHS     Continuous Infusions:   PRN Meds:acetaminophen, flavoxate, ondansetron    Review of patient's allergies indicates:   Allergen Reactions    Morphine Swelling       Review of Systems  Constitutional: Positive for fatigue and fever. Negative for activity change, appetite change and diaphoresis.   HENT: Negative for congestion and facial swelling.    Eyes: Negative for redness and itching.   Respiratory: Positive for cough. Negative for chest tightness, shortness of breath and wheezing.    Cardiovascular: Negative for chest pain, palpitations and leg swelling.   Gastrointestinal: Negative for abdominal pain, constipation, diarrhea, nausea and vomiting.   Endocrine: Negative for cold intolerance and heat intolerance.   Genitourinary: Negative for difficulty urinating, dysuria, flank pain, frequency, hematuria and urgency.   Musculoskeletal: Negative for arthralgias, back pain, joint swelling, myalgias and neck pain.   Neurological: Negative for dizziness, " syncope, weakness and headaches.   Psychiatric/Behavioral: Negative for agitation and confusion. The patient is not nervous/anxious.      OBJECTIVE:     Vital Signs (Most Recent)  Temp: 98.4 °F (36.9 °C) (09/17/17 0751)  Pulse: 69 (09/17/17 0751)  Resp: 18 (09/17/17 0751)  BP: (!) 123/54 (09/17/17 0751)  SpO2: 95 % (09/17/17 0751)    Vital Signs Range (Last 24H):  Temp:  [97.6 °F (36.4 °C)-98.4 °F (36.9 °C)]   Pulse:  [65-78]   Resp:  [12-18]   BP: (104-187)/(6-79)   SpO2:  [94 %-100 %]     I & O (Last 24H):No intake or output data in the 24 hours ending 09/17/17 1206  Physical Exam:  Constitutional: She is oriented to person, place, and time. She appears well-developed and well-nourished. No distress.   HENT:   Head: Normocephalic and atraumatic.   Mouth/Throat: Oropharynx is clear and moist.   Eyes: Conjunctivae are normal. Pupils are equal, round, and reactive to light. Right eye exhibits no discharge. Left eye exhibits no discharge. No scleral icterus.   Neck: Normal range of motion. No JVD present. No thyromegaly present.   Cardiovascular: Normal rate, regular rhythm and normal heart sounds.  Exam reveals no gallop and no friction rub.    No murmur heard.  Pulmonary/Chest: Effort normal. No respiratory distress. She has no wheezes. She has no rales. She exhibits no tenderness.   Diminished on right, no wheezing   Abdominal: Soft. Bowel sounds are normal. She exhibits no distension. There is no tenderness. There is no rebound and no guarding.   Musculoskeletal: Normal range of motion. She exhibits no edema or tenderness.   Lymphadenopathy:     She has no cervical adenopathy.   Neurological: She is alert and oriented to person, place, and time. She displays normal reflexes. No cranial nerve deficit.   Skin: Skin is warm and dry. Capillary refill takes less than 2 seconds. She is not diaphoretic.   Psychiatric: She has a normal mood and affect. Her behavior is normal. Judgment and thought content normal.      Laboratory:  CBC:   Recent Labs  Lab 09/16/17  1603   WBC 22.30*   RBC 4.43   HGB 12.9   HCT 38.2      MCV 86   MCH 29.2   MCHC 33.8     CMP:   Recent Labs  Lab 09/16/17  1603   *   CALCIUM 8.6*   ALBUMIN 3.3*   PROT 6.3      K 3.9   CO2 23      BUN 23   CREATININE 1.0   ALKPHOS 91   ALT 33   AST 53*   BILITOT 0.8       Diagnostic Results:  reviewed    ASSESSMENT/PLAN:     * CAP (community acquired pneumonia)     Acute  Associated with elevated white count and left shift  Check CRP  Breathing tx  Rocephin and azithromycin              Coronary artery disease involving native coronary artery without angina pectoris     Chronic  Stable  Reorder home medications  Telemetry        Essential hypertension     Chronic  Controlled  Continue home medications  Monitor and treat accordingly                    VTE Risk Mitigation          Ordered       Place QUINTON hose  Until discontinued       09/17/17 0647       Place sequential compression device  Until discontinued       09/17/17 0647       Medium Risk of VTE  Once       09/16/17 1950     Abdirashid Brown MD  [facility]  9/17/2017 12:08 PM      130 136.6

## 2019-05-03 ENCOUNTER — HOSPITAL ENCOUNTER (EMERGENCY)
Facility: HOSPITAL | Age: 84
Discharge: HOME | End: 2019-05-03
Attending: EMERGENCY MEDICINE
Payer: MEDICARE

## 2019-05-03 VITALS
HEART RATE: 54 BPM | RESPIRATION RATE: 18 BRPM | BODY MASS INDEX: 26.11 KG/M2 | TEMPERATURE: 98 F | WEIGHT: 133 LBS | HEIGHT: 60 IN | OXYGEN SATURATION: 94 % | SYSTOLIC BLOOD PRESSURE: 158 MMHG | DIASTOLIC BLOOD PRESSURE: 67 MMHG

## 2019-05-03 DIAGNOSIS — S00.03XA CONTUSION OF SCALP, INITIAL ENCOUNTER: Primary | ICD-10-CM

## 2019-05-03 PROCEDURE — 99284 EMERGENCY DEPT VISIT MOD MDM: CPT

## 2019-05-03 RX ORDER — GUAIFENESIN 400 MG/1
400 TABLET ORAL DAILY
COMMUNITY
Start: 2019-04-29 | End: 2019-05-08

## 2019-05-04 NOTE — ED PROVIDER NOTES
"Encounter Date: 5/3/2019    SCRIBE #1 NOTE: I, Jenn Mitchell, am scribing for, and in the presence of, Dr. Simon Larsen.       History     Chief Complaint   Patient presents with    Fall     from [place], "hit the door when she fell out the wheelchair"     Head Injury     pt states, "I have a hickey on my forehead"       Time seen by provider: 8:53 PM on 05/03/2019    Talia Alberts is a 89 y.o. female who presents to the ED from [facility] for evaluation of a head injury and neck pain s/p a mechanical fall that occurred immediately PTA. The patient states she was reaching from her chair for an item that fell on the floor when she fell out of the chair and hit her head. Patient denies LOC, N/V, vision changes, numbness, weakness, hip pain, or chest pain. Patient denies onset of any other new symptoms and has no other medical concerns or complaints at this moment. PMHx includes HTN, MI, and CAD. SHx includes cardiac surgery. Morphine allergy noted.     The history is provided by the patient.     Review of patient's allergies indicates:   Allergen Reactions    Morphine Swelling     Past Medical History:   Diagnosis Date    Cancer     Coronary artery disease     Hypertension     MI (myocardial infarction)     Skin cancer unsure    L and R arm, was removed by dr christianson    Squamous cell carcinoma      Past Surgical History:   Procedure Laterality Date    CARDIAC SURGERY      ESOPHAGOGASTRODUODENOSCOPY (EGD) N/A 3/23/2015    Performed by Estiven Parisi MD at [facility] ENDO    FRACTURE SURGERY       Family History   Problem Relation Age of Onset    No Known Problems Mother     No Known Problems Father     Melanoma Neg Hx     Psoriasis Neg Hx     Lupus Neg Hx     Eczema Neg Hx      Social History     Tobacco Use    Smoking status: Never Smoker    Smokeless tobacco: Never Used   Substance Use Topics    Alcohol use: No    Drug use: No     Review of Systems   Constitutional: Negative for activity " change.   HENT: Negative for facial swelling.    Eyes: Negative for visual disturbance.   Respiratory: Negative for shortness of breath.    Cardiovascular: Negative for chest pain.   Gastrointestinal: Negative for abdominal pain, nausea and vomiting.   Musculoskeletal: Positive for neck pain. Negative for arthralgias, back pain and joint swelling.   Skin: Negative for rash.   Neurological: Negative for weakness and numbness.   Psychiatric/Behavioral: The patient is not nervous/anxious.        Physical Exam     Initial Vitals [05/03/19 2018]   BP Pulse Resp Temp SpO2   (!) 158/67 (!) 54 18 97.6 °F (36.4 °C) (!) 94 %      MAP       --         Physical Exam    Nursing note and vitals reviewed.  Constitutional: She appears well-developed and well-nourished.   HENT:   Head: Normocephalic and atraumatic. Head is without contusion and without laceration.   No swelling or tenderness of the head. No obvious skull deformity noted.    Eyes: Conjunctivae are normal.   Neck: Normal range of motion. Neck supple.   Cardiovascular: Normal rate, regular rhythm and normal heart sounds. Exam reveals no gallop and no friction rub.    No murmur heard.  Pulmonary/Chest: Effort normal and breath sounds normal. No respiratory distress. She has no wheezes. She has no rhonchi. She has no rales.   Equal, bilateral breath sounds noted without wheezing.    Abdominal: Soft. She exhibits no distension. There is no tenderness.   No palpable abdominal tenderness noted.     Musculoskeletal: Normal range of motion. She exhibits tenderness.        Right hip: She exhibits no tenderness and no bony tenderness.        Left hip: She exhibits no tenderness and no bony tenderness.        Thoracic back: She exhibits no tenderness and no bony tenderness.        Lumbar back: She exhibits no tenderness and no bony tenderness.   No midline spinous tenderness. Left paraspinal muscle cervical spine TTP. No hip tenderness noted.    Neurological: She is alert and  oriented to person, place, and time. She has normal strength. No cranial nerve deficit or sensory deficit. GCS eye subscore is 4. GCS verbal subscore is 5. GCS motor subscore is 6.   Cranial nerves II through XII grossly intact. Finger to nose normal. Tone normal. Sensation intact to light touch. Strength 5/5 bilaterally upper and lower. Normal gait. Speech and cognition is normal. No focal neurologic deficit.   Skin: Skin is warm and dry. No erythema.   Psychiatric: She has a normal mood and affect.         ED Course   Procedures  Labs Reviewed - No data to display       Imaging Results          CT Head Without Contrast (In process)                  Medical Decision Making:   History:   Old Medical Records: I decided to obtain old medical records.  Clinical Tests:   Radiological Study: Reviewed and Ordered  ED Management:  89-year-old female presents with a mild left-sided headache after sustaining a fall from a wheelchair.  She has no midline cervical tenderness with negative nexus criteria.  There are no focal deficits with a normal CT of the head.  She has no other affected parts with no hip tenderness.       APC / Resident Notes:   I, Dr. Simon Larsen III, personally performed the services described in this documentation. All medical record entries made by the scribe were at my direction and in my presence.  I have reviewed the chart and agree that the record reflects my personal performance and is accurate and complete       Scribe Attestation:   Scribe #1: I performed the above scribed service and the documentation accurately describes the services I performed. I attest to the accuracy of the note.               Clinical Impression:       ICD-10-CM ICD-9-CM   1. Contusion of scalp, initial encounter S00.03XA 920                                Simon Larsen III, MD  05/03/19 [id number]

## 2019-05-04 NOTE — ED NOTES
Patient identifiers for Talia Alberts checked and correct.  LOC:  Patient is awake, alert, and aware of environment with an appropriate affect. Patient is oriented x 3 and speaking appropriately.  APPEARANCE:  Patient resting comfortably and in no acute distress. Patient is clean and well groomed, patient's clothing is properly fastened.  SKIN:  The skin is warm and dry. Patient has normal skin turgor and moist mucus membranes. Skin is intact; no bruising or breakdown noted.  MUSCULOSKELETAL:  Patient is moving all extremities well, no obvious deformities noted.   RESPIRATORY:  Airway is open and patent. Respirations are spontaneous and non-labored with normal effort and rate.  CARDIAC:  Patient has a normal rate and rhythm. No peripheral edema noted. Capillary refill < 3 seconds.  ABDOMEN:  No distention noted.    NEUROLOGICAL:  PERRL. Facial expression is symmetrical. Hand grasps are equal bilaterally. Normal sensation in all extremities when touched with finger.  Allergies reported:    Review of patient's allergies indicates:   Allergen Reactions    Morphine Swelling     OTHER NOTES:  Patient here after fall, states she bent over to  dropped comb and fell forward and hit top of head.  No LOC.

## 2019-05-04 NOTE — ED NOTES
At D/C, sleeping -> AA & O x 3, skin warm and dry, follow up care discussed at length with patient/family to include meds and follow-up with MD; patient/family given discharge instructions along with prescriptions as indicated and care sheets

## 2019-06-05 ENCOUNTER — HOSPITAL ENCOUNTER (INPATIENT)
Facility: HOSPITAL | Age: 84
LOS: 1 days | DRG: 641 | End: 2019-06-06
Attending: EMERGENCY MEDICINE | Admitting: INTERNAL MEDICINE
Payer: MEDICARE

## 2019-06-05 DIAGNOSIS — R42 DIZZINESS: ICD-10-CM
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
## 2019-06-05 DIAGNOSIS — S09.90XA CLOSED HEAD INJURY, INITIAL ENCOUNTER: ICD-10-CM

## 2019-06-05 DIAGNOSIS — E87.1 HYPONATREMIA: Primary | ICD-10-CM

## 2019-06-05 PROBLEM — H01.006 BLEPHARITIS OF BOTH EYES: Status: ACTIVE | Noted: 2019-06-05

## 2019-06-05 PROBLEM — Z86.73 HISTORY OF STROKE: Status: ACTIVE | Noted: 2019-06-05

## 2019-06-05 PROBLEM — H01.003 BLEPHARITIS OF BOTH EYES: Status: ACTIVE | Noted: 2019-06-05

## 2019-06-05 LAB
ANION GAP SERPL CALC-SCNC: 9 MMOL/L (ref 8–16)
BASOPHILS # BLD AUTO: 0 K/UL (ref 0–0.2)
BASOPHILS NFR BLD: 0.3 % (ref 0–1.9)
BUN SERPL-MCNC: 25 MG/DL (ref 10–30)
CALCIUM SERPL-MCNC: 9.1 MG/DL (ref 8.7–10.5)
CHLORIDE SERPL-SCNC: 89 MMOL/L (ref 95–110)
CO2 SERPL-SCNC: 25 MMOL/L (ref 23–29)
CREAT SERPL-MCNC: 0.9 MG/DL (ref 0.5–1.4)
DIFFERENTIAL METHOD: ABNORMAL
EOSINOPHIL # BLD AUTO: 0.2 K/UL (ref 0–0.5)
EOSINOPHIL NFR BLD: 2.6 % (ref 0–8)
ERYTHROCYTE [DISTWIDTH] IN BLOOD BY AUTOMATED COUNT: 13 % (ref 11.5–14.5)
EST. GFR  (AFRICAN AMERICAN): >60 ML/MIN/1.73 M^2
EST. GFR  (NON AFRICAN AMERICAN): 55 ML/MIN/1.73 M^2
GLUCOSE SERPL-MCNC: 117 MG/DL (ref 70–110)
HCT VFR BLD AUTO: 35.4 % (ref 37–48.5)
HGB BLD-MCNC: 11.9 G/DL (ref 12–16)
LYMPHOCYTES # BLD AUTO: 1.5 K/UL (ref 1–4.8)
LYMPHOCYTES NFR BLD: 20.5 % (ref 18–48)
MCH RBC QN AUTO: 30.6 PG (ref 27–31)
MCHC RBC AUTO-ENTMCNC: 33.6 G/DL (ref 32–36)
MCV RBC AUTO: 91 FL (ref 82–98)
MONOCYTES # BLD AUTO: 0.9 K/UL (ref 0.3–1)
MONOCYTES NFR BLD: 12.3 % (ref 4–15)
NEUTROPHILS # BLD AUTO: 4.8 K/UL (ref 1.8–7.7)
NEUTROPHILS NFR BLD: 64.3 % (ref 38–73)
PLATELET # BLD AUTO: 176 K/UL (ref 150–350)
PMV BLD AUTO: 7.2 FL (ref 9.2–12.9)
POTASSIUM SERPL-SCNC: 4.5 MMOL/L (ref 3.5–5.1)
RBC # BLD AUTO: 3.9 M/UL (ref 4–5.4)
SODIUM SERPL-SCNC: 123 MMOL/L (ref 136–145)
WBC # BLD AUTO: 7.5 K/UL (ref 3.9–12.7)

## 2019-06-05 PROCEDURE — 99900035 HC TECH TIME PER 15 MIN (STAT)

## 2019-06-05 PROCEDURE — 93005 ELECTROCARDIOGRAM TRACING: CPT

## 2019-06-05 PROCEDURE — 85025 COMPLETE CBC W/AUTO DIFF WBC: CPT

## 2019-06-05 PROCEDURE — 12000002 HC ACUTE/MED SURGE SEMI-PRIVATE ROOM

## 2019-06-05 PROCEDURE — 36415 COLL VENOUS BLD VENIPUNCTURE: CPT

## 2019-06-05 PROCEDURE — 99285 EMERGENCY DEPT VISIT HI MDM: CPT

## 2019-06-05 PROCEDURE — 25000003 PHARM REV CODE 250: Performed by: PHYSICIAN ASSISTANT

## 2019-06-05 PROCEDURE — 25000003 PHARM REV CODE 250: Performed by: INTERNAL MEDICINE

## 2019-06-05 PROCEDURE — 80048 BASIC METABOLIC PNL TOTAL CA: CPT

## 2019-06-05 PROCEDURE — 94761 N-INVAS EAR/PLS OXIMETRY MLT: CPT

## 2019-06-05 RX ORDER — MAG HYDROX/ALUMINUM HYD/SIMETH 200-200-20
1 SUSPENSION, ORAL (FINAL DOSE FORM) ORAL 2 TIMES DAILY PRN
COMMUNITY

## 2019-06-05 RX ORDER — CLONAZEPAM 0.12 MG/1
0.25 TABLET, ORALLY DISINTEGRATING ORAL NIGHTLY
Status: DISCONTINUED | OUTPATIENT
Start: 2019-06-05 | End: 2019-06-06 | Stop reason: HOSPADM

## 2019-06-05 RX ORDER — DOCUSATE SODIUM 100 MG/1
100 CAPSULE, LIQUID FILLED ORAL 2 TIMES DAILY
Status: DISCONTINUED | OUTPATIENT
Start: 2019-06-05 | End: 2019-06-06 | Stop reason: HOSPADM

## 2019-06-05 RX ORDER — POTASSIUM CHLORIDE 20 MEQ/15ML
40 SOLUTION ORAL
Status: DISCONTINUED | OUTPATIENT
Start: 2019-06-05 | End: 2019-06-06 | Stop reason: HOSPADM

## 2019-06-05 RX ORDER — ELECTROLYTES/DEXTROSE
SOLUTION, ORAL ORAL 2 TIMES DAILY
Status: DISCONTINUED | OUTPATIENT
Start: 2019-06-05 | End: 2019-06-06 | Stop reason: HOSPADM

## 2019-06-05 RX ORDER — RAMELTEON 8 MG/1
8 TABLET ORAL NIGHTLY PRN
Status: DISCONTINUED | OUTPATIENT
Start: 2019-06-05 | End: 2019-06-06 | Stop reason: HOSPADM

## 2019-06-05 RX ORDER — LOSARTAN POTASSIUM 25 MG/1
50 TABLET ORAL 2 TIMES DAILY
Status: DISCONTINUED | OUTPATIENT
Start: 2019-06-05 | End: 2019-06-06 | Stop reason: HOSPADM

## 2019-06-05 RX ORDER — ESCITALOPRAM OXALATE 10 MG/1
10 TABLET ORAL NIGHTLY
Status: DISCONTINUED | OUTPATIENT
Start: 2019-06-05 | End: 2019-06-06 | Stop reason: HOSPADM

## 2019-06-05 RX ORDER — LANOLIN ALCOHOL/MO/W.PET/CERES
800 CREAM (GRAM) TOPICAL
Status: DISCONTINUED | OUTPATIENT
Start: 2019-06-05 | End: 2019-06-06 | Stop reason: HOSPADM

## 2019-06-05 RX ORDER — ONDANSETRON 2 MG/ML
8 INJECTION INTRAMUSCULAR; INTRAVENOUS EVERY 8 HOURS PRN
Status: DISCONTINUED | OUTPATIENT
Start: 2019-06-05 | End: 2019-06-06 | Stop reason: HOSPADM

## 2019-06-05 RX ORDER — ALBUTEROL SULFATE 2.5 MG/.5ML
2.5 SOLUTION RESPIRATORY (INHALATION) EVERY 6 HOURS PRN
Status: DISCONTINUED | OUTPATIENT
Start: 2019-06-05 | End: 2019-06-06 | Stop reason: HOSPADM

## 2019-06-05 RX ORDER — ACETAMINOPHEN 500 MG
500 TABLET ORAL
Status: COMPLETED | OUTPATIENT
Start: 2019-06-05 | End: 2019-06-05

## 2019-06-05 RX ORDER — HYDROCHLOROTHIAZIDE 25 MG/1
25 TABLET ORAL DAILY
Status: ON HOLD | COMMUNITY
End: 2019-06-06 | Stop reason: HOSPADM

## 2019-06-05 RX ORDER — ACETAMINOPHEN 500 MG
1000 TABLET ORAL EVERY 6 HOURS PRN
Status: DISCONTINUED | OUTPATIENT
Start: 2019-06-05 | End: 2019-06-06 | Stop reason: HOSPADM

## 2019-06-05 RX ORDER — SODIUM CHLORIDE 0.9 % (FLUSH) 0.9 %
10 SYRINGE (ML) INJECTION
Status: DISCONTINUED | OUTPATIENT
Start: 2019-06-05 | End: 2019-06-06 | Stop reason: HOSPADM

## 2019-06-05 RX ORDER — PANTOPRAZOLE SODIUM 40 MG/1
40 TABLET, DELAYED RELEASE ORAL DAILY
Status: DISCONTINUED | OUTPATIENT
Start: 2019-06-06 | End: 2019-06-06 | Stop reason: HOSPADM

## 2019-06-05 RX ORDER — SODIUM CHLORIDE 9 MG/ML
INJECTION, SOLUTION INTRAVENOUS CONTINUOUS
Status: DISCONTINUED | OUTPATIENT
Start: 2019-06-05 | End: 2019-06-06

## 2019-06-05 RX ORDER — AMOXICILLIN 250 MG
1 CAPSULE ORAL 2 TIMES DAILY
Status: DISCONTINUED | OUTPATIENT
Start: 2019-06-05 | End: 2019-06-05 | Stop reason: SDUPTHER

## 2019-06-05 RX ORDER — METOPROLOL SUCCINATE 25 MG/1
25 TABLET, EXTENDED RELEASE ORAL DAILY
Status: DISCONTINUED | OUTPATIENT
Start: 2019-06-06 | End: 2019-06-06 | Stop reason: HOSPADM

## 2019-06-05 RX ADMIN — ACETAMINOPHEN 500 MG: 500 TABLET ORAL at 03:06

## 2019-06-05 RX ADMIN — LOSARTAN POTASSIUM 50 MG: 25 TABLET ORAL at 09:06

## 2019-06-05 RX ADMIN — SODIUM CHLORIDE: 0.9 INJECTION, SOLUTION INTRAVENOUS at 06:06

## 2019-06-05 RX ADMIN — ESCITALOPRAM OXALATE 10 MG: 10 TABLET ORAL at 09:06

## 2019-06-05 RX ADMIN — SODIUM CHLORIDE 1000 ML: 0.9 INJECTION, SOLUTION INTRAVENOUS at 04:06

## 2019-06-05 RX ADMIN — DOCUSATE SODIUM 100 MG: 100 CAPSULE, LIQUID FILLED ORAL at 09:06

## 2019-06-05 RX ADMIN — HYDROCORTISONE: 1 CREAM TOPICAL at 09:06

## 2019-06-05 NOTE — ED PROVIDER NOTES
Encounter Date: 6/5/2019    SCRIBE #1 NOTE: IJenn, am scribing for, and in the presence of, Nieves Toledo PA-C.       History     Chief Complaint   Patient presents with    Fall     turned to close BR door @ nursing home & got dizzy & fell; denies c/o @ present. Presents with c-collar per EMS       Time seen by provider: 2:23 PM on 06/05/2019    Talia Alberts is a 89 y.o. female with PMHx of HTN, MI, and CAD who presents to the ED via EMS from [facility] with complaints of neck pain and back pain s/p a fall that occurred immediately PTA. The patient reports she was trying to shut the bathroom door when she had a sudden onset of dizziness. While she tried to grab for her wheelchair, she reports losing her balance causing her to fall and hit her head on the toilet. She endorses LOC. She denies pain in abdomen or hips. She mentions having a history of episodes of dizziness. The patient has no other medical concerns or complaints at this moment.     The history is provided by the patient.     Review of patient's allergies indicates:   Allergen Reactions    Morphine Swelling     Past Medical History:   Diagnosis Date    Cancer     Coronary artery disease     Hypertension     MI (myocardial infarction)     Skin cancer unsure    L and R arm, was removed by dr christianson    Squamous cell carcinoma      Past Surgical History:   Procedure Laterality Date    CARDIAC SURGERY      ESOPHAGOGASTRODUODENOSCOPY (EGD) N/A 3/23/2015    Performed by [facility] at [facility]    FRACTURE SURGERY       Family History   Problem Relation Age of Onset    No Known Problems Mother     No Known Problems Father     Melanoma Neg Hx     Psoriasis Neg Hx     Lupus Neg Hx     Eczema Neg Hx      Social History     Tobacco Use    Smoking status: Never Smoker    Smokeless tobacco: Never Used   Substance Use Topics    Alcohol use: No    Drug use: No     Review of Systems   Constitutional: Negative for  diaphoresis and fever.   HENT: Negative for facial swelling.    Respiratory: Negative for cough and shortness of breath.    Cardiovascular: Negative for chest pain and leg swelling.   Gastrointestinal: Negative for abdominal pain, nausea and vomiting.   Musculoskeletal: Positive for back pain and neck pain. Negative for joint swelling.        - hip pain   Skin: Negative for pallor and rash.   Neurological: Positive for dizziness and syncope. Negative for headaches.   Hematological: Does not bruise/bleed easily.   Psychiatric/Behavioral: The patient is not nervous/anxious.        Physical Exam     Initial Vitals [06/05/19 1405]   BP Pulse Resp Temp SpO2   (!) 176/77 66 18 97.6 °F (36.4 °C) 97 %      MAP       --         Physical Exam    Nursing note and vitals reviewed.  Constitutional: She appears well-developed and well-nourished. She is not diaphoretic. No distress. Cervical collar in place.   HENT:   Head: Normocephalic and atraumatic.   Right Ear: External ear normal.   Left Ear: External ear normal.   Nose: Nose normal.   Mouth/Throat: Oropharynx is clear and moist.   Eyes: Conjunctivae and EOM are normal. Pupils are equal, round, and reactive to light.   Cardiovascular: Normal rate, regular rhythm, normal heart sounds and intact distal pulses. Exam reveals no gallop and no friction rub.    No murmur heard.  Pulses:       Radial pulses are 2+ on the right side, and 2+ on the left side.        Dorsalis pedis pulses are 2+ on the right side, and 2+ on the left side.        Posterior tibial pulses are 2+ on the right side, and 2+ on the left side.   Pulmonary/Chest: Breath sounds normal. No respiratory distress. She has no wheezes. She has no rhonchi. She has no rales.   Abdominal: Soft. She exhibits no distension and no mass. There is no tenderness.   Musculoskeletal: Normal range of motion. She exhibits tenderness. She exhibits no edema.        Right hip: Normal.        Left hip: Normal.        Cervical back:  She exhibits tenderness.        Thoracic back: She exhibits tenderness.        Lumbar back: She exhibits tenderness.   C-collar in place with midline spinous tenderness noted to C-spine, T-spine, and L-spine. No edema noted to extremities. 2+ pedal and radial pulses.    Neurological: She is alert and oriented to person, place, and time. She has normal strength. No cranial nerve deficit or sensory deficit. GCS score is 15. GCS eye subscore is 4. GCS verbal subscore is 5. GCS motor subscore is 6.   No focal neurological deficits noted.  Cranial nerves III-XII grossly intact.  Equal, rapid alternating movements noted to bilateral upper and lower extremities.    Skin: Skin is warm and dry. No rash noted. No erythema.   Psychiatric: She has a normal mood and affect.         ED Course   Procedures  Labs Reviewed   CBC W/ AUTO DIFFERENTIAL   BASIC METABOLIC PANEL          Imaging Results          CT Head Without Contrast (In process)                X-Ray Thoracic Spine AP Lateral (In process)                CT Cervical Spine Without Contrast (In process)                  Medical Decision Making:   History:   I obtained history from: EMS provider.  Old Medical Records: I decided to obtain old medical records.  Old Records Summarized: records from clinic visits and records from previous admission(s).  Differential Diagnosis:   Fracture  Dislocation  Sprain  Contusion  Strain  Spasm  Electrolyte imbalance  ACS    Clinical Tests:   Lab Tests: Reviewed and Ordered  Radiological Study: Ordered and Reviewed  Medical Tests: Reviewed and Ordered       APC / Resident Notes:   CT cervical spine and head are negative.  X-rays of thoracic and lumbar spine shows no acute abnormalities.  EKG similar to previous EKGs.  Sodium is decreased at 123 today (137 November 2018).  This could have contributed to her dizziness.  She will benefit from admission to the hospital.  Dr. Whitmore agrees to admission.         Scribe Attestation:   Paulaibedi  #1: I performed the above scribed service and the documentation accurately describes the services I performed. I attest to the accuracy of the note.    Attending Attestation:             Attending ED Notes:    I discussed the patient's care with Advanced Practice Clinician and saw the patient as well.  I agree with the history, physical, assessment, diagnosis, treatment, and discharge plan provided by the Advanced Practice Clinician.  Patient likely had orthostasis upon standing.  She got dizzy and fell.  CT head and neck are negative. T-spine L-spine x-rays are negative. EKG is borderline prolonged QT, LVH unchanged from prior, no ST segment elevation or depression.  Hyponatremia noted and pt will be admitted.      I, Nieves Toledo PA-C, personally performed the services described in this documentation. All medical record entries made by the scribe were at my direction and in my presence.  I have reviewed the chart and agree that the record reflects my personal performance and is accurate and complete. Nieves Toledo PA-C.  4:55 PM 06/05/2019            ED Course as of Jun 05 1648 Wed Jun 05, 2019   1540 Sodium(!): 123 [HS]   1541 Chloride(!): 89 [HS]      ED Course User Index  [HS] Nieves Toledo PA-C     Clinical Impression:       ICD-10-CM ICD-9-CM   1. Hyponatremia E87.1 276.1   2. Fall W19.XXXA E888.9   3. Dizziness R42 780.4   4. Closed head injury, initial encounter S09.90XA 959.01                                Nieves Toledo PA-C  06/05/19 6615

## 2019-06-05 NOTE — ASSESSMENT & PLAN NOTE
Secondary to chronic diuretic use?    I will discontinue the HCTZ and give the patient 1 L normal saline.  I will not obtain urine studies since she has been on a diuretic and the results will not be reliable however if her hyponatremia persists we will obtain more studies.

## 2019-06-05 NOTE — ASSESSMENT & PLAN NOTE
The CT scan of the brain obtained today shows evidence of old lacunar strokes.  The patient should be on a daily aspirin.

## 2019-06-05 NOTE — NURSING
Pt arrived to floor from ED at 1734. VSS, BP elevated, however has been running 170's-low 200's since arrival to hospital. Pt is oriented to person only, is able to answer some questions. Bed alarm on for safety. Side rails up x2, call light in reach.

## 2019-06-05 NOTE — H&P
[facility] Hospital Medicine  History & Physical    Patient Name: Talia Alberts  MRN: [id number]  Admission Date: 6/5/2019  Attending Physician: Quintin Dolan MD   Primary Care Provider: Michael Martin MD         Patient information was obtained from patient and ER records.     Subjective:     Principal Problem:Hyponatremia    Chief Complaint: I fell   Chief Complaint   Patient presents with    Fall     turned to close BR door @ nursing home & got dizzy & fell; denies c/o @ present. Presents with c-collar per EMS        HPI: The patient is a 89-year-old nursing home resident who fell this morning while going to the bathroom.  She did not have loss of consciousness.  A head CT was obtained that does not show any acute abnormalities.  It shows old lacunar strokes.  X-rays of her cervical spine were also obtained which did not show an acute injury.    I was asked to see the patient because she has a sodium of 125.  The patient states that she frequently has dizziness but does not fall much.  It sounds like she is in a wheelchair most of the time.  She denies having orthostasis, fever, chills, chest pain, shortness of breath or palpitations.  She reports eating and drinking normally the last few days.  She has not had any nausea, vomiting or diarrhea.  She takes hydrochlorothiazide for hypertension and her sodium was low several months ago.    Past Medical History:   Diagnosis Date    Cancer     Coronary artery disease     Hypertension     MI (myocardial infarction)     Skin cancer unsure    L and R arm, was removed by dr christianson    Squamous cell carcinoma        Past Surgical History:   Procedure Laterality Date    CARDIAC SURGERY      ESOPHAGOGASTRODUODENOSCOPY (EGD) N/A 3/23/2015    Performed by Estiven Parisi MD at [facility] ENDO    FRACTURE SURGERY         Review of patient's allergies indicates:   Allergen Reactions    Morphine Swelling       No current facility-administered  medications on file prior to encounter.      Current Outpatient Medications on File Prior to Encounter   Medication Sig    acetaminophen (TYLENOL) 325 MG tablet Take 2 tablets (650 mg total) by mouth every 8 (eight) hours.    albuterol sulfate 2.5 mg/0.5 mL Nebu Take 2.5 mg by nebulization every 4 (four) hours as needed. Rescue (Patient taking differently: Take by nebulization every 6 (six) hours as needed (SOB). Rescue)    alendronate (FOSAMAX) 70 MG tablet Take 70 mg by mouth every Thursday.     artificial tears (ISOPTO TEARS) 0.5 % ophthalmic solution Place 1 drop into both eyes as needed. (Patient taking differently: Place 1 drop into both eyes as needed (dry eye). )    calcium-vitamin D tablet 600 mg-200 units Take 1 tablet by mouth once daily.    clonazePAM (KLONOPIN) 0.5 MG tablet Take 0.25 mg by mouth every evening.     docusate sodium (COLACE) 100 MG capsule Take 100 mg by mouth 2 (two) times daily.    escitalopram oxalate (LEXAPRO) 10 MG tablet Take 10 mg by mouth every evening.     ferrous sulfate 325 (65 FE) MG EC tablet Take 325 mg by mouth once daily.    hydroCHLOROthiazide (HYDRODIURIL) 25 MG tablet Take 25 mg by mouth once daily.    hydrocortisone 1 % ointment Apply 1 % topically 2 (two) times daily as needed (1-2 times daily for Blepharitis).    losartan (COZAAR) 50 MG tablet Take 50 mg by mouth 2 (two) times daily.     lubiprostone (AMITIZA) 8 MCG Cap Take 8 mcg by mouth 2 (two) times daily as needed (constipation).     metoprolol succinate (TOPROL-XL) 25 MG 24 hr tablet Take 25 mg by mouth once daily.     multivitamin (THERAGRAN) tablet Take 1 tablet by mouth once daily.    pantoprazole (PROTONIX) 40 MG tablet Take 40 mg by mouth once daily.    propylene glycol (SYSTANE COMPLETE OPHT) Place 0.6 % into both eyes 3 (three) times daily.    simvastatin (ZOCOR) 40 MG tablet Take 40 mg by mouth every evening.    trolamine salicylate (ARTHRICREAM RUB) 10 % cream Apply 1 Dose topically  3 (three) times daily as needed (Left Hip Pain).      Family History     Problem Relation (Age of Onset)    No Known Problems Mother, Father        Tobacco Use    Smoking status: Never Smoker    Smokeless tobacco: Never Used   Substance and Sexual Activity    Alcohol use: No    Drug use: No    Sexual activity: Never     Review of Systems   Eyes: Positive for redness.   Respiratory: Negative for cough, chest tightness, shortness of breath and wheezing.    Cardiovascular: Negative for chest pain, palpitations and leg swelling.   Gastrointestinal: Negative for abdominal distention, abdominal pain, constipation, diarrhea, nausea and vomiting.   Genitourinary: Negative for difficulty urinating, dysuria and flank pain.   Musculoskeletal: Negative for gait problem, joint swelling and neck pain.   Skin: Negative for rash and wound.   Neurological: Positive for dizziness. Negative for syncope, light-headedness and headaches.   Psychiatric/Behavioral: Negative for agitation, behavioral problems and confusion.     Objective:     Vital Signs (Most Recent):  Temp: 97.6 °F (36.4 °C) (06/05/19 1405)  Pulse: 66 (06/05/19 1405)  Resp: 18 (06/05/19 1405)  BP: (!) 177/80 (06/05/19 1531)  SpO2: 96 % (06/05/19 1531) Vital Signs (24h Range):  Temp:  [97.6 °F (36.4 °C)] 97.6 °F (36.4 °C)  Pulse:  [66] 66  Resp:  [18] 18  SpO2:  [96 %-97 %] 96 %  BP: (176-177)/(77-80) 177/80     Weight: 61.2 kg (135 lb)  Body mass index is 26.37 kg/m².    Physical Exam   Constitutional: She is oriented to person, place, and time. She appears well-developed and well-nourished.   HENT:   Head: Normocephalic and atraumatic.   Eyes: Pupils are equal, round, and reactive to light. EOM are normal.   Bilateral eyelid erythema   Neck: Normal range of motion. Neck supple. No JVD present.   Cardiovascular: Normal rate, regular rhythm, normal heart sounds and intact distal pulses.   Pulmonary/Chest: Effort normal and breath sounds normal. No respiratory  distress. She has no rales.   Upper airway wheezing   Abdominal: Soft. Bowel sounds are normal. She exhibits no distension. There is no tenderness. There is no rebound.   Musculoskeletal: Normal range of motion. She exhibits no edema or deformity.   Neurological: She is alert and oriented to person, place, and time. No cranial nerve deficit or sensory deficit.   Skin: Skin is warm and dry. No rash noted.   Psychiatric: She has a normal mood and affect. Her behavior is normal.   Nursing note and vitals reviewed.        CRANIAL NERVES     CN III, IV, VI   Pupils are equal, round, and reactive to light.  Extraocular motions are normal.        Significant Labs:   BMP:   Recent Labs   Lab 06/05/19  1504   *   *   K 4.5   CL 89*   CO2 25   BUN 25   CREATININE 0.9   CALCIUM 9.1     CBC:   Recent Labs   Lab 06/05/19  1504   WBC 7.50   HGB 11.9*   HCT 35.4*          Significant Imaging: I have reviewed all pertinent imaging results/findings within the past 24 hours.    Assessment/Plan:     * Hyponatremia  Secondary to chronic diuretic use?    I will discontinue the HCTZ and give the patient 1 L normal saline.  I will not obtain urine studies since she has been on a diuretic and the results will not be reliable however if her hyponatremia persists we will obtain more studies.      Essential hypertension  I will continue her regular meds except for HCTZ.      Fall  Secondary to dizziness?  Hyponatremia?  The patient states that she does not fall frequently.  I will have PT evaluate her in the morning.  I will also check orthostatic vital signs.      History of stroke  The CT scan of the brain obtained today shows evidence of old lacunar strokes.  The patient should be on a daily aspirin.      Blepharitis of both eyes  Continue treatment with eyedrops.      VTE Risk Mitigation (From admission, onward)    None             Geraldine Whitmore MD  Department of Hospital Medicine   [facility]

## 2019-06-05 NOTE — SUBJECTIVE & OBJECTIVE
Past Medical History:   Diagnosis Date    Cancer     Coronary artery disease     Hypertension     MI (myocardial infarction)     Skin cancer unsure    L and R arm, was removed by dr christianson    Squamous cell carcinoma        Past Surgical History:   Procedure Laterality Date    CARDIAC SURGERY      ESOPHAGOGASTRODUODENOSCOPY (EGD) N/A 3/23/2015    Performed by [facility] ENDO    FRACTURE SURGERY         Review of patient's allergies indicates:   Allergen Reactions    Morphine Swelling       No current facility-administered medications on file prior to encounter.      Current Outpatient Medications on File Prior to Encounter   Medication Sig    acetaminophen (TYLENOL) 325 MG tablet Take 2 tablets (650 mg total) by mouth every 8 (eight) hours.    albuterol sulfate 2.5 mg/0.5 mL Nebu Take 2.5 mg by nebulization every 4 (four) hours as needed. Rescue (Patient taking differently: Take by nebulization every 6 (six) hours as needed (SOB). Rescue)    alendronate (FOSAMAX) 70 MG tablet Take 70 mg by mouth every Thursday.     artificial tears (ISOPTO TEARS) 0.5 % ophthalmic solution Place 1 drop into both eyes as needed. (Patient taking differently: Place 1 drop into both eyes as needed (dry eye). )    calcium-vitamin D tablet 600 mg-200 units Take 1 tablet by mouth once daily.    clonazePAM (KLONOPIN) 0.5 MG tablet Take 0.25 mg by mouth every evening.     docusate sodium (COLACE) 100 MG capsule Take 100 mg by mouth 2 (two) times daily.    escitalopram oxalate (LEXAPRO) 10 MG tablet Take 10 mg by mouth every evening.     ferrous sulfate 325 (65 FE) MG EC tablet Take 325 mg by mouth once daily.    hydroCHLOROthiazide (HYDRODIURIL) 25 MG tablet Take 25 mg by mouth once daily.    hydrocortisone 1 % ointment Apply 1 % topically 2 (two) times daily as needed (1-2 times daily for Blepharitis).    losartan (COZAAR) 50 MG tablet Take 50 mg by mouth 2 (two) times daily.     lubiprostone (AMITIZA)  8 MCG Cap Take 8 mcg by mouth 2 (two) times daily as needed (constipation).     metoprolol succinate (TOPROL-XL) 25 MG 24 hr tablet Take 25 mg by mouth once daily.     multivitamin (THERAGRAN) tablet Take 1 tablet by mouth once daily.    pantoprazole (PROTONIX) 40 MG tablet Take 40 mg by mouth once daily.    propylene glycol (SYSTANE COMPLETE OPHT) Place 0.6 % into both eyes 3 (three) times daily.    simvastatin (ZOCOR) 40 MG tablet Take 40 mg by mouth every evening.    trolamine salicylate (ARTHRICREAM RUB) 10 % cream Apply 1 Dose topically 3 (three) times daily as needed (Left Hip Pain).      Family History     Problem Relation (Age of Onset)    No Known Problems Mother, Father        Tobacco Use    Smoking status: Never Smoker    Smokeless tobacco: Never Used   Substance and Sexual Activity    Alcohol use: No    Drug use: No    Sexual activity: Never     Review of Systems   Eyes: Positive for redness.   Respiratory: Negative for cough, chest tightness, shortness of breath and wheezing.    Cardiovascular: Negative for chest pain, palpitations and leg swelling.   Gastrointestinal: Negative for abdominal distention, abdominal pain, constipation, diarrhea, nausea and vomiting.   Genitourinary: Negative for difficulty urinating, dysuria and flank pain.   Musculoskeletal: Negative for gait problem, joint swelling and neck pain.   Skin: Negative for rash and wound.   Neurological: Positive for dizziness. Negative for syncope, light-headedness and headaches.   Psychiatric/Behavioral: Negative for agitation, behavioral problems and confusion.     Objective:     Vital Signs (Most Recent):  Temp: 97.6 °F (36.4 °C) (06/05/19 1405)  Pulse: 66 (06/05/19 1405)  Resp: 18 (06/05/19 1405)  BP: (!) 177/80 (06/05/19 1531)  SpO2: 96 % (06/05/19 1531) Vital Signs (24h Range):  Temp:  [97.6 °F (36.4 °C)] 97.6 °F (36.4 °C)  Pulse:  [66] 66  Resp:  [18] 18  SpO2:  [96 %-97 %] 96 %  BP: (176-177)/(77-80) 177/80     Weight: 61.2  kg (135 lb)  Body mass index is 26.37 kg/m².    Physical Exam   Constitutional: She is oriented to person, place, and time. She appears well-developed and well-nourished.   HENT:   Head: Normocephalic and atraumatic.   Eyes: Pupils are equal, round, and reactive to light. EOM are normal.   Bilateral eyelid erythema   Neck: Normal range of motion. Neck supple. No JVD present.   Cardiovascular: Normal rate, regular rhythm, normal heart sounds and intact distal pulses.   Pulmonary/Chest: Effort normal and breath sounds normal. No respiratory distress. She has no rales.   Upper airway wheezing   Abdominal: Soft. Bowel sounds are normal. She exhibits no distension. There is no tenderness. There is no rebound.   Musculoskeletal: Normal range of motion. She exhibits no edema or deformity.   Neurological: She is alert and oriented to person, place, and time. No cranial nerve deficit or sensory deficit.   Skin: Skin is warm and dry. No rash noted.   Psychiatric: She has a normal mood and affect. Her behavior is normal.   Nursing note and vitals reviewed.        CRANIAL NERVES     CN III, IV, VI   Pupils are equal, round, and reactive to light.  Extraocular motions are normal.        Significant Labs:   BMP:   Recent Labs   Lab 06/05/19  1504   *   *   K 4.5   CL 89*   CO2 25   BUN 25   CREATININE 0.9   CALCIUM 9.1     CBC:   Recent Labs   Lab 06/05/19  1504   WBC 7.50   HGB 11.9*   HCT 35.4*          Significant Imaging: I have reviewed all pertinent imaging results/findings within the past 24 hours.

## 2019-06-05 NOTE — PROGRESS NOTES
06/05/19 1740   PRE-TX-O2   O2 Device (Oxygen Therapy) room air   SpO2 95 %   Pulse 69   Resp 18

## 2019-06-05 NOTE — ASSESSMENT & PLAN NOTE
Secondary to dizziness?  Hyponatremia?  The patient states that she does not fall frequently.  I will have PT evaluate her in the morning.  I will also check orthostatic vital signs.

## 2019-06-06 VITALS
HEART RATE: 58 BPM | WEIGHT: 148.81 LBS | RESPIRATION RATE: 18 BRPM | OXYGEN SATURATION: 97 % | DIASTOLIC BLOOD PRESSURE: 77 MMHG | TEMPERATURE: 98 F | BODY MASS INDEX: 29.22 KG/M2 | SYSTOLIC BLOOD PRESSURE: 181 MMHG | HEIGHT: 60 IN

## 2019-06-06 LAB
ANION GAP SERPL CALC-SCNC: 8 MMOL/L (ref 8–16)
BUN SERPL-MCNC: 18 MG/DL (ref 10–30)
CALCIUM SERPL-MCNC: 8.5 MG/DL (ref 8.7–10.5)
CHLORIDE SERPL-SCNC: 100 MMOL/L (ref 95–110)
CO2 SERPL-SCNC: 22 MMOL/L (ref 23–29)
CREAT SERPL-MCNC: 0.7 MG/DL (ref 0.5–1.4)
EST. GFR  (AFRICAN AMERICAN): >60 ML/MIN/1.73 M^2
EST. GFR  (NON AFRICAN AMERICAN): >60 ML/MIN/1.73 M^2
GLUCOSE SERPL-MCNC: 94 MG/DL (ref 70–110)
POTASSIUM SERPL-SCNC: 4.6 MMOL/L (ref 3.5–5.1)
SODIUM SERPL-SCNC: 130 MMOL/L (ref 136–145)

## 2019-06-06 PROCEDURE — 97530 THERAPEUTIC ACTIVITIES: CPT | Performed by: PHYSICAL THERAPIST

## 2019-06-06 PROCEDURE — G8980 MOBILITY D/C STATUS: HCPCS | Mod: CK | Performed by: PHYSICAL THERAPIST

## 2019-06-06 PROCEDURE — 25000003 PHARM REV CODE 250: Performed by: INTERNAL MEDICINE

## 2019-06-06 PROCEDURE — 90670 PCV13 VACCINE IM: CPT | Performed by: INTERNAL MEDICINE

## 2019-06-06 PROCEDURE — 36415 COLL VENOUS BLD VENIPUNCTURE: CPT

## 2019-06-06 PROCEDURE — 90471 IMMUNIZATION ADMIN: CPT | Performed by: INTERNAL MEDICINE

## 2019-06-06 PROCEDURE — 94761 N-INVAS EAR/PLS OXIMETRY MLT: CPT

## 2019-06-06 PROCEDURE — 80048 BASIC METABOLIC PNL TOTAL CA: CPT

## 2019-06-06 PROCEDURE — G0009 ADMIN PNEUMOCOCCAL VACCINE: HCPCS | Performed by: INTERNAL MEDICINE

## 2019-06-06 PROCEDURE — 63600175 PHARM REV CODE 636 W HCPCS: Performed by: INTERNAL MEDICINE

## 2019-06-06 PROCEDURE — G8978 MOBILITY CURRENT STATUS: HCPCS | Mod: CK | Performed by: PHYSICAL THERAPIST

## 2019-06-06 PROCEDURE — G8979 MOBILITY GOAL STATUS: HCPCS | Mod: CK | Performed by: PHYSICAL THERAPIST

## 2019-06-06 PROCEDURE — 99900035 HC TECH TIME PER 15 MIN (STAT)

## 2019-06-06 PROCEDURE — 97161 PT EVAL LOW COMPLEX 20 MIN: CPT | Performed by: PHYSICAL THERAPIST

## 2019-06-06 RX ORDER — DOXYLAMINE SUCCINATE 25 MG
TABLET ORAL 2 TIMES DAILY
Status: DISCONTINUED | OUTPATIENT
Start: 2019-06-06 | End: 2019-06-06 | Stop reason: HOSPADM

## 2019-06-06 RX ADMIN — MICONAZOLE NITRATE: 20 CREAM TOPICAL at 10:06

## 2019-06-06 RX ADMIN — DOCUSATE SODIUM 100 MG: 100 CAPSULE, LIQUID FILLED ORAL at 08:06

## 2019-06-06 RX ADMIN — SODIUM CHLORIDE: 0.9 INJECTION, SOLUTION INTRAVENOUS at 06:06

## 2019-06-06 RX ADMIN — LOSARTAN POTASSIUM 50 MG: 25 TABLET ORAL at 08:06

## 2019-06-06 RX ADMIN — METOPROLOL SUCCINATE 25 MG: 25 TABLET, EXTENDED RELEASE ORAL at 08:06

## 2019-06-06 RX ADMIN — PNEUMOCOCCAL 13-VALENT CONJUGATE VACCINE 0.5 ML: 2.2; 2.2; 2.2; 2.2; 2.2; 4.4; 2.2; 2.2; 2.2; 2.2; 2.2; 2.2; 2.2 INJECTION, SUSPENSION INTRAMUSCULAR at 03:06

## 2019-06-06 RX ADMIN — HYDROCORTISONE: 1 CREAM TOPICAL at 08:06

## 2019-06-06 RX ADMIN — PANTOPRAZOLE SODIUM 40 MG: 40 TABLET, DELAYED RELEASE ORAL at 08:06

## 2019-06-06 NOTE — PLAN OF CARE
Discharge Destination         Previous Next     NH: [facility]     Date: 6/6/2019 12:12 PM  Created By: Destiny Gibson  Status: Accepted  Pending when to call report.       06/06/19 1319   Post-Acute Status   Post-Acute Authorization Placement   Post-Acute Placement Status Set-up Complete

## 2019-06-06 NOTE — PROGRESS NOTES
06/06/19 0824   PRE-TX-O2   O2 Device (Oxygen Therapy) room air   SpO2 99 %   Aerosol Therapy   $ Aerosol Therapy Charges PRN treatment not required

## 2019-06-06 NOTE — DISCHARGE SUMMARY
[facility] Hospital Medicine  Discharge Summary      Patient Name: Talia Alberts  MRN: [id number]  Admission Date: 6/5/2019  Hospital Length of Stay: 1 days  Discharge Date and Time:  06/06/2019 12:12 PM  Attending Physician: Geraldine Whitmore MD   Discharging Provider: Geraldine Whitmore MD  Primary Care Provider: Michael Martin MD      HPI:   The patient is a 89-year-old nursing home resident who fell this morning while going to the bathroom.  She did not have loss of consciousness.  A head CT was obtained that does not show any acute abnormalities.  It shows old lacunar strokes.  X-rays of her cervical spine were also obtained which did not show an acute injury.    I was asked to see the patient because she has a sodium of 125.  The patient states that she frequently has dizziness but does not fall much.  It sounds like she is in a wheelchair most of the time.  She denies having orthostasis, fever, chills, chest pain, shortness of breath or palpitations.  She reports eating and drinking normally the last few days.  She has not had any nausea, vomiting or diarrhea.  She takes hydrochlorothiazide for hypertension and her sodium was low several months ago.    * No surgery found *      Hospital Course:   The patient is a 89-year-old nursing home resident who fell while trying to get to the bathroom yesterday morning.  Her workup in the emergency room was remarkable for a sodium of 125 and she was admitted.  I believe the hyponatremia was secondary to chronic hydrochlorothiazide use which I have discontinued.  The patient received 1 L normal saline and her sodium is much better today.  It will be rechecked in about 4 days.    The patient had a CT scan of the brain and C-spine done in the ER which did not show any acute abnormalities.     Consults:   Consults (From admission, onward)        Status Ordering Provider     IP consult to case management  Once     Provider:  (Not yet assigned)     JAIMIE Aquino          No new Assessment & Plan notes have been filed under this hospital service since the last note was generated.  Service: Hospital Medicine    Final Active Diagnoses:    Diagnosis Date Noted POA    PRINCIPAL PROBLEM:  Hyponatremia [E87.1] 06/05/2019 Yes    Essential hypertension [I10] 03/19/2017 Yes    Fall [W19.XXXA] 03/19/2017 Yes    Blepharitis of both eyes [H01.003, H01.006] 06/05/2019 Yes    History of stroke [Z86.73] 06/05/2019 Not Applicable      Problems Resolved During this Admission:       Discharged Condition: stable    Disposition: half-way Nursing Home    Follow Up:    Patient Instructions:      Diet Cardiac     Activity as tolerated       Significant Diagnostic Studies: Labs:   BMP:   Recent Labs   Lab 06/05/19  1504 06/06/19  0640   * 94   * 130*   K 4.5 4.6   CL 89* 100   CO2 25 22*   BUN 25 18   CREATININE 0.9 0.7   CALCIUM 9.1 8.5*    and CBC   Recent Labs   Lab 06/05/19  1504   WBC 7.50   HGB 11.9*   HCT 35.4*          Pending Diagnostic Studies:     None         Medications:  Reconciled Home Medications:      Medication List      START taking these medications    miconazole nitrate 2% 2 % Oint  Commonly known as:  MICOTIN  Apply topically 2 (two) times daily.        CHANGE how you take these medications    albuterol sulfate 2.5 mg/0.5 mL Nebu  Take 2.5 mg by nebulization every 4 (four) hours as needed. Rescue  What changed:    · how much to take  · when to take this  · reasons to take this  · additional instructions     artificial tears 0.5 % ophthalmic solution  Commonly known as:  ISOPTO TEARS  Place 1 drop into both eyes as needed.  What changed:  reasons to take this        CONTINUE taking these medications    acetaminophen 325 MG tablet  Commonly known as:  TYLENOL  Take 2 tablets (650 mg total) by mouth every 8 (eight) hours.     alendronate 70 MG tablet  Commonly known as:  FOSAMAX  Take 70 mg by mouth every Thursday.      ARTHRICREAM RUB 10 % cream  Generic drug:  trolamine salicylate  Apply 1 Dose topically 3 (three) times daily as needed (Left Hip Pain).     CALCIUM 600 + D(3) 600 mg(1,500mg) -200 unit Tab  Generic drug:  calcium-vitamin D3  Take 1 tablet by mouth once daily.     clonazePAM 0.25 MG Tbdl  Commonly known as:  KLONOPIN  Take 0.25 mg by mouth every evening.     docusate sodium 100 MG capsule  Commonly known as:  COLACE  Take 100 mg by mouth 2 (two) times daily.     escitalopram oxalate 10 MG tablet  Commonly known as:  LEXAPRO  Take 10 mg by mouth every evening.     ferrous sulfate 325 (65 FE) MG EC tablet  Take 325 mg by mouth once daily.     hydrocortisone 1 % ointment  Apply 1 % topically 2 (two) times daily as needed (1-2 times daily for Blepharitis).     losartan 50 MG tablet  Commonly known as:  COZAAR  Take 50 mg by mouth 2 (two) times daily.     lubiprostone 8 MCG Cap  Commonly known as:  AMITIZA  Take 8 mcg by mouth 2 (two) times daily as needed (constipation).     metoprolol succinate 25 MG 24 hr tablet  Commonly known as:  TOPROL-XL  Take 25 mg by mouth once daily.     multivitamin tablet  Commonly known as:  THERAGRAN  Take 1 tablet by mouth once daily.     pantoprazole 40 MG tablet  Commonly known as:  PROTONIX  Take 40 mg by mouth once daily.     simvastatin 40 MG tablet  Commonly known as:  ZOCOR  Take 40 mg by mouth every evening.     SYSTANE COMPLETE OPHT  Place 0.6 % into both eyes 3 (three) times daily. Instill one drop.        STOP taking these medications    hydroCHLOROthiazide 25 MG tablet  Commonly known as:  HYDRODIURIL            Indwelling Lines/Drains at time of discharge:   Lines/Drains/Airways          None          Time spent on the discharge of patient: 30 minutes  Patient was seen and examined on the date of discharge and determined to be suitable for discharge.         Geraldine Whitmore MD  Department of Hospital Medicine  [facility]

## 2019-06-06 NOTE — PLAN OF CARE
06/06/19 1302   Medicare Message   Important Message from Medicare regarding Discharge Appeal Rights Explained to patient/caregiver   Date IMM was signed 06/06/19   Time IMM was signed 1302

## 2019-06-06 NOTE — PLAN OF CARE
Cm completed assessment with patient, chart and [place].  Pt lives at [place].  Pt will return to [place] on discharge.  Cm tried calling triny Healy, left message on answering machine.    PCP is Dr. Martin.  Insurance verified as PHN and Medicaid.  Disposition:  Pt will return to [place].       06/06/19 1050   Discharge Assessment   Assessment Type Discharge Planning Assessment   Confirmed/corrected address and phone number on facesheet? Yes   Assessment information obtained from? Patient   Prior to hospitilization cognitive status: Alert/Oriented   Prior to hospitalization functional status: Needs Assistance;Assistive Equipment   Current cognitive status: Alert/Oriented   Current Functional Status: Assistive Equipment;Needs Assistance   Facility Arrived From: [facility]   Lives With alone   Able to Return to Prior Arrangements yes   Is patient able to care for self after discharge? No  (Pt in nursing home)   Who are your caregiver(s) and their phone number(s)? daughterJoel Gutierrez - [phone]/ kaz Alberts - [phone]   Patient's perception of discharge disposition nursing home   Readmission Within the Last 30 Days no previous admission in last 30 days   Patient currently being followed by outpatient case management? No   Patient currently receives any other outside agency services? No   Equipment Currently Used at Home wheelchair   Do you have any problems affording any of your prescribed medications? No   Is the patient taking medications as prescribed? yes   Does the patient have transportation home? Yes   Transportation Anticipated agency  ([place])   Dialysis Name and Scheduled days na   Does the patient receive services at the [facility]? No   Discharge Plan A Return to nursing home   Patient/Family in Agreement with Plan yes   Does the patient have transportation to healthcare appointments? Yes

## 2019-06-06 NOTE — NURSING
Called report to [facility] and gave update on resident. Informed her of medication changes and that discharge instructions will be sent back with resident. She voiced understanding. Discharge instructions given to transportation with [facility]. IV removed, tele removed from pt. Pt cleaned and changed into clean clothing prior to discharge. Pt tolerated well.

## 2019-06-06 NOTE — PT/OT/SLP EVAL
Physical Therapy Evaluation and Discharge Note    Patient Name:  Talia Alberts   MRN:  [id number]    Recommendations:     Discharge Recommendations:  nursing facility, basic   Discharge Equipment Recommendations: none   Barriers to discharge: None    Assessment:     Talia Alberts is a 89 y.o. female admitted with a medical diagnosis of Hyponatremia. .  At this time, patient is functioning at their prior level of function and does not require further acute PT services.     Recent Surgery: * No surgery found *      Plan:     During this hospitalization, patient does not require further acute PT services.  Please re-consult if situation changes.      Subjective     Chief Complaint: none  Patient/Family Comments/goals: none stated  Pain/Comfort:  · Pain Rating 1: 0/10    Patients cultural, spiritual, Sikh conflicts given the current situation: no    Living Environment:  Pt is a resident of [facility].  Prior to admission, patient required assistance +1 person for transfers in/out of w/c.  Equipment used at home: wheelchair.  DME owned (not currently used): none.  Upon discharge, patient will have assistance from staff.    Objective:     Patient found HOB elevated with peripheral IV upon PT entry to room.    General Precautions: Standard, fall   Orthopedic Precautions:N/A   Braces: N/A     Exams:  · Cognitive Exam:  Patient is oriented to Person, Place, Time and Situation  · Postural Exam:  Patient presented with the following abnormalities:    · -       Rounded shoulders  · -       Forward head  · -       Abnormal trunk flexion  · RLE ROM: WFL  · RLE Strength: WFL  · LLE ROM: WFL  · LLE Strength: WFL    Functional Mobility:  · Bed Mobility:     · Supine to Sit: modified independence  · Transfers:     · Sit to Stand:  supervision with no AD multiple times to assist with brief change and hygiene.  · Bed to Chair: stand by assistance with  no AD  using  Stand Pivot  · Balance: fair    AM-PAC 6 CLICK  MOBILITY  Total Score:19       T herapeutic Activities and Exercises:  See above.     AM-PAC 6 CLICK MOBILITY  Total Score:19     Patient left up in chair with all lines intact, call button in reach, chair alarm on and nurse Nora notified.    GOALS:   Multidisciplinary Problems     Physical Therapy Goals     Not on file          Multidisciplinary Problems (Resolved)        Problem: Physical Therapy Goal    Goal Priority Disciplines Outcome Goal Variances Interventions   Physical Therapy Goal   (Resolved)     PT, PT/OT Outcome(s) achieved                     History:     Past Medical History:   Diagnosis Date    Cancer     Coronary artery disease     Hypertension     MI (myocardial infarction)     Skin cancer unsure    L and R arm, was removed by dr christianson    Squamous cell carcinoma        Past Surgical History:   Procedure Laterality Date    CARDIAC SURGERY      ESOPHAGOGASTRODUODENOSCOPY (EGD) N/A 3/23/2015    [facility]    FRACTURE SURGERY         Time Tracking:     PT Received On: 06/06/19  PT Start Time: 1106     PT Stop Time: 1129  PT Total Time (min): 23 min     Billable Minutes: Evaluation 10 and Therapeutic Activity 13      Megan Hirsch, PT  06/06/2019

## 2019-06-06 NOTE — DISCHARGE INSTRUCTIONS
[facility] Transfer Orders        [facility]    Diagnoses:   Active Hospital Problems    Diagnosis  POA    *Hyponatremia [E87.1]  Yes     Priority: 1 - High    Essential hypertension [I10]  Yes     Priority: 2     Fall [W19.XXXA]  Yes     Priority: 3     Blepharitis of both eyes [H01.003, H01.006]  Yes    History of stroke [Z86.73]  Not Applicable      Resolved Hospital Problems   No resolved problems to display.     Allergies:   Review of patient's allergies indicates:   Allergen Reactions    Morphine Swelling       Code Status: full    Vitals: Routine       Diet: cardiac diet    Activity: Activity as tolerated    Nursing Precautions: Fall    Bed/Surface: Low Air Loss        Oxygen: room air    Dialysis: Patient is not on dialysis.     Labs: BMP 6-10-19  Pending Diagnostic Studies:     None        I           Medications: Discontinue all previous medication orders, if any. See new list below.  Current Discharge Medication List      START taking these medications    Details   miconazole nitrate 2% (MICOTIN) 2 % Oint Apply topically 2 (two) times daily.  Refills: 0         CONTINUE these medications which have NOT CHANGED    Details   acetaminophen (TYLENOL) 325 MG tablet Take 2 tablets (650 mg total) by mouth every 8 (eight) hours.  Refills: 0      albuterol sulfate 2.5 mg/0.5 mL Nebu Take 2.5 mg by nebulization every 4 (four) hours as needed. Rescue  Qty: 300 mg, Refills: 11      alendronate (FOSAMAX) 70 MG tablet Take 70 mg by mouth every Thursday.   Refills: 3      artificial tears (ISOPTO TEARS) 0.5 % ophthalmic solution Place 1 drop into both eyes as needed.      calcium-vitamin D tablet 600 mg-200 units Take 1 tablet by mouth once daily.      clonazePAM (KLONOPIN) 0.25 MG TbDL Take 0.25 mg by mouth every evening.       docusate sodium (COLACE) 100 MG capsule Take 100 mg by mouth 2 (two) times daily.      escitalopram oxalate (LEXAPRO) 10 MG tablet Take 10  mg by mouth every evening.       ferrous sulfate 325 (65 FE) MG EC tablet Take 325 mg by mouth once daily.      hydrocortisone 1 % ointment Apply 1 % topically 2 (two) times daily as needed (1-2 times daily for Blepharitis).      losartan (COZAAR) 50 MG tablet Take 50 mg by mouth 2 (two) times daily.       lubiprostone (AMITIZA) 8 MCG Cap Take 8 mcg by mouth 2 (two) times daily as needed (constipation).       metoprolol succinate (TOPROL-XL) 25 MG 24 hr tablet Take 25 mg by mouth once daily.       multivitamin (THERAGRAN) tablet Take 1 tablet by mouth once daily.      pantoprazole (PROTONIX) 40 MG tablet Take 40 mg by mouth once daily.      propylene glycol (SYSTANE COMPLETE OPHT) Place 0.6 % into both eyes 3 (three) times daily. Instill one drop.      simvastatin (ZOCOR) 40 MG tablet Take 40 mg by mouth every evening.      trolamine salicylate (ARTHRICREAM RUB) 10 % cream Apply 1 Dose topically 3 (three) times daily as needed (Left Hip Pain).          STOP taking these medications       hydroCHLOROthiazide (HYDRODIURIL) 25 MG tablet Comments:   Reason for Stopping:             Follow up:

## 2019-06-06 NOTE — PLAN OF CARE
Cm sent referral to [facility] via right care.  Paras informed Lisy at [place] of pt being discharged today.       06/06/19 1212   Post-Acute Status   Post-Acute Authorization Placement   Post-Acute Placement Status Referrals Sent

## 2019-06-06 NOTE — HOSPITAL COURSE
The patient is a 89-year-old nursing home resident who fell while trying to get to the bathroom yesterday morning.  Her workup in the emergency room was remarkable for a sodium of 125 and she was admitted.  I believe the hyponatremia was secondary to chronic hydrochlorothiazide use which I have discontinued.  The patient received 1 L normal saline and her sodium is much better today.  It will be rechecked in about 4 days.    The patient had a CT scan of the brain and C-spine done in the ER which did not show any acute abnormalities.

## 2019-06-06 NOTE — PLAN OF CARE
06/05/19 1934   Patient Assessment/Suction   Level of Consciousness (AVPU) alert   Respiratory Effort Unlabored   Expansion/Accessory Muscles/Retractions no use of accessory muscles;no retractions   All Lung Fields Breath Sounds diminished;clear   PRE-TX-O2   O2 Device (Oxygen Therapy) room air   SpO2 96 %   Pulse Oximetry Type Intermittent   $ Pulse Oximetry - Multiple Charge Pulse Oximetry - Multiple   Aerosol Therapy   $ Aerosol Therapy Charges PRN treatment not required

## 2019-06-06 NOTE — PLAN OF CARE
Cm received a message from Lisy at [place], the nurse can call report to Gray on DEJAN Garcia @ [phone].  Lisy also asked about where Miconazole Nitrate is to be applied.  The order is written BID, but no indication as to where.  Cm sent a note to Dr. Whitmore via Secure Chat.  2:59pm  Cm received a message from Dr. Joy that the cream goes to pt's Buttocks.  Cm sent a message to Lisy via [facility] [employer] [facility].     NH: [facility]     Date: 6/6/2019 12:12 PM  Created By: Destiny Gibson  Status: Accepted   Cm notified MAIKOL Melendez.       06/06/19 1424   Final Note   Assessment Type Final Discharge Note   Anticipated Discharge Disposition [facility]

## 2019-06-07 ENCOUNTER — TELEPHONE (OUTPATIENT)
Dept: MEDSURG UNIT | Facility: HOSPITAL | Age: 84
End: 2019-06-07

## 2019-07-24 DIAGNOSIS — R13.10 DYSPHAGIA: Primary | ICD-10-CM

## 2019-07-24 DIAGNOSIS — J18.9 PNA (PNEUMONIA): ICD-10-CM

## 2019-07-31 ENCOUNTER — HOSPITAL ENCOUNTER (OUTPATIENT)
Dept: RADIOLOGY | Facility: HOSPITAL | Age: 84
Discharge: HOME | End: 2019-07-31
Attending: NURSE PRACTITIONER
Payer: MEDICARE

## 2019-07-31 DIAGNOSIS — R13.12 OROPHARYNGEAL DYSPHAGIA: ICD-10-CM

## 2019-07-31 PROCEDURE — 92526 ORAL FUNCTION THERAPY: CPT

## 2019-07-31 PROCEDURE — 92610 EVALUATE SWALLOWING FUNCTION: CPT

## 2019-07-31 PROCEDURE — 74230 FL MODIFIED BARIUM SWALLOW SPEECH STUDY: ICD-10-PCS | Mod: 26,,, | Performed by: RADIOLOGY

## 2019-07-31 PROCEDURE — 74230 X-RAY XM SWLNG FUNCJ C+: CPT | Mod: TC

## 2019-07-31 PROCEDURE — G8996 SWALLOW CURRENT STATUS: HCPCS | Mod: CJ

## 2019-07-31 PROCEDURE — 92611 MOTION FLUOROSCOPY/SWALLOW: CPT

## 2019-07-31 PROCEDURE — 74230 X-RAY XM SWLNG FUNCJ C+: CPT | Mod: 26,,, | Performed by: RADIOLOGY

## 2019-07-31 PROCEDURE — G8998 SWALLOW D/C STATUS: HCPCS | Mod: CJ

## 2019-07-31 PROCEDURE — G8997 SWALLOW GOAL STATUS: HCPCS | Mod: CJ

## 2019-07-31 NOTE — PROGRESS NOTES
Modified Barium Swallowing Study    Past Medical History:   Diagnosis Date    Cancer     Coronary artery disease     Hypertension     MI (myocardial infarction)     Skin cancer unsure    L and R arm, was removed by dr christianson    Squamous cell carcinoma      Past Surgical History:   Procedure Laterality Date    CARDIAC SURGERY      ESOPHAGOGASTRODUODENOSCOPY (EGD) N/A 3/23/2015    Performed by [facility] at [facility] END[facility]    FRACTURE SURGERY       Pt was referred due to recent health decline & increased coughing & occasional choking episodes at [facility], where she resides.  Pt was losing weight due to prolonged mastication & poor intake, so diet was changed to pureed, with improved intake & weight stabilization.  Liquids were thickened to nectar thick to address coughing; pills are now crushed.  Pt was last seen for MBSS on 2/4/2019, with prolonged oral prep & piecemeal transit, bolus holding, SILENT aspiration (a drop x2 trials) on thin liquids via straw, and recommendation for finely chopped foods, no bread, thin liquids via cup sip only, meds whole in puree; pleasure feedings of soft cookies per her daughter's request.  Her Speech Therapist, Afua, attended this evaluation & provided information.  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  Pt was noted to hold chin above mid-line.   Today, pt was found to be very distractible, which affected chewing, & transit of all bolus types A->P.  Piecemeal deglutition was also seen.  Cervical spine hardware was noted at C5-6, as was a cricopharyngeal bar, which tilted upward & retained a small amount of puree & cookie boluses, which returned to the pyriforms after the swallow.  Pharyngeal stage was mildly impaired by premature spillage of liquids, reduced base of tongue retraction, & reduced pharyngeal wall contraction with mild stasis after most bolus types.  A barium pill  presented in puree was swallowed without problem.  An esophageal sweep found aerophagia, & mild stasis of puree.  Pt was noted to cough 1x during the interview, & 2x during the evaluation altho there was no evidence of penetration or aspiration.   Recommend continue pureed textures or upgrade as appropriate per pt preference & intake levels.  Thin liquids via straight & controlled flow straw to encourage chin tuck & reduce sip size.  Cue pt to swallow an extra, dry swallow after every bolus, to reduce pharyngeal residue.  Crush meds or as tolerated.  Pt appears safe for soft solids for pleasure when closely supervised.     Pt & Speech Therapist were educated re findings & recommendations, and tx plan discussed.    G Codes Swallowing Current      Goal CJ     D/C CJ     07/31/19 1009   Speech Time Calculation   Speech Start Time 1009   Speech Stop Time 1040   Speech Total (min) 31 min   General Information   SLP Treatment Date 07/31/19   General Precautions fall   Visual/Auditory Hearing impaired   Current Diet   Current Diet Textures Puree   Current Liquid Consistencies Nectar Thick   Subjective   Patient states okay   Oral Musculature Evaluation   Oral Musculature WFL   Dentition upper dentures   Mucosal Quality adequate   Mandibular Strength and Mobility WFL   Oral Labial Strength and Mobility WFL   Lingual Strength and Mobility WFL   Velar Elevation WFL   Buccal Strength and Mobility WFL   Volitional Cough weak   Voice Prior to PO Intake clear. frail        MBS Eval: Thin Liquid Trial   Mode of Presentation, Thin Liquid spoon;fed by clinician;cup;straw;self fed   Volume of Thin Liquid Presented tsp, cup sip, straw sip   Oral Prep/Phase, Thin Liquid bolus holding;delayed initiation of oral swallow   Pharyngeal Phase, Thin Liquid pyriform sinuses stasis;cervical protrusion (comment);premature spillage;decreased base of tongue retraction;decreased pharyngeal wall contraction;scattered pharyngeal  residue  (osteophytes, moderate pyriform residue; )   Rosenbeck's 8-Point Penetration-Aspiration Scale, Thin Liquids (1) Material does not enter airway.   Esophageal Phase, Thin regurgitation of food/liquids;cricopharyngeal bar/prominent cricopharyngeal muscle  (C5-6 hardware; CP bar stasis moved to pyriforms)   Successful Strategies Trialed During Procedure, Thin Liquid multiple swallows;chin tuck   Additional Comments mild oral transit dysphagia with bolus holding & delayed oral swallow, mild pharyngeal dysphagia        MBS Eval: Nectar Thick Liquid Trial   Mode of Presentation, Nectar spoon;cup;straw   Volume of Nectar Presented tsp, cup sip, staw sip   Oral Prep/Phase, Nectar bolus holding;delayed initiation of oral swallow   Pharyngeal Phase, Nectar premature spillage;decreased base of tongue retraction;decreased pharyngeal wall contraction;pyriform sinuses stasis;vallecular stasis;scattered pharnygeal residue  (osteophytes, mild scattered residue)   Rosenbeck's 8-Point Penetration-Aspiration Scale, Nectar Thick Liquids (1) Material does not enter airway.   Esophageal Phase, Nectar cricopharyngeal bar/prominent cricopharyngeal muscle;retrograde flow  (C5-6 hardware)   Successful Strategies Trialed During Procedure, Nectar chin tuck;multiple swallows   Diagnostic Statement mild oral transit dysphagia due to bolus holding & delayed oral swallow; mild pharygneal dysphagia; no penetration or aspiration        MBS Eval: Pureed Trial   Mode of Presentation, Puree spoon   Volume of Puree Presented tsp   Oral Prep/Phase, Puree bolus holding;delayed initiation of oral swallow  (distractible)   Pharyngeal Phase, Puree decreased pharyngeal wall contraction;decreased base of tongue retraction;pyriform sinuses stasis;vallecular stasis  (very mild residue)   Rosenbeck's 8-Point Penetration-Aspiration Scale, Pureed (1) Material does not enter airway.   Esophageal Phase, Pureed cricopharyngeal bar/prominent cricopharyngeal  muscle;esophageal stasis   Successful Strategies Trialed During Procedure, Puree multiple swallows   Diagnostic Statement mild oral & pharygneal dysphagia        MBS Eval: Solid Food Texture Trial   Mode of Presentation, Solid spoon;fed by clinician   Volume of Solid Food Presented 1/4 pc cookie with barium pudding   Oral Prep/Phase, Solid bolus holding;prolonged chewing;piecemeal deglutition   Pharyngeal Phase, Solid cervical protrusion (comment);decreased base of tongue retraction;decreased pharyngeal wall contraction;vallecular stasis  (mild residue)   Rosenbeck's 8-Point Penetration-Aspiration Scale, Solid (1) Material does not enter airway.   Esophageal Phase, Solid cricopharyngeal bar/prominent cricopharyngeal muscle  (C5-6 hardware)   Successful Strategies Trialed During Procedure, Solid chin tuck;multiple swallows   Diagnostic Statement mild oral & pharyngeal dysphagia   Recommendations   Solid Diet Level Puree   Liquid Diet Level Thin   Additional Recommendations controlled flow straw, chin tuck, cues to swallow 2x per bolus, meds crushed in puree; soft solids for pleasure feedings   Treatment/Billable Minutes   Treatment Swallowing Dysfunction 10   Eval Swallow and Oral Function 10   Motion Fluoro Swallow, Cine/Vid 11   Total Time 31

## 2019-09-09 RX ORDER — FUROSEMIDE 10 MG/ML
INJECTION INTRAMUSCULAR; INTRAVENOUS
Status: ON HOLD | COMMUNITY
Start: 2019-07-22 | End: 2019-09-27 | Stop reason: HOSPADM

## 2019-09-09 RX ORDER — NEOMYCIN SULFATE, POLYMYXIN B SULFATE, HYDROCORTISONE 3.5; 10000; 1 MG/ML; [USP'U]/ML; MG/ML
SOLUTION/ DROPS AURICULAR (OTIC)
COMMUNITY
Start: 2018-01-29

## 2019-09-09 RX ORDER — ASPIRIN 81 MG/1
81 TABLET ORAL
Status: ON HOLD | COMMUNITY
End: 2019-10-31 | Stop reason: SDUPTHER

## 2019-09-10 ENCOUNTER — INITIAL CONSULT (OUTPATIENT)
Dept: PULMONOLOGY | Facility: CLINIC | Age: 84
End: 2019-09-10
Payer: MEDICARE

## 2019-09-10 VITALS
BODY MASS INDEX: 29.45 KG/M2 | OXYGEN SATURATION: 96 % | WEIGHT: 150 LBS | HEIGHT: 60 IN | HEART RATE: 67 BPM | SYSTOLIC BLOOD PRESSURE: 130 MMHG | DIASTOLIC BLOOD PRESSURE: 80 MMHG

## 2019-09-10 DIAGNOSIS — I50.32 CHRONIC DIASTOLIC CONGESTIVE HEART FAILURE: Primary | ICD-10-CM

## 2019-09-10 DIAGNOSIS — H10.33 ACUTE BACTERIAL CONJUNCTIVITIS OF BOTH EYES: ICD-10-CM

## 2019-09-10 DIAGNOSIS — R06.01 ORTHOPNEA: ICD-10-CM

## 2019-09-10 DIAGNOSIS — I35.0 AORTIC STENOSIS, MODERATE: ICD-10-CM

## 2019-09-10 DIAGNOSIS — I50.1 CARDIOGENIC PULMONARY EDEMA: ICD-10-CM

## 2019-09-10 DIAGNOSIS — R06.2 WHEEZING: ICD-10-CM

## 2019-09-10 DIAGNOSIS — R60.0 BILATERAL LOWER EXTREMITY EDEMA: ICD-10-CM

## 2019-09-10 DIAGNOSIS — G31.84 MILD COGNITIVE IMPAIRMENT: Chronic | ICD-10-CM

## 2019-09-10 DIAGNOSIS — I25.10 CORONARY ARTERY DISEASE INVOLVING NATIVE CORONARY ARTERY OF NATIVE HEART WITHOUT ANGINA PECTORIS: Chronic | ICD-10-CM

## 2019-09-10 PROBLEM — J18.9 CAP (COMMUNITY ACQUIRED PNEUMONIA): Status: RESOLVED | Noted: 2017-09-16 | Resolved: 2019-09-10

## 2019-09-10 PROCEDURE — 99205 PR OFFICE/OUTPT VISIT, NEW, LEVL V, 60-74 MIN: ICD-10-PCS | Mod: S$GLB,,, | Performed by: INTERNAL MEDICINE

## 2019-09-10 PROCEDURE — 99205 OFFICE O/P NEW HI 60 MIN: CPT | Mod: S$GLB,,, | Performed by: INTERNAL MEDICINE

## 2019-09-10 RX ORDER — MULTIVITAMIN
1 TABLET ORAL
COMMUNITY

## 2019-09-10 RX ORDER — DOXAZOSIN 1 MG/1
TABLET ORAL
Status: ON HOLD | COMMUNITY
Start: 2019-08-30 | End: 2019-10-31

## 2019-09-10 RX ORDER — METHYLPREDNISOLONE 4 MG/1
TABLET ORAL
Status: ON HOLD | COMMUNITY
Start: 2019-08-23 | End: 2019-09-27 | Stop reason: HOSPADM

## 2019-09-10 RX ORDER — DEXAMETHASONE SODIUM PHOSPHATE 4 MG/ML
INJECTION, SOLUTION INTRA-ARTICULAR; INTRALESIONAL; INTRAMUSCULAR; INTRAVENOUS; SOFT TISSUE
Status: ON HOLD | COMMUNITY
Start: 2019-08-10 | End: 2019-09-27 | Stop reason: HOSPADM

## 2019-09-10 RX ORDER — ONDANSETRON 4 MG/1
TABLET, ORALLY DISINTEGRATING ORAL
Status: ON HOLD | COMMUNITY
Start: 2019-08-26 | End: 2019-09-27 | Stop reason: HOSPADM

## 2019-09-10 NOTE — PATIENT INSTRUCTIONS
· Increase lasix to 20 mg daily.  · If weight increase of more than 5 lbs, double dose.  · Recheck chemistry, BNP, CXR and echo tomorrow.  · RTC in 2 weeks.      Discharge Instructions for Aortic Valve Stenosis  You have been diagnosed with aortic valve stenosis. This means the aortic valve in your heart is stiff or remains in a near-closed position and has trouble opening. Because of this, your heart has to work harder to push the blood through the valve. In some cases, this extra work makes the muscle of the heart thicken. The extra work can tire the heart and cause its muscle to weaken over time. This condition often changes very slowly and doesn't need to be treated. But in some people, it may get worse faster and need to be treated. Some people can control their stenosis with medicines. In some severe cases, surgery is needed.  Home care  · Check with your doctor before taking any over-the-counter medicines, herbal products, or vitamins.  · Take your medicines exactly as directed. Dont skip doses.  · Keep all follow-up appointments. Some people with aortic stenosis dont have symptoms. Others need close follow-up and surgery.  Lifestyle changes  · Maintain a healthy weight. Get help to lose any extra pounds.  · Ask your doctor if an exercise program is right for you. Some people with aortic stenosis need to be very careful about exercise as it can result in fainting.  · Break the smoking habit. Enroll in a stop-smoking program to improve your chances of success.  Follow-up care  Make a follow-up appointment with your healthcare provider, or as directed.  When to call 911  Call 911 or go to the emergency room right away if you have any of the following:  · Chest pain or shortness of breath  · Weakness in the muscles of your face, arms, or legs  · Trouble speaking  · Rapid pulse or pounding heartbeat  · Fainting or dizziness  · Sudden vertigo   Date Last Reviewed: 6/1/2016  © 0589-9589 [employer]  [address] [place] [address]. All rights reserved. This information is not intended as a substitute for professional medical care. Always follow your healthcare professional's instructions.        Taking Medicine to Control Heart Failure  The heart is a muscle that pumps oxygen-rich blood to all parts of the body. When you have heart failure, the heart is not able to pump as well as it should. Blood and fluid may back up into the lungs (congestive heart failure), and some parts of the body dont get enough oxygen-rich blood to work normally. These problems lead to the symptoms of heart failure.  Medications can help your heart work better, but follow your doctor's directions exactly to ensure the best result.     Have all your prescriptions filled. Talk to a pharmacist if you have questions.     Why take your medicine?  · They help you feel better. That means you can do more of the things you enjoy.  · They help your heart work better.  · They can help you stay out of the hospital.  · They can prevent shortness of breath and swelling in your feet.  · They can improve blood flow to the rest of your body and prevent other organs from being affected by your heart's decreased function.  Know your medicines  You may take one or more of the medications below. Be sure you know which ones you take:  · ACE inhibitors lower blood pressure and decrease strain on the heart. This makes it easier for the heart to pump. These also help remodel the heart, which can promote improved pumping ability.  · Angiotensin receptor blockers (ARBs) work like ACE inhibitors. These are prescribed for some people who can't take ACE inhibitors. Some people who take ACE inhibitors develop a cough.  · Angiotensin receptor neprilysin inhibitors (ARNIs) a new drug that combines and ARB and a neprilysin inhibitor. It helps to relax blood vessels, reduce stress on the heart, and help your body get rid of salt and  fluid.  · Beta-blockers help lower blood pressure and slow your heart rate. This lessens the work your heart has to do. Beta-blockers may improve the hearts pumping action and strength over time. If you have severe pulmonary disease, you may not be able to take these medicines.  · Diuretics (water pills) help the body get rid of excess water by excreting salt. This helps prevent swelling, especially in your ankles. They can also help you breath better if you have fluid in your lungs. Having less fluid to pump means your heart doesnt have to work as hard. A side effect of this medicine is having to urinate more often. Some diuretics make your body lose a mineral called potassium. Your doctor will tell you if you need to take supplements or eat more foods high in potassium.  · Digoxin helps your heart pump with more strength. This helps your heart pump more blood with each beat. So, more oxygen-rich blood travels to the rest of the body.  · Aldosterone blockers help alter hormones and decrease strain on the heart.  · Hydralazine and nitrates are two separate medications used together to treat heart failure. They may come in one combination pill. They lower blood pressure and decrease how hard the heart has to pump.  Tips for taking your medicine  · Take your medications exactly as directed. Follow the directions on the label.  · Take your medications at the same time or times each day.  · If you miss a dose, take it as soon as you remember -- unless its almost time for your next dose. If so, skip the missed dose. Do not take a double dose.  · Never change the dose or stop taking a medication unless your doctor tells you. Please tell your doctor if you don't understand how to take your medications, or if you are having difficulty getting your medications.  · If you miss too many doses, you are at risk for being admitted to the hospital for shortness of breath and worsening of heart failure symptoms.  Date Last  Reviewed: 4/1/2016  © 4301-1359 [employer]. [address] [place] [address], [place] [address]. All rights reserved. This information is not intended as a substitute for professional medical care. Always follow your healthcare professional's instructions.        Heart Failure: Making Changes to Your Diet  You have a condition called heart failure. When you have heart failure, excess fluid is more likely to build up in your body because your heart isn't working well. This makes the heart work harder to pump blood. Fluid buildup causes symptoms such as shortness of breath and swelling (edema). This is often referred to as congestive heart failure or CHF. Controlling the amount of salt (sodium) you eat may help stop fluid from building up. Your doctor may also tell you to reduce the amount of fluid you drink.  Reading food labels    Your healthcare provider will tell you how much sodium you can eat each day. Read food labels to keep track. Keep in mind that certain foods are high in salt. These include canned, frozen, and processed foods. Check the amount of sodium in each serving. Watch out for high-sodium ingredients. These include MSG (monosodium glutamate), baking soda, and sodium phosphate.   Eating less salt  Give yourself time to get used to eating less salt. It may take a little while. Here are some tips to help:  · Take the saltshaker off the table. Replace it with salt-free herb mixes and spices.  · Eat fresh or plain frozen vegetables. These have much less salt than canned vegetables.  · Choose low-sodium snacks like sodium-free pretzels, crackers, or air-popped popcorn.  · Dont add salt to your food when youre cooking. Instead, season your foods with pepper, lemon, garlic, or onion.  · When you eat out, ask that your food be cooked without added salt.  · Avoid eating fried foods as these often have a great deal of salt.  If youre told to limit fluids  You may need to limit how much fluid you have  to help prevent swelling. This includes anything that is liquid at room temperature, such as ice cream and soup. If your doctor tells you to limit fluid, try these tips:  · Measure drinks in a measuring cup before you drink them. This will help you meet daily goals.  · Chill drinks to make them more refreshing.  · Suck on frozen lemon wedges to quench thirst.  · Only drink when youre thirsty.  · Chew sugarless gum or suck on hard candy to keep your mouth moist.  · Weigh yourself daily to know if your body's fluid content is rising.  My sodium goal  Your healthcare provider may give you a sodium goal to meet each day. This includes sodium found in food as well as salt that you add. My goal is to eat no more than ___________ mg of sodium per day.     When to call your doctor  Call your doctor right away if you have any symptoms of worsening heart failure. These can include:  · Sudden weight gain  · Increased swelling of your legs or ankles  · Trouble breathing when youre resting or at night  · Increase in the number of pillows you have to sleep on  · Chest pain, pressure, discomfort, or pain in the jaw, neck, or back   Date Last Reviewed: 3/21/2016  © 1435-3076 [employer]. [address] [place] [address], [place] [address]. All rights reserved. This information is not intended as a substitute for professional medical care. Always follow your healthcare professional's instructions.        Heart Failure: Warning Signs of a Flare-Up  You have a condition called heart failure. Once you have heart failure, flare-ups can happen. Below are signs that can mean your heart failure is getting worse. If you notice any of these warning signs, call your healthcare provider.  Swelling    · Your feet, ankles, or lower legs get puffier.  · You notice skin changes on your lower legs.  · Your shoes feel too tight.  · Your clothes are tighter in the waist.  · You have trouble getting rings on or off your fingers.  Shortness of  breath  · You have to breathe harder even when youre doing your normal activities or when youre resting.  · You are short of breath walking up stairs or even short distances.  · You wake up at night short of breath or coughing.  · You need to use more pillows or sit up to sleep.  · You wake up tired or restless.  Other warning signs  · You feel weaker, dizzy, or more tired.  · You have chest pain or changes in your heartbeat.  · You have a cough that wont go away.  · You cant remember things or dont feel like eating.  Tracking your weight  Gaining weight is often the first warning sign that heart failure is getting worse. Gaining even a few pounds can be a sign that your body is retaining excess water and salt. Weighing yourself each day in the morning after you urinate and before you eat, is the best way to know if you're retaining water. Get a scale that is easy to read and make sure you wear the same clothes and use the same scale every time you weigh. Your healthcare provider will show you how to track your weight. Call your doctor if you gain more than 2 pounds in 1 day, 5 pounds in 1 week, or whatever weight gain you were told to report by your doctor. This is often a sign of worsening heart failure and needs to be evaluated and treated before it compromises your breathing. Your doctor will tell you what to do next.    Date Last Reviewed: 3/15/2016  © 1904-2822 [employer]. [address] [place] [address], [place] [address]. All rights reserved. This information is not intended as a substitute for professional medical care. Always follow your healthcare professional's instructions.

## 2019-09-10 NOTE — PROGRESS NOTES
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  Her daughter who was present during our visit reports no respiratory symptoms prior to 3 months ago.  Approximately 3 months ago she developed a persistent cough which has progressively become more severe and constant since.  Symptom onset was gradual as was its progression.  She developed some associated wheezing, orthopnea, lower extremity edema, and exertional dyspnea over the same timeframe.  She occasionally coughs up pink/white frothy sputum, but denies any purulent or blood-tinged sputum.  Her symptoms are exacerbated by recumbency relieved when upright.  She denies any associated fevers, chills, pleuritic chest pain, postnasal drip, hemoptysis, or unintentional weight loss.    Cough: Onset of symptoms was 3 months ago. Symptoms have been gradually worsening since that time. The cough is productive of white and frothy sputum and is aggravated by reclining position. Associated symptoms include: shortness of breath, wheezing and leg swelling. Patient does not have a history of asthma. Patient does not have a history of environmental allergens. Patient has not traveled recently. Patient does not have a history of smoking. Patient has had a previous chest x-ray. Patient has had a PPD done.    Shortness of breath: Symptoms occur at rest. Symptoms began 3 months ago, gradually worsening since. Associated symptoms include  constant cough, dyspnea on exertion, dyspnea when laying down, edema in both legs, cough productive of white/frothy sputum, and wheezing. She denies chest pain, drainage from nose, hemoptysis, hoarseness  or unresolving pneumonia. She has not had recent travel. Weight has increased a couple of pounds over last 3 months on diuretics twice weekly. Symptoms are exacerbated by lying flat and minimal activity. Symptoms are alleviated by sitting up.      Past Medical History:    Acute on chronic diastolic heart failure     Aortic stenosis, moderate     Breast cancer     resolved and no evidence of recurrence    Coronary artery disease     GERD (gastroesophageal reflux disease)     Hyperlipidemia     Hypertension     Iron deficiency anemia     Major depressive disorder     MI (myocardial infarction)     Squamous cell carcinoma      Past Surgical History:    BREAST LUMPECTOMY      LT SIDE     SECTION      4    CHOLECYSTECTOMY      ESOPHAGOGASTRODUODENOSCOPY (EGD) N/A 3/23/2015    Per[facility]    ORIF FEMUR FRACTURE Right 2014    TISSUE AORTIC VALVE REPLACEMENT       Family History    No Known Problems Mother     No Known Problems Father     Melanoma Neg Hx     Psoriasis Neg Hx     Lupus Neg Hx     Eczema Neg Hx         Social History:   Marital Status:    Occupation:  Data Unavailable  Alcohol History:   reports that she does not drink alcohol.  Tobacco History:   reports that she has never smoked. She has never used smokeless tobacco.  Drug History:  reports that she does not use drugs.    Allergies:    Morphine Swelling       Medications:   acetaminophen (TYLENOL) 325 MG tablet Take 2 tablets (650 mg total) by mouth every 8 (eight) hours.    albuterol sulfate 2.5 mg/0.5 mL Nebu Take 2.5 mg by nebulization every 4 (four) hours as needed. Rescue (Patient taking differently: Take by nebulization every 6 (six) hours as needed (SOB). Rescue)    alendronate (FOSAMAX) 70 MG tablet Take 70 mg by mouth every Thursday.     artificial tears (ISOPTO TEARS) 0.5 % ophthalmic solution Place 1 drop into both eyes as needed. (Patient taking differently:  Place 1 drop into both eyes as needed (dry eye). )    aspirin (ECOTRIN) 81 MG EC tablet Take 81 mg by mouth.    calcium-vitamin D tablet 600 mg-200 units Take 1 tablet by mouth once daily.    clonazePAM (KLONOPIN) 0.25 MG TbDL Take 0.25 mg by mouth every evening.     docusate sodium (COLACE) 100 MG capsule Take 100 mg by mouth 2 (two) times daily.    doxazosin (CARDURA) 1 MG tablet     escitalopram oxalate (LEXAPRO) 10 MG tablet Take 10 mg by mouth every evening.     ferrous sulfate 325 (65 FE) MG EC tablet Take 325 mg by mouth once daily.    furosemide (LASIX) 10 mg/mL injection     hydrocortisone 1 % ointment Apply 1 % topically 2 (two) times daily as needed (1-2 times daily for Blepharitis).    losartan (COZAAR) 50 MG tablet Take 50 mg by mouth 2 (two) times daily.     lubiprostone (AMITIZA) 8 MCG Cap Take 8 mcg by mouth 2 (two) times daily as needed (constipation).     metoprolol succinate (TOPROL-XL) 25 MG 24 hr tablet Take 25 mg by mouth once daily.     miconazole nitrate 2% (MICOTIN) 2 % Oint Apply topically 2 (two) times daily. Apply to butocks    multivitamin (THERAGRAN) tablet Take 1 tablet by mouth once daily.    multivitamin with folic acid (THERA) 400 mcg Tab Take 1 tablet by mouth.    neomycin-polymyxin-hydrocortisone (CORTISPORIN) otic solution 3 times a day for 2 days only    ondansetron (ZOFRAN-ODT) 4 MG TbDL     pantoprazole (PROTONIX) 40 MG tablet Take 40 mg by mouth once daily.    propylene glycol (SYSTANE COMPLETE OPHT) Place 0.6 % into both eyes 3 (three) times daily. Instill one drop.    simvastatin (ZOCOR) 40 MG tablet Take 40 mg by mouth every evening.    trolamine salicylate (ARTHRICREAM RUB) 10 % cream Apply 1 Dose topically 3 (three) times daily as needed (Left Hip Pain).     dexamethasone (DECADRON) 4 mg/mL injection     methylPREDNISolone (MEDROL DOSEPACK) 4 mg tablet      Review of Systems   Constitutional: negative for chills, fatigue, fevers, night sweats and  sweats  Eyes: negative for color blindness, icterus and visual disturbance  Ears, nose, mouth, throat, and face: negative for hoarseness, nasal congestion, snoring and sore throat  Respiratory: positive for cough, dyspnea on exertion and wheezing, negative for hemoptysis, pleurisy/chest pain, sputum and stridor  Cardiovascular: positive for dyspnea, lower extremity edema and orthopnea, negative for chest pressure/discomfort, palpitations and syncope  Gastrointestinal: negative for diarrhea, nausea and vomiting  Genitourinary:negative for dysuria, frequency and hematuria  Integument/breast: negative for changed mole, rash and skin color change  Hematologic/lymphatic: negative for bleeding, easy bruising and lymphadenopathy  Musculoskeletal:negative except for back pain, bone pain, muscle weakness and neck pain  Neurological: negative for dizziness, seizures and tremors  Behavioral/Psych: negative for anxiety, depression and irritability  Endocrine: negative for diabetic symptoms including polydipsia, polyphagia and polyuria  Allergic/Immunologic: negative for anaphylaxis, angioedema, hay fever and urticaria     OBJECTIVE:      /80 (BP Location: Right arm, Patient Position: Sitting, BP Method: Medium (Manual))   Pulse 67   Ht 5' (1.524 m)   Wt 68 kg (150 lb)   LMP  (LMP Unknown)   SpO2 96%   BMI 29.29 kg/m²     Physical Exam  Gen: well developed, well nourished, no distress, expiratory wheezing audible from across the room but decreased throughout our 45 minute visit; no increased WOB   HEENT: normal findings: soft palate, uvula, and tonsils normal and oropharynx pink & moist without lesions or evidence of thrush;pupils equal and reactive, extraocular eye movements intact, conjunctivitis noted bilaterally;neck without nodes and pharynx erythematous without exudate   Neck: supple, no significant adenopathy   Heart: Regular rate and rhythm, normal S1/S2, no murmurs, normal pulses and capillary fill   Chest:  normal respiratory effort, normal percussion bilaterally, rales bibasilar and diffuse expiratory wheezes  inspection normal - no chest wall deformities or tenderness, respiratory effort normal   Abdomen: soft, non-tender non-distended; bowel sounds normal   Ext: warm, well perfused and edema 3+ and pitting   Vascular normal posterior tibial pulse(s) and normal dorsalis pedis pulse(s)   Skin: no rashes, no ecchymoses, no petechiae, no nodules   Lymph: no palpable lymphadenopathy   Neuro: normal mood, grossly non-focal, CN II-XII intact   Psych: normal judgment and insight, normal mood/affect and non-anxious     Diagnostic Studies:  I have personally reviewed and interpreted the following studies.  CXR:   2/2018     Cardiomegaly, pulmonary edema, and postoperative changes consistent with prior steronotomy    Chest CT:  None on file    PFTs:   None on file    PSG:   None on file    Echo:   2/2018     1 - Normal left ventricular systolic function (EF 60-65%).        2 - Concentric remodeling.        3 - No wall motion abnormalities.        4 - Impaired LV relaxation, elevated LAP (grade 2 diastolic dysfunction).        5 - Biatrial enlargement.        6 - Normal right ventricular systolic function .        7 - The estimated PA systolic pressure is 38 mmHg.        8 - Moderate aortic stenosis, SILVESTRE = 1.09 cm2, AVAi = 0.77 cm2/m2, peak velocity = 2.57 m/s, mean gradient = 13 mmHg.        9 - Trivial aortic regurgitation.        10 - Trivial mitral regurgitation.        11 - Mild tricuspid regurgitation.     C-spine CT:  I personally reviewed the images.      No evidence of upper airway stenosis.    MBBS:   1/2019     With thin liquids there was an episode of silent aspiration.       With puree there is penetration without aspiration      Penetration or aspiration of the solid food is not seen.    BMP:   (6/6/2019)  130 / 4.6 / 100 / 22 / 18 / 0.7 / 94    8.5  BNP:   (2/2018)  195 (down from 613)    Assessment:       1.  Chronic diastolic congestive heart failure    2. Aortic stenosis, moderate    3. Bilateral lower extremity edema    4. Coronary artery disease involving native coronary artery of native heart without angina pectoris    5. Cardiogenic pulmonary edema    6. Orthopnea    7. Wheezing    8. Acute bacterial conjunctivitis of both eyes    9. Mild cognitive impairment    10. Hyponatremia      Plan:   · Symptoms most likely due to cardiogenic pulmonary edema.  Raising concern for progression of stenosis of 10 year old bovine aortic valve prosthesis.  · Increase diuretic frequency to daily but maintain close attention to avoiding aggressive diuresis considering her pre-load dependent state.  · I doubt bronchodilators are providing any benefit considering her lack of risk factors for pulmonary disease, absent symptom relief with aerosolized albuterol and more likely alternative diagnosis.  · Will obtain BNP, Echo, Chemistry, and CXR tomorrow.  · If symptoms persist when she is more euvolemic, then further investigation for occult pulmomary disease might be warranted.  · Additionally, on exam she appears to have bacterial conjunctivitis.  Will defer to ophthalmology/primary MD at her nursing home.    RTC in 2 weeks.    Panfilo Wells MD  [facility]  09/10/2019     Follow up in 2 weeks (on 9/24/2019).    Chronic diastolic congestive heart failure  -     Brain natriuretic peptide; Future; Expected date: 09/11/2019  -     Echo Color Flow Doppler? Yes; Future; Expected date: 09/11/2019  -     X-ray chest PA and lateral; Future; Expected date: 09/11/2019    Aortic stenosis, moderate  -     X-ray chest PA and lateral; Future; Expected date: 09/11/2019    Bilateral lower extremity edema  -     X-ray chest PA and lateral; Future; Expected date: 09/11/2019    Coronary artery disease involving native coronary artery of native heart without angina pectoris    Cardiogenic pulmonary edema  -     X-ray chest PA and lateral; Future; Expected  date: 09/11/2019    Orthopnea    Wheezing    Acute bacterial conjunctivitis of both eyes    Mild cognitive impairment

## 2019-09-24 ENCOUNTER — OFFICE VISIT (OUTPATIENT)
Dept: PULMONOLOGY | Facility: CLINIC | Age: 84
End: 2019-09-24
Payer: MEDICARE

## 2019-09-24 ENCOUNTER — HOSPITAL ENCOUNTER (INPATIENT)
Facility: HOSPITAL | Age: 84
LOS: 3 days | Discharge: OTHER INSTITUTION | DRG: 293 | End: 2019-09-27
Attending: INTERNAL MEDICINE | Admitting: INTERNAL MEDICINE
Payer: MEDICARE

## 2019-09-24 VITALS
HEART RATE: 48 BPM | BODY MASS INDEX: 26.43 KG/M2 | SYSTOLIC BLOOD PRESSURE: 125 MMHG | OXYGEN SATURATION: 85 % | WEIGHT: 140 LBS | DIASTOLIC BLOOD PRESSURE: 80 MMHG | HEIGHT: 61 IN

## 2019-09-24 DIAGNOSIS — I25.10 CORONARY ARTERY DISEASE INVOLVING NATIVE CORONARY ARTERY OF NATIVE HEART WITHOUT ANGINA PECTORIS: Chronic | ICD-10-CM

## 2019-09-24 DIAGNOSIS — R60.0 BILATERAL LOWER EXTREMITY EDEMA: ICD-10-CM

## 2019-09-24 DIAGNOSIS — I50.9 ACUTE CHF: ICD-10-CM

## 2019-09-24 DIAGNOSIS — J96.01 ACUTE HYPOXEMIC RESPIRATORY FAILURE: ICD-10-CM

## 2019-09-24 DIAGNOSIS — I50.1 CARDIOGENIC PULMONARY EDEMA: ICD-10-CM

## 2019-09-24 DIAGNOSIS — I50.33 ACUTE ON CHRONIC DIASTOLIC CONGESTIVE HEART FAILURE: Primary | ICD-10-CM

## 2019-09-24 DIAGNOSIS — I50.9 CHF (CONGESTIVE HEART FAILURE): ICD-10-CM

## 2019-09-24 DIAGNOSIS — E87.1 HYPONATREMIA: ICD-10-CM

## 2019-09-24 DIAGNOSIS — R06.01 ORTHOPNEA: ICD-10-CM

## 2019-09-24 DIAGNOSIS — I10 ESSENTIAL HYPERTENSION: ICD-10-CM

## 2019-09-24 LAB
ALBUMIN SERPL BCP-MCNC: 3.8 G/DL (ref 3.5–5.2)
ALP SERPL-CCNC: 156 U/L (ref 55–135)
ALT SERPL W/O P-5'-P-CCNC: 32 U/L (ref 10–44)
ANION GAP SERPL CALC-SCNC: 12 MMOL/L (ref 8–16)
AST SERPL-CCNC: 29 U/L (ref 10–40)
BILIRUB SERPL-MCNC: 1 MG/DL (ref 0.1–1)
BNP SERPL-MCNC: 445 PG/ML (ref 0–99)
BUN SERPL-MCNC: 33 MG/DL (ref 10–30)
CALCIUM SERPL-MCNC: 8.4 MG/DL (ref 8.7–10.5)
CHLORIDE SERPL-SCNC: 97 MMOL/L (ref 95–110)
CO2 SERPL-SCNC: 26 MMOL/L (ref 23–29)
CREAT SERPL-MCNC: 0.9 MG/DL (ref 0.5–1.4)
ERYTHROCYTE [DISTWIDTH] IN BLOOD BY AUTOMATED COUNT: 13.4 % (ref 11.5–14.5)
EST. GFR  (AFRICAN AMERICAN): >60 ML/MIN/1.73 M^2
EST. GFR  (NON AFRICAN AMERICAN): 55.3 ML/MIN/1.73 M^2
GLUCOSE SERPL-MCNC: 95 MG/DL (ref 70–110)
HCT VFR BLD AUTO: 34.2 % (ref 37–48.5)
HGB BLD-MCNC: 10.9 G/DL (ref 12–16)
MCH RBC QN AUTO: 29.9 PG (ref 27–31)
MCHC RBC AUTO-ENTMCNC: 31.9 G/DL (ref 32–36)
MCV RBC AUTO: 94 FL (ref 82–98)
PLATELET # BLD AUTO: 119 K/UL (ref 150–350)
PMV BLD AUTO: 10.6 FL (ref 9.2–12.9)
POTASSIUM SERPL-SCNC: 5 MMOL/L (ref 3.5–5.1)
PROT SERPL-MCNC: 6.8 G/DL (ref 6–8.4)
RBC # BLD AUTO: 3.65 M/UL (ref 4–5.4)
SODIUM SERPL-SCNC: 135 MMOL/L (ref 136–145)
WBC # BLD AUTO: 8.16 K/UL (ref 3.9–12.7)

## 2019-09-24 PROCEDURE — 1101F PR PT FALLS ASSESS DOC 0-1 FALLS W/OUT INJ PAST YR: ICD-10-PCS | Mod: S$GLB,,, | Performed by: INTERNAL MEDICINE

## 2019-09-24 PROCEDURE — 94640 AIRWAY INHALATION TREATMENT: CPT

## 2019-09-24 PROCEDURE — 94761 N-INVAS EAR/PLS OXIMETRY MLT: CPT

## 2019-09-24 PROCEDURE — 85027 COMPLETE CBC AUTOMATED: CPT

## 2019-09-24 PROCEDURE — 83880 ASSAY OF NATRIURETIC PEPTIDE: CPT

## 2019-09-24 PROCEDURE — 36415 COLL VENOUS BLD VENIPUNCTURE: CPT

## 2019-09-24 PROCEDURE — 80053 COMPREHEN METABOLIC PANEL: CPT

## 2019-09-24 PROCEDURE — 99214 PR OFFICE/OUTPT VISIT, EST, LEVL IV, 30-39 MIN: ICD-10-PCS | Mod: S$GLB,,, | Performed by: INTERNAL MEDICINE

## 2019-09-24 PROCEDURE — 1101F PT FALLS ASSESS-DOCD LE1/YR: CPT | Mod: S$GLB,,, | Performed by: INTERNAL MEDICINE

## 2019-09-24 PROCEDURE — 99214 OFFICE O/P EST MOD 30 MIN: CPT | Mod: S$GLB,,, | Performed by: INTERNAL MEDICINE

## 2019-09-24 PROCEDURE — 21400001 HC TELEMETRY ROOM

## 2019-09-24 PROCEDURE — 27000221 HC OXYGEN, UP TO 24 HOURS

## 2019-09-24 PROCEDURE — 63600175 PHARM REV CODE 636 W HCPCS: Performed by: INTERNAL MEDICINE

## 2019-09-24 PROCEDURE — 25000003 PHARM REV CODE 250: Performed by: INTERNAL MEDICINE

## 2019-09-24 PROCEDURE — 25000242 PHARM REV CODE 250 ALT 637 W/ HCPCS: Performed by: INTERNAL MEDICINE

## 2019-09-24 RX ORDER — ACETAMINOPHEN 325 MG/1
650 TABLET ORAL EVERY 4 HOURS PRN
Status: DISCONTINUED | OUTPATIENT
Start: 2019-09-24 | End: 2019-09-27 | Stop reason: HOSPADM

## 2019-09-24 RX ORDER — ONDANSETRON 2 MG/ML
4 INJECTION INTRAMUSCULAR; INTRAVENOUS EVERY 8 HOURS PRN
Status: DISCONTINUED | OUTPATIENT
Start: 2019-09-24 | End: 2019-09-27 | Stop reason: HOSPADM

## 2019-09-24 RX ORDER — ALBUTEROL SULFATE 0.83 MG/ML
2.5 SOLUTION RESPIRATORY (INHALATION) EVERY 4 HOURS PRN
Status: DISCONTINUED | OUTPATIENT
Start: 2019-09-24 | End: 2019-09-27 | Stop reason: HOSPADM

## 2019-09-24 RX ORDER — FUROSEMIDE 10 MG/ML
40 INJECTION INTRAMUSCULAR; INTRAVENOUS 2 TIMES DAILY
Status: DISCONTINUED | OUTPATIENT
Start: 2019-09-24 | End: 2019-09-27 | Stop reason: HOSPADM

## 2019-09-24 RX ORDER — LANOLIN ALCOHOL/MO/W.PET/CERES
800 CREAM (GRAM) TOPICAL
Status: DISCONTINUED | OUTPATIENT
Start: 2019-09-24 | End: 2019-09-27 | Stop reason: HOSPADM

## 2019-09-24 RX ORDER — ENOXAPARIN SODIUM 100 MG/ML
30 INJECTION SUBCUTANEOUS EVERY 24 HOURS
Status: DISCONTINUED | OUTPATIENT
Start: 2019-09-24 | End: 2019-09-25

## 2019-09-24 RX ORDER — SIMVASTATIN 40 MG/1
40 TABLET, FILM COATED ORAL NIGHTLY
Status: DISCONTINUED | OUTPATIENT
Start: 2019-09-24 | End: 2019-09-27 | Stop reason: HOSPADM

## 2019-09-24 RX ORDER — HYDRALAZINE HYDROCHLORIDE 20 MG/ML
10 INJECTION INTRAMUSCULAR; INTRAVENOUS
Status: DISCONTINUED | OUTPATIENT
Start: 2019-09-24 | End: 2019-09-27 | Stop reason: HOSPADM

## 2019-09-24 RX ORDER — METOPROLOL SUCCINATE 25 MG/1
25 TABLET, EXTENDED RELEASE ORAL DAILY
Status: DISCONTINUED | OUTPATIENT
Start: 2019-09-25 | End: 2019-09-27 | Stop reason: HOSPADM

## 2019-09-24 RX ORDER — ERYTHROMYCIN 5 MG/G
OINTMENT OPHTHALMIC
Status: ON HOLD | COMMUNITY
Start: 2019-09-16 | End: 2019-09-27 | Stop reason: HOSPADM

## 2019-09-24 RX ORDER — POTASSIUM CHLORIDE 20 MEQ/15ML
40 SOLUTION ORAL
Status: DISCONTINUED | OUTPATIENT
Start: 2019-09-24 | End: 2019-09-27 | Stop reason: HOSPADM

## 2019-09-24 RX ORDER — PANTOPRAZOLE SODIUM 40 MG/1
40 TABLET, DELAYED RELEASE ORAL DAILY
Status: DISCONTINUED | OUTPATIENT
Start: 2019-09-25 | End: 2019-09-27 | Stop reason: HOSPADM

## 2019-09-24 RX ORDER — AMOXICILLIN 250 MG
1 CAPSULE ORAL 2 TIMES DAILY
Status: DISCONTINUED | OUTPATIENT
Start: 2019-09-24 | End: 2019-09-27 | Stop reason: HOSPADM

## 2019-09-24 RX ORDER — ASPIRIN 81 MG/1
81 TABLET ORAL DAILY
Status: DISCONTINUED | OUTPATIENT
Start: 2019-09-25 | End: 2019-09-27 | Stop reason: HOSPADM

## 2019-09-24 RX ORDER — ONDANSETRON 2 MG/ML
4 INJECTION INTRAMUSCULAR; INTRAVENOUS EVERY 6 HOURS PRN
Status: DISCONTINUED | OUTPATIENT
Start: 2019-09-24 | End: 2019-09-27 | Stop reason: HOSPADM

## 2019-09-24 RX ORDER — POTASSIUM CHLORIDE 20 MEQ/1
TABLET, EXTENDED RELEASE ORAL
Status: ON HOLD | COMMUNITY
Start: 2019-09-19 | End: 2019-10-31 | Stop reason: SDUPTHER

## 2019-09-24 RX ADMIN — SENNOSIDES AND DOCUSATE SODIUM 1 TABLET: 8.6; 5 TABLET ORAL at 10:09

## 2019-09-24 RX ADMIN — ALBUTEROL SULFATE 2.5 MG: 2.5 SOLUTION RESPIRATORY (INHALATION) at 07:09

## 2019-09-24 RX ADMIN — FUROSEMIDE 40 MG: 10 INJECTION, SOLUTION INTRAMUSCULAR; INTRAVENOUS at 05:09

## 2019-09-24 RX ADMIN — SIMVASTATIN 40 MG: 40 TABLET, FILM COATED ORAL at 10:09

## 2019-09-24 RX ADMIN — ENOXAPARIN SODIUM 30 MG: 30 INJECTION SUBCUTANEOUS at 05:09

## 2019-09-24 NOTE — H&P
[facility]  History & Physical    Patient Name: Talia Alberts  MRN: [id number]  Admission Date: 2019  Attending Physician: Errol Faustin MD   Primary Care Provider: Michael Martin MD         Patient information was obtained from relative(s) and ER records.     Subjective:     Principal Problem:Acute on chronic diastolic congestive heart failure    Chief Complaint: No chief complaint on file.       HPI: 89-year-old patient getting admitted with acute congestive heart failure flare  Patient resides in a nursing home and is already on medication for congestive heart failure  She was seen by her pulmonologist 2 weeks ago and again today  Compared to the last office visit today she was orthopneic, has got more significant edema on both lower extremities and patient was more short of breath  Following patient was admitted directly from the physician's office  When seen in the floor patient was not in respiratory distress, but there was significant amount of fluid all over the body  She will get admitted for IV access he diuresis and her cardiologist will be consulted    Past Medical History:   Diagnosis Date    Acute on chronic diastolic heart failure     Aortic stenosis, moderate     Breast cancer     resolved and no evidence of recurrence    Coronary artery disease     GERD (gastroesophageal reflux disease)     Hyperlipidemia     Hypertension     Iron deficiency anemia     Major depressive disorder     MI (myocardial infarction)     Squamous cell carcinoma        Past Surgical History:   Procedure Laterality Date    BREAST LUMPECTOMY      LT SIDE     SECTION      4    CHOLECYSTECTOMY      ORIF FEMUR FRACTURE Right 2014    TISSUE AORTIC VALVE REPLACEMENT         Review of patient's allergies indicates:   Allergen Reactions    Morphine Swelling       No current facility-administered medications on file prior to encounter.      Current Outpatient  Medications on File Prior to Encounter   Medication Sig    acetaminophen (TYLENOL) 325 MG tablet Take 2 tablets (650 mg total) by mouth every 8 (eight) hours.    albuterol sulfate 2.5 mg/0.5 mL Nebu Take 2.5 mg by nebulization every 4 (four) hours as needed. Rescue (Patient taking differently: Take by nebulization every 6 (six) hours as needed (SOB). Rescue)    alendronate (FOSAMAX) 70 MG tablet Take 70 mg by mouth every Thursday.     artificial tears (ISOPTO TEARS) 0.5 % ophthalmic solution Place 1 drop into both eyes as needed. (Patient taking differently: Place 1 drop into both eyes as needed (dry eye). )    aspirin (ECOTRIN) 81 MG EC tablet Take 81 mg by mouth.    calcium-vitamin D tablet 600 mg-200 units Take 1 tablet by mouth once daily.    clonazePAM (KLONOPIN) 0.25 MG TbDL Take 0.25 mg by mouth every evening.     dexamethasone (DECADRON) 4 mg/mL injection     docusate sodium (COLACE) 100 MG capsule Take 100 mg by mouth 2 (two) times daily.    doxazosin (CARDURA) 1 MG tablet     erythromycin (ROMYCIN) ophthalmic ointment     escitalopram oxalate (LEXAPRO) 10 MG tablet Take 10 mg by mouth every evening.     ferrous sulfate 325 (65 FE) MG EC tablet Take 325 mg by mouth once daily.    furosemide (LASIX) 10 mg/mL injection     hydrocortisone 1 % ointment Apply 1 % topically 2 (two) times daily as needed (1-2 times daily for Blepharitis).    losartan (COZAAR) 50 MG tablet Take 50 mg by mouth 2 (two) times daily.     lubiprostone (AMITIZA) 8 MCG Cap Take 8 mcg by mouth 2 (two) times daily as needed (constipation).     methylPREDNISolone (MEDROL DOSEPACK) 4 mg tablet     metoprolol succinate (TOPROL-XL) 25 MG 24 hr tablet Take 25 mg by mouth once daily.     miconazole nitrate 2% (MICOTIN) 2 % Oint Apply topically 2 (two) times daily. Apply to butocks (Patient not taking: Reported on 9/24/2019)    multivitamin (THERAGRAN) tablet Take 1 tablet by mouth once daily.    multivitamin with folic acid  (THERA) 400 mcg Tab Take 1 tablet by mouth.    neomycin-polymyxin-hydrocortisone (CORTISPORIN) otic solution 3 times a day for 2 days only    ondansetron (ZOFRAN-ODT) 4 MG TbDL     pantoprazole (PROTONIX) 40 MG tablet Take 40 mg by mouth once daily.    potassium chloride SA (K-DUR,KLOR-CON) 20 MEQ tablet     propylene glycol (SYSTANE COMPLETE OPHT) Place 0.6 % into both eyes 3 (three) times daily. Instill one drop.    simvastatin (ZOCOR) 40 MG tablet Take 40 mg by mouth every evening.    trolamine salicylate (ARTHRICREAM RUB) 10 % cream Apply 1 Dose topically 3 (three) times daily as needed (Left Hip Pain).      Family History     Problem Relation (Age of Onset)    No Known Problems Mother, Father        Tobacco Use    Smoking status: Never Smoker    Smokeless tobacco: Never Used   Substance and Sexual Activity    Alcohol use: No    Drug use: No    Sexual activity: Not on file     Review of Systems   Constitutional: Negative for activity change and appetite change.   HENT: Negative for congestion and dental problem.    Eyes: Negative for discharge and itching.   Respiratory: Positive for cough and shortness of breath.    Cardiovascular: Positive for leg swelling. Negative for chest pain.   Gastrointestinal: Negative for abdominal distention and abdominal pain.   Endocrine: Negative for cold intolerance.   Genitourinary: Negative for difficulty urinating and dysuria.   Musculoskeletal: Negative for arthralgias and back pain.   Skin: Negative for color change.   Neurological: Negative for dizziness and facial asymmetry.   Hematological: Negative for adenopathy.   Psychiatric/Behavioral: Negative for agitation and behavioral problems.     Objective:     Vital Signs (Most Recent):  Pulse: 60 (09/24/19 1604)  Resp: 19 (09/24/19 1604)  BP: (!) 192/83 (09/24/19 1604)  SpO2: (!) 94 % (09/24/19 1604) Vital Signs (24h Range):  Pulse:  [48-60] 60  Resp:  [19] 19  SpO2:  [85 %-94 %] 94 %  BP: (125-192)/(80-83)  192/83        There is no height or weight on file to calculate BMI.    Physical Exam   Constitutional: No distress.   Eyes: Pupils are equal, round, and reactive to light. EOM are normal.   Neck: Neck supple.   Cardiovascular: Normal rate.   Pulmonary/Chest: Effort normal. She has rales.   Abdominal: Soft. Bowel sounds are normal.   Musculoskeletal: Normal range of motion. She exhibits edema.   Neurological: She is alert.   Skin: Skin is warm.   Psychiatric: Her behavior is normal.   Nursing note and vitals reviewed.        CRANIAL NERVES     CN III, IV, VI   Pupils are equal, round, and reactive to light.  Extraocular motions are normal.            Assessment/Plan:     * Acute on chronic diastolic congestive heart failure  Clinically patient has significant acute congestive heart failure flare  She will be admitted for aggressive IV diuresis  A chest x-ray will be obtained and patient's cardiologist will be consulted      Bilateral lower extremity edema  Mostly from CHF flare      Cardiogenic pulmonary edema  Patient will get admitted for aggressive IV diuresis      Coronary artery disease involving native coronary artery of native heart without angina pectoris  Stable issue at the moment.  No significant chest pain      Essential hypertension  Blood pressure level stable.  Will closely monitor        VTE Risk Mitigation (From admission, onward)         Ordered     enoxaparin injection 30 mg  Daily      09/24/19 1640     IP VTE HIGH RISK PATIENT  Once      09/24/19 1640                   Errol Faustin MD  Department of Hospital Medicine   [facility]

## 2019-09-24 NOTE — ASSESSMENT & PLAN NOTE
Clinically patient has significant acute congestive heart failure flare  She will be admitted for aggressive IV diuresis  A chest x-ray will be obtained and patient's cardiologist will be consulted

## 2019-09-24 NOTE — PLAN OF CARE
09/24/19 1656   PRE-TX-O2   O2 Device (Oxygen Therapy) nasal cannula   $ Is the patient on Low Flow Oxygen? Yes   Flow (L/min) 2   SpO2 95 %   Pulse Oximetry Type Intermittent   $ Pulse Oximetry - Multiple Charge Pulse Oximetry - Multiple   Pulse 62   Resp 18

## 2019-09-24 NOTE — SUBJECTIVE & OBJECTIVE
Past Medical History:   Diagnosis Date    Acute on chronic diastolic heart failure     Aortic stenosis, moderate     Breast cancer     resolved and no evidence of recurrence    Coronary artery disease     GERD (gastroesophageal reflux disease)     Hyperlipidemia     Hypertension     Iron deficiency anemia     Major depressive disorder     MI (myocardial infarction)     Squamous cell carcinoma        Past Surgical History:   Procedure Laterality Date    BREAST LUMPECTOMY      LT SIDE     SECTION      4    CHOLECYSTECTOMY      ORIF FEMUR FRACTURE Right 2014    TISSUE AORTIC VALVE REPLACEMENT         Review of patient's allergies indicates:   Allergen Reactions    Morphine Swelling       No current facility-administered medications on file prior to encounter.      Current Outpatient Medications on File Prior to Encounter   Medication Sig    acetaminophen (TYLENOL) 325 MG tablet Take 2 tablets (650 mg total) by mouth every 8 (eight) hours.    albuterol sulfate 2.5 mg/0.5 mL Nebu Take 2.5 mg by nebulization every 4 (four) hours as needed. Rescue (Patient taking differently: Take by nebulization every 6 (six) hours as needed (SOB). Rescue)    alendronate (FOSAMAX) 70 MG tablet Take 70 mg by mouth every Thursday.     artificial tears (ISOPTO TEARS) 0.5 % ophthalmic solution Place 1 drop into both eyes as needed. (Patient taking differently: Place 1 drop into both eyes as needed (dry eye). )    aspirin (ECOTRIN) 81 MG EC tablet Take 81 mg by mouth.    calcium-vitamin D tablet 600 mg-200 units Take 1 tablet by mouth once daily.    clonazePAM (KLONOPIN) 0.25 MG TbDL Take 0.25 mg by mouth every evening.     dexamethasone (DECADRON) 4 mg/mL injection     docusate sodium (COLACE) 100 MG capsule Take 100 mg by mouth 2 (two) times daily.    doxazosin (CARDURA) 1 MG tablet     erythromycin (ROMYCIN) ophthalmic ointment     escitalopram oxalate (LEXAPRO) 10 MG tablet Take 10 mg by  mouth every evening.     ferrous sulfate 325 (65 FE) MG EC tablet Take 325 mg by mouth once daily.    furosemide (LASIX) 10 mg/mL injection     hydrocortisone 1 % ointment Apply 1 % topically 2 (two) times daily as needed (1-2 times daily for Blepharitis).    losartan (COZAAR) 50 MG tablet Take 50 mg by mouth 2 (two) times daily.     lubiprostone (AMITIZA) 8 MCG Cap Take 8 mcg by mouth 2 (two) times daily as needed (constipation).     methylPREDNISolone (MEDROL DOSEPACK) 4 mg tablet     metoprolol succinate (TOPROL-XL) 25 MG 24 hr tablet Take 25 mg by mouth once daily.     miconazole nitrate 2% (MICOTIN) 2 % Oint Apply topically 2 (two) times daily. Apply to butocks (Patient not taking: Reported on 9/24/2019)    multivitamin (THERAGRAN) tablet Take 1 tablet by mouth once daily.    multivitamin with folic acid (THERA) 400 mcg Tab Take 1 tablet by mouth.    neomycin-polymyxin-hydrocortisone (CORTISPORIN) otic solution 3 times a day for 2 days only    ondansetron (ZOFRAN-ODT) 4 MG TbDL     pantoprazole (PROTONIX) 40 MG tablet Take 40 mg by mouth once daily.    potassium chloride SA (K-DUR,KLOR-CON) 20 MEQ tablet     propylene glycol (SYSTANE COMPLETE OPHT) Place 0.6 % into both eyes 3 (three) times daily. Instill one drop.    simvastatin (ZOCOR) 40 MG tablet Take 40 mg by mouth every evening.    trolamine salicylate (ARTHRICREAM RUB) 10 % cream Apply 1 Dose topically 3 (three) times daily as needed (Left Hip Pain).      Family History     Problem Relation (Age of Onset)    No Known Problems Mother, Father        Tobacco Use    Smoking status: Never Smoker    Smokeless tobacco: Never Used   Substance and Sexual Activity    Alcohol use: No    Drug use: No    Sexual activity: Not on file     Review of Systems   Constitutional: Negative for activity change and appetite change.   HENT: Negative for congestion and dental problem.    Eyes: Negative for discharge and itching.   Respiratory: Positive for  cough and shortness of breath.    Cardiovascular: Positive for leg swelling. Negative for chest pain.   Gastrointestinal: Negative for abdominal distention and abdominal pain.   Endocrine: Negative for cold intolerance.   Genitourinary: Negative for difficulty urinating and dysuria.   Musculoskeletal: Negative for arthralgias and back pain.   Skin: Negative for color change.   Neurological: Negative for dizziness and facial asymmetry.   Hematological: Negative for adenopathy.   Psychiatric/Behavioral: Negative for agitation and behavioral problems.     Objective:     Vital Signs (Most Recent):  Pulse: 60 (09/24/19 1604)  Resp: 19 (09/24/19 1604)  BP: (!) 192/83 (09/24/19 1604)  SpO2: (!) 94 % (09/24/19 1604) Vital Signs (24h Range):  Pulse:  [48-60] 60  Resp:  [19] 19  SpO2:  [85 %-94 %] 94 %  BP: (125-192)/(80-83) 192/83        There is no height or weight on file to calculate BMI.    Physical Exam   Constitutional: No distress.   Eyes: Pupils are equal, round, and reactive to light. EOM are normal.   Neck: Neck supple.   Cardiovascular: Normal rate.   Pulmonary/Chest: Effort normal. She has rales.   Abdominal: Soft. Bowel sounds are normal.   Musculoskeletal: Normal range of motion. She exhibits edema.   Neurological: She is alert.   Skin: Skin is warm.   Psychiatric: Her behavior is normal.   Nursing note and vitals reviewed.        CRANIAL NERVES     CN III, IV, VI   Pupils are equal, round, and reactive to light.  Extraocular motions are normal.

## 2019-09-24 NOTE — HPI
89-year-old patient getting admitted with acute congestive heart failure flare  Patient resides in a nursing home and is already on medication for congestive heart failure  She was seen by her pulmonologist 2 weeks ago and again today  Compared to the last office visit today she was orthopneic, has got more significant edema on both lower extremities and patient was more short of breath  Following patient was admitted directly from the physician's office  When seen in the floor patient was not in respiratory distress, but there was significant amount of fluid all over the body  She will get admitted for IV access he diuresis and her cardiologist will be consulted

## 2019-09-24 NOTE — PROGRESS NOTES
[facility]  Pulmonology  Follow-Up Clinic Visit    Subjective:     Talia Alberts is a 89 y.o. female followed for diastolic heart failure.    Since our last visit worsening shortness of breath, orthopnea, lower extremity edema, and hypoxemia.  This is despite starting her on a diuretic.    Is on home oxygen.  is not currently smoking.    Review of Systems   Constitutional: Negative for fever, chills, fatigue, night sweats and weakness.   HENT: Negative for nosebleeds, postnasal drip and sinus pressure.    Eyes: Positive for redness and itching.   Respiratory: Positive for shortness of breath, orthopnea, dyspnea on extertion and Paroxysmal Nocturnal Dyspnea. Negative for cough, hemoptysis, sputum production, wheezing and pleurisy.    Cardiovascular: Positive for leg swelling. Negative for chest pain and palpitations.   Endocrine: Negative for polydipsia, polyphagia and polyuria.    Musculoskeletal: Negative for back pain, gait problem and joint swelling.   Skin: Negative for rash.   Gastrointestinal: Negative for nausea, vomiting and abdominal pain.   Neurological: Negative for syncope, light-headedness and headaches.   All other systems reviewed and are negative.       Medications:  Outpatient Medications as of 9/24/2019   Medication    acetaminophen (TYLENOL) 325 MG tablet    albuterol sulfate 2.5 mg/0.5 mL Nebu    alendronate (FOSAMAX) 70 MG tablet    artificial tears (ISOPTO TEARS) 0.5 % ophthalmic solution    aspirin (ECOTRIN) 81 MG EC tablet    calcium-vitamin D tablet 600 mg-200 units    clonazePAM (KLONOPIN) 0.25 MG TbDL    docusate sodium (COLACE) 100 MG capsule    doxazosin (CARDURA) 1 MG tablet    erythromycin (ROMYCIN) ophthalmic ointment    escitalopram oxalate (LEXAPRO) 10 MG tablet    ferrous sulfate 325 (65 FE) MG EC tablet    furosemide (LASIX) 10 mg/mL injection    hydrocortisone 1 % ointment    losartan (COZAAR) 50 MG tablet    lubiprostone (AMITIZA) 8 MCG Cap     "metoprolol succinate (TOPROL-XL) 25 MG 24 hr tablet    multivitamin (THERAGRAN) tablet    multivitamin with folic acid (THERA) 400 mcg Tab    neomycin-polymyxin-hydrocortisone (CORTISPORIN) otic solution    pantoprazole (PROTONIX) 40 MG tablet    potassium chloride SA (K-DUR,KLOR-CON) 20 MEQ tablet    propylene glycol (SYSTANE COMPLETE OPHT)    simvastatin (ZOCOR) 40 MG tablet    trolamine salicylate (ARTHRICREAM RUB) 10 % cream    dexamethasone (DECADRON) 4 mg/mL injection    methylPREDNISolone (MEDROL DOSEPACK) 4 mg tablet    miconazole nitrate 2% (MICOTIN) 2 % Oint    ondansetron (ZOFRAN-ODT) 4 MG TbDL     No current facility-administered medications on file as of 9/24/2019.         I have personally reviewed the following:  active problem list, medication list, allergies, family history, social history, health maintenance, notes from last encounter, lab results, imaging    Objective:        /80 (BP Location: Left arm, Patient Position: Sitting)   Pulse (!) 48   Ht 5' 1" (1.549 m)   Wt 63.5 kg (140 lb)   LMP  (LMP Unknown)   SpO2 (!) 85%   BMI 26.45 kg/m²     Physical Exam:  Physical Exam   Constitutional: She is oriented to person, place, and time. She appears well-developed and well-nourished. No distress.   HENT:   Head: Normocephalic.   Right Ear: External ear normal.   Left Ear: External ear normal.   Mouth/Throat: Oropharynx is clear and moist. Abnormal dentition. Mallampati Score: II.   Neck: Normal range of motion. Neck supple. JVD present. No tracheal deviation present. No thyromegaly present.   Cardiovascular: Regular rhythm, normal heart sounds and intact distal pulses. Exam reveals no gallop and no friction rub.   No murmur heard.  Pulmonary/Chest: Normal expansion and symmetric chest wall expansion. No respiratory distress. She has wheezes. She has no rhonchi. She has rales. Chest wall is dull to percussion.   Abdominal: Soft. Bowel sounds are normal. There is no tenderness. "   Musculoskeletal: Normal range of motion. She exhibits edema. She exhibits no tenderness.   Lymphadenopathy:     She has no cervical adenopathy.   Neurological: She is alert and oriented to person, place, and time. She has normal strength. No cranial nerve deficit or sensory deficit. GCS eye subscore is 4. GCS verbal subscore is 5. GCS motor subscore is 6.   Skin: Skin is warm and dry. No rash noted. She is not diaphoretic.   Psychiatric: She has a normal mood and affect. Her behavior is normal. Judgment and thought content normal.   Nursing note and vitals reviewed.     Diagnostic Studies:  I have personally reviewed the following labs/studies/images.    Chest x-ray:   Date:  9/10/2019  Radiology Report::  Bilateral airspace opacities and moderate bilateral effusions  My impression:  Cardiogenic pulmonary edema    I independently viewed the above imaging/lab studies in addition to reviewing the report     CT Chest:  None on file     PFTs:  None on file     Methacholine Challenge:  None on file     6MWT:  None on file     PSG:  None on file     ECG:  =None on file     Echocardiogram:   Date:  2/2018  · Normal left ventricular systolic function (EF 60-65%).   · Concentric remodeling.  · No wall motion abnormalities.   · Impaired LV relaxation, elevated LAP (grade 2 diastolic dysfunction).   · Biatrial enlargement.   · Normal right ventricular systolic function .   · The estimated PA systolic pressure is 38 mmHg.   · Moderate aortic stenosis, SILVESTRE = 1.09 cm2, AVAi = 0.77 cm2/m2, peak velocity = 2.57 m/s, mean gradient = 13 mmHg.   · Trivial aortic regurgitation.   · Trivial mitral regurgitation.   · Mild tricuspid regurgitation.      BNP  Date:  9/10/2019  >400       Assessment:       1. Acute on chronic diastolic congestive heart failure    2. Acute hypoxemic respiratory failure    3. Cardiogenic pulmonary edema    4. Bilateral lower extremity edema    5. Essential hypertension    6. Coronary artery disease involving  native coronary artery of native heart without angina pectoris    7. Hyponatremia    8. Orthopnea        Impression:  Cardiogenic pulmonary edema / HFpEF refractory to oral diuretic    Plan:   · Admit to the hospital for IV diuresis.  · Called and discussed with hospital medicine.  · Will follow as an inpatient.  · I informed the patient of my working diagnosis, it's etiology, risk factors, expected symptoms, diagnostic work up, treatment options and prognosis., I personally reviewed the results of relevant imaging/labs/studies with the patient, and discussed their clinical significance., Plan discussed with the patient, who is in agreement., Opportunity provided for the the patient to voice any additional questions or concerns. and All questions were answered to the patient's satisfaction.    Will schedule next follow-up when she is discharged from the hospital.    Panfilo Wells MD  Pulmonary / Critical Care Medicine  [facility]  09/24/2019

## 2019-09-25 ENCOUNTER — CLINICAL SUPPORT (OUTPATIENT)
Dept: CARDIOLOGY | Facility: HOSPITAL | Age: 84
DRG: 293 | End: 2019-09-25
Attending: INTERNAL MEDICINE
Payer: MEDICARE

## 2019-09-25 LAB
ALBUMIN SERPL BCP-MCNC: 3.5 G/DL (ref 3.5–5.2)
ALP SERPL-CCNC: 128 U/L (ref 55–135)
ALT SERPL W/O P-5'-P-CCNC: 27 U/L (ref 10–44)
ANION GAP SERPL CALC-SCNC: 8 MMOL/L (ref 8–16)
AST SERPL-CCNC: 24 U/L (ref 10–40)
BASOPHILS # BLD AUTO: 0.04 K/UL (ref 0–0.2)
BASOPHILS NFR BLD: 0.7 % (ref 0–1.9)
BILIRUB SERPL-MCNC: 0.8 MG/DL (ref 0.1–1)
BUN SERPL-MCNC: 30 MG/DL (ref 10–30)
CALCIUM SERPL-MCNC: 8.2 MG/DL (ref 8.7–10.5)
CHLORIDE SERPL-SCNC: 96 MMOL/L (ref 95–110)
CO2 SERPL-SCNC: 32 MMOL/L (ref 23–29)
CREAT SERPL-MCNC: 0.9 MG/DL (ref 0.5–1.4)
DIFFERENTIAL METHOD: ABNORMAL
EOSINOPHIL # BLD AUTO: 0.3 K/UL (ref 0–0.5)
EOSINOPHIL NFR BLD: 4.4 % (ref 0–8)
ERYTHROCYTE [DISTWIDTH] IN BLOOD BY AUTOMATED COUNT: 13.5 % (ref 11.5–14.5)
EST. GFR  (AFRICAN AMERICAN): >60 ML/MIN/1.73 M^2
EST. GFR  (NON AFRICAN AMERICAN): 55.3 ML/MIN/1.73 M^2
GLUCOSE SERPL-MCNC: 100 MG/DL (ref 70–110)
HCT VFR BLD AUTO: 32 % (ref 37–48.5)
HGB BLD-MCNC: 10.1 G/DL (ref 12–16)
IMM GRANULOCYTES # BLD AUTO: 0.01 K/UL (ref 0–0.04)
IMM GRANULOCYTES NFR BLD AUTO: 0.2 % (ref 0–0.5)
LYMPHOCYTES # BLD AUTO: 1 K/UL (ref 1–4.8)
LYMPHOCYTES NFR BLD: 16.8 % (ref 18–48)
MAGNESIUM SERPL-MCNC: 2.1 MG/DL (ref 1.6–2.6)
MCH RBC QN AUTO: 29.5 PG (ref 27–31)
MCHC RBC AUTO-ENTMCNC: 31.6 G/DL (ref 32–36)
MCV RBC AUTO: 94 FL (ref 82–98)
MONOCYTES # BLD AUTO: 0.8 K/UL (ref 0.3–1)
MONOCYTES NFR BLD: 13.9 % (ref 4–15)
NEUTROPHILS # BLD AUTO: 3.6 K/UL (ref 1.8–7.7)
NEUTROPHILS NFR BLD: 64 % (ref 38–73)
NRBC BLD-RTO: 0 /100 WBC
PLATELET # BLD AUTO: 116 K/UL (ref 150–350)
PMV BLD AUTO: 10.3 FL (ref 9.2–12.9)
POTASSIUM SERPL-SCNC: 4.1 MMOL/L (ref 3.5–5.1)
PROT SERPL-MCNC: 6.1 G/DL (ref 6–8.4)
RBC # BLD AUTO: 3.42 M/UL (ref 4–5.4)
SODIUM SERPL-SCNC: 136 MMOL/L (ref 136–145)
WBC # BLD AUTO: 5.67 K/UL (ref 3.9–12.7)

## 2019-09-25 PROCEDURE — 36415 COLL VENOUS BLD VENIPUNCTURE: CPT

## 2019-09-25 PROCEDURE — 99900035 HC TECH TIME PER 15 MIN (STAT)

## 2019-09-25 PROCEDURE — 25000242 PHARM REV CODE 250 ALT 637 W/ HCPCS: Performed by: INTERNAL MEDICINE

## 2019-09-25 PROCEDURE — 27000221 HC OXYGEN, UP TO 24 HOURS

## 2019-09-25 PROCEDURE — 93306 TTE W/DOPPLER COMPLETE: CPT

## 2019-09-25 PROCEDURE — 21400001 HC TELEMETRY ROOM

## 2019-09-25 PROCEDURE — 83735 ASSAY OF MAGNESIUM: CPT

## 2019-09-25 PROCEDURE — 97535 SELF CARE MNGMENT TRAINING: CPT

## 2019-09-25 PROCEDURE — 93005 ELECTROCARDIOGRAM TRACING: CPT

## 2019-09-25 PROCEDURE — 97162 PT EVAL MOD COMPLEX 30 MIN: CPT

## 2019-09-25 PROCEDURE — 97530 THERAPEUTIC ACTIVITIES: CPT

## 2019-09-25 PROCEDURE — 85025 COMPLETE CBC W/AUTO DIFF WBC: CPT

## 2019-09-25 PROCEDURE — 97166 OT EVAL MOD COMPLEX 45 MIN: CPT

## 2019-09-25 PROCEDURE — 25000003 PHARM REV CODE 250: Performed by: INTERNAL MEDICINE

## 2019-09-25 PROCEDURE — 80053 COMPREHEN METABOLIC PANEL: CPT

## 2019-09-25 PROCEDURE — 94761 N-INVAS EAR/PLS OXIMETRY MLT: CPT

## 2019-09-25 PROCEDURE — 94640 AIRWAY INHALATION TREATMENT: CPT

## 2019-09-25 PROCEDURE — 63600175 PHARM REV CODE 636 W HCPCS: Performed by: INTERNAL MEDICINE

## 2019-09-25 RX ORDER — ENOXAPARIN SODIUM 100 MG/ML
40 INJECTION SUBCUTANEOUS EVERY 24 HOURS
Status: DISCONTINUED | OUTPATIENT
Start: 2019-09-25 | End: 2019-09-27 | Stop reason: HOSPADM

## 2019-09-25 RX ADMIN — PANTOPRAZOLE SODIUM 40 MG: 40 TABLET, DELAYED RELEASE ORAL at 10:09

## 2019-09-25 RX ADMIN — SENNOSIDES AND DOCUSATE SODIUM 1 TABLET: 8.6; 5 TABLET ORAL at 10:09

## 2019-09-25 RX ADMIN — ASPIRIN 81 MG: 81 TABLET, DELAYED RELEASE ORAL at 10:09

## 2019-09-25 RX ADMIN — FUROSEMIDE 40 MG: 10 INJECTION, SOLUTION INTRAMUSCULAR; INTRAVENOUS at 07:09

## 2019-09-25 RX ADMIN — FUROSEMIDE 40 MG: 10 INJECTION, SOLUTION INTRAMUSCULAR; INTRAVENOUS at 10:09

## 2019-09-25 RX ADMIN — ALBUTEROL SULFATE 2.5 MG: 2.5 SOLUTION RESPIRATORY (INHALATION) at 09:09

## 2019-09-25 RX ADMIN — SIMVASTATIN 40 MG: 40 TABLET, FILM COATED ORAL at 09:09

## 2019-09-25 RX ADMIN — SENNOSIDES AND DOCUSATE SODIUM 1 TABLET: 8.6; 5 TABLET ORAL at 09:09

## 2019-09-25 RX ADMIN — METOPROLOL SUCCINATE 25 MG: 25 TABLET, FILM COATED, EXTENDED RELEASE ORAL at 10:09

## 2019-09-25 NOTE — CARE UPDATE
09/25/19 0970   Patient Assessment/Suction   Level of Consciousness (AVPU) alert   Respiratory Effort Normal;Unlabored  (prolonged exp phase w/ audible upp a/w wz)   Expansion/Accessory Muscles/Retractions no retractions;no use of accessory muscles   All Lung Fields Breath Sounds diminished;clear   Rhythm/Pattern, Respiratory unlabored   PRE-TX-O2   O2 Device (Oxygen Therapy) Oxymask   $ Is the patient on Low Flow Oxygen? Yes   Flow (L/min) 2   SpO2 100 %   Pulse 61   Resp 20   Aerosol Therapy   $ Aerosol Therapy Charges Aerosol Treatment   Daily Review of Necessity (SVN) completed   Respiratory Treatment Status (SVN) given   Treatment Route (SVN) mask;oxygen   Patient Position (SVN) HOB elevated   Post Treatment Assessment (SVN) breath sounds unchanged   Signs of Intolerance (SVN) none   Breath Sounds Post-Respiratory Treatment   Post-treatment Heart Rate (beats/min) 60   Post-treatment Resp Rate (breaths/min) 24

## 2019-09-25 NOTE — PLAN OF CARE
Problem: Oral Intake Inadequate  Goal: Improved Oral Intake  Intervention: Promote and Optimize Oral Intake  Flowsheets (Taken 9/25/2019 1112)  Oral Nutrition Promotion: calorie dense liquids provided   1. Added Ensure Enlive TID to aid in PO intake

## 2019-09-25 NOTE — PT/OT/SLP EVAL
Occupational Therapy   Evaluation and Discharge Note    Name: Talia Alberts  MRN: [id number]  Admitting Diagnosis:  Acute on chronic diastolic congestive heart failure      Recommendations:     Discharge Recommendations: nursing facility, basic  Discharge Equipment Recommendations:  none  Barriers to discharge:  None    Assessment:     Talia Alberts is a 89 y.o. female with a medical diagnosis of Acute on chronic diastolic congestive heart failure. Pt seated in w/c at bedside, having recently t/f from bed with PT. Per pt's daughter, this is her baseline at the NH. Pt uses briefs and is cleaned by staff. At this time, patient is functioning at their prior level of function and does not require further acute OT services.     Plan:     During this hospitalization, patient does not require further acute OT services.  Please re-consult if situation changes.    · Plan of Care Reviewed with: patient, daughter    Subjective     Chief Complaint: no complaints noted  Patient/Family Comments/goals: to return to NH    Occupational Profile:  Living Environment: [facility]  Previous level of function: Mod-max A for ADLs/mobility  Roles and Routines: eats meals in dining room of NH  Equipment Used at home:  wheelchair  Assistance upon Discharge: daughter, staff at NH    Pain/Comfort:  · Pain Rating 1: 0/10    Patients cultural, spiritual, Uatsdin conflicts given the current situation:      Objective:     Communicated with: nurse prior to session.  Patient found up in chair with telemetry, peripheral IV upon OT entry to room.    General Precautions: Standard, fall   Orthopedic Precautions:N/A   Braces: N/A     Occupational Performance:    Activities of Daily Living:  · Grooming: moderate assistance to clean dentures    Cognitive/Visual Perceptual:  Cognitive/Psychosocial Skills:     -       Oriented to: Person and Place   -       Safety awareness/insight to disability: impaired   -       Mood/Affect/Coping skills/emotional  control: Cooperative and Pleasant    Physical Exam:  Upper Extremity Range of Motion:     -       Right Upper Extremity: WFL  -       Left Upper Extremity: WFL    AMPAC 6 Click ADL:  AMPAC Total Score: 13    Treatment & Education:  OT role/POC  ADL activity seated in w/c  Education:    Patient left up in chair with call button in reach, nurse notified and daughter present    GOALS:   Multidisciplinary Problems     Occupational Therapy Goals     Not on file                History:     Past Medical History:   Diagnosis Date    Acute on chronic diastolic heart failure     Aortic stenosis, moderate     Breast cancer     resolved and no evidence of recurrence    Coronary artery disease     GERD (gastroesophageal reflux disease)     Hyperlipidemia     Hypertension     Iron deficiency anemia     Major depressive disorder     MI (myocardial infarction)     Squamous cell carcinoma        Past Surgical History:   Procedure Laterality Date    BREAST LUMPECTOMY  2000    LT SIDE     SECTION      4    CHOLECYSTECTOMY      ORIF FEMUR FRACTURE Right 2014    TISSUE AORTIC VALVE REPLACEMENT         Time Tracking:     OT Date of Treatment: 19  OT Start Time: 1020  OT Stop Time: 1047  OT Total Time (min): 27 min    Billable Minutes:Evaluation 10  Self Care/Home Management 17    Gina Willis OT  2019

## 2019-09-25 NOTE — PT/OT/SLP EVAL
"Physical Therapy Evaluation and Discharge Note    Patient Name:  Talia Alberts   MRN:  [id number]    Recommendations:     Discharge Recommendations:  nursing facility, basic   Discharge Equipment Recommendations: none   Barriers to discharge: None    Assessment:     Talia Alberts is a 89 y.o. female admitted with a medical diagnosis of Acute on chronic diastolic congestive heart failure. .  At this time, patient is functioning at their prior level of function and does not require further acute PT services. Pt may benefit from Medicare Part B PT upon returning to NH. Discussed with daughter. Pt is nonambulatory.     Recent Surgery: * No surgery found *      Plan:     During this hospitalization, patient does not require further acute PT services.  Please re-consult if situation changes.      Subjective     Chief Complaint: "my right ankle hurts".  Patient/Family Comments/goals: to go home  Pain/Comfort:  · Pain Rating 1: (unable to give number)  · Location - Side 1: Left  · Location 1: ankle  · Pain Addressed 2: Reposition    Patients cultural, spiritual, Buddhism conflicts given the current situation:      Living Environment:  Pt lives at NH and only transfers with assistance of caregivers.   Prior to admission, patients level of function was moderate assistance from caregivers.  Equipment used at home: wheelchair.  DME owned (not currently used): none.  Upon discharge, patient will have assistance from facility.    Objective:     Communicated with rn prior to session.  Patient found supine with bed alarm, telemetry, peripheral IV upon PT entry to room.    General Precautions: Standard, fall   Orthopedic Precautions:    Braces:       Exams:  · Gross Motor Coordination:  WFL  · RLE ROM: WFL  · RLE Strength: WFL  · LLE ROM: WFL  · LLE Strength: WFL    Functional Mobility:  · Bed Mobility:     · Supine to Sit: moderate assistance  · Transfers:     · Sit to Stand:  contact guard assistance with rolling " walker  · Bed to Chair: minimum assistance with  rolling walker  using  Stand Pivot    AM-PAC 6 CLICK MOBILITY  Total Score:13       Therapeutic Activities and Exercises:   education, dc planning, fall prevention    AM-PAC 6 CLICK MOBILITY  Total Score:13     Patient left up in chair with call button in reach, rn notified and daughter present.    GOALS:   Multidisciplinary Problems     Physical Therapy Goals     Not on file                History:     Past Medical History:   Diagnosis Date    Acute on chronic diastolic heart failure     Aortic stenosis, moderate     Breast cancer     resolved and no evidence of recurrence    Coronary artery disease     GERD (gastroesophageal reflux disease)     Hyperlipidemia     Hypertension     Iron deficiency anemia     Major depressive disorder     MI (myocardial infarction)     Squamous cell carcinoma        Past Surgical History:   Procedure Laterality Date    BREAST LUMPECTOMY      LT SIDE     SECTION      4    CHOLECYSTECTOMY      ORIF FEMUR FRACTURE Right 2014    TISSUE AORTIC VALVE REPLACEMENT         Time Tracking:     PT Received On: 19  PT Start Time: 0950     PT Stop Time: 1010  PT Total Time (min): 20 min     Billable Minutes: Evaluation 8 and Therapeutic Activity 12      Aicha Vásquez, PT  2019

## 2019-09-25 NOTE — CONSULTS
"  [facility]  Adult Nutrition  Consult Note    SUMMARY     Recommendations    1. Continue current diet as tolerated, encourage intake   2. Added Ensure Enlive TID to aid in PO intake, thicken to appropriate consistency per SLP  3.  to assist in meal choices.    Goals:   1. Pt will meet at least 75% of estimated needs via PO intake of meals and supplements.    Nutrition Goal Status: new  Communication of RD Recs: reviewed with RN    Reason for Assessment    Reason For Assessment: consult  Diagnosis: cardiac disease  Relevant Medical History: CHF, CAD, GERD, HLD, HTN, iron deficiency anemia, MI, Cancer  Interdisciplinary Rounds: attended    Nutrition Risk Screen    Nutrition Risk Screen: dysphagia or difficulty swallowing    Nutrition/Diet History    Spiritual, Cultural Beliefs, Adventist Practices, Values that Affect Care: no  Food Allergies: NKFA  Factors Affecting Nutritional Intake: difficulty/impaired swallowing    Anthropometrics    Temp: 98.4 °F (36.9 °C)  Height: 5' 1" (154.9 cm)  Height (inches): 61 in  Weight Method: Bed Scale  Weight: 68.1 kg (150 lb 1.8 oz)  Weight (lb): 150.11 lb  Ideal Body Weight (IBW), Female: 105 lb  % Ideal Body Weight, Female (lb): 133.33 lb  BMI (Calculated): 26.5  BMI Grade: 25 - 29.9 - overweight     Lab/Procedures/Meds    Pertinent Labs Reviewed: reviewed   9/25/2019 04:17   Sodium 136   Potassium 4.1   Chloride 96   CO2 32 (H)   Anion Gap 8   BUN, Bld 30   Creatinine 0.9   eGFR if African American >60.0   Glucose 100   Calcium 8.2 (L)   Magnesium 2.1   Alkaline Phosphatase 128   PROTEIN TOTAL 6.1   Albumin 3.5   BILIRUBIN TOTAL 0.8   AST 24   ALT 27       WBC 5.67   RBC 3.42 (L)   Hemoglobin 10.1 (L)   Hematocrit 32.0 (L)     Pertinent Medications Reviewed: reviewed  Scheduled Meds:   aspirin  81 mg Oral Daily    enoxaparin  40 mg Subcutaneous Daily    furosemide  40 mg Intravenous BID    metoprolol succinate  25 mg Oral Daily    pantoprazole  40 mg " Oral Daily    senna-docusate 8.6-50 mg  1 tablet Oral BID    simvastatin  40 mg Oral QHS     Estimated/Assessed Needs    Weight Used For Calorie Calculations: 68 kg (150 lb)  Energy Calorie Requirements (kcal): 5724-8846 kcal/day   Energy Need Method: Kcal/kg  Protein Requirements: 54-88 g/day   Weight Used For Protein Calculations: 68 kg (150 lb)     Estimated Fluid Requirement Method: RDA Method  RDA Method (mL): 1360     Nutrition Prescription Ordered    Current Diet Order: Pureed Dysphagia     Evaluation of Received Nutrient/Fluid Intake    Energy Calories Required: not meeting needs  Protein Required: not meeting needs  Fluid Required: not meeting needs  Tolerance: tolerating  % Intake of Estimated Energy Needs: 0 - 25 %  % Meal Intake: 0 - 25 %    Nutrition Risk    Level of Risk/Frequency of Follow-up: high     Assessment and Plan    Pt daughter stated no decrease in appetite or intake prior to admit. No issues with N/V/D or chewing. Has had issues with dysphagia for a while now, eats pureed with thickened liquids. Daughter stated that she cant tell if the pt has lost weight due to fluid retention. Added Ensure Enlive to aid in PO intake when meal intake is poor. Will continue to monitor labs and intake.    Monitor and Evaluation    Food and Nutrient Intake: energy intake, food and beverage intake  Food and Nutrient Adminstration: diet order  Physical Activity and Function: nutrition-related ADLs and IADLs  Anthropometric Measurements: weight, weight change  Biochemical Data, Medical Tests and Procedures: inflammatory profile, electrolyte and renal panel, lipid profile, gastrointestinal profile, glucose/endocrine profile  Nutrition-Focused Physical Findings: overall appearance     Nutrition Follow-Up    RD Follow-up?: Yes   Ana Amado  09/25/2019  11:11 AM

## 2019-09-25 NOTE — CONSULTS
Errol Faustin MD   Physician   [facility] Medicine   H&P   Signed                       [facility]  Cardiology consult     Patient Name: Talia Alberts  MRN: [id number]  Admission Date: 9/24/2019  Attending Physician: Errol Faustin MD   Primary Care Provider: Michael Martin MD           Patient information was obtained from relative(s) and records.      Subjective:      Principal Problem:Acute on chronic diastolic congestive heart failure     Chief Complaint: No chief complaint on file.        HPI: 89-year-old patient getting admitted with acute congestive heart failure flare  Patient resides at [facility] for the last 2 years and is already on medication for congestive heart failure-Lasix 2 times a week.  She was seen by her pulmonologist 2 weeks ago and again today.  The patient's daughter at the bedside reports that approximately 4 days ago, she did have edema in the lower extremities.  The patient also has aspiration issues; she uses a thickener for fluids at [place].  Compared to the last office visit today she was orthopneic, has got more significant edema on both lower extremities and patient was more short of breath  Following patient was admitted directly from the physician's office  When seen in the floor patient was not in respiratory distress, but there was significant amount of fluid all over the body.  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            Past Medical History:   Diagnosis Date    Acute on chronic diastolic heart failure      Aortic stenosis, moderate      Breast cancer 2000     resolved and no evidence of recurrence    Coronary artery disease      GERD (gastroesophageal reflux disease)      Hyperlipidemia      Hypertension      Iron deficiency anemia      Major depressive disorder      MI (myocardial infarction)      Squamous cell carcinoma                  Past Surgical History:   Procedure Laterality Date    BREAST LUMPECTOMY        LT SIDE     SECTION         4    CHOLECYSTECTOMY        ORIF FEMUR FRACTURE Right 2014    TISSUE AORTIC VALVE REPLACEMENT                 Review of patient's allergies indicates:   Allergen Reactions    Morphine Swelling         No current facility-administered medications on file prior to encounter.            Current Outpatient Medications on File Prior to Encounter   Medication Sig    acetaminophen (TYLENOL) 325 MG tablet Take 2 tablets (650 mg total) by mouth every 8 (eight) hours.    albuterol sulfate 2.5 mg/0.5 mL Nebu Take 2.5 mg by nebulization every 4 (four) hours as needed. Rescue (Patient taking differently: Take by nebulization every 6 (six) hours as needed (SOB). Rescue)    alendronate (FOSAMAX) 70 MG tablet Take 70 mg by mouth every Thursday.     artificial tears (ISOPTO TEARS) 0.5 % ophthalmic solution Place 1 drop into both eyes as needed. (Patient taking differently: Place 1 drop into both eyes as needed (dry eye). )    aspirin (ECOTRIN) 81 MG EC tablet Take 81 mg by mouth.    calcium-vitamin D tablet 600 mg-200 units Take 1 tablet by mouth once daily.    clonazePAM (KLONOPIN) 0.25 MG TbDL Take 0.25 mg by mouth every evening.     dexamethasone (DECADRON) 4 mg/mL injection      docusate sodium (COLACE) 100 MG capsule Take 100 mg by mouth 2 (two) times daily.    doxazosin (CARDURA) 1 MG tablet      erythromycin (ROMYCIN) ophthalmic ointment      escitalopram oxalate (LEXAPRO) 10 MG tablet Take 10 mg by mouth every evening.     ferrous sulfate 325 (65 FE) MG EC tablet Take 325 mg by mouth once daily.    furosemide (LASIX) 10 mg/mL injection      hydrocortisone 1 % ointment Apply 1 % topically 2 (two) times daily as needed (1-2 times daily for Blepharitis).    losartan (COZAAR) 50 MG tablet Take 50 mg by mouth 2 (two) times daily.     lubiprostone (AMITIZA) 8 MCG Cap Take 8  mcg by mouth 2 (two) times daily as needed (constipation).     methylPREDNISolone (MEDROL DOSEPACK) 4 mg tablet      metoprolol succinate (TOPROL-XL) 25 MG 24 hr tablet Take 25 mg by mouth once daily.     miconazole nitrate 2% (MICOTIN) 2 % Oint Apply topically 2 (two) times daily. Apply to butocks (Patient not taking: Reported on 9/24/2019)    multivitamin (THERAGRAN) tablet Take 1 tablet by mouth once daily.    multivitamin with folic acid (THERA) 400 mcg Tab Take 1 tablet by mouth.    neomycin-polymyxin-hydrocortisone (CORTISPORIN) otic solution 3 times a day for 2 days only    ondansetron (ZOFRAN-ODT) 4 MG TbDL      pantoprazole (PROTONIX) 40 MG tablet Take 40 mg by mouth once daily.    potassium chloride SA (K-DUR,KLOR-CON) 20 MEQ tablet      propylene glycol (SYSTANE COMPLETE OPHT) Place 0.6 % into both eyes 3 (three) times daily. Instill one drop.    simvastatin (ZOCOR) 40 MG tablet Take 40 mg by mouth every evening.    trolamine salicylate (ARTHRICREAM RUB) 10 % cream Apply 1 Dose topically 3 (three) times daily as needed (Left Hip Pain).            Family History      Problem Relation (Age of Onset)     No Known Problems Mother, Father               Tobacco Use    Smoking status: Never Smoker    Smokeless tobacco: Never Used   Substance and Sexual Activity    Alcohol use: No    Drug use: No    Sexual activity: Not on file      Review of Systems   Constitutional: Negative for activity change and appetite change.   HENT: Negative for congestion and dental problem.    Eyes: Negative for discharge and itching.   Respiratory: Positive for cough and shortness of breath.    Cardiovascular: Positive for leg swelling. Negative for chest pain.   Gastrointestinal: Negative for abdominal distention and abdominal pain.   Endocrine: Negative for cold intolerance.   Genitourinary: Negative for difficulty urinating and dysuria.   Musculoskeletal: Negative for arthralgias and back pain.   Skin: Negative for  color change.   Neurological: Negative for dizziness and facial asymmetry.   Hematological: Negative for adenopathy.   Psychiatric/Behavioral: Negative for agitation and behavioral problems.      Objective:      Vital Signs (Most Recent):  Pulse: 60 (09/24/19 1604)  Resp: 19 (09/24/19 1604)  BP: (!) 192/83 (09/24/19 1604)  SpO2: (!) 94 % (09/24/19 1604) Vital Signs (24h Range):  Pulse:  [48-60] 60  Resp:  [19] 19  SpO2:  [85 %-94 %] 94 %  BP: (125-192)/(80-83) 192/83         There is no height or weight on file to calculate BMI.     Physical Exam   Constitutional: No distress.   Eyes: Pupils are equal, round, and reactive to light. EOM are normal.  Bilateral ectropion; erythema of the lower eyelids  Neck: Neck supple.   Cardiovascular: Normal rate.  Ejection systolic murmur at the base; and radiates into the carotids bilaterally.  Pulmonary/Chest: Effort normal. She has rales in the bases.   Abdominal: Soft. Bowel sounds are normal.   Musculoskeletal: Normal range of motion. She exhibits edema-much improved overnight.  Extremities:  Bilateral pitting edema is noted laterally.  SVG harvest site.    Neurological: She is alert.   Skin: Skin is warm.   Psychiatric: Her behavior is normal.  .   Chest x-ray reveals normal heart size.  Bilateral infiltrates consistent with pulmonary edema; blunting of the right costophrenic angle-probable small pleural effusion.  EKG is pending at this time.  Hemoglobin 10.1 hematocrit 32.0 WBC count is 5670 granulocytes 64% lymphocytes 17% platelet count [id number].  Sodium 136 potassium 4.1 BUN 30 creatinine 0.9; admitted BUN creatinine were 33 and 0.9.  Total protein 6.1 albumin 3.5 AST ALT within normal limits.  .                Assessment/Plan:      * Acute on chronic diastolic congestive heart failure  Clinically patient has significant acute congestive heart failure flare  She will be admitted for aggressive IV diuresis  EKG.  Possible aspiration-no fever chills or change in  cough pattern     Bilateral lower extremity edema  Mostly from CHF flare        Cardiogenic pulmonary edema  Patient will get admitted for aggressive IV diuresis        Coronary artery disease involving native coronary artery of native heart without angina pectoris; bioprosthetic aortic valve replacement  Stable issue at the moment.  No significant chest pain  Possible recurrent aortic stenosis/regurgitation; prosthetic valve malfunction   Further recommendations after review of the echocardiogram.  The patient will need Lasix daily as an outpatient, in all likelihood.  However, BUN was elevated at admitted suggestive of prerenal azotemia.     Essential hypertension  Blood pressure level stable.  Will closely monitor               VTE Risk Mitigation (From admission, onward)                  Ordered        enoxaparin injection 30 mg  Daily      09/24/19 1640        IP VTE HIGH RISK PATIENT  Once      09/24/19 1640                        M aHrrison Godinez MD [facility]  Cardiology   [facility]

## 2019-09-25 NOTE — PROGRESS NOTES
Pharmacist Renal Dose Adjustment Note    Talia Alberts is a 89 y.o. female being treated with the medication Lovenox.    Patient Data:    Vital Signs (Most Recent):  Temp: 98.4 °F (36.9 °C) (09/25/19 0705)  Pulse: 61 (09/25/19 0947)  Resp: 20 (09/25/19 0947)  BP: (!) 192/88(nurse notified) (09/25/19 0705)  SpO2: 100 % (09/25/19 0947)   Vital Signs (72h Range):  Temp:  [97.9 °F (36.6 °C)-98.9 °F (37.2 °C)]   Pulse:  [48-64]   Resp:  [18-24]   BP: (125-192)/(64-88)   SpO2:  [85 %-100 %]      Recent Labs   Lab 09/24/19  1723 09/25/19 0417   CREATININE 0.9 0.9     Serum creatinine: 0.9 mg/dL 09/25/19 0417  Estimated creatinine clearance: 34.5 mL/min (CrCl > 30 mL/min)  BMI < 40    Lovenox 30 mg subq daily will be changed to Lovenox 40 mg subq daily per pharmacy renal dose adjustment protocol.    Pharmacist's Name: Estrella Valente  Pharmacist's Extension: [phone]

## 2019-09-26 LAB
ALBUMIN SERPL BCP-MCNC: 3.3 G/DL (ref 3.5–5.2)
ALBUMIN SERPL BCP-MCNC: 3.3 G/DL (ref 3.5–5.2)
ALP SERPL-CCNC: 121 U/L (ref 55–135)
ALP SERPL-CCNC: 121 U/L (ref 55–135)
ALT SERPL W/O P-5'-P-CCNC: 24 U/L (ref 10–44)
ALT SERPL W/O P-5'-P-CCNC: 24 U/L (ref 10–44)
ANION GAP SERPL CALC-SCNC: 11 MMOL/L (ref 8–16)
ANION GAP SERPL CALC-SCNC: 11 MMOL/L (ref 8–16)
AORTIC ROOT ANNULUS: 1.89 CM
AORTIC VALVE CUSP SEPERATION: 1.21 CM
AST SERPL-CCNC: 22 U/L (ref 10–40)
AST SERPL-CCNC: 22 U/L (ref 10–40)
AV INDEX (PROSTH): 0.47
AV MEAN GRADIENT: 18 MMHG
AV PEAK GRADIENT: 30 MMHG
AV VALVE AREA: 1.08 CM2
AV VELOCITY RATIO: 47.28
BASOPHILS # BLD AUTO: 0.05 K/UL (ref 0–0.2)
BASOPHILS NFR BLD: 0.8 % (ref 0–1.9)
BILIRUB SERPL-MCNC: 0.9 MG/DL (ref 0.1–1)
BILIRUB SERPL-MCNC: 0.9 MG/DL (ref 0.1–1)
BSA FOR ECHO PROCEDURE: 1.65 M2
BUN SERPL-MCNC: 34 MG/DL (ref 10–30)
BUN SERPL-MCNC: 34 MG/DL (ref 10–30)
CALCIUM SERPL-MCNC: 8.3 MG/DL (ref 8.7–10.5)
CALCIUM SERPL-MCNC: 8.3 MG/DL (ref 8.7–10.5)
CHLORIDE SERPL-SCNC: 94 MMOL/L (ref 95–110)
CHLORIDE SERPL-SCNC: 94 MMOL/L (ref 95–110)
CO2 SERPL-SCNC: 33 MMOL/L (ref 23–29)
CO2 SERPL-SCNC: 33 MMOL/L (ref 23–29)
CREAT SERPL-MCNC: 0.9 MG/DL (ref 0.5–1.4)
CREAT SERPL-MCNC: 0.9 MG/DL (ref 0.5–1.4)
CV ECHO LV RWT: 0.58 CM
DIFFERENTIAL METHOD: ABNORMAL
DOP CALC AO PEAK VEL: 2.74 M/S
DOP CALC AO VTI: 65.43 CM
DOP CALC LVOT AREA: 2.3 CM2
DOP CALC LVOT DIAMETER: 1.71 CM
DOP CALC LVOT PEAK VEL: 129.56 M/S
DOP CALC LVOT STROKE VOLUME: 70.88 CM3
DOP CALCLVOT PEAK VEL VTI: 30.88 CM
E WAVE DECELERATION TIME: 310.13 MSEC
E/A RATIO: 1.89
E/E' RATIO: 32.36 M/S
ECHO LV POSTERIOR WALL: 1.05 CM (ref 0.6–1.1)
EOSINOPHIL # BLD AUTO: 0.3 K/UL (ref 0–0.5)
EOSINOPHIL NFR BLD: 4.1 % (ref 0–8)
ERYTHROCYTE [DISTWIDTH] IN BLOOD BY AUTOMATED COUNT: 13.5 % (ref 11.5–14.5)
ERYTHROCYTE [DISTWIDTH] IN BLOOD BY AUTOMATED COUNT: 13.5 % (ref 11.5–14.5)
EST. GFR  (AFRICAN AMERICAN): >60 ML/MIN/1.73 M^2
EST. GFR  (AFRICAN AMERICAN): >60 ML/MIN/1.73 M^2
EST. GFR  (NON AFRICAN AMERICAN): 55.3 ML/MIN/1.73 M^2
EST. GFR  (NON AFRICAN AMERICAN): 55.3 ML/MIN/1.73 M^2
FRACTIONAL SHORTENING: 34 % (ref 28–44)
GLUCOSE SERPL-MCNC: 96 MG/DL (ref 70–110)
GLUCOSE SERPL-MCNC: 96 MG/DL (ref 70–110)
HCT VFR BLD AUTO: 30.7 % (ref 37–48.5)
HCT VFR BLD AUTO: 30.7 % (ref 37–48.5)
HGB BLD-MCNC: 9.8 G/DL (ref 12–16)
HGB BLD-MCNC: 9.8 G/DL (ref 12–16)
IMM GRANULOCYTES # BLD AUTO: 0.02 K/UL (ref 0–0.04)
IMM GRANULOCYTES NFR BLD AUTO: 0.3 % (ref 0–0.5)
INTERVENTRICULAR SEPTUM: 1.05 CM (ref 0.6–1.1)
LEFT ATRIUM SIZE: 3.93 CM
LEFT INTERNAL DIMENSION IN SYSTOLE: 2.4 CM (ref 2.1–4)
LEFT VENTRICLE DIASTOLIC VOLUME INDEX: 39.47 ML/M2
LEFT VENTRICLE DIASTOLIC VOLUME: 65.97 ML
LEFT VENTRICLE MASS INDEX: 70 G/M2
LEFT VENTRICLE SYSTOLIC VOLUME INDEX: 19.9 ML/M2
LEFT VENTRICLE SYSTOLIC VOLUME: 33.29 ML
LEFT VENTRICULAR INTERNAL DIMENSION IN DIASTOLE: 3.63 CM (ref 3.5–6)
LEFT VENTRICULAR MASS: 117.33 G
LV LATERAL E/E' RATIO: 29.67 M/S
LV SEPTAL E/E' RATIO: 35.6 M/S
LYMPHOCYTES # BLD AUTO: 1.2 K/UL (ref 1–4.8)
LYMPHOCYTES NFR BLD: 20 % (ref 18–48)
MAGNESIUM SERPL-MCNC: 2 MG/DL (ref 1.6–2.6)
MCH RBC QN AUTO: 29.9 PG (ref 27–31)
MCH RBC QN AUTO: 29.9 PG (ref 27–31)
MCHC RBC AUTO-ENTMCNC: 31.9 G/DL (ref 32–36)
MCHC RBC AUTO-ENTMCNC: 31.9 G/DL (ref 32–36)
MCV RBC AUTO: 94 FL (ref 82–98)
MCV RBC AUTO: 94 FL (ref 82–98)
MONOCYTES # BLD AUTO: 0.9 K/UL (ref 0.3–1)
MONOCYTES NFR BLD: 14.1 % (ref 4–15)
MV PEAK A VEL: 0.94 M/S
MV PEAK E VEL: 1.78 M/S
NEUTROPHILS # BLD AUTO: 3.7 K/UL (ref 1.8–7.7)
NEUTROPHILS NFR BLD: 60.7 % (ref 38–73)
NRBC BLD-RTO: 0 /100 WBC
PISA TR MAX VEL: 4.14 M/S
PLATELET # BLD AUTO: 121 K/UL (ref 150–350)
PLATELET # BLD AUTO: 121 K/UL (ref 150–350)
PMV BLD AUTO: 10.5 FL (ref 9.2–12.9)
PMV BLD AUTO: 10.5 FL (ref 9.2–12.9)
POTASSIUM SERPL-SCNC: 3.9 MMOL/L (ref 3.5–5.1)
POTASSIUM SERPL-SCNC: 3.9 MMOL/L (ref 3.5–5.1)
PROT SERPL-MCNC: 5.9 G/DL (ref 6–8.4)
PROT SERPL-MCNC: 5.9 G/DL (ref 6–8.4)
PV PEAK VELOCITY: 116.11 CM/S
RBC # BLD AUTO: 3.28 M/UL (ref 4–5.4)
RBC # BLD AUTO: 3.28 M/UL (ref 4–5.4)
RIGHT VENTRICULAR END-DIASTOLIC DIMENSION: 525 CM
SODIUM SERPL-SCNC: 138 MMOL/L (ref 136–145)
SODIUM SERPL-SCNC: 138 MMOL/L (ref 136–145)
TDI LATERAL: 0.06 M/S
TDI SEPTAL: 0.05 M/S
TDI: 0.06 M/S
TR MAX PG: 69 MMHG
WBC # BLD AUTO: 6.11 K/UL (ref 3.9–12.7)
WBC # BLD AUTO: 6.11 K/UL (ref 3.9–12.7)

## 2019-09-26 PROCEDURE — 85025 COMPLETE CBC W/AUTO DIFF WBC: CPT

## 2019-09-26 PROCEDURE — 27000221 HC OXYGEN, UP TO 24 HOURS

## 2019-09-26 PROCEDURE — 80053 COMPREHEN METABOLIC PANEL: CPT

## 2019-09-26 PROCEDURE — 99900035 HC TECH TIME PER 15 MIN (STAT)

## 2019-09-26 PROCEDURE — 25000003 PHARM REV CODE 250: Performed by: INTERNAL MEDICINE

## 2019-09-26 PROCEDURE — 21400001 HC TELEMETRY ROOM

## 2019-09-26 PROCEDURE — 63600175 PHARM REV CODE 636 W HCPCS: Performed by: INTERNAL MEDICINE

## 2019-09-26 PROCEDURE — 36415 COLL VENOUS BLD VENIPUNCTURE: CPT

## 2019-09-26 PROCEDURE — 83735 ASSAY OF MAGNESIUM: CPT

## 2019-09-26 PROCEDURE — 94761 N-INVAS EAR/PLS OXIMETRY MLT: CPT

## 2019-09-26 RX ADMIN — FUROSEMIDE 40 MG: 10 INJECTION, SOLUTION INTRAMUSCULAR; INTRAVENOUS at 08:09

## 2019-09-26 RX ADMIN — SIMVASTATIN 40 MG: 40 TABLET, FILM COATED ORAL at 08:09

## 2019-09-26 RX ADMIN — ENOXAPARIN SODIUM 40 MG: 100 INJECTION SUBCUTANEOUS at 04:09

## 2019-09-26 RX ADMIN — METOPROLOL SUCCINATE 25 MG: 25 TABLET, FILM COATED, EXTENDED RELEASE ORAL at 08:09

## 2019-09-26 RX ADMIN — ASPIRIN 81 MG: 81 TABLET, DELAYED RELEASE ORAL at 08:09

## 2019-09-26 RX ADMIN — PANTOPRAZOLE SODIUM 40 MG: 40 TABLET, DELAYED RELEASE ORAL at 08:09

## 2019-09-26 RX ADMIN — FUROSEMIDE 40 MG: 10 INJECTION, SOLUTION INTRAMUSCULAR; INTRAVENOUS at 06:09

## 2019-09-26 RX ADMIN — SENNOSIDES AND DOCUSATE SODIUM 1 TABLET: 8.6; 5 TABLET ORAL at 08:09

## 2019-09-26 RX ADMIN — ACETAMINOPHEN 650 MG: 325 TABLET ORAL at 04:09

## 2019-09-26 RX ADMIN — HYDRALAZINE HYDROCHLORIDE 10 MG: 20 INJECTION INTRAMUSCULAR; INTRAVENOUS at 11:09

## 2019-09-26 NOTE — PLAN OF CARE
Vital signs, cardiac and lab monitoring. CXR in am. Possible discharge in am . MD consult in am. Increase activity as tolerated. Bed alarm on. Watch for falls. Watch for sign and symptoms of bleeding. Breathing treatments and adjust oxygen as needed. Strict I & O. Daily weights.

## 2019-09-26 NOTE — HOSPITAL COURSE
Patient admitted with acute on chronic diastolic heart failure in the background of advanced age  Patient lives in nursing Home and got admitted to the hospital for aggressive diuresis  She did very well with IV Lasix  Patient was seen by a cardiologist  Later patient was discharged to  home with oral diuretics  Patient's daughter said that she already has gotten a nebulizing machine and a home oxygen at the nursing home  where she resides

## 2019-09-26 NOTE — PLAN OF CARE
09/26/19 0739   Patient Assessment/Suction   Level of Consciousness (AVPU) alert   Respiratory Effort Normal;Unlabored   Expansion/Accessory Muscles/Retractions no use of accessory muscles;no retractions   All Lung Fields Breath Sounds clear   Rhythm/Pattern, Respiratory unlabored;pattern regular;depth regular   PRE-TX-O2   O2 Device (Oxygen Therapy) Oxymask   $ Is the patient on Low Flow Oxygen? Yes   Flow (L/min) 2   SpO2 99 %   Pulse Oximetry Type Intermittent   $ Pulse Oximetry - Multiple Charge Pulse Oximetry - Multiple   Pulse (!) 58   Resp 16   Aerosol Therapy   $ Aerosol Therapy Charges PRN treatment not required

## 2019-09-26 NOTE — PLAN OF CARE
09/25/19 2020   Patient Assessment/Suction   Level of Consciousness (AVPU) alert   Respiratory Effort Unlabored   PRE-TX-O2   O2 Device (Oxygen Therapy) Oxymask   $ Is the patient on Low Flow Oxygen? Yes   Flow (L/min) 2   SpO2 95 %   Pulse Oximetry Type Intermittent   $ Pulse Oximetry - Multiple Charge Pulse Oximetry - Multiple   Aerosol Therapy   $ Aerosol Therapy Charges PRN treatment not required

## 2019-09-26 NOTE — PROGRESS NOTES
Critical access hospital Medicine  Progress Note    Patient Name: Talia Alberts  MRN: [id number]  Patient Class: IP- Inpatient   Admission Date: 9/24/2019  Length of Stay: 1 days  Attending Physician: Errol Faustin MD  Primary Care Provider: Michael Martin MD        Subjective:     Principal Problem:Acute on chronic diastolic congestive heart failure        HPI:  89-year-old patient getting admitted with acute congestive heart failure flare  Patient resides in a nursing home and is already on medication for congestive heart failure  She was seen by her pulmonologist 2 weeks ago and again today  Compared to the last office visit today she was orthopneic, has got more significant edema on both lower extremities and patient was more short of breath  Following patient was admitted directly from the physician's office  When seen in the floor patient was not in respiratory distress, but there was significant amount of fluid all over the body  She will get admitted for IV access he diuresis and her cardiologist will be consulted    Overview/Hospital Course:  Patient much better after diuresis  No new issues  She is walking around with Help  At NH she is more or less sedentary    Interval History:     Review of Systems   Constitutional: Negative for activity change and appetite change.   HENT: Negative for congestion and dental problem.    Eyes: Negative for discharge and itching.   Respiratory: Positive for shortness of breath.    Cardiovascular: Negative for chest pain.   Gastrointestinal: Negative for abdominal distention and abdominal pain.   Endocrine: Negative for cold intolerance.   Genitourinary: Negative for difficulty urinating and dysuria.   Musculoskeletal: Negative for arthralgias and back pain.   Skin: Negative for color change.   Neurological: Negative for dizziness and facial asymmetry.   Hematological: Negative for adenopathy.   Psychiatric/Behavioral: Negative for agitation and behavioral  problems.     Objective:     Vital Signs (Most Recent):  Temp: 98 °F (36.7 °C) (09/1935)  Pulse: 67 (09/1935)  Resp: (!) 21 (09/1935)  BP: 134/63 (09/1935)  SpO2: 96 % (09/1935) Vital Signs (24h Range):  Temp:  [97.9 °F (36.6 °C)-98.4 °F (36.9 °C)] 98 °F (36.7 °C)  Pulse:  [59-70] 67  Resp:  [20-24] 21  SpO2:  [94 %-100 %] 96 %  BP: (126-192)/(60-88) 134/63     Weight: 68.1 kg (150 lb 1.8 oz)  Body mass index is 28.36 kg/m².    Intake/Output Summary (Last 24 hours) at 9/25/2019 1938  Last data filed at 9/25/2019 1540  Gross per 24 hour   Intake 660 ml   Output 2300 ml   Net -1640 ml      Physical Exam   Constitutional: She is oriented to person, place, and time. No distress.   Eyes: Pupils are equal, round, and reactive to light. EOM are normal.   Neck: Neck supple.   Cardiovascular: Normal rate.   Pulmonary/Chest: Effort normal. She has rales.   Abdominal: Soft. Bowel sounds are normal.   Musculoskeletal: Normal range of motion. She exhibits no edema.   Neurological: She is alert and oriented to person, place, and time.   Skin: Skin is warm.   Psychiatric: She has a normal mood and affect.   Nursing note and vitals reviewed.      Significant Labs:   CBC:   Recent Labs   Lab 09/24/19 2029 09/25/19 0417   WBC 8.16 5.67   HGB 10.9* 10.1*   HCT 34.2* 32.0*   * 116*     CMP:   Recent Labs   Lab 09/24/19 1723 09/25/19 0417   * 136   K 5.0 4.1   CL 97 96   CO2 26 32*   GLU 95 100   BUN 33* 30   CREATININE 0.9 0.9   CALCIUM 8.4* 8.2*   PROT 6.8 6.1   ALBUMIN 3.8 3.5   BILITOT 1.0 0.8   ALKPHOS 156* 128   AST 29 24   ALT 32 27   ANIONGAP 12 8   EGFRNONAA 55.3* 55.3*       Significant Imaging: I have reviewed all pertinent imaging results/findings within the past 24 hours.      Assessment/Plan:      * Acute on chronic diastolic congestive heart failure  After diuresis pt feels much better       Bilateral lower extremity edema  Mostly from CHF flare      Cardiogenic pulmonary  edema  Much better after diuresis      Coronary artery disease involving native coronary artery of native heart without angina pectoris  Stable issue at the moment.  No significant chest pain      Essential hypertension  Blood pressure level stable.  Will closely monitor        VTE Risk Mitigation (From admission, onward)         Ordered     enoxaparin injection 40 mg  Daily      09/25/19 1102     IP VTE HIGH RISK PATIENT  Once      09/24/19 5848                      Errol Faustin MD  Department of Hospital Medicine   [facility]

## 2019-09-26 NOTE — SUBJECTIVE & OBJECTIVE
Interval History:     Review of Systems   Constitutional: Negative for activity change and appetite change.   HENT: Negative for congestion and dental problem.    Eyes: Negative for discharge and itching.   Respiratory: Positive for shortness of breath.    Cardiovascular: Negative for chest pain.   Gastrointestinal: Negative for abdominal distention and abdominal pain.   Endocrine: Negative for cold intolerance.   Genitourinary: Negative for difficulty urinating and dysuria.   Musculoskeletal: Negative for arthralgias and back pain.   Skin: Negative for color change.   Neurological: Negative for dizziness and facial asymmetry.   Hematological: Negative for adenopathy.   Psychiatric/Behavioral: Negative for agitation and behavioral problems.     Objective:     Vital Signs (Most Recent):  Temp: 98 °F (36.7 °C) (09/1935)  Pulse: 67 (09/1935)  Resp: (!) 21 (09/1935)  BP: 134/63 (09/1935)  SpO2: 96 % (09/1935) Vital Signs (24h Range):  Temp:  [97.9 °F (36.6 °C)-98.4 °F (36.9 °C)] 98 °F (36.7 °C)  Pulse:  [59-70] 67  Resp:  [20-24] 21  SpO2:  [94 %-100 %] 96 %  BP: (126-192)/(60-88) 134/63     Weight: 68.1 kg (150 lb 1.8 oz)  Body mass index is 28.36 kg/m².    Intake/Output Summary (Last 24 hours) at 9/25/2019 1938  Last data filed at 9/25/2019 1540  Gross per 24 hour   Intake 660 ml   Output 2300 ml   Net -1640 ml      Physical Exam   Constitutional: She is oriented to person, place, and time. No distress.   Eyes: Pupils are equal, round, and reactive to light. EOM are normal.   Neck: Neck supple.   Cardiovascular: Normal rate.   Pulmonary/Chest: Effort normal. She has rales.   Abdominal: Soft. Bowel sounds are normal.   Musculoskeletal: Normal range of motion. She exhibits no edema.   Neurological: She is alert and oriented to person, place, and time.   Skin: Skin is warm.   Psychiatric: She has a normal mood and affect.   Nursing note and vitals reviewed.      Significant Labs:   CBC:    Recent Labs   Lab 09/24/19 2029 09/25/19 0417   WBC 8.16 5.67   HGB 10.9* 10.1*   HCT 34.2* 32.0*   * 116*     CMP:   Recent Labs   Lab 09/24/19 1723 09/25/19 0417   * 136   K 5.0 4.1   CL 97 96   CO2 26 32*   GLU 95 100   BUN 33* 30   CREATININE 0.9 0.9   CALCIUM 8.4* 8.2*   PROT 6.8 6.1   ALBUMIN 3.8 3.5   BILITOT 1.0 0.8   ALKPHOS 156* 128   AST 29 24   ALT 32 27   ANIONGAP 12 8   EGFRNONAA 55.3* 55.3*       Significant Imaging: I have reviewed all pertinent imaging results/findings within the past 24 hours.

## 2019-09-26 NOTE — PROGRESS NOTES
Progress Note  Cardiology    Admit Date: 9/24/2019   LOS: 2 days     Follow-up For:  CHF    Scheduled Meds:   aspirin  81 mg Oral Daily    enoxaparin  40 mg Subcutaneous Daily    furosemide  40 mg Intravenous BID    metoprolol succinate  25 mg Oral Daily    pantoprazole  40 mg Oral Daily    senna-docusate 8.6-50 mg  1 tablet Oral BID    simvastatin  40 mg Oral QHS     Continuous Infusions:  PRN Meds:acetaminophen, albuterol, hydrALAZINE, magnesium oxide, magnesium oxide, ondansetron, ondansetron, potassium chloride 10%, potassium chloride 10%    Review of patient's allergies indicates:   Allergen Reactions    Morphine Swelling       SUBJECTIVE:     Interval History: Patient appears to be less short of breath.    Review of Systems  Respiratory: positive for cough, negative for hemoptysis and wheezing  Cardiovascular: negative for chest pressure/discomfort and dyspnea    OBJECTIVE:     Vital Signs (Most Recent)  Temp: 98.3 °F (36.8 °C) (09/26/19 1645)  Pulse: 62 (09/26/19 1645)  Resp: 20 (09/26/19 1645)  BP: (!) 124/59 (09/26/19 1645)  SpO2: 98 % (09/26/19 1645)    Vital Signs Range (Last 24H):  Temp:  [97.4 °F (36.3 °C)-98.7 °F (37.1 °C)]   Pulse:  [58-67]   Resp:  [16-21]   BP: (124-190)/(59-87)   SpO2:  [95 %-100 %]       Physical Exam:  Neck: no carotid bruit, no JVD and supple, symmetrical, trachea midline  Lungs: rales bibasilar, rhonchi bibasilar  Heart: regular rate and rhythm, S1, S2 normal, no murmur, click, rub or gallop  Abdomen: soft, non-tender; bowel sounds normal; no masses,  no organomegaly  Extremities: Extremities normal, atraumatic, no cyanosis, clubbing, or edema    Recent Results (from the past 24 hour(s))   Comprehensive Metabolic Panel (CMP)    Collection Time: 09/26/19  4:16 AM   Result Value Ref Range    Sodium 138 136 - 145 mmol/L    Potassium 3.9 3.5 - 5.1 mmol/L    Chloride 94 (L) 95 - 110 mmol/L    CO2 33 (H) 23 - 29 mmol/L    Glucose 96 70 - 110 mg/dL    BUN, Bld 34 (H) 10 - 30  mg/dL    Creatinine 0.9 0.5 - 1.4 mg/dL    Calcium 8.3 (L) 8.7 - 10.5 mg/dL    Total Protein 5.9 (L) 6.0 - 8.4 g/dL    Albumin 3.3 (L) 3.5 - 5.2 g/dL    Total Bilirubin 0.9 0.1 - 1.0 mg/dL    Alkaline Phosphatase 121 55 - 135 U/L    AST 22 10 - 40 U/L    ALT 24 10 - 44 U/L    Anion Gap 11 8 - 16 mmol/L    eGFR if African American >60.0 >60 mL/min/1.73 m^2    eGFR if non  55.3 (A) >60 mL/min/1.73 m^2   Magnesium    Collection Time: 09/26/19  4:16 AM   Result Value Ref Range    Magnesium 2.0 1.6 - 2.6 mg/dL   CBC with Automated Differential    Collection Time: 09/26/19  4:16 AM   Result Value Ref Range    WBC 6.11 3.90 - 12.70 K/uL    RBC 3.28 (L) 4.00 - 5.40 M/uL    Hemoglobin 9.8 (L) 12.0 - 16.0 g/dL    Hematocrit 30.7 (L) 37.0 - 48.5 %    Mean Corpuscular Volume 94 82 - 98 fL    Mean Corpuscular Hemoglobin 29.9 27.0 - 31.0 pg    Mean Corpuscular Hemoglobin Conc 31.9 (L) 32.0 - 36.0 g/dL    RDW 13.5 11.5 - 14.5 %    Platelets 121 (L) 150 - 350 K/uL    MPV 10.5 9.2 - 12.9 fL    Immature Granulocytes 0.3 0.0 - 0.5 %    Gran # (ANC) 3.7 1.8 - 7.7 K/uL    Immature Grans (Abs) 0.02 0.00 - 0.04 K/uL    Lymph # 1.2 1.0 - 4.8 K/uL    Mono # 0.9 0.3 - 1.0 K/uL    Eos # 0.3 0.0 - 0.5 K/uL    Baso # 0.05 0.00 - 0.20 K/uL    nRBC 0 0 /100 WBC    Gran% 60.7 38.0 - 73.0 %    Lymph% 20.0 18.0 - 48.0 %    Mono% 14.1 4.0 - 15.0 %    Eosinophil% 4.1 0.0 - 8.0 %    Basophil% 0.8 0.0 - 1.9 %    Differential Method Automated    CBC Without Differential    Collection Time: 09/26/19  4:16 AM   Result Value Ref Range    WBC 6.11 3.90 - 12.70 K/uL    RBC 3.28 (L) 4.00 - 5.40 M/uL    Hemoglobin 9.8 (L) 12.0 - 16.0 g/dL    Hematocrit 30.7 (L) 37.0 - 48.5 %    Mean Corpuscular Volume 94 82 - 98 fL    Mean Corpuscular Hemoglobin 29.9 27.0 - 31.0 pg    Mean Corpuscular Hemoglobin Conc 31.9 (L) 32.0 - 36.0 g/dL    RDW 13.5 11.5 - 14.5 %    Platelets 121 (L) 150 - 350 K/uL    MPV 10.5 9.2 - 12.9 fL   Comprehensive metabolic panel     Collection Time: 09/26/19  4:16 AM   Result Value Ref Range    Sodium 138 136 - 145 mmol/L    Potassium 3.9 3.5 - 5.1 mmol/L    Chloride 94 (L) 95 - 110 mmol/L    CO2 33 (H) 23 - 29 mmol/L    Glucose 96 70 - 110 mg/dL    BUN, Bld 34 (H) 10 - 30 mg/dL    Creatinine 0.9 0.5 - 1.4 mg/dL    Calcium 8.3 (L) 8.7 - 10.5 mg/dL    Total Protein 5.9 (L) 6.0 - 8.4 g/dL    Albumin 3.3 (L) 3.5 - 5.2 g/dL    Total Bilirubin 0.9 0.1 - 1.0 mg/dL    Alkaline Phosphatase 121 55 - 135 U/L    AST 22 10 - 40 U/L    ALT 24 10 - 44 U/L    Anion Gap 11 8 - 16 mmol/L    eGFR if African American >60.0 >60 mL/min/1.73 m^2    eGFR if non  55.3 (A) >60 mL/min/1.73 m^2       Diagnostic Results:  Labs: Reviewed    ASSESSMENT/PLAN:     Continue Lasix 40 mg IV b.i.d..  Chest x-ray in a.m..  Echocardiogram reviewed.  No periprosthetic aortic regurgitation..

## 2019-09-27 VITALS
SYSTOLIC BLOOD PRESSURE: 134 MMHG | HEIGHT: 61 IN | RESPIRATION RATE: 20 BRPM | BODY MASS INDEX: 27.6 KG/M2 | DIASTOLIC BLOOD PRESSURE: 60 MMHG | OXYGEN SATURATION: 100 % | TEMPERATURE: 98 F | WEIGHT: 146.19 LBS | HEART RATE: 57 BPM

## 2019-09-27 PROBLEM — I50.33 ACUTE ON CHRONIC DIASTOLIC CONGESTIVE HEART FAILURE: Status: RESOLVED | Noted: 2018-02-09 | Resolved: 2019-09-27

## 2019-09-27 PROBLEM — I50.1 CARDIOGENIC PULMONARY EDEMA: Status: RESOLVED | Noted: 2019-09-10 | Resolved: 2019-09-27

## 2019-09-27 PROBLEM — R60.0 BILATERAL LOWER EXTREMITY EDEMA: Status: RESOLVED | Noted: 2019-09-10 | Resolved: 2019-09-27

## 2019-09-27 LAB
ALBUMIN SERPL BCP-MCNC: 3.6 G/DL (ref 3.5–5.2)
ALBUMIN SERPL BCP-MCNC: 3.6 G/DL (ref 3.5–5.2)
ALP SERPL-CCNC: 140 U/L (ref 55–135)
ALP SERPL-CCNC: 140 U/L (ref 55–135)
ALT SERPL W/O P-5'-P-CCNC: 24 U/L (ref 10–44)
ALT SERPL W/O P-5'-P-CCNC: 24 U/L (ref 10–44)
ANION GAP SERPL CALC-SCNC: 9 MMOL/L (ref 8–16)
ANION GAP SERPL CALC-SCNC: 9 MMOL/L (ref 8–16)
AST SERPL-CCNC: 25 U/L (ref 10–40)
AST SERPL-CCNC: 25 U/L (ref 10–40)
BASOPHILS # BLD AUTO: 0.04 K/UL (ref 0–0.2)
BASOPHILS NFR BLD: 0.6 % (ref 0–1.9)
BILIRUB SERPL-MCNC: 1.1 MG/DL (ref 0.1–1)
BILIRUB SERPL-MCNC: 1.1 MG/DL (ref 0.1–1)
BUN SERPL-MCNC: 39 MG/DL (ref 10–30)
BUN SERPL-MCNC: 39 MG/DL (ref 10–30)
CALCIUM SERPL-MCNC: 8.4 MG/DL (ref 8.7–10.5)
CALCIUM SERPL-MCNC: 8.4 MG/DL (ref 8.7–10.5)
CHLORIDE SERPL-SCNC: 94 MMOL/L (ref 95–110)
CHLORIDE SERPL-SCNC: 94 MMOL/L (ref 95–110)
CO2 SERPL-SCNC: 35 MMOL/L (ref 23–29)
CO2 SERPL-SCNC: 35 MMOL/L (ref 23–29)
CREAT SERPL-MCNC: 1 MG/DL (ref 0.5–1.4)
CREAT SERPL-MCNC: 1 MG/DL (ref 0.5–1.4)
DIFFERENTIAL METHOD: ABNORMAL
EOSINOPHIL # BLD AUTO: 0.3 K/UL (ref 0–0.5)
EOSINOPHIL NFR BLD: 4.8 % (ref 0–8)
ERYTHROCYTE [DISTWIDTH] IN BLOOD BY AUTOMATED COUNT: 13.7 % (ref 11.5–14.5)
ERYTHROCYTE [DISTWIDTH] IN BLOOD BY AUTOMATED COUNT: 13.7 % (ref 11.5–14.5)
EST. GFR  (AFRICAN AMERICAN): 56.1 ML/MIN/1.73 M^2
EST. GFR  (AFRICAN AMERICAN): 56.1 ML/MIN/1.73 M^2
EST. GFR  (NON AFRICAN AMERICAN): 48.7 ML/MIN/1.73 M^2
EST. GFR  (NON AFRICAN AMERICAN): 48.7 ML/MIN/1.73 M^2
GLUCOSE SERPL-MCNC: 99 MG/DL (ref 70–110)
GLUCOSE SERPL-MCNC: 99 MG/DL (ref 70–110)
HCT VFR BLD AUTO: 34.9 % (ref 37–48.5)
HCT VFR BLD AUTO: 34.9 % (ref 37–48.5)
HGB BLD-MCNC: 11.1 G/DL (ref 12–16)
HGB BLD-MCNC: 11.1 G/DL (ref 12–16)
IMM GRANULOCYTES # BLD AUTO: 0.03 K/UL (ref 0–0.04)
IMM GRANULOCYTES NFR BLD AUTO: 0.5 % (ref 0–0.5)
LYMPHOCYTES # BLD AUTO: 1.3 K/UL (ref 1–4.8)
LYMPHOCYTES NFR BLD: 20 % (ref 18–48)
MAGNESIUM SERPL-MCNC: 2.1 MG/DL (ref 1.6–2.6)
MCH RBC QN AUTO: 30.2 PG (ref 27–31)
MCH RBC QN AUTO: 30.2 PG (ref 27–31)
MCHC RBC AUTO-ENTMCNC: 31.8 G/DL (ref 32–36)
MCHC RBC AUTO-ENTMCNC: 31.8 G/DL (ref 32–36)
MCV RBC AUTO: 95 FL (ref 82–98)
MCV RBC AUTO: 95 FL (ref 82–98)
MONOCYTES # BLD AUTO: 0.8 K/UL (ref 0.3–1)
MONOCYTES NFR BLD: 13.1 % (ref 4–15)
NEUTROPHILS # BLD AUTO: 3.8 K/UL (ref 1.8–7.7)
NEUTROPHILS NFR BLD: 61 % (ref 38–73)
NRBC BLD-RTO: 0 /100 WBC
PLATELET # BLD AUTO: 129 K/UL (ref 150–350)
PLATELET # BLD AUTO: 129 K/UL (ref 150–350)
PMV BLD AUTO: 10.8 FL (ref 9.2–12.9)
PMV BLD AUTO: 10.8 FL (ref 9.2–12.9)
POTASSIUM SERPL-SCNC: 3.5 MMOL/L (ref 3.5–5.1)
POTASSIUM SERPL-SCNC: 3.5 MMOL/L (ref 3.5–5.1)
PROT SERPL-MCNC: 6.4 G/DL (ref 6–8.4)
PROT SERPL-MCNC: 6.4 G/DL (ref 6–8.4)
RBC # BLD AUTO: 3.68 M/UL (ref 4–5.4)
RBC # BLD AUTO: 3.68 M/UL (ref 4–5.4)
SODIUM SERPL-SCNC: 138 MMOL/L (ref 136–145)
SODIUM SERPL-SCNC: 138 MMOL/L (ref 136–145)
WBC # BLD AUTO: 6.26 K/UL (ref 3.9–12.7)
WBC # BLD AUTO: 6.26 K/UL (ref 3.9–12.7)

## 2019-09-27 PROCEDURE — 63600175 PHARM REV CODE 636 W HCPCS: Performed by: INTERNAL MEDICINE

## 2019-09-27 PROCEDURE — 25000003 PHARM REV CODE 250: Performed by: INTERNAL MEDICINE

## 2019-09-27 PROCEDURE — 27000221 HC OXYGEN, UP TO 24 HOURS

## 2019-09-27 PROCEDURE — 94761 N-INVAS EAR/PLS OXIMETRY MLT: CPT

## 2019-09-27 PROCEDURE — 85025 COMPLETE CBC W/AUTO DIFF WBC: CPT

## 2019-09-27 PROCEDURE — 36415 COLL VENOUS BLD VENIPUNCTURE: CPT

## 2019-09-27 PROCEDURE — 94640 AIRWAY INHALATION TREATMENT: CPT

## 2019-09-27 PROCEDURE — 99900035 HC TECH TIME PER 15 MIN (STAT)

## 2019-09-27 PROCEDURE — 83735 ASSAY OF MAGNESIUM: CPT

## 2019-09-27 PROCEDURE — 80053 COMPREHEN METABOLIC PANEL: CPT

## 2019-09-27 RX ORDER — FUROSEMIDE 20 MG/1
20 TABLET ORAL 2 TIMES DAILY
Qty: 60 TABLET | Refills: 0 | Status: SHIPPED | OUTPATIENT
Start: 2019-09-27 | End: 2019-10-28

## 2019-09-27 RX ADMIN — FUROSEMIDE 40 MG: 10 INJECTION, SOLUTION INTRAMUSCULAR; INTRAVENOUS at 08:09

## 2019-09-27 RX ADMIN — ACETAMINOPHEN 650 MG: 325 TABLET ORAL at 08:09

## 2019-09-27 RX ADMIN — PANTOPRAZOLE SODIUM 40 MG: 40 TABLET, DELAYED RELEASE ORAL at 08:09

## 2019-09-27 RX ADMIN — METOPROLOL SUCCINATE 25 MG: 25 TABLET, FILM COATED, EXTENDED RELEASE ORAL at 08:09

## 2019-09-27 RX ADMIN — SENNOSIDES AND DOCUSATE SODIUM 1 TABLET: 8.6; 5 TABLET ORAL at 08:09

## 2019-09-27 RX ADMIN — ASPIRIN 81 MG: 81 TABLET, DELAYED RELEASE ORAL at 08:09

## 2019-09-27 RX ADMIN — POTASSIUM CHLORIDE 40 MEQ: 20 SOLUTION ORAL at 08:09

## 2019-09-27 NOTE — SUBJECTIVE & OBJECTIVE
Interval History:     Review of Systems   Constitutional: Negative for activity change and appetite change.   HENT: Negative for congestion and dental problem.    Eyes: Negative for discharge and itching.   Respiratory: Negative for shortness of breath.    Cardiovascular: Negative for chest pain.   Gastrointestinal: Negative for abdominal distention and abdominal pain.   Endocrine: Negative for cold intolerance.   Genitourinary: Negative for difficulty urinating and dysuria.   Musculoskeletal: Negative for arthralgias and back pain.   Skin: Negative for color change.   Neurological: Negative for dizziness and facial asymmetry.   Hematological: Negative for adenopathy.   Psychiatric/Behavioral: Negative for agitation and behavioral problems.     Objective:     Vital Signs (Most Recent):  Temp: 98.1 °F (36.7 °C) (09/26/19 1947)  Pulse: (!) 59 (09/26/19 1947)  Resp: 16 (09/26/19 1947)  BP: (!) 104/52 (09/26/19 1947)  SpO2: 95 % (09/26/19 1947) Vital Signs (24h Range):  Temp:  [97.4 °F (36.3 °C)-98.7 °F (37.1 °C)] 98.1 °F (36.7 °C)  Pulse:  [58-65] 59  Resp:  [16-20] 16  SpO2:  [95 %-100 %] 95 %  BP: (104-190)/(52-87) 104/52     Weight: 68 kg (149 lb 15.7 oz)  Body mass index is 28.34 kg/m².    Intake/Output Summary (Last 24 hours) at 9/26/2019 2020  Last data filed at 9/26/2019 1626  Gross per 24 hour   Intake 450 ml   Output 2200 ml   Net -1750 ml      Physical Exam   Constitutional: She is oriented to person, place, and time. No distress.   Eyes: Pupils are equal, round, and reactive to light. EOM are normal.   Neck: Neck supple.   Cardiovascular: Normal rate.   Pulmonary/Chest: Effort normal and breath sounds normal.   Abdominal: Soft. Bowel sounds are normal.   Musculoskeletal: Normal range of motion. She exhibits edema.   Neurological: She is alert and oriented to person, place, and time.   Skin: Skin is warm.   Psychiatric: She has a normal mood and affect.   Nursing note and vitals reviewed.      Significant  Labs:   CBC:   Recent Labs   Lab 09/24/19 2029 09/25/19 0417 09/26/19 0416   WBC 8.16 5.67 6.11  6.11   HGB 10.9* 10.1* 9.8*  9.8*   HCT 34.2* 32.0* 30.7*  30.7*   * 116* 121*  121*     CMP:   Recent Labs   Lab 09/25/19 0417 09/26/19 0416    138  138   K 4.1 3.9  3.9   CL 96 94*  94*   CO2 32* 33*  33*    96  96   BUN 30 34*  34*   CREATININE 0.9 0.9  0.9   CALCIUM 8.2* 8.3*  8.3*   PROT 6.1 5.9*  5.9*   ALBUMIN 3.5 3.3*  3.3*   BILITOT 0.8 0.9  0.9   ALKPHOS 128 121  121   AST 24 22  22   ALT 27 24  24   ANIONGAP 8 11  11   EGFRNONAA 55.3* 55.3*  55.3*       Significant Imaging: I have reviewed all pertinent imaging results/findings within the past 24 hours.

## 2019-09-27 NOTE — PLAN OF CARE
Problem: Oral Intake Inadequate  Goal: Improved Oral Intake  Intervention: Promote and Optimize Oral Intake  Flowsheets (Taken 9/27/2019 1120)  Oral Nutrition Promotion: -- (Oral Supplement)   1. Recommended Appetite Supplement to aid in PO intake    2. If appetite stimulant unsuccessful and PO intake does not improve within the next 24-48 hours , recommended starting Enteral Nutrition.

## 2019-09-27 NOTE — PROGRESS NOTES
"[facility]  Adult Nutrition  Progress Note    SUMMARY       Recommendations  1. Continue current diet as tolerated, encourage intake     2. Continue Ensure Enlive TID to aid in PO intake (Provides: 1050 kcals, 60g protein)     3. Recommend starting an appetite stimulant to aid in PO intake of meals and supplements.     3. If PO intake does not improve within the next 24-48 hours, recommend starting Enteral Nutrition.     Goals:   1. Pt will meet at least 75% of estimated needs via PO intake of meals and supplements.    2. Appetite stimulant will be initiated     3. Enteral Nutrition will be initiated within 24-48 hours    Nutrition Goal Status: new  Communication of RD Recs: reviewed with RN    Reason for Assessment    Reason For Assessment: RD follow-up  Diagnosis: cardiac disease  Relevant Medical History: CHF, CAD, GERD, HLD, HTN, iron deficiency anemia, MI, Cancer  Interdisciplinary Rounds: attended    Nutrition Risk Screen    Nutrition Risk Screen: dysphagia or difficulty swallowing    Nutrition/Diet History    Spiritual, Cultural Beliefs, Latter day Practices, Values that Affect Care: no  Food Allergies: NKFA  Factors Affecting Nutritional Intake: difficulty/impaired swallowing, decreased appetite    Anthropometrics    Temp: 97.8 °F (36.6 °C)  Height: 5' 1" (154.9 cm)  Height (inches): 61 in  Weight Method: Bed Scale  Weight: 66.3 kg (146 lb 3.2 oz)  Weight (lb): 146.2 lb  Ideal Body Weight (IBW), Female: 105 lb  % Ideal Body Weight, Female (lb): 133.33 lb  BMI (Calculated): 26.5  BMI Grade: 25 - 29.9 - overweight     Lab/Procedures/Meds    Pertinent Labs Reviewed: reviewed   9/27/2019 04:26   Sodium 138   Potassium 3.5   Chloride 94 (L)   CO2 35 (H)   Anion Gap 9   BUN, Bld 39 (H)   Creatinine 1.0   eGFR if non African American 48.7 (A)   Glucose 99   Calcium 8.4 (L)   Alkaline Phosphatase 140 (H)   PROTEIN TOTAL 6.4   Albumin 3.6   BILIRUBIN TOTAL 1.1 (H)   AST 25   ALT 24     WBC 6.26   RBC " 3.68 (L)   Hemoglobin 11.1 (L)   Hematocrit 34.9 (L)     Pertinent Medications Reviewed: reviewed  Scheduled Meds:   aspirin  81 mg Oral Daily    enoxaparin  40 mg Subcutaneous Daily    furosemide  40 mg Intravenous BID    metoprolol succinate  25 mg Oral Daily    pantoprazole  40 mg Oral Daily    senna-docusate 8.6-50 mg  1 tablet Oral BID    simvastatin  40 mg Oral QHS     Estimated/Assessed Needs    Weight Used For Calorie Calculations: 68 kg (150 lb)  Energy Calorie Requirements (kcal): 7601-5538 kcal/day   Energy Need Method: Kcal/kg  Protein Requirements: 54-88 g/day   Weight Used For Protein Calculations: 68 kg (150 lb)     Estimated Fluid Requirement Method: RDA Method  RDA Method (mL): 1360    Nutrition Prescription Ordered    Current Diet Order: Low Sodium Pureed Dysphagia   Oral Nutrition Supplement: Ensure Enlive TID     Evaluation of Received Nutrient/Fluid Intake    Energy Calories Required: not meeting needs  Protein Required: not meeting needs  Fluid Required: not meeting needs  Tolerance: tolerating  % Intake of Estimated Energy Needs: 0 - 25 %  % Meal Intake: 0 - 25 %    Nutrition Risk    Level of Risk/Frequency of Follow-up: high     Assessment and Plan    Pt intake and appetite not improving. Drank 1/2 an Ensure yesterday. Family stated that the pt does not like pureed consistency. Has no issues with swallowing pills. Appetite stimulant may be necessary.  If PO intake continues to remain poor enteral nutrition may be necessary in order to meet estimated energy needs. Pt does not have a central line. If Clinimix and lipids desired, will need central line placed. Will continue to monitor intake and labs.     Monitor and Evaluation    Food and Nutrient Intake: energy intake, food and beverage intake  Food and Nutrient Adminstration: diet order  Physical Activity and Function: nutrition-related ADLs and IADLs  Anthropometric Measurements: weight, weight change  Biochemical Data, Medical Tests  and Procedures: inflammatory profile, electrolyte and renal panel, lipid profile, gastrointestinal profile, glucose/endocrine profile  Nutrition-Focused Physical Findings: overall appearance     Nutrition Follow-Up    RD Follow-up?: Yes  Ana Amado   09/27/2019  11:20 AM

## 2019-09-27 NOTE — NURSING
Discharge instructions and meds reviewed with patient and family, all v/u, all questions answered, belongings reconciled, discharged to [facility]. Called report to Hneny at [facility]

## 2019-09-27 NOTE — PLAN OF CARE
09/27/19 1341   Discharge Reassessment   Assessment Type Discharge Planning Reassessment   Anticipated Discharge Disposition alf Nu   Post-Acute Status   Post-Acute Authorization Placement   Post-Acute Placement Status   (Patient returning to [facility] as a [facility] patient.)    DC orders were sent to [facility] via [employer] fax. I called Lisy at [facility] [phone] who verified recpt.    1600--Report info rec'd from Lisy and given to nurse, Catarina to call report--Henny on [facility]hernade[facility] at [facility] [phone], [facility] will send transport. Lisy informed me that they have 3 admits, transport may be delayed but it will come.

## 2019-09-27 NOTE — PROGRESS NOTES
[facility] Medicine  Progress Note    Patient Name: Talia Alberts  MRN: [id number]  Patient Class: IP- Inpatient   Admission Date: 9/24/2019  Length of Stay: 2 days  Attending Physician: Errol Faustin MD  Primary Care Provider: Michael Martin MD        Subjective:     Principal Problem:Acute on chronic diastolic congestive heart failure        HPI:  89-year-old patient getting admitted with acute congestive heart failure flare  Patient resides in a nursing home and is already on medication for congestive heart failure  She was seen by her pulmonologist 2 weeks ago and again today  Compared to the last office visit today she was orthopneic, has got more significant edema on both lower extremities and patient was more short of breath  Following patient was admitted directly from the physician's office  When seen in the floor patient was not in respiratory distress, but there was significant amount of fluid all over the body  She will get admitted for IV access he diuresis and her cardiologist will be consulted    Overview/Hospital Course:  Patient much better after diuresis  No new issues  She is walking around with Help  At NH she is more or less sedentary  No new issues compared to yesterday    Interval History:     Review of Systems   Constitutional: Negative for activity change and appetite change.   HENT: Negative for congestion and dental problem.    Eyes: Negative for discharge and itching.   Respiratory: Negative for shortness of breath.    Cardiovascular: Negative for chest pain.   Gastrointestinal: Negative for abdominal distention and abdominal pain.   Endocrine: Negative for cold intolerance.   Genitourinary: Negative for difficulty urinating and dysuria.   Musculoskeletal: Negative for arthralgias and back pain.   Skin: Negative for color change.   Neurological: Negative for dizziness and facial asymmetry.   Hematological: Negative for adenopathy.   Psychiatric/Behavioral:  Negative for agitation and behavioral problems.     Objective:     Vital Signs (Most Recent):  Temp: 98.1 °F (36.7 °C) (09/26/19 1947)  Pulse: (!) 59 (09/26/19 1947)  Resp: 16 (09/26/19 1947)  BP: (!) 104/52 (09/26/19 1947)  SpO2: 95 % (09/26/19 1947) Vital Signs (24h Range):  Temp:  [97.4 °F (36.3 °C)-98.7 °F (37.1 °C)] 98.1 °F (36.7 °C)  Pulse:  [58-65] 59  Resp:  [16-20] 16  SpO2:  [95 %-100 %] 95 %  BP: (104-190)/(52-87) 104/52     Weight: 68 kg (149 lb 15.7 oz)  Body mass index is 28.34 kg/m².    Intake/Output Summary (Last 24 hours) at 9/26/2019 2020  Last data filed at 9/26/2019 1626  Gross per 24 hour   Intake 450 ml   Output 2200 ml   Net -1750 ml      Physical Exam   Constitutional: She is oriented to person, place, and time. No distress.   Eyes: Pupils are equal, round, and reactive to light. EOM are normal.   Neck: Neck supple.   Cardiovascular: Normal rate.   Pulmonary/Chest: Effort normal and breath sounds normal.   Abdominal: Soft. Bowel sounds are normal.   Musculoskeletal: Normal range of motion. She exhibits edema.   Neurological: She is alert and oriented to person, place, and time.   Skin: Skin is warm.   Psychiatric: She has a normal mood and affect.   Nursing note and vitals reviewed.      Significant Labs:   CBC:   Recent Labs   Lab 09/24/19 2029 09/25/19 0417 09/26/19 0416   WBC 8.16 5.67 6.11  6.11   HGB 10.9* 10.1* 9.8*  9.8*   HCT 34.2* 32.0* 30.7*  30.7*   * 116* 121*  121*     CMP:   Recent Labs   Lab 09/25/19 0417 09/26/19 0416    138  138   K 4.1 3.9  3.9   CL 96 94*  94*   CO2 32* 33*  33*    96  96   BUN 30 34*  34*   CREATININE 0.9 0.9  0.9   CALCIUM 8.2* 8.3*  8.3*   PROT 6.1 5.9*  5.9*   ALBUMIN 3.5 3.3*  3.3*   BILITOT 0.8 0.9  0.9   ALKPHOS 128 121  121   AST 24 22  22   ALT 27 24  24   ANIONGAP 8 11  11   EGFRNONAA 55.3* 55.3*  55.3*       Significant Imaging: I have reviewed all pertinent imaging results/findings within the past 24  hours.      Assessment/Plan:      * Acute on chronic diastolic congestive heart failure  After diuresis pt feels much better       Bilateral lower extremity edema  Mostly from CHF flare      Cardiogenic pulmonary edema  Much better after diuresis      Coronary artery disease involving native coronary artery of native heart without angina pectoris  Stable issue at the moment.  No significant chest pain      Essential hypertension  Blood pressure level stable.  Will closely monitor        VTE Risk Mitigation (From admission, onward)         Ordered     enoxaparin injection 40 mg  Daily      09/25/19 1102     IP VTE HIGH RISK PATIENT  Once      09/24/19 1640                      Errol Faustin MD  Department of Hospital Medicine   [facility]

## 2019-09-27 NOTE — PLAN OF CARE
09/27/19 1154   Patient Assessment/Suction   Level of Consciousness (AVPU) alert   Respiratory Effort Normal;Unlabored   All Lung Fields Breath Sounds clear   PRE-TX-O2   O2 Device (Oxygen Therapy) venti mask   $ Is the patient on Low Flow Oxygen? Yes   Flow (L/min) 3   SpO2 98 %   Pulse Oximetry Type Intermittent   $ Pulse Oximetry - Multiple Charge Pulse Oximetry - Multiple   Pulse 62   Resp 18   Aerosol Therapy   $ Aerosol Therapy Charges Aerosol Treatment;PRN treatment not required   Respiratory Treatment Status (SVN) PRN treatment not required

## 2019-09-27 NOTE — DISCHARGE SUMMARY
[facility] Medicine  Discharge Summary      Patient Name: Talia Alberts  MRN: [id number]  Admission Date: 9/24/2019  Hospital Length of Stay: 3 days  Discharge Date and Time:  09/27/2019 11:50 AM  Attending Physician: Errol Faustin MD   Discharging Provider: Errol Faustin MD  Primary Care Provider: Michael Martin MD      HPI:   89-year-old patient getting admitted with acute congestive heart failure flare  Patient resides in a nursing home and is already on medication for congestive heart failure  She was seen by her pulmonologist 2 weeks ago and again today  Compared to the last office visit today she was orthopneic, has got more significant edema on both lower extremities and patient was more short of breath  Following patient was admitted directly from the physician's office  When seen in the floor patient was not in respiratory distress, but there was significant amount of fluid all over the body  She will get admitted for IV access he diuresis and her cardiologist will be consulted    * No surgery found *      Hospital Course:   Patient admitted with acute on chronic diastolic heart failure in the background of advanced age  Patient lives in nursing Home and got admitted to the hospital for aggressive diuresis  She did very well with IV Lasix  Patient was seen by a cardiologist  Later patient was discharged to  home with oral diuretics  Patient's daughter said that she already has gotten a nebulizing machine and a home oxygen at the nursing home  where she resides     Consults:   Consults (From admission, onward)        Status Ordering Provider     Inpatient consult to Cardiology  Once     Provider:  Davin Mcneil MD    Acknowledged ERROL FAUSTIN     Inpatient consult to Registered Dietitian/Nutritionist  Once     Provider:  (Not yet assigned)    Completed ERROL FAUSTIN          No new Assessment & Plan notes have been filed under this hospital service since the last note was  generated.  Service: Hospital Medicine    Final Active Diagnoses:    Diagnosis Date Noted POA    Coronary artery disease involving native coronary artery of native heart without angina pectoris [I25.10]  Yes     Chronic    Essential hypertension [I10] 03/19/2017 Yes      Problems Resolved During this Admission:    Diagnosis Date Noted Date Resolved POA    PRINCIPAL PROBLEM:  Acute on chronic diastolic congestive heart failure [I50.33] 02/09/2018 09/27/2019 Yes    Bilateral lower extremity edema [R60.0] 09/10/2019 09/27/2019 Yes    Cardiogenic pulmonary edema [I50.1] 09/10/2019 09/27/2019 Yes       Discharged Condition: good    Disposition: Another Health Care Inst*    Follow Up:  Follow-up Information     Asuncion Godinez MD.    Specialty:  Cardiology  Contact information:  [address] [place] [id number] [phone]                 Patient Instructions:      Diet Cardiac     Activity as tolerated       Significant Diagnostic Studies: Labs:   CMP   Recent Labs   Lab 09/26/19 0416 09/27/19  0426     138 138  138   K 3.9  3.9 3.5  3.5   CL 94*  94* 94*  94*   CO2 33*  33* 35*  35*   GLU 96  96 99  99   BUN 34*  34* 39*  39*   CREATININE 0.9  0.9 1.0  1.0   CALCIUM 8.3*  8.3* 8.4*  8.4*   PROT 5.9*  5.9* 6.4  6.4   ALBUMIN 3.3*  3.3* 3.6  3.6   BILITOT 0.9  0.9 1.1*  1.1*   ALKPHOS 121  121 140*  140*   AST 22  22 25  25   ALT 24  24 24  24   ANIONGAP 11  11 9  9   ESTGFRAFRICA >60.0  >60.0 56.1*  56.1*   EGFRNONAA 55.3*  55.3* 48.7*  48.7*    and CBC   Recent Labs   Lab 09/26/19 0416 09/27/19  0426   WBC 6.11  6.11 6.26  6.26   HGB 9.8*  9.8* 11.1*  11.1*   HCT 30.7*  30.7* 34.9*  34.9*   *  121* 129*  129*       Pending Diagnostic Studies:     None         Medications:  Reconciled Home Medications:      Medication List      START taking these medications    furosemide 20 MG tablet  Commonly known as:  LASIX  Take 1 tablet (20 mg total) by  mouth 2 (two) times daily.  Replaces:  furosemide 10 mg/mL injection        CHANGE how you take these medications    albuterol sulfate 2.5 mg/0.5 mL Nebu  Take 2.5 mg by nebulization every 4 (four) hours as needed. Rescue  What changed:    · how much to take  · when to take this  · reasons to take this     artificial tears 0.5 % ophthalmic solution  Commonly known as:  ISOPTO TEARS  Place 1 drop into both eyes as needed.  What changed:  reasons to take this        CONTINUE taking these medications    alendronate 70 MG tablet  Commonly known as:  FOSAMAX  Take 70 mg by mouth every Thursday.     ARTHRICREAM RUB 10 % cream  Generic drug:  trolamine salicylate  Apply 1 Dose topically 3 (three) times daily as needed (Left Hip Pain).     aspirin 81 MG EC tablet  Commonly known as:  ECOTRIN  Take 81 mg by mouth.     CALCIUM 600 + D(3) 600 mg(1,500mg) -200 unit Tab  Generic drug:  calcium-vitamin D3  Take 1 tablet by mouth once daily.     docusate sodium 100 MG capsule  Commonly known as:  COLACE  Take 100 mg by mouth 2 (two) times daily.     doxazosin 1 MG tablet  Commonly known as:  CARDURA     ferrous sulfate 325 (65 FE) MG EC tablet  Take 325 mg by mouth once daily.     hydrocortisone 1 % ointment  Apply 1 % topically 2 (two) times daily as needed (1-2 times daily for Blepharitis).     losartan 50 MG tablet  Commonly known as:  COZAAR  Take 50 mg by mouth 2 (two) times daily.     lubiprostone 8 MCG Cap  Commonly known as:  AMITIZA  Take 8 mcg by mouth 2 (two) times daily as needed (constipation).     metoprolol succinate 25 MG 24 hr tablet  Commonly known as:  TOPROL-XL  Take 25 mg by mouth once daily.     miconazole nitrate 2% 2 % Oint  Commonly known as:  MICOTIN  Apply topically 2 (two) times daily. Apply to butocks     neomycin-polymyxin-hydrocortisone otic solution  Commonly known as:  CORTISPORIN  3 times a day for 2 days only     pantoprazole 40 MG tablet  Commonly known as:  PROTONIX  Take 40 mg by mouth once  daily.     potassium chloride SA 20 MEQ tablet  Commonly known as:  K-DUR,KLOR-CON     simvastatin 40 MG tablet  Commonly known as:  ZOCOR  Take 40 mg by mouth every evening.     THERA 400 mcg Tab  Generic drug:  multivitamin with folic acid  Take 1 tablet by mouth.        STOP taking these medications    acetaminophen 325 MG tablet  Commonly known as:  TYLENOL     clonazePAM 0.25 MG Tbdl  Commonly known as:  KLONOPIN     dexamethasone 4 mg/mL injection  Commonly known as:  DECADRON     erythromycin ophthalmic ointment  Commonly known as:  ROMYCIN     escitalopram oxalate 10 MG tablet  Commonly known as:  LEXAPRO     furosemide 10 mg/mL injection  Commonly known as:  LASIX  Replaced by:  furosemide 20 MG tablet     methylPREDNISolone 4 mg tablet  Commonly known as:  MEDROL DOSEPACK     multivitamin tablet  Commonly known as:  THERAGRAN     ondansetron 4 MG Tbdl  Commonly known as:  ZOFRAN-ODT     SYSTANE COMPLETE OPHT            Indwelling Lines/Drains at time of discharge:   Lines/Drains/Airways     None                 Time spent on the discharge of patient: 26  minutes  Patient was seen and examined on the date of discharge and determined to be suitable for discharge.         Errol Faustin MD  Department of Hospital Medicine  [facility]

## 2019-10-03 NOTE — ED NOTES
"Patient identifiers for Talia Alberts checked and correct.  LOC:  Patient is awake, alert, and aware of environment with an appropriate affect. Patient is oriented x 3 and speaking appropriately.  APPEARANCE:  Patient resting comfortably and in no acute distress. Patient is clean and well groomed, patient's clothing is properly fastened.  SKIN:  The skin is warm and dry. Patient has normal skin turgor and moist mucus membranes. Skin is intact; bruising to left outer eye lid after fall.  MUSCULOSKELETAL:  Patient is moving all extremities well, no obvious deformities noted. Pulses intact. States she has pain to both lower legs "behind knees".  RESPIRATORY:  Airway is open and patent. Respirations are spontaneous and non-labored with normal effort and rate.  CARDIAC:  Patient has a normal rate and rhythm. No peripheral edema noted. Capillary refill < 3 seconds.  ABDOMEN:  No distention noted.  Soft and non-tender upon palpation.  NEUROLOGICAL:  PERRL. Facial expression is symmetrical. Hand grasps are equal bilaterally. Normal sensation in all extremities when touched with finger.  Allergies reported:   Review of patient's allergies indicates:   Allergen Reactions    Morphine Swelling     OTHER NOTES:  States she has been out shopping and got home and "legs just gave out" and fell and hit head, no LOC.    " Patient:   Roger Cordero            MRN: [id number]            FIN: [id number]               Age:   76 years     Sex:  FEMALE     :  43   Associated Diagnoses:   None   Author:   Tamica Littlejohn          Date of Surgery:     Surgeon: Tylor Arredondo M.D. Preoperative Diagnosis: Left cataract (ICD9:366.________)    Postoperative Diaagnosis: Left cataract (ICD9:366.________)    Procedures Performed: Femtosecond laser asissted left phacoemulsification with insertion of intraocular lens implant (CPT:  70930          RT)    Assistant: N/A    Anesthesia: Monitored Anesthesia Care/Topical Anesthetic. Complications: None. Indication for Procedure: Decreased visual acuity in the left eye    Description of Procedure: The patient's left eye was marked in the preop holding area, where the patient was given topical proparacaine. The limbus was marked at the six o'clock position while the patient was in the sitting position. The was patient was taken back to the laser room and positioned properly. A speculum was used for exposure and the patient interface was placed onto the patient's eye. The patient was properly docked, and then the Lensx laser was used to create a capsulorrhexis and break up the lens nucleus. Next, a temporal primary corneal incision was created. A 15 degree 30% arcuate incision was created at 124 degrees. The speculum was then removed. The limbus was marked at the six o'clock position while the patient was sitting up. The patient was then taken back to the operating room, where the anesthesia team achieved IV sedation. The patient was then prepped and draped in usual sterile fashion and a speculum was placed into the left eye for further exposure. A toric marking set was used to kenji the limbus at 124 degrees. A superblade as used to create a superotemportal paracentesis. Preservative-free lidocaine with epinephrine was given intracamerally for anesthesia.  Viscoelastic was used to deepen the anterior chamber. An Eiphert spatula was used to open up the primary corneal incision. Next, 0.12 forceps the Eiphert spatula were used to open the temporal clear corneal wound incision. A cystotome needle and Utrata forceps were used to elevate and remove the continuous curvilinear capsulorrhexis created by the laser. BSS was used for hydrodissection. The nucleus was removed using phacoemulsification. The remaining cortical lens material was removed using aspiration. Provisc was used to deepen the capsular bag and an GPE813 18.0 diopter lens was inserted into the capsular bag. The lens was rotated so that the toric axis was along 124 degrees. Serial [id number]. Provisc was removed using aspiration. Miochol was injected to bring down the pupil. BSS was used to hydrate both wounds. Topical Betadine was applied and the speculum was removed. The patient was given topical maxitrol ophthalmic solution and a patch and shield. The patient was taken back to day surgery in stable condition and is to follow up tomorrow at [facility] for further postoperative care.                Electronically Signed On 08/07/2018 10:58  __________________________________________________   Dasha Beth

## 2019-10-28 ENCOUNTER — HOSPITAL ENCOUNTER (INPATIENT)
Facility: HOSPITAL | Age: 84
LOS: 3 days | Discharge: NURSING, REHAB OR LONG-TERM CARE | DRG: 682 | End: 2019-10-31
Attending: EMERGENCY MEDICINE | Admitting: INTERNAL MEDICINE
Payer: MEDICARE

## 2019-10-28 DIAGNOSIS — K21.9 GASTROESOPHAGEAL REFLUX DISEASE WITHOUT ESOPHAGITIS: ICD-10-CM

## 2019-10-28 DIAGNOSIS — I10 ESSENTIAL HYPERTENSION: ICD-10-CM

## 2019-10-28 DIAGNOSIS — R41.82 ALTERED MENTAL STATUS, UNSPECIFIED ALTERED MENTAL STATUS TYPE: ICD-10-CM

## 2019-10-28 DIAGNOSIS — N39.0 URINARY TRACT INFECTION WITHOUT HEMATURIA, SITE UNSPECIFIED: ICD-10-CM

## 2019-10-28 DIAGNOSIS — N17.9 AKI (ACUTE KIDNEY INJURY): Primary | ICD-10-CM

## 2019-10-28 DIAGNOSIS — I25.10 CORONARY ARTERY DISEASE INVOLVING NATIVE CORONARY ARTERY OF NATIVE HEART WITHOUT ANGINA PECTORIS: Chronic | ICD-10-CM

## 2019-10-28 PROBLEM — E78.5 HYPERLIPIDEMIA: Status: ACTIVE | Noted: 2019-10-28

## 2019-10-28 PROBLEM — G93.41 ENCEPHALOPATHY, METABOLIC: Status: ACTIVE | Noted: 2019-10-28

## 2019-10-28 LAB
ALBUMIN SERPL BCP-MCNC: 3.8 G/DL (ref 3.5–5.2)
ALP SERPL-CCNC: 133 U/L (ref 55–135)
ALT SERPL W/O P-5'-P-CCNC: 30 U/L (ref 10–44)
AMORPH CRY URNS QL MICRO: ABNORMAL
ANION GAP SERPL CALC-SCNC: 13 MMOL/L (ref 8–16)
AST SERPL-CCNC: 27 U/L (ref 10–40)
BACTERIA #/AREA URNS HPF: ABNORMAL /HPF
BASOPHILS # BLD AUTO: 0.05 K/UL (ref 0–0.2)
BASOPHILS NFR BLD: 0.4 % (ref 0–1.9)
BILIRUB SERPL-MCNC: 0.7 MG/DL (ref 0.1–1)
BILIRUB UR QL STRIP: NEGATIVE
BUN SERPL-MCNC: 67 MG/DL (ref 10–30)
CALCIUM SERPL-MCNC: 9.3 MG/DL (ref 8.7–10.5)
CHLORIDE SERPL-SCNC: 101 MMOL/L (ref 95–110)
CLARITY UR: ABNORMAL
CO2 SERPL-SCNC: 23 MMOL/L (ref 23–29)
COLOR UR: YELLOW
CREAT SERPL-MCNC: 2 MG/DL (ref 0.5–1.4)
DIFFERENTIAL METHOD: ABNORMAL
EOSINOPHIL # BLD AUTO: 0.2 K/UL (ref 0–0.5)
EOSINOPHIL NFR BLD: 1.9 % (ref 0–8)
ERYTHROCYTE [DISTWIDTH] IN BLOOD BY AUTOMATED COUNT: 13.1 % (ref 11.5–14.5)
EST. GFR  (AFRICAN AMERICAN): 24 ML/MIN/1.73 M^2
EST. GFR  (NON AFRICAN AMERICAN): 21 ML/MIN/1.73 M^2
GLUCOSE SERPL-MCNC: 200 MG/DL (ref 70–110)
GLUCOSE UR QL STRIP: NEGATIVE
HCT VFR BLD AUTO: 42.6 % (ref 37–48.5)
HGB BLD-MCNC: 13.6 G/DL (ref 12–16)
HGB UR QL STRIP: ABNORMAL
IMM GRANULOCYTES # BLD AUTO: 0.04 K/UL (ref 0–0.04)
KETONES UR QL STRIP: NEGATIVE
LEUKOCYTE ESTERASE UR QL STRIP: ABNORMAL
LYMPHOCYTES # BLD AUTO: 3.1 K/UL (ref 1–4.8)
LYMPHOCYTES NFR BLD: 26.1 % (ref 18–48)
MCH RBC QN AUTO: 29.6 PG (ref 27–31)
MCHC RBC AUTO-ENTMCNC: 31.9 G/DL (ref 32–36)
MCV RBC AUTO: 93 FL (ref 82–98)
MICROSCOPIC COMMENT: ABNORMAL
MONOCYTES # BLD AUTO: 1 K/UL (ref 0.3–1)
MONOCYTES NFR BLD: 8.5 % (ref 4–15)
NEUTROPHILS # BLD AUTO: 7.4 K/UL (ref 1.8–7.7)
NEUTROPHILS NFR BLD: 62.8 % (ref 38–73)
NITRITE UR QL STRIP: NEGATIVE
NRBC BLD-RTO: 0 /100 WBC
PH UR STRIP: 8 [PH] (ref 5–8)
PLATELET # BLD AUTO: 136 K/UL (ref 150–350)
PMV BLD AUTO: 11.3 FL (ref 9.2–12.9)
POTASSIUM SERPL-SCNC: 4.6 MMOL/L (ref 3.5–5.1)
PROT SERPL-MCNC: 7.6 G/DL (ref 6–8.4)
PROT UR QL STRIP: ABNORMAL
RBC # BLD AUTO: 4.59 M/UL (ref 4–5.4)
RBC #/AREA URNS HPF: 3 /HPF (ref 0–4)
SODIUM SERPL-SCNC: 137 MMOL/L (ref 136–145)
SP GR UR STRIP: 1.01 (ref 1–1.03)
URN SPEC COLLECT METH UR: ABNORMAL
UROBILINOGEN UR STRIP-ACNC: ABNORMAL EU/DL
WBC # BLD AUTO: 11.74 K/UL (ref 3.9–12.7)
WBC #/AREA URNS HPF: >100 /HPF (ref 0–5)

## 2019-10-28 PROCEDURE — 87186 SC STD MICRODIL/AGAR DIL: CPT

## 2019-10-28 PROCEDURE — 11000001 HC ACUTE MED/SURG PRIVATE ROOM

## 2019-10-28 PROCEDURE — 25000003 PHARM REV CODE 250: Performed by: INTERNAL MEDICINE

## 2019-10-28 PROCEDURE — 63600175 PHARM REV CODE 636 W HCPCS: Performed by: INTERNAL MEDICINE

## 2019-10-28 PROCEDURE — 87088 URINE BACTERIA CULTURE: CPT

## 2019-10-28 PROCEDURE — 87077 CULTURE AEROBIC IDENTIFY: CPT

## 2019-10-28 PROCEDURE — 36415 COLL VENOUS BLD VENIPUNCTURE: CPT

## 2019-10-28 PROCEDURE — 85025 COMPLETE CBC W/AUTO DIFF WBC: CPT

## 2019-10-28 PROCEDURE — 99285 EMERGENCY DEPT VISIT HI MDM: CPT | Mod: 25

## 2019-10-28 PROCEDURE — 25000003 PHARM REV CODE 250: Performed by: EMERGENCY MEDICINE

## 2019-10-28 PROCEDURE — 63600175 PHARM REV CODE 636 W HCPCS: Performed by: EMERGENCY MEDICINE

## 2019-10-28 PROCEDURE — 81000 URINALYSIS NONAUTO W/SCOPE: CPT

## 2019-10-28 PROCEDURE — 80053 COMPREHEN METABOLIC PANEL: CPT

## 2019-10-28 PROCEDURE — 87086 URINE CULTURE/COLONY COUNT: CPT

## 2019-10-28 RX ORDER — SODIUM CHLORIDE 9 MG/ML
1000 INJECTION, SOLUTION INTRAVENOUS
Status: COMPLETED | OUTPATIENT
Start: 2019-10-28 | End: 2019-10-28

## 2019-10-28 RX ORDER — TOBRAMYCIN AND DEXAMETHASONE 3; 1 MG/ML; MG/ML
2 SUSPENSION/ DROPS OPHTHALMIC EVERY 6 HOURS
COMMUNITY

## 2019-10-28 RX ORDER — SODIUM CHLORIDE 9 MG/ML
INJECTION, SOLUTION INTRAVENOUS CONTINUOUS
Status: DISCONTINUED | OUTPATIENT
Start: 2019-10-28 | End: 2019-10-30

## 2019-10-28 RX ORDER — PANTOPRAZOLE SODIUM 40 MG/1
40 TABLET, DELAYED RELEASE ORAL DAILY
Status: DISCONTINUED | OUTPATIENT
Start: 2019-10-28 | End: 2019-10-31 | Stop reason: HOSPADM

## 2019-10-28 RX ORDER — ASPIRIN 81 MG/1
81 TABLET ORAL DAILY
Status: DISCONTINUED | OUTPATIENT
Start: 2019-10-28 | End: 2019-10-31 | Stop reason: HOSPADM

## 2019-10-28 RX ORDER — ONDANSETRON 2 MG/ML
4 INJECTION INTRAMUSCULAR; INTRAVENOUS EVERY 8 HOURS PRN
Status: DISCONTINUED | OUTPATIENT
Start: 2019-10-28 | End: 2019-10-31 | Stop reason: HOSPADM

## 2019-10-28 RX ORDER — SIMVASTATIN 40 MG/1
40 TABLET, FILM COATED ORAL NIGHTLY
Status: DISCONTINUED | OUTPATIENT
Start: 2019-10-28 | End: 2019-10-31 | Stop reason: HOSPADM

## 2019-10-28 RX ORDER — HEPARIN SODIUM 5000 [USP'U]/ML
5000 INJECTION, SOLUTION INTRAVENOUS; SUBCUTANEOUS EVERY 12 HOURS
Status: DISCONTINUED | OUTPATIENT
Start: 2019-10-28 | End: 2019-10-31 | Stop reason: HOSPADM

## 2019-10-28 RX ORDER — FOLIC ACID 1 MG/1
400 TABLET ORAL DAILY
COMMUNITY

## 2019-10-28 RX ORDER — METOPROLOL SUCCINATE 25 MG/1
25 TABLET, EXTENDED RELEASE ORAL DAILY
Status: DISCONTINUED | OUTPATIENT
Start: 2019-10-29 | End: 2019-10-31 | Stop reason: HOSPADM

## 2019-10-28 RX ORDER — ACETAMINOPHEN 325 MG/1
650 TABLET ORAL EVERY 6 HOURS PRN
Status: DISCONTINUED | OUTPATIENT
Start: 2019-10-28 | End: 2019-10-31 | Stop reason: HOSPADM

## 2019-10-28 RX ORDER — LOSARTAN POTASSIUM 25 MG/1
50 TABLET ORAL 2 TIMES DAILY
Status: CANCELLED | OUTPATIENT
Start: 2019-10-28

## 2019-10-28 RX ORDER — DOCUSATE SODIUM 100 MG/1
100 CAPSULE, LIQUID FILLED ORAL 2 TIMES DAILY
Status: DISCONTINUED | OUTPATIENT
Start: 2019-10-28 | End: 2019-10-31 | Stop reason: HOSPADM

## 2019-10-28 RX ORDER — ALBUTEROL SULFATE 2.5 MG/.5ML
2.5 SOLUTION RESPIRATORY (INHALATION) EVERY 6 HOURS PRN
Status: DISCONTINUED | OUTPATIENT
Start: 2019-10-28 | End: 2019-10-31 | Stop reason: HOSPADM

## 2019-10-28 RX ORDER — POTASSIUM CHLORIDE 20 MEQ/1
20 TABLET, EXTENDED RELEASE ORAL DAILY
Status: DISCONTINUED | OUTPATIENT
Start: 2019-10-29 | End: 2019-10-31 | Stop reason: HOSPADM

## 2019-10-28 RX ADMIN — DOCUSATE SODIUM 100 MG: 100 CAPSULE, LIQUID FILLED ORAL at 09:10

## 2019-10-28 RX ADMIN — SODIUM CHLORIDE 1000 ML: 0.9 INJECTION, SOLUTION INTRAVENOUS at 11:10

## 2019-10-28 RX ADMIN — CEFTRIAXONE 1 G: 1 INJECTION, SOLUTION INTRAVENOUS at 11:10

## 2019-10-28 RX ADMIN — HEPARIN SODIUM 5000 UNITS: 5000 INJECTION, SOLUTION INTRAVENOUS; SUBCUTANEOUS at 09:10

## 2019-10-28 RX ADMIN — PANTOPRAZOLE SODIUM 40 MG: 40 TABLET, DELAYED RELEASE ORAL at 04:10

## 2019-10-28 RX ADMIN — SODIUM CHLORIDE: 0.9 INJECTION, SOLUTION INTRAVENOUS at 03:10

## 2019-10-28 RX ADMIN — ASPIRIN 81 MG: 81 TABLET, COATED ORAL at 04:10

## 2019-10-28 RX ADMIN — SIMVASTATIN 40 MG: 40 TABLET, FILM COATED ORAL at 09:10

## 2019-10-28 NOTE — ASSESSMENT & PLAN NOTE
Continue IVF hydration.  Follow renal panel and electrolytes closely.  Adjust renal dose medications for creatinine clearance 10-50cc/min.  Avoid NSAIDs, Lorenzo-II inhibitors, ACE-I, Angiotensin Receptor Blockers, or Aminoglycosides.  Hold Lasix therapy and angiotensin receptor blocker for now.

## 2019-10-28 NOTE — HPI
Patient is an elderly frail 89-year-old  female with past medical history significant for hypertension, hyperlipidemia, gastroesophageal reflux disease, coronary artery disease who presented to the emergency room at [facility] via paramedics with complaint of altered mental status.  Patient is a resident of local nursing home.  Reportedly patient was noted to have visual hallucinations.  Also it was reported patient's pulse ox at nursing home was around 70% which improved to 99% upon supplemental oxygen use via nasal cannula.  Reportedly patient's mental status improved subsequently.  Patient denies any focal subjective complaints.  Patient is noted to have worsening renal functions than baseline.  No recent fever, chills, nausea vomiting or diarrhea reported.

## 2019-10-28 NOTE — UM SECONDARY REVIEW
Physician Advisor External    Level of Care Issue    1522  Sent to EHR for review DOS 10/28/19    2252  EHR recommends inpatient for 10/28/19

## 2019-10-28 NOTE — SUBJECTIVE & OBJECTIVE
Past Medical History:   Diagnosis Date    Acute on chronic diastolic heart failure     Aortic stenosis, moderate     Breast cancer     resolved and no evidence of recurrence    Coronary artery disease     GERD (gastroesophageal reflux disease)     Hyperlipidemia     Hypertension     Iron deficiency anemia     Major depressive disorder     MI (myocardial infarction)     Squamous cell carcinoma        Past Surgical History:   Procedure Laterality Date    BREAST LUMPECTOMY      LT SIDE     SECTION      4    CHOLECYSTECTOMY      ORIF FEMUR FRACTURE Right 2014    TISSUE AORTIC VALVE REPLACEMENT         Review of patient's allergies indicates:   Allergen Reactions    Morphine Swelling       No current facility-administered medications on file prior to encounter.      Current Outpatient Medications on File Prior to Encounter   Medication Sig    albuterol sulfate 2.5 mg/0.5 mL Nebu Take 2.5 mg by nebulization every 4 (four) hours as needed. Rescue (Patient taking differently: Take by nebulization every 6 (six) hours as needed (SOB). Rescue)    alendronate (FOSAMAX) 70 MG tablet Take 70 mg by mouth every Thursday.     artificial tears (ISOPTO TEARS) 0.5 % ophthalmic solution Place 1 drop into both eyes as needed. (Patient taking differently: Place 1 drop into both eyes as needed (dry eye). )    aspirin (ECOTRIN) 81 MG EC tablet Take 81 mg by mouth.    calcium-vitamin D tablet 600 mg-200 units Take 1 tablet by mouth once daily.    docusate sodium (COLACE) 100 MG capsule Take 100 mg by mouth 2 (two) times daily.    doxazosin (CARDURA) 1 MG tablet     ferrous sulfate 325 (65 FE) MG EC tablet Take 325 mg by mouth once daily.    furosemide (LASIX) 20 MG tablet Take 1 tablet (20 mg total) by mouth 2 (two) times daily.    hydrocortisone 1 % ointment Apply 1 % topically 2 (two) times daily as needed (1-2 times daily for Blepharitis).    losartan (COZAAR) 50 MG tablet Take 50 mg by  mouth 2 (two) times daily.     lubiprostone (AMITIZA) 8 MCG Cap Take 8 mcg by mouth 2 (two) times daily as needed (constipation).     metoprolol succinate (TOPROL-XL) 25 MG 24 hr tablet Take 25 mg by mouth once daily.     miconazole nitrate 2% (MICOTIN) 2 % Oint Apply topically 2 (two) times daily. Apply to butocks (Patient not taking: Reported on 9/24/2019)    multivitamin with folic acid (THERA) 400 mcg Tab Take 1 tablet by mouth.    neomycin-polymyxin-hydrocortisone (CORTISPORIN) otic solution 3 times a day for 2 days only    pantoprazole (PROTONIX) 40 MG tablet Take 40 mg by mouth once daily.    potassium chloride SA (K-DUR,KLOR-CON) 20 MEQ tablet     simvastatin (ZOCOR) 40 MG tablet Take 40 mg by mouth every evening.    trolamine salicylate (ARTHRICREAM RUB) 10 % cream Apply 1 Dose topically 3 (three) times daily as needed (Left Hip Pain).      Family History     Problem Relation (Age of Onset)    No Known Problems Mother, Father        Tobacco Use    Smoking status: Never Smoker    Smokeless tobacco: Never Used   Substance and Sexual Activity    Alcohol use: No    Drug use: No    Sexual activity: Not on file     Review of Systems   Constitutional: Positive for fatigue.   Musculoskeletal: Positive for arthralgias.   Neurological: Positive for dizziness and weakness.   Psychiatric/Behavioral: Positive for confusion.   All other systems reviewed and are negative.    Objective:     Vital Signs (Most Recent):  Temp: 97.7 °F (36.5 °C) (10/28/19 1021)  Pulse: 75 (10/28/19 1416)  Resp: 16 (10/28/19 1021)  BP: (!) 113/56 (10/28/19 1031)  SpO2: 96 % (10/28/19 1416) Vital Signs (24h Range):  Temp:  [97.7 °F (36.5 °C)] 97.7 °F (36.5 °C)  Pulse:  [65-75] 75  Resp:  [16] 16  SpO2:  [94 %-100 %] 96 %  BP: (113-119)/(56-58) 113/56     Weight: 66.2 kg (146 lb)  Body mass index is 27.59 kg/m².    Physical Exam   Constitutional: She is oriented to person, place, and time. She appears well-developed.   Elderly frail  appearing female   HENT:   Head: Normocephalic and atraumatic.   Dry mucosa   Eyes: Pupils are equal, round, and reactive to light. Conjunctivae are normal.   Neck: Neck supple. No JVD present. No thyromegaly present.   Cardiovascular: Normal rate and regular rhythm. Exam reveals no gallop and no friction rub.   No murmur heard.  Pulmonary/Chest: Effort normal and breath sounds normal.   Abdominal: Soft. Bowel sounds are normal. She exhibits no distension and no mass. There is no tenderness.   Musculoskeletal: Normal range of motion. She exhibits no edema.   Neurological: She is oriented to person, place, and time. No cranial nerve deficit.   Skin: Skin is warm and dry.   Psychiatric: Her behavior is normal.         CRANIAL NERVES     CN III, IV, VI   Pupils are equal, round, and reactive to light.       Significant Labs:   CBC:   Recent Labs   Lab 10/28/19  1033   WBC 11.74   HGB 13.6   HCT 42.6   *     CMP:   Recent Labs   Lab 10/28/19  1033      K 4.6      CO2 23   *   BUN 67*   CREATININE 2.0*   CALCIUM 9.3   PROT 7.6   ALBUMIN 3.8   BILITOT 0.7   ALKPHOS 133   AST 27   ALT 30   ANIONGAP 13   EGFRNONAA 21*     Urine Culture: No results for input(s): LABURIN in the last 48 hours.  Urine Studies:   Recent Labs   Lab 10/28/19  1046   COLORU Yellow   APPEARANCEUA Cloudy*   PHUR 8.0   SPECGRAV 1.010   PROTEINUA Trace*   GLUCUA Negative   KETONESU Negative   BILIRUBINUA Negative   OCCULTUA 1+*   NITRITE Negative   UROBILINOGEN 2.0-3.0*   LEUKOCYTESUR 3+*   RBCUA 3   WBCUA >100*   BACTERIA Many*       Significant Imaging:   CXR: Pending.

## 2019-10-29 LAB
ALBUMIN SERPL BCP-MCNC: 3.4 G/DL (ref 3.5–5.2)
ALP SERPL-CCNC: 129 U/L (ref 55–135)
ALT SERPL W/O P-5'-P-CCNC: 25 U/L (ref 10–44)
ANION GAP SERPL CALC-SCNC: 11 MMOL/L (ref 8–16)
ANION GAP SERPL CALC-SCNC: 11 MMOL/L (ref 8–16)
AST SERPL-CCNC: 25 U/L (ref 10–40)
BASOPHILS # BLD AUTO: 0.06 K/UL (ref 0–0.2)
BASOPHILS NFR BLD: 0.9 % (ref 0–1.9)
BILIRUB SERPL-MCNC: 0.6 MG/DL (ref 0.1–1)
BUN SERPL-MCNC: 41 MG/DL (ref 10–30)
BUN SERPL-MCNC: 41 MG/DL (ref 10–30)
CALCIUM SERPL-MCNC: 8.5 MG/DL (ref 8.7–10.5)
CALCIUM SERPL-MCNC: 8.5 MG/DL (ref 8.7–10.5)
CHLORIDE SERPL-SCNC: 110 MMOL/L (ref 95–110)
CHLORIDE SERPL-SCNC: 110 MMOL/L (ref 95–110)
CO2 SERPL-SCNC: 20 MMOL/L (ref 23–29)
CO2 SERPL-SCNC: 20 MMOL/L (ref 23–29)
CREAT SERPL-MCNC: 1.2 MG/DL (ref 0.5–1.4)
CREAT SERPL-MCNC: 1.2 MG/DL (ref 0.5–1.4)
DIFFERENTIAL METHOD: NORMAL
EOSINOPHIL # BLD AUTO: 0.3 K/UL (ref 0–0.5)
EOSINOPHIL NFR BLD: 4.8 % (ref 0–8)
ERYTHROCYTE [DISTWIDTH] IN BLOOD BY AUTOMATED COUNT: 13.1 % (ref 11.5–14.5)
EST. GFR  (AFRICAN AMERICAN): 45 ML/MIN/1.73 M^2
EST. GFR  (AFRICAN AMERICAN): 45 ML/MIN/1.73 M^2
EST. GFR  (NON AFRICAN AMERICAN): 39 ML/MIN/1.73 M^2
EST. GFR  (NON AFRICAN AMERICAN): 39 ML/MIN/1.73 M^2
GLUCOSE SERPL-MCNC: 103 MG/DL (ref 70–110)
GLUCOSE SERPL-MCNC: 103 MG/DL (ref 70–110)
HCT VFR BLD AUTO: 38.9 % (ref 37–48.5)
HGB BLD-MCNC: 12.5 G/DL (ref 12–16)
IMM GRANULOCYTES # BLD AUTO: 0.02 K/UL (ref 0–0.04)
LYMPHOCYTES # BLD AUTO: 1.4 K/UL (ref 1–4.8)
LYMPHOCYTES NFR BLD: 22 % (ref 18–48)
MAGNESIUM SERPL-MCNC: 2.2 MG/DL (ref 1.6–2.6)
MCH RBC QN AUTO: 29.4 PG (ref 27–31)
MCHC RBC AUTO-ENTMCNC: 32.1 G/DL (ref 32–36)
MCV RBC AUTO: 92 FL (ref 82–98)
MONOCYTES # BLD AUTO: 0.7 K/UL (ref 0.3–1)
MONOCYTES NFR BLD: 10.3 % (ref 4–15)
NEUTROPHILS # BLD AUTO: 3.9 K/UL (ref 1.8–7.7)
NEUTROPHILS NFR BLD: 61.7 % (ref 38–73)
NRBC BLD-RTO: 0 /100 WBC
PHOSPHATE SERPL-MCNC: 3.5 MG/DL (ref 2.7–4.5)
PLATELET # BLD AUTO: 154 K/UL (ref 150–350)
PMV BLD AUTO: 11.1 FL (ref 9.2–12.9)
POTASSIUM SERPL-SCNC: 4.3 MMOL/L (ref 3.5–5.1)
POTASSIUM SERPL-SCNC: 4.3 MMOL/L (ref 3.5–5.1)
PROT SERPL-MCNC: 6.9 G/DL (ref 6–8.4)
RBC # BLD AUTO: 4.25 M/UL (ref 4–5.4)
SODIUM SERPL-SCNC: 141 MMOL/L (ref 136–145)
SODIUM SERPL-SCNC: 141 MMOL/L (ref 136–145)
WBC # BLD AUTO: 6.4 K/UL (ref 3.9–12.7)

## 2019-10-29 PROCEDURE — 63600175 PHARM REV CODE 636 W HCPCS: Performed by: EMERGENCY MEDICINE

## 2019-10-29 PROCEDURE — 97165 OT EVAL LOW COMPLEX 30 MIN: CPT

## 2019-10-29 PROCEDURE — 11000001 HC ACUTE MED/SURG PRIVATE ROOM

## 2019-10-29 PROCEDURE — 92610 EVALUATE SWALLOWING FUNCTION: CPT

## 2019-10-29 PROCEDURE — G8987 SELF CARE CURRENT STATUS: HCPCS | Mod: CK

## 2019-10-29 PROCEDURE — 83735 ASSAY OF MAGNESIUM: CPT

## 2019-10-29 PROCEDURE — 94761 N-INVAS EAR/PLS OXIMETRY MLT: CPT

## 2019-10-29 PROCEDURE — 80053 COMPREHEN METABOLIC PANEL: CPT

## 2019-10-29 PROCEDURE — G8988 SELF CARE GOAL STATUS: HCPCS | Mod: CK

## 2019-10-29 PROCEDURE — 97802 MEDICAL NUTRITION INDIV IN: CPT

## 2019-10-29 PROCEDURE — 36415 COLL VENOUS BLD VENIPUNCTURE: CPT

## 2019-10-29 PROCEDURE — 97161 PT EVAL LOW COMPLEX 20 MIN: CPT | Performed by: PHYSICAL THERAPIST

## 2019-10-29 PROCEDURE — 97116 GAIT TRAINING THERAPY: CPT | Performed by: PHYSICAL THERAPIST

## 2019-10-29 PROCEDURE — 99900035 HC TECH TIME PER 15 MIN (STAT)

## 2019-10-29 PROCEDURE — 25000003 PHARM REV CODE 250: Performed by: INTERNAL MEDICINE

## 2019-10-29 PROCEDURE — 85025 COMPLETE CBC W/AUTO DIFF WBC: CPT

## 2019-10-29 PROCEDURE — 97530 THERAPEUTIC ACTIVITIES: CPT

## 2019-10-29 PROCEDURE — G8989 SELF CARE D/C STATUS: HCPCS | Mod: CK

## 2019-10-29 PROCEDURE — 25000003 PHARM REV CODE 250: Performed by: EMERGENCY MEDICINE

## 2019-10-29 PROCEDURE — 84100 ASSAY OF PHOSPHORUS: CPT

## 2019-10-29 PROCEDURE — 63600175 PHARM REV CODE 636 W HCPCS: Performed by: INTERNAL MEDICINE

## 2019-10-29 RX ADMIN — DOCUSATE SODIUM 100 MG: 100 CAPSULE, LIQUID FILLED ORAL at 08:10

## 2019-10-29 RX ADMIN — CEFTRIAXONE 1 G: 1 INJECTION, SOLUTION INTRAVENOUS at 09:10

## 2019-10-29 RX ADMIN — PANTOPRAZOLE SODIUM 40 MG: 40 TABLET, DELAYED RELEASE ORAL at 08:10

## 2019-10-29 RX ADMIN — ASPIRIN 81 MG: 81 TABLET, COATED ORAL at 08:10

## 2019-10-29 RX ADMIN — METOPROLOL SUCCINATE 25 MG: 25 TABLET, FILM COATED, EXTENDED RELEASE ORAL at 08:10

## 2019-10-29 RX ADMIN — SIMVASTATIN 40 MG: 40 TABLET, FILM COATED ORAL at 09:10

## 2019-10-29 RX ADMIN — SODIUM CHLORIDE: 0.9 INJECTION, SOLUTION INTRAVENOUS at 11:10

## 2019-10-29 RX ADMIN — SODIUM CHLORIDE: 0.9 INJECTION, SOLUTION INTRAVENOUS at 04:10

## 2019-10-29 RX ADMIN — HEPARIN SODIUM 5000 UNITS: 5000 INJECTION, SOLUTION INTRAVENOUS; SUBCUTANEOUS at 08:10

## 2019-10-29 RX ADMIN — POTASSIUM CHLORIDE 20 MEQ: 1500 TABLET, EXTENDED RELEASE ORAL at 08:10

## 2019-10-29 RX ADMIN — ACETAMINOPHEN 650 MG: 325 TABLET ORAL at 04:10

## 2019-10-29 RX ADMIN — DOCUSATE SODIUM 100 MG: 100 CAPSULE, LIQUID FILLED ORAL at 09:10

## 2019-10-29 RX ADMIN — HEPARIN SODIUM 5000 UNITS: 5000 INJECTION, SOLUTION INTRAVENOUS; SUBCUTANEOUS at 09:10

## 2019-10-29 NOTE — PROGRESS NOTES
[facility]  Adult Nutrition  Progress Note    SUMMARY    Intervention: texture/ sodium modified diet and nutrition supplement therapy-commercial beverage     Recommendation:  1) Change diet to pureed, low sodium, nectar thick liquids  and order SLP consult   2) Change supplements to boost pudding for now   3) Weigh wt weekly   4) consider appetite stimulant  Goals: 1) PO intakes > 50% of meals and supplements by f/u  Nutrition Goal Status: new  Communication of RD Recs: (POC, sticky note, reviewed with RN)    Reason for Assessment    Reason For Assessment: identified at risk by screening criteria  Diagnosis: (JIMBO)  Relevant Medical History: CHF, MI, CA, iron deficiency anemia, HTN, HLD, GERD, CAD, nursing home resident  Interdisciplinary Rounds: attended    General Information Comments: 89 y/o female from nursing home admitted with JIMBO, UTI, and metabolic encephalopathy. Pt not answering all my questions-unable to tell me whether she was on pureed diet or mechanical soft PTA.  Rec. SLP consult, discussed with RN. States that she only likes soup, shakes her head at all other options presented. Likes vanilla flavor. NFPE done 10/29/19, mild-moderate wasting seen. Wt gain per chart review.    Nutrition Discharge Planning: To be determined- Low sodium, texture per SLP    Nutrition Risk Screen    Nutrition Risk Screen: dysphagia or difficulty swallowing    Nutrition/Diet History    Patient Reported Diet/Restrictions/Preferences: (unsure- both mechanical soft and pureed written in pt's chart per RN)  Spiritual, Cultural Beliefs, Episcopal Practices, Values that Affect Care: no  Food Allergies: NKFA  Factors Affecting Nutritional Intake: decreased appetite    Anthropometrics    Temp: 96.6 °F (35.9 °C)  Height Method: Stated(office 9/27/18)  Height: 5'  Height (inches): 60 in  Weight Method: Bed Scale  Weight: 66.2 kg (145 lb 15.1 oz)  Weight (lb): 145.95 lb  Ideal Body Weight (IBW), Female: 100 lb  %  Ideal Body Weight, Female (lb): 145.95 lb  BMI (Calculated): 28.6  Usual Body Weight (UBW), k.5 kg(19)  Weight Change Amount: (49 kg 18 and 17, 50.8 kg 17)  % Usual Body Weight: 104.47  % Weight Change From Usual Weight: 4.25 %       Lab/Procedures/Meds    Pertinent Labs Reviewed: reviewed  BMP  Lab Results   Component Value Date     10/29/2019     10/29/2019    K 4.3 10/29/2019    K 4.3 10/29/2019     10/29/2019     10/29/2019    CO2 20 (L) 10/29/2019    CO2 20 (L) 10/29/2019    BUN 41 (H) 10/29/2019    BUN 41 (H) 10/29/2019    CREATININE 1.2 10/29/2019    CREATININE 1.2 10/29/2019    CALCIUM 8.5 (L) 10/29/2019    CALCIUM 8.5 (L) 10/29/2019    ANIONGAP 11 10/29/2019    ANIONGAP 11 10/29/2019    ESTGFRAFRICA 45 (A) 10/29/2019    ESTGFRAFRICA 45 (A) 10/29/2019    EGFRNONAA 39 (A) 10/29/2019    EGFRNONAA 39 (A) 10/29/2019     Lab Results   Component Value Date    ALBUMIN 3.4 (L) 10/29/2019     No results found for: IRON, TIBC, FERRITIN, SATURATEDIRO    Pertinent Medications Reviewed: reviewed  Pertinent Medications Comments: docusate, KCl, statin, NS @ 125 ml/hr    Estimated/Assessed Needs    Weight Used For Calorie Calculations: 63.5 kg (139 lb 15.9 oz)(last measured)  Energy Calorie Requirements (kcal): Noble St Jeor ( x 1.3 wt maintenance) = 1250 kcal  Energy Need Method: Noble-St Jeor  Protein Requirements: 0.9-1.0 g protein/kg ( JIMBO vs. age/ muscle loss) = 57-63 g protein  Weight Used For Protein Calculations: 63.5 kg (139 lb 15.9 oz)  Fluid Requirements (mL): 4162-1551 ml   Estimated Fluid Requirement Method: other (see comments)(20-25 ml/kg CHF)  CHO Requirement: N/A      Nutrition Prescription Ordered    Current Diet Order: Regular- mechanical soft/pureed/ low sodium- nectar thick liquids  Nutrition Order Comments: Boost plus vanilla with meals    Evaluation of Received Nutrient/Fluid Intake    Energy Calories Required: not meeting needs  Protein Required:  not meeting needs  Fluid Required: not meeting needs  Tolerance: tolerating  % Intake of Estimated Energy Needs: 5%  % Meal Intake: 10% ( only applesauce today per RN)    Nutrition Risk    Level of Risk/Frequency of Follow-up: moderate - high 2 x weekly    Assessment and Plan    Malnutrition of moderate degree  Contributing Nutrition Diagnosis  Moderate chronic condition related malnutrition    Related to (etiology):   Decreased appetite    Signs and Symptoms (as evidenced by):   1) Mild-moderate wasting as charted below  2) mild edema  * suspect poor PO intakes PTA-no documentation    Interventions/Recommendations (treatment strategy):  Above    Nutrition Diagnosis Status:   New           Monitor and Evaluation    Food and Nutrient Intake: energy intake, food and beverage intake  Food and Nutrient Adminstration: diet order  Anthropometric Measurements: weight  Biochemical Data, Medical Tests and Procedures: electrolyte and renal panel  Nutrition-Focused Physical Findings: overall appearance     Malnutrition Assessment  Malnutrition Type: chronic illness  Skin (Micronutrient): (Daniel = 15)  Teeth (Micronutrient): (no all present)   Micronutrient Evaluation: suspected deficiency  Micronutrient Evaluation Comments: check iron and replete if needed       Orbital Region (Subcutaneous Fat Loss): well nourished  Upper Arm Region (Subcutaneous Fat Loss): mild depletion  Thoracic and Lumbar Region: mild depletion   Pablo Region (Muscle Loss): mild depletion  Clavicle Bone Region (Muscle Loss): moderate depletion  Clavicle and Acromion Bone Region (Muscle Loss): moderate depletion  Scapular Bone Region (Muscle Loss): moderate depletion  Dorsal Hand (Muscle Loss): mild depletion  Patellar Region (Muscle Loss): mild depletion  Anterior Thigh Region (Muscle Loss): mild depletion  Posterior Calf Region (Muscle Loss): mild depletion   Edema (Fluid Accumulation): 1-->trace   Subcutaneous Fat Loss (Final Summary): mild  protein-calorie malnutrition  Muscle Loss Evaluation (Final Summary): moderate protein-calorie malnutrition         Nutrition Follow-Up    RD Follow-up?: Yes

## 2019-10-29 NOTE — PT/OT/SLP EVAL
"Occupational Therapy   Evaluation and Discharge Note    Name: Talia Alberts  MRN: [id number]  Admitting Diagnosis:  JIMBO (acute kidney injury)      Recommendations:     Discharge Recommendations: nursing facility, basic  Discharge Equipment Recommendations:  none  Barriers to discharge:  None    Assessment:     Talia Alberts is a 89 y.o. female with a medical diagnosis of JIMBO (acute kidney injury). At this time, patient is functioning at their prior level of function and does not require further acute OT services. She has 24 hour care available at [facility].    Plan:     During this hospitalization, patient does not require further acute OT services.  Please re-consult if situation changes.    · Plan of Care Reviewed with: patient    Subjective     Chief Complaint: " I hate pureed food so I don't each much."  Patient/Family Comments/goals: " I am not supposed to use straws."    Occupational Profile:  Living Environment: Pt is a [facility] resident at [facility].  Previous level of function: Pt receives assistance for all bathing and dressing tasks and is set-up level for self feeding and supervision for grooming seated at sink. She uses a wheelchair for mobility and transfers with one person assist.  Roles and Routines: Pt stated she likes some of the activities at [place]. Her daughter visits her daily.  Equipment Used at home:  hospital bed, wheelchair, grab bar  Assistance upon Discharge: Nursing staff will help patient.    Pain/Comfort:  · Pain Rating 1: 0/10  · Pain Rating Post-Intervention 1: 0/10    Patients cultural, spiritual, Samaritan conflicts given the current situation:      Objective:     Communicated with: nurse Ken prior to session.  Patient found supine with telemetry, peripheral IV upon OT entry to room.    General Precautions: Standard, fall, aspiration   Orthopedic Precautions:N/A   Braces: N/A     Occupational Performance:    Bed Mobility:    · Patient completed Rolling/Turning " to Left with  stand by assistance and with side rail  · Patient completed Rolling/Turning to Right with stand by assistance and with side rail  · Patient completed Scooting/Bridging with contact guard assistance and cues for technique  · Patient completed Supine to Sit with contact guard assistance and with side rail  · Patient completed Sit to Supine with contact guard assistance, with side rail and using log roll technique    Functional Mobility/Transfers:  · Patient completed Sit <> Stand Transfer with contact guard assistance and cues for bilateral hand placement on bed rails     · Functional Mobility: 4 side steps towards HOB holding onto bed rails with CGA    Activities of Daily Living:  · Feeding:  set-up to sip nectar thick juice and scoop and eat applesauce with HOB upright    · Grooming: supervision and set-up to wipe face and mouth with warm cloth; cues for thorough task completion    · Upper Body Dressing: moderate assistance seated EOB to don/doff gown  · Lower Body Dressing: total assistance to don/doff socks    Cognitive/Visual Perceptual:  Cognitive/Psychosocial Skills:     -       Oriented to: Person, Place, Situation and Month, not year   -       Follows Commands/attention:Follows two-step commands  -       Communication: clear/fluent and [place]  -       Memory: Impaired STM  -       Safety awareness/insight to disability: impaired   -       Mood/Affect/Coping skills/emotional control: Cooperative and Pleasant  Visual/Perceptual:      -Intact  acuity, R/L discrimination, visual field and motor planning praxis      Physical Exam:  Balance:    -       SBA static and CGA dynamic sitting  Postural examination/scapula alignment:    -       Rounded shoulders  -       Forward head  -       Posterior pelvic tilt  Skin integrity: Thin and Dry  Edema:  None noted  Dominant hand:    -       right  Upper Extremity Range of Motion:     -       Right Upper Extremity: WFL  -       Left Upper Extremity: WFL  Upper  Extremity Strength:    -       Right Upper Extremity: WFL  -       Left Upper Extremity: WFL   Strength:    -       Right Upper Extremity: WFL  -       Left Upper Extremity: WFL  Fine Motor Coordination:    -       Intact  Gross motor coordination:   WFL    AMPAC 6 Click ADL:  AMPAC Total Score: 15    Treatment & Education:  OT oriented pt to time and how to use call button and bed controls.  OT ed pt on OT role & discharge recommendations.  OT ed pt on bed mobility techniques to increase independence with task.  OT ed pt on log roll tech to reduce strain on spine and increase independence with bed mobility.  OT ed pt on keeping HOB raised and sitting up in chair as pt will tolerate to counteract effects of bedrest.  OT ed pt on fall risk and strongly advised pt to call for help for all OOB mobility.  Pt verbalized understanding.  Education:    Patient left HOB elevated with all lines intact, call button in reach, bed alarm on and Ken, nurse notified    GOALS:   Multidisciplinary Problems     Occupational Therapy Goals     Not on file                History:     Past Medical History:   Diagnosis Date    Acute on chronic diastolic heart failure     Aortic stenosis, moderate     Breast cancer     resolved and no evidence of recurrence    Coronary artery disease     GERD (gastroesophageal reflux disease)     Hyperlipidemia     Hypertension     Iron deficiency anemia     Major depressive disorder     MI (myocardial infarction)     Squamous cell carcinoma        Past Surgical History:   Procedure Laterality Date    BREAST LUMPECTOMY  2000    LT SIDE     SECTION      4    CHOLECYSTECTOMY      ORIF FEMUR FRACTURE Right 2014    TISSUE AORTIC VALVE REPLACEMENT         Time Tracking:     OT Date of Treatment: 10/29/19  OT Start Time: 1134  OT Stop Time: 1155  OT Total Time (min): 21 min    Billable Minutes:Evaluation 10  Therapeutic Activity 11    PHUC Uriarte  10/29/2019

## 2019-10-29 NOTE — SUBJECTIVE & OBJECTIVE
Interval History:  Patient is bright, much more alert and responsive.  Very hard of hearing but having normal conversation.  Short-term memory deficit evident.    Review of Systems   Constitutional: Positive for fatigue.   Musculoskeletal: Positive for arthralgias.   Neurological: Positive for dizziness and weakness.   Psychiatric/Behavioral: Positive for confusion.   All other systems reviewed and are negative.    Objective:     Vital Signs (Most Recent):  Temp: 97.3 °F (36.3 °C) (10/29/19 1342)  Pulse: 68 (10/29/19 1342)  Resp: 16 (10/29/19 1342)  BP: (!) 158/61 (10/29/19 1342)  SpO2: 96 % (10/29/19 1342) Vital Signs (24h Range):  Temp:  [96 °F (35.6 °C)-97.3 °F (36.3 °C)] 97.3 °F (36.3 °C)  Pulse:  [68-95] 68  Resp:  [16-18] 16  SpO2:  [96 %-97 %] 96 %  BP: (138-178)/(61-83) 158/61     Weight: 66.2 kg (145 lb 15.1 oz)  Body mass index is 28.5 kg/m².    Intake/Output Summary (Last 24 hours) at 10/29/2019 1505  Last data filed at 10/29/2019 0600  Gross per 24 hour   Intake 220 ml   Output --   Net 220 ml      Physical Exam   Constitutional: She is oriented to person, place, and time. She appears well-developed.   Elderly frail appearing female   HENT:   Head: Normocephalic and atraumatic.   Dry mucosa   Eyes: Pupils are equal, round, and reactive to light. Conjunctivae are normal.   Neck: Neck supple. No JVD present. No thyromegaly present.   Cardiovascular: Normal rate and regular rhythm. Exam reveals no gallop and no friction rub.   No murmur heard.  Pulmonary/Chest: Effort normal and breath sounds normal.   Abdominal: Soft. Bowel sounds are normal. She exhibits no distension and no mass. There is no tenderness.   Musculoskeletal: Normal range of motion. She exhibits no edema.   Neurological: She is oriented to person, place, and time. No cranial nerve deficit.   Skin: Skin is warm and dry.   Psychiatric: Her behavior is normal.       Significant Labs:   BMP:   Recent Labs   Lab 10/29/19  0538     101      141   K 4.3  4.3     110   CO2 20*  20*   BUN 41*  41*   CREATININE 1.2  1.2   CALCIUM 8.5*  8.5*   MG 2.2     CBC:   Recent Labs   Lab 10/28/19  1033 10/29/19  0538   WBC 11.74 6.40   HGB 13.6 12.5   HCT 42.6 38.9   * 154       Significant Imaging:  CXR:   1. No detrimental change.  2. Right basilar airspace disease favors atelectasis.  3. Interstitial opacities favor pulmonary edema given cardiomegaly.

## 2019-10-29 NOTE — PT/OT/SLP EVAL
Physical Therapy Evaluation    Patient Name:  Talia Alberts   MRN:  [id number]    Recommendations:     Discharge Recommendations:  nursing facility, basic, nursing facility, skilled   Discharge Equipment Recommendations: none   Barriers to discharge: None    Assessment:     Talia Alberts is a 89 y.o. female admitted with a medical diagnosis of JIMBO (acute kidney injury).  She presents with the following impairments/functional limitations:  weakness, impaired self care skills, impaired balance, decreased safety awareness, impaired endurance, impaired functional mobilty, decreased upper extremity function, gait instability, decreased lower extremity function.  During PT evaluation, pt required Min A for transfer supine<>sit, and Min A for sit<>Stand.  She ambulated to the bathroom where she was able to have a large BM, but required assistance for hygiene.  She then ambulated 50ft with RW, and CGA.      Rehab Prognosis: Good; patient would benefit from acute skilled PT services to address these deficits and reach maximum level of function.    Recent Surgery: * No surgery found *      Plan:     During this hospitalization, patient to be seen 6 x/week to address the identified rehab impairments via gait training, therapeutic activities, therapeutic exercises and progress toward the following goals:    · Plan of Care Expires:  11/12/19    Subjective     Chief Complaint: pt states that walking makes her back hurt.  Patient/Family Comments/goals: pt wants to sit up  Pain/Comfort:  · Pain Rating 1: 0/10    Patients cultural, spiritual, Adventist conflicts given the current situation: no    Living Environment:  Pt is a resident at [facility].   Prior to admission, patients level of function was assistance for transfers with RW.  She ambulates short distances with RW and assistance.  Equipment used at home: wheelchair, walker, rolling.  DME owned (not currently used): none.  Upon discharge, patient will have assistance  from staff.    Objective:     Communicated with nurse Rogers prior to session.  Patient found HOB elevated with peripheral IV, telemetry  upon PT entry to room.    General Precautions: Standard, aspiration, fall, hearing impaired   Orthopedic Precautions:N/A   Braces: N/A     Exams:  · Cognitive Exam:  Patient is oriented to Person  · Postural Exam:  Patient presented with the following abnormalities:    · -       Rounded shoulders  · -       Forward head  · RLE ROM: WFL  · RLE Strength: WFL  · LLE ROM: WFL  · LLE Strength: WFL    Functional Mobility:  · Bed Mobility:     · Supine to Sit: minimum assistance  · Transfers:     · Sit to Stand:  minimum assistance with rolling walker  · Gait: 50ft with RW and CGA  · Balance: fair      Therapeutic Activities and Exercises:   Pt was assisted into the bathroom where she had a large BM, but required assistance for hygiene.     AM-PAC 6 CLICK MOBILITY  Total Score:19     Patient left up in chair with all lines intact, call button in reach, chair alarm on and nurse Ken notified.    GOALS:   Multidisciplinary Problems     Physical Therapy Goals        Problem: Physical Therapy Goal    Goal Priority Disciplines Outcome Goal Variances Interventions   Physical Therapy Goal     PT, PT/OT Ongoing, Progressing     Description:  Goals to be met by: 2019     Patient will increase functional independence with mobility by performin. Supine to sit with Modified Rooks  2. Sit to supine with Modified Rooks  3. Sit to stand transfer with Contact Guard Assistance  4. Bed to chair transfer with Contact Guard Assistance using Rolling Walker  5. Gait  x 150 feet with Contact Guard Assistance using Rolling Walker.   6. Lower extremity exercise program x10-20 reps per handout, with supervision                      History:     Past Medical History:   Diagnosis Date    Acute on chronic diastolic heart failure     Aortic stenosis, moderate     Breast cancer      resolved and no evidence of recurrence    Coronary artery disease     GERD (gastroesophageal reflux disease)     Hyperlipidemia     Hypertension     Iron deficiency anemia     Major depressive disorder     MI (myocardial infarction)     Squamous cell carcinoma        Past Surgical History:   Procedure Laterality Date    BREAST LUMPECTOMY  2000    LT SIDE     SECTION      4    CHOLECYSTECTOMY      ORIF FEMUR FRACTURE Right 2014    TISSUE AORTIC VALVE REPLACEMENT         Time Tracking:     PT Received On: 10/29/19  PT Start Time: 1309     PT Stop Time: 1337  PT Total Time (min): 28 min     Billable Minutes: Evaluation 14 and Gait Training 14      Megan Hirsch, PT  10/29/2019

## 2019-10-29 NOTE — CARE UPDATE
10/29/19 0825   Patient Assessment/Suction   Level of Consciousness (AVPU) alert   PRE-TX-O2   O2 Device (Oxygen Therapy) room air   SpO2 96 %   Pulse Oximetry Type Intermittent   $ Pulse Oximetry - Multiple Charge Pulse Oximetry - Multiple   Pulse 82   Resp 16   Aerosol Therapy   $ Aerosol Therapy Charges PRN treatment not required   Respiratory Treatment Status (SVN) PRN treatment not required

## 2019-10-29 NOTE — PLAN OF CARE
CM met with pt and pt's daughter, Monet, bedside to complete discharge assessment. Pt unable to verify information on facesheet. Monet verified that pt is resident at [facility] and has been for about 2 years. Upon DC pt will return. Monet reports that she is pt's POA. DC plan is to return to [facility]. CM following to assist in any DC needs.        10/29/19 1445   Discharge Assessment   Assessment Type Discharge Planning Assessment   Confirmed/corrected address and phone number on facesheet? Yes   Assessment information obtained from? Patient   Communicated expected length of stay with patient/caregiver yes   Prior to hospitilization cognitive status: Not Oriented to Place;Not Oriented to Time   Prior to hospitalization functional status: Assistive Equipment   Current cognitive status: Not Oriented to Place;Not Oriented to Time   Current Functional Status: Assistive Equipment   Lives With facility resident   Able to Return to Prior Arrangements yes   Is patient able to care for self after discharge? No   Patient's perception of discharge disposition nursing home   Readmission Within the Last 30 Days no previous admission in last 30 days   Patient currently being followed by outpatient case management? No   Patient currently receives any other outside agency services? No   Equipment Currently Used at Home   (facility resident)   Do you have any problems affording any of your prescribed medications? No   Is the patient taking medications as prescribed? yes   Does the patient have transportation home? Yes   Transportation Anticipated other (see comments)  ([place])   Does the patient receive services at [facility]? No   Discharge Plan A Return to nursing home   DME Needed Upon Discharge  none   Patient/Family in Agreement with Plan yes

## 2019-10-29 NOTE — PT/OT/SLP EVAL
"Speech Language Pathology Evaluation  Bedside Swallow    Patient Name:  Talia Alberts   MRN:  [id number]  Admitting Diagnosis: JIMBO (acute kidney injury)    Recommendations:                 General Recommendations:  Will monitor tolerance x1 day  Diet recommendations:  Puree(IDDSI 4), Thin   Aspiration Precautions: Double swallow with each bite/sip, Meds whole 1 at a time and Standard aspiration precautions   General Precautions: Standard, aspiration, fall, hearing impaired  Communication strategies:  none    History:     Past Medical History:   Diagnosis Date    Acute on chronic diastolic heart failure     Aortic stenosis, moderate     Breast cancer     resolved and no evidence of recurrence    Coronary artery disease     GERD (gastroesophageal reflux disease)     Hyperlipidemia     Hypertension     Iron deficiency anemia     Major depressive disorder     MI (myocardial infarction)     Squamous cell carcinoma        Past Surgical History:   Procedure Laterality Date    BREAST LUMPECTOMY      LT SIDE     SECTION      4    CHOLECYSTECTOMY      ORIF FEMUR FRACTURE Right 2014    TISSUE AORTIC VALVE REPLACEMENT         Social History: Patient resides at [facility].    Prior Intubation HX:  None this admit    Modified Barium Swallow: 19: "Pt was referred due to recent health decline & increased coughing & occasional choking episodes at [facility], where she resides.  Pt was losing weight due to prolonged mastication & poor intake, so diet was changed to pureed, with improved intake & weight stabilization.  Liquids were thickened to nectar thick to address coughing; pills are now crushed.  Pt was last seen for MBSS on 2019, with prolonged oral prep & piecemeal transit, bolus holding, SILENT aspiration (a drop x2 trials) on thin liquids via straw, and recommendation for finely chopped foods, no bread, thin liquids via cup sip only, meds whole in puree; pleasure feedings of " "soft cookies per her daughter's request.  Her Speech Therapist, Afua, attended this evaluation & provided information.  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  Pt was noted to hold chin above mid-line.              Today, pt was found to be very distractible, which affected chewing, & transit of all bolus types A->P.  Piecemeal deglutition was also seen.  Cervical spine hardware was noted at C5-6, as was a cricopharyngeal bar, which tilted upward & retained a small amount of puree & cookie boluses, which returned to the pyriforms after the swallow.  Pharyngeal stage was mildly impaired by premature spillage of liquids, reduced base of tongue retraction, & reduced pharyngeal wall contraction with mild stasis after most bolus types.  A barium pill presented in puree was swallowed without problem.  An esophageal sweep found aerophagia, & mild stasis of puree.  Pt was noted to cough 1x during the interview, & 2x during the evaluation altho there was no evidence of penetration or aspiration.              Recommend continue pureed textures or upgrade as appropriate per pt preference & intake levels.  Thin liquids via straight & controlled flow straw to encourage chin tuck & reduce sip size.  Cue pt to swallow an extra, dry swallow after every bolus, to reduce pharyngeal residue.  Crush meds or as tolerated.  Pt appears safe for soft solids for pleasure when closely supervised.                Pt & Speech Therapist were educated re findings & recommendations, and tx plan discussed."    Imaging:  X-Ray Chest 1 View   Final Result      1. No detrimental change.   2. Right basilar airspace disease favors atelectasis.   3. Interstitial opacities favor pulmonary edema given cardiomegaly.         Electronically signed by: Grady Ruffin   Date:    10/29/2019   Time:    08:01           Prior diet: Pureed textures and nectar thick " "liquids.    Subjective   "I don't know what they did with my teeth."  "If I eat too much I'll vomit."  "They told me not to drink from a straw."    Objective:   Pt seen for clinical swallow evaluation. She is alert and cooperative sitting up in chair. She is without her dentures today. Oriented to place, year, self and situation.     Oral Musculature Evaluation  · Oral Musculature: WFL  · Dentition: edentulous(Reports she has dentures but unavailable at time of evaluation)  · Secretion Management: adequate  · Mucosal Quality: adequate  · Mandibular Strength and Mobility: WFL  · Oral Labial Strength and Mobility: WFL  · Lingual Strength and Mobility: WFL  · Buccal Strength and Mobility: WFL  · Volitional Cough: adequate  · Volitional Swallow: able to palpate laryngeal rise  · Voice Prior to PO Intake: clear    Bedside Swallow Eval:   Consistencies Assessed:  · Thin liquids --via cup, straw, continuous cup sips  · Puree --applesauce  · Mixed consistencies --diced peaches     Oral Phase:   · WFL  · Orally expelled peach trials    Pharyngeal Phase:   · no overt clinical signs/symptoms of aspiration    Compensatory Strategies  · None    Treatment: Pt/family educated re: results/recs of evaluation, SLP role and POC. Receptive to information provided.     Assessment:     Talia Alberts is a 89 y.o. female with an SLP diagnosis of Dysphagia.  She presents today without her dentures and inability to adequately masticate soft solids; pt expectorated. All PO trial consumed without overt s/s penetration/aspiration. REC Pureed textures (IDDSI 4) with thin liquids.Will monitor tolerance x1 day.     Goals:   Multidisciplinary Problems     SLP Goals        Problem: SLP Goal    Goal Priority Disciplines Outcome   SLP Goal     SLP Ongoing, Progressing   Description:    1) Consume pureed textures (IDDSI 4) and thin liquids with functional oral phase and no overt s/s penetration/aspiration                    Plan:     · Patient to be " seen:  1 x/week   · Plan of Care expires:  11/05/19  · Plan of Care reviewed with:  patient   · SLP Follow-Up:  Yes       Discharge recommendations:  nursing facility, basic   Barriers to Discharge:  None    Time Tracking:     SLP Treatment Date:   10/29/19  Speech Start Time:  1359  Speech Stop Time:  1411     Speech Total Time (min):  12 min    Billable Minutes: Eval Swallow and Oral Function 12 and Total Time 12    Shannen Akbar CCC-SLP  10/29/2019

## 2019-10-29 NOTE — PLAN OF CARE
Intervention: texture/ sodium modified diet and nutrition supplement therapy-commercial beverage      Recommendation:  1) Change diet to pureed, low sodium, nectar thick liquids  and order SLP consult   2) Change supplements to boost pudding for now   3) Weigh wt weekly   4) consider appetite stimulant  Goals: 1) PO intakes > 50% of meals and supplements by f/u  Nutrition Goal Status: new  Communication of RD Recs: (POC, sticky note, reviewed with RN)

## 2019-10-29 NOTE — PROGRESS NOTES
[facility] Medicine  Progress Note    Patient Name: Talia Alberts  MRN: [id number]  Patient Class: IP- Inpatient   Admission Date: 10/28/2019  Length of Stay: 1 days  Attending Physician: Ranjith Beebe MD  Primary Care Provider: Michael Martin MD    Subjective:     Principal Problem:JIMBO (acute kidney injury)    HPI:  Patient is an elderly frail 89-year-old  female with past medical history significant for hypertension, hyperlipidemia, gastroesophageal reflux disease, coronary artery disease who presented to the emergency room at [facility] via paramedics with complaint of altered mental status.  Patient is a resident of local nursing home.  Reportedly patient was noted to have visual hallucinations.  Also it was reported patient's pulse ox at nursing home was around 70% which improved to 99% upon supplemental oxygen use via nasal cannula.  Reportedly patient's mental status improved subsequently.  Patient denies any focal subjective complaints.  Patient is noted to have worsening renal functions than baseline.  No recent fever, chills, nausea vomiting or diarrhea reported.    Overview/Hospital Course:  No notes on file    Interval History:  Patient is bright, much more alert and responsive.  Very hard of hearing but having normal conversation.  Short-term memory deficit evident.    Review of Systems   Constitutional: Positive for fatigue.   Musculoskeletal: Positive for arthralgias.   Neurological: Positive for dizziness and weakness.   Psychiatric/Behavioral: Positive for confusion.   All other systems reviewed and are negative.    Objective:     Vital Signs (Most Recent):  Temp: 97.3 °F (36.3 °C) (10/29/19 1342)  Pulse: 68 (10/29/19 1342)  Resp: 16 (10/29/19 1342)  BP: (!) 158/61 (10/29/19 1342)  SpO2: 96 % (10/29/19 1342) Vital Signs (24h Range):  Temp:  [96 °F (35.6 °C)-97.3 °F (36.3 °C)] 97.3 °F (36.3 °C)  Pulse:  [68-95] 68  Resp:  [16-18]  16  SpO2:  [96 %-97 %] 96 %  BP: (138-178)/(61-83) 158/61     Weight: 66.2 kg (145 lb 15.1 oz)  Body mass index is 28.5 kg/m².    Intake/Output Summary (Last 24 hours) at 10/29/2019 1505  Last data filed at 10/29/2019 0600  Gross per 24 hour   Intake 220 ml   Output --   Net 220 ml      Physical Exam   Constitutional: She is oriented to person, place, and time. She appears well-developed.   Elderly frail appearing female   HENT:   Head: Normocephalic and atraumatic.   Dry mucosa   Eyes: Pupils are equal, round, and reactive to light. Conjunctivae are normal.   Neck: Neck supple. No JVD present. No thyromegaly present.   Cardiovascular: Normal rate and regular rhythm. Exam reveals no gallop and no friction rub.   No murmur heard.  Pulmonary/Chest: Effort normal and breath sounds normal.   Abdominal: Soft. Bowel sounds are normal. She exhibits no distension and no mass. There is no tenderness.   Musculoskeletal: Normal range of motion. She exhibits no edema.   Neurological: She is oriented to person, place, and time. No cranial nerve deficit.   Skin: Skin is warm and dry.   Psychiatric: Her behavior is normal.       Significant Labs:   BMP:   Recent Labs   Lab 10/29/19  0538     103     141   K 4.3  4.3     110   CO2 20*  20*   BUN 41*  41*   CREATININE 1.2  1.2   CALCIUM 8.5*  8.5*   MG 2.2     CBC:   Recent Labs   Lab 10/28/19  1033 10/29/19  0538   WBC 11.74 6.40   HGB 13.6 12.5   HCT 42.6 38.9   * 154       Significant Imaging:  CXR:   1. No detrimental change.  2. Right basilar airspace disease favors atelectasis.  3. Interstitial opacities favor pulmonary edema given cardiomegaly.      Assessment/Plan:      * JIMBO (acute kidney injury)  Continue IVF hydration.  Follow renal panel and electrolytes closely.  Adjust renal dose medications for creatinine clearance 10-50cc/min.  Avoid NSAIDs, Lorenzo-II inhibitors, ACE-I, Angiotensin Receptor Blockers, or Aminoglycosides.  Hold Lasix  therapy and angiotensin receptor blocker for now.                  Encephalopathy, metabolic  Neuro checks q.4 hours.  Fall and seizure precautions.  Continue PT and OT      UTI (urinary tract infection)  Follow urine culture results.  Continue intravenous Rocephin 1 g daily.      Hyperlipidemia  Chronic problem. Will continue chronic medications and monitor for any changes, adjusting as needed.          Coronary artery disease involving native coronary artery of native heart without angina pectoris  Patient with known CAD and monitor for S/Sx of angina/ACS. Continue to monitor on telemetry.        Essential hypertension  Hold antihypertensive agents for now.  Check orthostasis in the morning.  Fall precautions.      GERD (gastroesophageal reflux disease)  Continue PPI      Disposition:  Back to nursing home pending urine culture results.  VTE Risk Mitigation (From admission, onward)         Ordered     heparin (porcine) injection 5,000 Units  Every 12 hours      10/28/19 1514     IP VTE LOW RISK PATIENT  Once      10/28/19 1514                      Ranjith Beebe MD  Department of Hospital Medicine   [facility]

## 2019-10-29 NOTE — PLAN OF CARE
Problem: SLP Goal  Goal: SLP Goal  Description    1) Consume pureed textures (IDDSI 4) and thin liquids with functional oral phase and no overt s/s penetration/aspiration   Outcome: Ongoing, Progressing     Clinical swallow evaluation completed. REC Pureed texture IDDSI 4) and thin liquids. Will follow.

## 2019-10-29 NOTE — ASSESSMENT & PLAN NOTE
Contributing Nutrition Diagnosis  Moderate chronic condition related malnutrition    Related to (etiology):   Decreased appetite    Signs and Symptoms (as evidenced by):   1) Mild-moderate wasting as charted below  2) mild edema  * suspect poor PO intakes PTA-no documentation    Interventions/Recommendations (treatment strategy):  Above    Nutrition Diagnosis Status:   New

## 2019-10-30 LAB
ALBUMIN SERPL BCP-MCNC: 3 G/DL (ref 3.5–5.2)
ALP SERPL-CCNC: 120 U/L (ref 55–135)
ALT SERPL W/O P-5'-P-CCNC: 24 U/L (ref 10–44)
ANION GAP SERPL CALC-SCNC: 8 MMOL/L (ref 8–16)
ANION GAP SERPL CALC-SCNC: 8 MMOL/L (ref 8–16)
AST SERPL-CCNC: 30 U/L (ref 10–40)
BASOPHILS # BLD AUTO: 0.05 K/UL (ref 0–0.2)
BASOPHILS NFR BLD: 0.7 % (ref 0–1.9)
BILIRUB SERPL-MCNC: 0.5 MG/DL (ref 0.1–1)
BUN SERPL-MCNC: 20 MG/DL (ref 10–30)
BUN SERPL-MCNC: 20 MG/DL (ref 10–30)
CALCIUM SERPL-MCNC: 7.9 MG/DL (ref 8.7–10.5)
CALCIUM SERPL-MCNC: 7.9 MG/DL (ref 8.7–10.5)
CHLORIDE SERPL-SCNC: 113 MMOL/L (ref 95–110)
CHLORIDE SERPL-SCNC: 113 MMOL/L (ref 95–110)
CO2 SERPL-SCNC: 18 MMOL/L (ref 23–29)
CO2 SERPL-SCNC: 18 MMOL/L (ref 23–29)
CREAT SERPL-MCNC: 0.9 MG/DL (ref 0.5–1.4)
CREAT SERPL-MCNC: 0.9 MG/DL (ref 0.5–1.4)
DIFFERENTIAL METHOD: ABNORMAL
EOSINOPHIL # BLD AUTO: 0.5 K/UL (ref 0–0.5)
EOSINOPHIL NFR BLD: 6.5 % (ref 0–8)
ERYTHROCYTE [DISTWIDTH] IN BLOOD BY AUTOMATED COUNT: 12.8 % (ref 11.5–14.5)
EST. GFR  (AFRICAN AMERICAN): >60 ML/MIN/1.73 M^2
EST. GFR  (AFRICAN AMERICAN): >60 ML/MIN/1.73 M^2
EST. GFR  (NON AFRICAN AMERICAN): 55 ML/MIN/1.73 M^2
EST. GFR  (NON AFRICAN AMERICAN): 55 ML/MIN/1.73 M^2
GLUCOSE SERPL-MCNC: 97 MG/DL (ref 70–110)
GLUCOSE SERPL-MCNC: 97 MG/DL (ref 70–110)
HCT VFR BLD AUTO: 34.4 % (ref 37–48.5)
HGB BLD-MCNC: 11.1 G/DL (ref 12–16)
IMM GRANULOCYTES # BLD AUTO: 0.02 K/UL (ref 0–0.04)
LYMPHOCYTES # BLD AUTO: 1.3 K/UL (ref 1–4.8)
LYMPHOCYTES NFR BLD: 18.6 % (ref 18–48)
MAGNESIUM SERPL-MCNC: 1.9 MG/DL (ref 1.6–2.6)
MCH RBC QN AUTO: 29.4 PG (ref 27–31)
MCHC RBC AUTO-ENTMCNC: 32.3 G/DL (ref 32–36)
MCV RBC AUTO: 91 FL (ref 82–98)
MONOCYTES # BLD AUTO: 0.8 K/UL (ref 0.3–1)
MONOCYTES NFR BLD: 11.1 % (ref 4–15)
NEUTROPHILS # BLD AUTO: 4.4 K/UL (ref 1.8–7.7)
NEUTROPHILS NFR BLD: 62.8 % (ref 38–73)
NRBC BLD-RTO: 0 /100 WBC
PHOSPHATE SERPL-MCNC: 2.6 MG/DL (ref 2.7–4.5)
PLATELET # BLD AUTO: 147 K/UL (ref 150–350)
PMV BLD AUTO: 10.7 FL (ref 9.2–12.9)
POTASSIUM SERPL-SCNC: 4 MMOL/L (ref 3.5–5.1)
POTASSIUM SERPL-SCNC: 4 MMOL/L (ref 3.5–5.1)
PROT SERPL-MCNC: 6 G/DL (ref 6–8.4)
RBC # BLD AUTO: 3.78 M/UL (ref 4–5.4)
SODIUM SERPL-SCNC: 139 MMOL/L (ref 136–145)
SODIUM SERPL-SCNC: 139 MMOL/L (ref 136–145)
WBC # BLD AUTO: 6.94 K/UL (ref 3.9–12.7)

## 2019-10-30 PROCEDURE — 85025 COMPLETE CBC W/AUTO DIFF WBC: CPT

## 2019-10-30 PROCEDURE — 63600175 PHARM REV CODE 636 W HCPCS: Performed by: EMERGENCY MEDICINE

## 2019-10-30 PROCEDURE — 80053 COMPREHEN METABOLIC PANEL: CPT

## 2019-10-30 PROCEDURE — 11000001 HC ACUTE MED/SURG PRIVATE ROOM

## 2019-10-30 PROCEDURE — 25000003 PHARM REV CODE 250: Performed by: EMERGENCY MEDICINE

## 2019-10-30 PROCEDURE — 25000003 PHARM REV CODE 250: Performed by: INTERNAL MEDICINE

## 2019-10-30 PROCEDURE — 63600175 PHARM REV CODE 636 W HCPCS: Performed by: INTERNAL MEDICINE

## 2019-10-30 PROCEDURE — 97116 GAIT TRAINING THERAPY: CPT

## 2019-10-30 PROCEDURE — 83735 ASSAY OF MAGNESIUM: CPT

## 2019-10-30 PROCEDURE — 99900035 HC TECH TIME PER 15 MIN (STAT)

## 2019-10-30 PROCEDURE — 84100 ASSAY OF PHOSPHORUS: CPT

## 2019-10-30 PROCEDURE — 36415 COLL VENOUS BLD VENIPUNCTURE: CPT

## 2019-10-30 RX ORDER — SODIUM,POTASSIUM PHOSPHATES 280-250MG
1 POWDER IN PACKET (EA) ORAL
Status: COMPLETED | OUTPATIENT
Start: 2019-10-30 | End: 2019-10-31

## 2019-10-30 RX ADMIN — PANTOPRAZOLE SODIUM 40 MG: 40 TABLET, DELAYED RELEASE ORAL at 09:10

## 2019-10-30 RX ADMIN — POTASSIUM & SODIUM PHOSPHATES POWDER PACK 280-160-250 MG 1 PACKET: 280-160-250 PACK at 09:10

## 2019-10-30 RX ADMIN — HEPARIN SODIUM 5000 UNITS: 5000 INJECTION, SOLUTION INTRAVENOUS; SUBCUTANEOUS at 09:10

## 2019-10-30 RX ADMIN — CEFTRIAXONE 1 G: 1 INJECTION, SOLUTION INTRAVENOUS at 09:10

## 2019-10-30 RX ADMIN — POTASSIUM & SODIUM PHOSPHATES POWDER PACK 280-160-250 MG 1 PACKET: 280-160-250 PACK at 01:10

## 2019-10-30 RX ADMIN — SIMVASTATIN 40 MG: 40 TABLET, FILM COATED ORAL at 09:10

## 2019-10-30 RX ADMIN — ACETAMINOPHEN 650 MG: 325 TABLET ORAL at 09:10

## 2019-10-30 RX ADMIN — POTASSIUM & SODIUM PHOSPHATES POWDER PACK 280-160-250 MG 1 PACKET: 280-160-250 PACK at 05:10

## 2019-10-30 RX ADMIN — POTASSIUM CHLORIDE 20 MEQ: 1500 TABLET, EXTENDED RELEASE ORAL at 09:10

## 2019-10-30 RX ADMIN — METOPROLOL SUCCINATE 25 MG: 25 TABLET, FILM COATED, EXTENDED RELEASE ORAL at 09:10

## 2019-10-30 RX ADMIN — ASPIRIN 81 MG: 81 TABLET, COATED ORAL at 09:10

## 2019-10-30 RX ADMIN — DOCUSATE SODIUM 100 MG: 100 CAPSULE, LIQUID FILLED ORAL at 09:10

## 2019-10-30 NOTE — PLAN OF CARE
POC reviewed with patient, understanding verbalized. AAOX4. Up with assist plus 2. Room air. Dysphagia pureed diet. Telemetry monitoring maintained. VS stable throughout shift. Safety maintained. Will continue to monitor.

## 2019-10-30 NOTE — PT/OT/SLP PROGRESS
Physical Therapy Treatment    Patient Name:  Talia Alberts   MRN:  [id number]    Recommendations:     Discharge Recommendations:  nursing facility, basic, nursing facility, skilled   Discharge Equipment Recommendations: none   Barriers to discharge: None    Assessment:     Talia Alberts is a 89 y.o. female admitted with a medical diagnosis of JIMBO (acute kidney injury).  She presents with the following impairments/functional limitations:  weakness, impaired endurance, impaired self care skills, impaired functional mobilty, gait instability, decreased lower extremity function . Tolerated treatment well. Ambulated with rw and CGA for safety.    Rehab Prognosis: Good; patient would benefit from acute skilled PT services to address these deficits and reach maximum level of function.    Recent Surgery: * No surgery found *      Plan:     During this hospitalization, patient to be seen 6 x/week to address the identified rehab impairments via gait training, therapeutic activities, therapeutic exercises and progress toward the following goals:    · Plan of Care Expires:  11/12/19    Subjective     Chief Complaint: none stated  Patient/Family Comments/goals: to return to NH following hospital.  Pain/Comfort:  · Pain Rating 1: 0/10      Objective:     Communicated with nurse Fuentes prior to session.  Patient found supine with peripheral IV, bed alarm, telemetry upon PT entry to room.     General Precautions: Standard, fall, aspiration, hearing impaired   Orthopedic Precautions:N/A   Braces:       Functional Mobility:  · Bed Mobility:     · Rolling Right: contact guard assistance  · Supine to Sit: minimum assistance  · Transfers:     · Sit to Stand:  minimum assistance with rolling walker  · Gait: 80' with rw and CGA.      AM-PAC 6 CLICK MOBILITY          Therapeutic Activities and Exercises:   Transferred to sitting EOB with assistance with verbal cues for safety.   Ambulated with rw and CGA .    Patient left up in chair  with all lines intact, call button in reach, chair alarm on, nurse Gina notified and daughter present..    GOALS:   Multidisciplinary Problems     Physical Therapy Goals        Problem: Physical Therapy Goal    Goal Priority Disciplines Outcome Goal Variances Interventions   Physical Therapy Goal     PT, PT/OT Ongoing, Progressing     Description:  Goals to be met by: 2019     Patient will increase functional independence with mobility by performin. Supine to sit with Modified [place]  2. Sit to supine with Modified [place]  3. Sit to stand transfer with Contact Guard Assistance  4. Bed to chair transfer with Contact Guard Assistance using Rolling Walker  5. Gait  x 150 feet with Contact Guard Assistance using Rolling Walker.   6. Lower extremity exercise program x10-20 reps per handout, with supervision                      Time Tracking:     PT Received On: 10/30/19  PT Start Time: 1042     PT Stop Time: 1052  PT Total Time (min): 10 min     Billable Minutes: Gait Training 10min    Treatment Type: Treatment  PT/PTA: PTA     PTA Visit Number: 1     Andreea Madsen, PTA  10/30/2019

## 2019-10-30 NOTE — PLAN OF CARE
Problem: Physical Therapy Goal  Goal: Physical Therapy Goal  Description  Goals to be met by: 2019     Patient will increase functional independence with mobility by performin. Supine to sit with Modified [place]  2. Sit to supine with Modified [place]  3. Sit to stand transfer with Contact Guard Assistance  4. Bed to chair transfer with Contact Guard Assistance using Rolling Walker  5. Gait  x 150 feet with Contact Guard Assistance using Rolling Walker.   6. Lower extremity exercise program x10-20 reps per handout, with supervision     Outcome: Ongoing, Progressing   Ambulated 80' with rw and CGA for safety.

## 2019-10-30 NOTE — PT/OT/SLP PROGRESS
Speech Language Pathology      Talia Alberts  MRN: [id number]    Patient not seen today secondary to Patient unwilling to participate(Refused lunch.). No dentures at bedside to assess with upgraded textures. Will follow-up 10/31/19.    Shannen Akbar, KEILA-SLP

## 2019-10-30 NOTE — ASSESSMENT & PLAN NOTE
Follow urine culture results, growing gram-negative rods.  Continue intravenous Rocephin 1 g daily.

## 2019-10-30 NOTE — PLAN OF CARE
Alert, has been pleasantly confused.  IVF continued  Unable to keep accurate urine output as pt is incontinent.  Safety maintained.

## 2019-10-30 NOTE — PROGRESS NOTES
[facility] Hospital Medicine  Progress Note    Patient Name: Talia Alberts  MRN: [id number]  Patient Class: IP- Inpatient   Admission Date: 10/28/2019  Length of Stay: 2 days  Attending Physician: Ranjith Beebe MD  Primary Care Provider: Michael Martin MD        Subjective:     Principal Problem:JIMBO (acute kidney injury)        HPI:  Patient is an elderly frail 89-year-old  female with past medical history significant for hypertension, hyperlipidemia, gastroesophageal reflux disease, coronary artery disease who presented to the emergency room at [facility] via paramedics with complaint of altered mental status.  Patient is a resident of local nursing home.  Reportedly patient was noted to have visual hallucinations.  Also it was reported patient's pulse ox at nursing home was around 70% which improved to 99% upon supplemental oxygen use via nasal cannula.  Reportedly patient's mental status improved subsequently.  Patient denies any focal subjective complaints.  Patient is noted to have worsening renal functions than baseline.  No recent fever, chills, nausea vomiting or diarrhea reported.    Overview/Hospital Course:  Interval History:  Patient is bright, much more alert and responsive.  Very hard of hearing but having normal conversation.  Short-term memory deficit evident.     Review of Systems   Constitutional: Positive for fatigue.   Musculoskeletal: Positive for arthralgias.   Neurological: Positive for dizziness and weakness.   Psychiatric/Behavioral: Positive for confusion.   All other systems reviewed and are negative.    Physical Exam   Constitutional: She is oriented to person, place, and time. She appears well-developed.   Elderly frail appearing female   HENT:   Head: Normocephalic and atraumatic.   Dry mucosa   Eyes: Pupils are equal, round, and reactive to light. Conjunctivae are normal.   Neck: Neck supple. No JVD present. No thyromegaly present.    Cardiovascular: Normal rate and regular rhythm. Exam reveals no gallop and no friction rub.   No murmur heard.  Pulmonary/Chest: Effort normal and breath sounds normal.   Abdominal: Soft. Bowel sounds are normal. She exhibits no distension and no mass. There is no tenderness.   Musculoskeletal: Normal range of motion. She exhibits no edema.   Neurological: She is oriented to person, place, and time. No cranial nerve deficit.   Skin: Skin is warm and dry.   Psychiatric: Her behavior is normal.         Significant Labs:   BMP:       Recent Labs   Lab 10/29/19  0538     103     141   K 4.3  4.3     110   CO2 20*  20*   BUN 41*  41*   CREATININE 1.2  1.2   CALCIUM 8.5*  8.5*   MG 2.2      CBC:        Recent Labs   Lab 10/28/19  1033 10/29/19  0538   WBC 11.74 6.40   HGB 13.6 12.5   HCT 42.6 38.9   * 154         Significant Imaging:  CXR:   1. No detrimental change.  2. Right basilar airspace disease favors atelectasis.  3. Interstitial opacities favor pulmonary edema given cardiomegaly.      Assessment/Plan:      * JIMBO (acute kidney injury)  Continue IVF hydration.  Follow renal panel and electrolytes closely.  Adjust renal dose medications for creatinine clearance 10-50cc/min.  Avoid NSAIDs, Lorenzo-II inhibitors, ACE-I, Angiotensin Receptor Blockers, or Aminoglycosides.  Hold Lasix therapy and angiotensin receptor blocker for now.                  Encephalopathy, metabolic  Neuro checks q.4 hours.  Fall and seizure precautions.  Consult PT and OT in a.m..      UTI (urinary tract infection)  Follow urine culture results, growing gram-negative rods.  Continue intravenous Rocephin 1 g daily.      Hyperlipidemia  Chronic problem. Will continue chronic medications and monitor for any changes, adjusting as needed.          Coronary artery disease involving native coronary artery of native heart without angina pectoris  Patient with known CAD and monitor for S/Sx of angina/ACS. Continue to  monitor on telemetry.        Essential hypertension  Hold antihypertensive agents for now.  Check orthostasis in the morning.  Fall precautions.      GERD (gastroesophageal reflux disease)  Continue PPI        VTE Risk Mitigation (From admission, onward)         Ordered     heparin (porcine) injection 5,000 Units  Every 12 hours      10/28/19 1514     IP VTE LOW RISK PATIENT  Once      10/28/19 1514                      Ranjith Beebe MD  Department of Hospital Medicine   [facility]

## 2019-10-31 VITALS
RESPIRATION RATE: 19 BRPM | SYSTOLIC BLOOD PRESSURE: 163 MMHG | BODY MASS INDEX: 27.01 KG/M2 | TEMPERATURE: 98 F | HEIGHT: 60 IN | OXYGEN SATURATION: 98 % | DIASTOLIC BLOOD PRESSURE: 73 MMHG | WEIGHT: 137.56 LBS | HEART RATE: 72 BPM

## 2019-10-31 LAB
ALBUMIN SERPL BCP-MCNC: 3 G/DL (ref 3.5–5.2)
ALP SERPL-CCNC: 128 U/L (ref 55–135)
ALT SERPL W/O P-5'-P-CCNC: 26 U/L (ref 10–44)
ANION GAP SERPL CALC-SCNC: 8 MMOL/L (ref 8–16)
ANION GAP SERPL CALC-SCNC: 8 MMOL/L (ref 8–16)
AST SERPL-CCNC: 30 U/L (ref 10–40)
BASOPHILS # BLD AUTO: 0.06 K/UL (ref 0–0.2)
BASOPHILS NFR BLD: 1 % (ref 0–1.9)
BILIRUB SERPL-MCNC: 0.5 MG/DL (ref 0.1–1)
BUN SERPL-MCNC: 13 MG/DL (ref 10–30)
BUN SERPL-MCNC: 13 MG/DL (ref 10–30)
CALCIUM SERPL-MCNC: 8.1 MG/DL (ref 8.7–10.5)
CALCIUM SERPL-MCNC: 8.1 MG/DL (ref 8.7–10.5)
CHLORIDE SERPL-SCNC: 110 MMOL/L (ref 95–110)
CHLORIDE SERPL-SCNC: 110 MMOL/L (ref 95–110)
CO2 SERPL-SCNC: 21 MMOL/L (ref 23–29)
CO2 SERPL-SCNC: 21 MMOL/L (ref 23–29)
CREAT SERPL-MCNC: 0.8 MG/DL (ref 0.5–1.4)
CREAT SERPL-MCNC: 0.8 MG/DL (ref 0.5–1.4)
DIFFERENTIAL METHOD: ABNORMAL
EOSINOPHIL # BLD AUTO: 0.5 K/UL (ref 0–0.5)
EOSINOPHIL NFR BLD: 8.6 % (ref 0–8)
ERYTHROCYTE [DISTWIDTH] IN BLOOD BY AUTOMATED COUNT: 12.9 % (ref 11.5–14.5)
EST. GFR  (AFRICAN AMERICAN): >60 ML/MIN/1.73 M^2
EST. GFR  (AFRICAN AMERICAN): >60 ML/MIN/1.73 M^2
EST. GFR  (NON AFRICAN AMERICAN): >60 ML/MIN/1.73 M^2
EST. GFR  (NON AFRICAN AMERICAN): >60 ML/MIN/1.73 M^2
GLUCOSE SERPL-MCNC: 100 MG/DL (ref 70–110)
GLUCOSE SERPL-MCNC: 100 MG/DL (ref 70–110)
HCT VFR BLD AUTO: 36.1 % (ref 37–48.5)
HGB BLD-MCNC: 11.6 G/DL (ref 12–16)
IMM GRANULOCYTES # BLD AUTO: 0.01 K/UL (ref 0–0.04)
LYMPHOCYTES # BLD AUTO: 1.7 K/UL (ref 1–4.8)
LYMPHOCYTES NFR BLD: 28 % (ref 18–48)
MAGNESIUM SERPL-MCNC: 1.9 MG/DL (ref 1.6–2.6)
MCH RBC QN AUTO: 29.1 PG (ref 27–31)
MCHC RBC AUTO-ENTMCNC: 32.1 G/DL (ref 32–36)
MCV RBC AUTO: 91 FL (ref 82–98)
MONOCYTES # BLD AUTO: 0.7 K/UL (ref 0.3–1)
MONOCYTES NFR BLD: 12 % (ref 4–15)
NEUTROPHILS # BLD AUTO: 3 K/UL (ref 1.8–7.7)
NEUTROPHILS NFR BLD: 50.2 % (ref 38–73)
NRBC BLD-RTO: 0 /100 WBC
PHOSPHATE SERPL-MCNC: 3.2 MG/DL (ref 2.7–4.5)
PLATELET # BLD AUTO: 145 K/UL (ref 150–350)
PMV BLD AUTO: 10.3 FL (ref 9.2–12.9)
POTASSIUM SERPL-SCNC: 4.3 MMOL/L (ref 3.5–5.1)
POTASSIUM SERPL-SCNC: 4.3 MMOL/L (ref 3.5–5.1)
PROT SERPL-MCNC: 6.2 G/DL (ref 6–8.4)
RBC # BLD AUTO: 3.98 M/UL (ref 4–5.4)
SODIUM SERPL-SCNC: 139 MMOL/L (ref 136–145)
SODIUM SERPL-SCNC: 139 MMOL/L (ref 136–145)
WBC # BLD AUTO: 5.92 K/UL (ref 3.9–12.7)

## 2019-10-31 PROCEDURE — 84100 ASSAY OF PHOSPHORUS: CPT

## 2019-10-31 PROCEDURE — 97116 GAIT TRAINING THERAPY: CPT

## 2019-10-31 PROCEDURE — 63600175 PHARM REV CODE 636 W HCPCS: Performed by: EMERGENCY MEDICINE

## 2019-10-31 PROCEDURE — 36415 COLL VENOUS BLD VENIPUNCTURE: CPT

## 2019-10-31 PROCEDURE — 25000003 PHARM REV CODE 250: Performed by: EMERGENCY MEDICINE

## 2019-10-31 PROCEDURE — 94761 N-INVAS EAR/PLS OXIMETRY MLT: CPT

## 2019-10-31 PROCEDURE — 83735 ASSAY OF MAGNESIUM: CPT

## 2019-10-31 PROCEDURE — 99900035 HC TECH TIME PER 15 MIN (STAT)

## 2019-10-31 PROCEDURE — 85025 COMPLETE CBC W/AUTO DIFF WBC: CPT

## 2019-10-31 PROCEDURE — 25000003 PHARM REV CODE 250: Performed by: INTERNAL MEDICINE

## 2019-10-31 PROCEDURE — 63600175 PHARM REV CODE 636 W HCPCS: Performed by: INTERNAL MEDICINE

## 2019-10-31 PROCEDURE — 80053 COMPREHEN METABOLIC PANEL: CPT

## 2019-10-31 RX ORDER — POTASSIUM CHLORIDE 20 MEQ/1
20 TABLET, EXTENDED RELEASE ORAL DAILY
Qty: 30 TABLET | Refills: 0 | Status: SHIPPED | OUTPATIENT
Start: 2019-10-31

## 2019-10-31 RX ORDER — SULFAMETHOXAZOLE AND TRIMETHOPRIM 800; 160 MG/1; MG/1
1 TABLET ORAL 2 TIMES DAILY
Qty: 10 TABLET | Refills: 0
Start: 2019-10-31 | End: 2019-11-05

## 2019-10-31 RX ORDER — ASPIRIN 81 MG/1
81 TABLET ORAL DAILY
Refills: 0 | COMMUNITY
Start: 2019-10-31

## 2019-10-31 RX ORDER — DOXAZOSIN 1 MG/1
1 TABLET ORAL DAILY
Qty: 30 TABLET | Refills: 0 | Status: SHIPPED | OUTPATIENT
Start: 2019-10-31 | End: 2020-10-30

## 2019-10-31 RX ADMIN — HEPARIN SODIUM 5000 UNITS: 5000 INJECTION, SOLUTION INTRAVENOUS; SUBCUTANEOUS at 10:10

## 2019-10-31 RX ADMIN — DOCUSATE SODIUM 100 MG: 100 CAPSULE, LIQUID FILLED ORAL at 10:10

## 2019-10-31 RX ADMIN — POTASSIUM & SODIUM PHOSPHATES POWDER PACK 280-160-250 MG 1 PACKET: 280-160-250 PACK at 06:10

## 2019-10-31 RX ADMIN — ASPIRIN 81 MG: 81 TABLET, COATED ORAL at 10:10

## 2019-10-31 RX ADMIN — METOPROLOL SUCCINATE 25 MG: 25 TABLET, FILM COATED, EXTENDED RELEASE ORAL at 10:10

## 2019-10-31 RX ADMIN — POTASSIUM CHLORIDE 20 MEQ: 1500 TABLET, EXTENDED RELEASE ORAL at 10:10

## 2019-10-31 RX ADMIN — PANTOPRAZOLE SODIUM 40 MG: 40 TABLET, DELAYED RELEASE ORAL at 10:10

## 2019-10-31 RX ADMIN — CEFTRIAXONE 1 G: 1 INJECTION, SOLUTION INTRAVENOUS at 10:10

## 2019-10-31 NOTE — PLAN OF CARE
I sent the pts discharge orders , AVS , MAR and updated notes to Chiqui for review. I updated Lisy in admissions at [phone]. Jaye Del Rosario, BALJEET     10/31/19 0949   Post-Acute Status   Post-Acute Authorization Placement   Post-Acute Placement Status Pending Post-Acute Medical Review

## 2019-10-31 NOTE — PLAN OF CARE
10/31/19 1452   Final Note   Assessment Type Final Discharge Note   Anticipated Discharge Disposition [facility]

## 2019-10-31 NOTE — NURSING
Discharge instructions explained to patient, verbalized understanding. PIV removed, tolerated well, catheter intact. Telemetry monitor removed, returned to monitor unit. Vital Signs stable,denies complaints. Patient off unit via wheelchair with [facility] transportation.

## 2019-10-31 NOTE — PT/OT/SLP PROGRESS
Physical Therapy Treatment    Patient Name:  Talia Alberts   MRN:  [id number]    Recommendations:     Discharge Recommendations:  nursing facility, basic, nursing facility, skilled   Discharge Equipment Recommendations: none   Barriers to discharge: None    Assessment:     Talia Alberts is a 89 y.o. female admitted with a medical diagnosis of JIMBO (acute kidney injury).  She presents with the following impairments/functional limitations:  weakness, impaired endurance, impaired self care skills, impaired functional mobilty, gait instability, decreased safety awareness . Tolerated treatment well. Ambulated 80' with rw and CGA, requires A for safety with mobility, reported slight dizziness upon sitting EOB.    Rehab Prognosis: Good; patient would benefit from acute skilled PT services to address these deficits and reach maximum level of function.    Recent Surgery: * No surgery found *      Plan:     During this hospitalization, patient to be seen 6 x/week to address the identified rehab impairments via gait training, therapeutic activities, therapeutic exercises and progress toward the following goals:    · Plan of Care Expires:  11/12/19    Subjective     Chief Complaint: none stated  Patient/Family Comments/goals: none stated.  Pain/Comfort:  · Pain Rating 1: 0/10      Objective:     Communicated with nurse Fuentes prior to session.  Patient found supine with telemetry, bed alarm upon PT entry to room.     General Precautions: Standard, fall, aspiration, hearing impaired   Orthopedic Precautions:N/A   Braces: N/A     Functional Mobility:  · Bed Mobility:     · Rolling Left:  contact guard assistance  · Rolling Right: contact guard assistance  · Supine to Sit: contact guard assistance  · Sit to Supine: contact guard assistance  · Transfers:     · Sit to Stand:  contact guard assistance with rolling walker  · Gait: 80' with rw and CGA.      AM-PAC 6 CLICK MOBILITY          Therapeutic Activities and Exercises:    Transferred to sitting EOB. Donned extra gown and brushed hair.   Ambulated 80' with rw and CGA.   Returned to supine in bed to have bath.       Patient left supine with all lines intact, call button in reach, bed alarm on and nurse Gina notified..    GOALS:   Multidisciplinary Problems     Physical Therapy Goals        Problem: Physical Therapy Goal    Goal Priority Disciplines Outcome Goal Variances Interventions   Physical Therapy Goal     PT, PT/OT Ongoing, Progressing     Description:  Goals to be met by: 2019     Patient will increase functional independence with mobility by performin. Supine to sit with Modified [place]  2. Sit to supine with Modified [place]  3. Sit to stand transfer with Contact Guard Assistance  4. Bed to chair transfer with Contact Guard Assistance using Rolling Walker  5. Gait  x 150 feet with Contact Guard Assistance using Rolling Walker.   6. Lower extremity exercise program x10-20 reps per handout, with supervision                      Time Tracking:     PT Received On: 10/31/19  PT Start Time: 0930     PT Stop Time: 0940  PT Total Time (min): 10 min     Billable Minutes: Gait Training 10min    Treatment Type: Treatment  PT/PTA: PTA     PTA Visit Number: 2     Andreea Madsen, LOKESH  10/31/2019

## 2019-10-31 NOTE — PLAN OF CARE
Problem: Physical Therapy Goal  Goal: Physical Therapy Goal  Description  Goals to be met by: 2019     Patient will increase functional independence with mobility by performin. Supine to sit with Modified [place]  2. Sit to supine with Modified [place]  3. Sit to stand transfer with Contact Guard Assistance  4. Bed to chair transfer with Contact Guard Assistance using Rolling Walker  5. Gait  x 150 feet with Contact Guard Assistance using Rolling Walker.   6. Lower extremity exercise program x10-20 reps per handout, with supervision     Outcome: Ongoing, Progressing   Ambulated 80' with rw and CGA.

## 2019-10-31 NOTE — DISCHARGE SUMMARY
[facility] Hospital Medicine  Discharge Summary      Patient Name: Talia Alberts  MRN: [id number]  Admission Date: 10/28/2019  Hospital Length of Stay: 3 days  Discharge Date and Time:  10/31/2019 8:35 AM  Attending Physician: Ranjith Beebe MD   Discharging Provider: Ranjith Beebe MD  Primary Care Provider: Michael Martin MD      HPI:   Patient is an elderly frail 89-year-old  female with past medical history significant for hypertension, hyperlipidemia, gastroesophageal reflux disease, coronary artery disease who presented to the emergency room at [facility] via paramedics with complaint of altered mental status.  Patient is a resident of local nursing home.  Reportedly patient was noted to have visual hallucinations.  Also it was reported patient's pulse ox at nursing home was around 70% which improved to 99% upon supplemental oxygen use via nasal cannula.  Reportedly patient's mental status improved subsequently.  Patient denies any focal subjective complaints.  Patient is noted to have worsening renal functions than baseline.  No recent fever, chills, nausea vomiting or diarrhea reported.    * No surgery found *      Hospital Course:   Patient was managed on medicine telemetry floor where patient received intravenous antibiotics and mental status was closely monitored which has improved to baseline.  Patient was treated for urinary tract infection.  Urine cultures grew Klebsiella oxytoca species which is sensitive to oral Bactrim antibiotic therapy.  Patient to have BMP checked next week.  Patient is with improved appetite and needs to continue use of boost protein supplementation, pureed diet with aspiration precautions.    Consults: None    Final Active Diagnoses:    Diagnosis Date Noted POA    PRINCIPAL PROBLEM:  JIMBO (acute kidney injury) [N17.9] 10/28/2019 Yes    Hyperlipidemia [E78.5] 10/28/2019 Yes    UTI (urinary tract infection) [N39.0] 10/28/2019  Yes    Encephalopathy, metabolic [G93.41] 10/28/2019 Yes    Coronary artery disease involving native coronary artery of native heart without angina pectoris [I25.10]  Yes     Chronic    Malnutrition of moderate degree [E44.0] 09/21/2017 Yes    Essential hypertension [I10] 03/19/2017 Yes    GERD (gastroesophageal reflux disease) [K21.9] 03/23/2015 Yes      Problems Resolved During this Admission:       Discharged Condition: good    Disposition: assisted Nursing Home    Follow Up:  Follow-up Information     Michael Martin MD In 1 week.    Specialty:  Family Medicine  Contact information:  [address]  [place] [address]  [phone]                 Patient Instructions:      Diet Cardiac   Order Comments: Dysphagia diet-pureed diet with aspiration precautions.  Boost can with meals.     Other restrictions (specify):   Order Comments: PLEASE OBSERVE FALL PRECAUTIONS     Call MD for:   Order Comments: For worsening symptoms, chest pain, shortness of breath, increased abdominal pain, high grade fever, stroke or stroke like symptoms, immediately go to the nearest Emergency Room or call 911 as soon as possible.       Significant Diagnostic Studies: Labs:   BMP:   Recent Labs   Lab 10/30/19  0610 10/31/19  0620   GLU 97  97 100  100     139 139  139   K 4.0  4.0 4.3  4.3   *  113* 110  110   CO2 18*  18* 21*  21*   BUN 20  20 13  13   CREATININE 0.9  0.9 0.8  0.8   CALCIUM 7.9*  7.9* 8.1*  8.1*   MG 1.9 1.9    and CMP   Recent Labs   Lab 10/30/19  0610 10/31/19  0620     139 139  139   K 4.0  4.0 4.3  4.3   *  113* 110  110   CO2 18*  18* 21*  21*   GLU 97  97 100  100   BUN 20  20 13  13   CREATININE 0.9  0.9 0.8  0.8   CALCIUM 7.9*  7.9* 8.1*  8.1*   PROT 6.0 6.2   ALBUMIN 3.0* 3.0*   BILITOT 0.5 0.5   ALKPHOS 120 128   AST 30 30   ALT 24 26   ANIONGAP 8  8 8  8   ESTGFRAFRICA >60  >60 >60  >60   EGFRNONAA 55*  55* >60  >60       Pending Diagnostic  Studies:     None         Medications:  Reconciled Home Medications:      Medication List      START taking these medications    sulfamethoxazole-trimethoprim 800-160mg 800-160 mg Tab  Commonly known as:  BACTRIM DS  Take 1 tablet by mouth 2 (two) times daily. for 5 days        CHANGE how you take these medications    albuterol sulfate 2.5 mg/0.5 mL Nebu  Take 2.5 mg by nebulization every 4 (four) hours as needed. Rescue  What changed:    · when to take this  · reasons to take this     artificial tears 0.5 % ophthalmic solution  Commonly known as:  ISOPTO TEARS  Place 1 drop into both eyes as needed.  What changed:  reasons to take this        CONTINUE taking these medications    alendronate 70 MG tablet  Commonly known as:  FOSAMAX  Take 70 mg by mouth every Thursday.     ARTHRICREAM RUB 10 % cream  Generic drug:  trolamine salicylate  Apply 1 Dose topically 3 (three) times daily as needed (Left Hip Pain).     aspirin 81 MG EC tablet  Commonly known as:  ECOTRIN  Take 81 mg by mouth.     CALCIUM 600 + D(3) 600 mg(1,500mg) -200 unit Tab  Generic drug:  calcium-vitamin D3  Take 1 tablet by mouth once daily.     docusate sodium 100 MG capsule  Commonly known as:  COLACE  Take 100 mg by mouth 2 (two) times daily.     doxazosin 1 MG tablet  Commonly known as:  CARDURA     ferrous sulfate 325 (65 FE) MG EC tablet  Take 325 mg by mouth once daily.     folic acid 1 MG tablet  Commonly known as:  FOLVITE  Take 400 mcg by mouth once daily.     furosemide 20 MG tablet  Commonly known as:  LASIX  Take 1 tablet (20 mg total) by mouth 2 (two) times daily.     hydrocortisone 1 % ointment  Apply 1 % topically 2 (two) times daily as needed (1-2 times daily for Blepharitis).     losartan 50 MG tablet  Commonly known as:  COZAAR  Take 50 mg by mouth 2 (two) times daily.     lubiprostone 8 MCG Cap  Commonly known as:  AMITIZA  Take 8 mcg by mouth 2 (two) times daily as needed (constipation).     metoprolol succinate 25 MG 24 hr  tablet  Commonly known as:  TOPROL-XL  Take 25 mg by mouth once daily.     miconazole nitrate 2% 2 % Oint  Commonly known as:  MICOTIN  Apply topically 2 (two) times daily. Apply to butocks     neomycin-polymyxin-hydrocortisone otic solution  Commonly known as:  CORTISPORIN  3 times a day for 2 days only     pantoprazole 40 MG tablet  Commonly known as:  PROTONIX  Take 40 mg by mouth once daily.     potassium chloride SA 20 MEQ tablet  Commonly known as:  K-DUR,KLOR-CON     simvastatin 40 MG tablet  Commonly known as:  ZOCOR  Take 40 mg by mouth every evening.     THERA 400 mcg Tab  Generic drug:  multivitamin with folic acid  Take 1 tablet by mouth.     tobramycin-dexamethasone 0.3-0.1% 0.3-0.1 % Drps  Commonly known as:  TOBRADEX  2 drops every 6 (six) hours.            Indwelling Lines/Drains at time of discharge:   Lines/Drains/Airways     None                 Time spent on the discharge of patient: 32 minutes  Patient was seen and examined on the date of discharge and determined to be suitable for discharge.         Ranjith Beebe MD  Department of Hospital Medicine  [facility]

## 2019-10-31 NOTE — PLAN OF CARE
10/30/19 2030   Patient Assessment/Suction   Level of Consciousness (AVPU) alert   Respiratory Effort Normal;Unlabored   Expansion/Accessory Muscles/Retractions expansion symmetric;no retractions;no use of accessory muscles   All Lung Fields Breath Sounds clear   PRE-TX-O2   O2 Device (Oxygen Therapy) room air   SpO2 98 %   Pulse Oximetry Type Intermittent   Aerosol Therapy   $ Aerosol Therapy Charges PRN treatment not required   Respiratory Treatment Status (SVN) PRN treatment not required

## 2019-10-31 NOTE — PLAN OF CARE
POC reviewed, Cardiac monitored SR, VSS, PRN pain medication given for headache pain, safety and fall precautions maintained, bed in low position, bed alarm set, call light within reach, SR up x 2,  incontinent care provided, resting between care will continue to monitor and round.

## 2019-10-31 NOTE — PLAN OF CARE
Date Status/Notes Created By   · 10/31/2019 10:58:15 AM Note: New corrected AVS sent . thanks !  Jaye Del Rosario  · 10/31/2019 10:47:53 AM Accepted  [email or web address]  · 10/31/2019 9:49:04 AM New: The pt is returning today. thanks ! Jaye [phone]  Jaye Del Rosario   Corrected AVS sent after medication was reconciled. CM to follow. Jaye Del Rosario, [facility]     10/31/19 1058   Post-Acute Status   Post-Acute Authorization Placement   Post-Acute Placement Status Pending Post-Acute Medical Review

## 2019-10-31 NOTE — PHYSICIAN QUERY
PT Name: Talia Alberts  MR #: [id number]    Physician Query Form - Cause and Effect Relationship Clarification      CDS/: Karina Pierre RN              Contact information:[email or web address]    This form is a permanent document in the medical record.     Query Date: October 31, 2019    By submitting this query, we are merely seeking further clarification of documentation. Please utilize your independent clinical judgment when addressing the question(s) below.    The Medical record contains the following:  Supporting Clinical Findings   Location in record                                                                                             UTI (urinary tract infection)  Follow urine culture results, growing gram-negative rods.  Continue intravenous Rocephin 1 g daily.                                                                                                Hospital medicine 10/30                                                                                                                               Urine culture 10/28         Provider, please clarify if there is any correlation between __Klebsiella oxytoca_______ and ___UTI_________.           Are the conditions:      [ x ] Due to or associated with each other   [  ] Unrelated to each other   [  ] Other (Please Specify): _________________________   [  ] Clinically Undetermined

## 2019-10-31 NOTE — CARE UPDATE
10/31/19 0803   Patient Assessment/Suction   Level of Consciousness (AVPU) alert   Respiratory Effort Normal;Unlabored   All Lung Fields Breath Sounds clear   PRE-TX-O2   O2 Device (Oxygen Therapy) room air   SpO2 97 %   Pulse Oximetry Type Intermittent   $ Pulse Oximetry - Multiple Charge Pulse Oximetry - Multiple   Pulse 60   Resp 18   Aerosol Therapy   $ Aerosol Therapy Charges PRN treatment not required   Respiratory Treatment Status (SVN) PRN treatment not required

## 2019-11-01 LAB
BACTERIA UR CULT: ABNORMAL
BACTERIA UR CULT: ABNORMAL

## 2020-02-17 NOTE — H&P
[facility] Hospital Medicine  History & Physical    Patient Name: Talia Alberts  MRN: [id number]  Admission Date: 10/28/2019  Attending Physician: Simon Larsen III, MD   Primary Care Provider: Michael Martin MD         Patient information was obtained from patient and ER records.     Subjective:     Principal Problem:JIMBO (acute kidney injury)    Chief Complaint: Altered mental status  Chief Complaint   Patient presents with    Altered Mental Status        HPI: Patient is an elderly frail 89-year-old  female with past medical history significant for hypertension, hyperlipidemia, gastroesophageal reflux disease, coronary artery disease who presented to the emergency room at [facility] in inpatient status via paramedics with complaint of altered mental status.  Patient is a resident of local nursing home.  Reportedly patient was noted to have visual hallucinations.  Also it was reported patient's pulse ox at nursing home was around 70% which improved to 99% upon supplemental oxygen use via nasal cannula.  Reportedly patient's mental status improved subsequently.  Patient denies any focal subjective complaints.  Patient is noted to have worsening renal functions than baseline.  No recent fever, chills, nausea vomiting or diarrhea reported.    Past Medical History:   Diagnosis Date    Acute on chronic diastolic heart failure     Aortic stenosis, moderate     Breast cancer     resolved and no evidence of recurrence    Coronary artery disease     GERD (gastroesophageal reflux disease)     Hyperlipidemia     Hypertension     Iron deficiency anemia     Major depressive disorder     MI (myocardial infarction)     Squamous cell carcinoma        Past Surgical History:   Procedure Laterality Date    BREAST LUMPECTOMY      LT SIDE     SECTION      4    CHOLECYSTECTOMY      ORIF FEMUR FRACTURE Right 2014    TISSUE AORTIC VALVE REPLACEMENT   Patient prestns with left side/ back pain which started last night 2010       Review of patient's allergies indicates:   Allergen Reactions    Morphine Swelling       No current facility-administered medications on file prior to encounter.      Current Outpatient Medications on File Prior to Encounter   Medication Sig    albuterol sulfate 2.5 mg/0.5 mL Nebu Take 2.5 mg by nebulization every 4 (four) hours as needed. Rescue (Patient taking differently: Take by nebulization every 6 (six) hours as needed (SOB). Rescue)    alendronate (FOSAMAX) 70 MG tablet Take 70 mg by mouth every Thursday.     artificial tears (ISOPTO TEARS) 0.5 % ophthalmic solution Place 1 drop into both eyes as needed. (Patient taking differently: Place 1 drop into both eyes as needed (dry eye). )    aspirin (ECOTRIN) 81 MG EC tablet Take 81 mg by mouth.    calcium-vitamin D tablet 600 mg-200 units Take 1 tablet by mouth once daily.    docusate sodium (COLACE) 100 MG capsule Take 100 mg by mouth 2 (two) times daily.    doxazosin (CARDURA) 1 MG tablet     ferrous sulfate 325 (65 FE) MG EC tablet Take 325 mg by mouth once daily.    furosemide (LASIX) 20 MG tablet Take 1 tablet (20 mg total) by mouth 2 (two) times daily.    hydrocortisone 1 % ointment Apply 1 % topically 2 (two) times daily as needed (1-2 times daily for Blepharitis).    losartan (COZAAR) 50 MG tablet Take 50 mg by mouth 2 (two) times daily.     lubiprostone (AMITIZA) 8 MCG Cap Take 8 mcg by mouth 2 (two) times daily as needed (constipation).     metoprolol succinate (TOPROL-XL) 25 MG 24 hr tablet Take 25 mg by mouth once daily.     miconazole nitrate 2% (MICOTIN) 2 % Oint Apply topically 2 (two) times daily. Apply to butocks (Patient not taking: Reported on 9/24/2019)    multivitamin with folic acid (THERA) 400 mcg Tab Take 1 tablet by mouth.    neomycin-polymyxin-hydrocortisone (CORTISPORIN) otic solution 3 times a day for 2 days only    pantoprazole (PROTONIX) 40 MG tablet Take 40 mg by mouth once daily.    potassium chloride  SA (K-DUR,KLOR-CON) 20 MEQ tablet     simvastatin (ZOCOR) 40 MG tablet Take 40 mg by mouth every evening.    trolamine salicylate (ARTHRICREAM RUB) 10 % cream Apply 1 Dose topically 3 (three) times daily as needed (Left Hip Pain).      Family History     Problem Relation (Age of Onset)    No Known Problems Mother, Father        Tobacco Use    Smoking status: Never Smoker    Smokeless tobacco: Never Used   Substance and Sexual Activity    Alcohol use: No    Drug use: No    Sexual activity: Not on file     Review of Systems: Patient is pleasantly demented.  Constitutional: Positive for fatigue.   Musculoskeletal: Positive for arthralgias.   Neurological: Positive for dizziness and weakness.   Psychiatric/Behavioral: Positive for confusion.   All other systems reviewed and are negative.    Objective:     Vital Signs (Most Recent):  Temp: 97.7 °F (36.5 °C) (10/28/19 1021)  Pulse: 75 (10/28/19 1416)  Resp: 16 (10/28/19 1021)  BP: (!) 113/56 (10/28/19 1031)  SpO2: 96 % (10/28/19 1416) Vital Signs (24h Range):  Temp:  [97.7 °F (36.5 °C)] 97.7 °F (36.5 °C)  Pulse:  [65-75] 75  Resp:  [16] 16  SpO2:  [94 %-100 %] 96 %  BP: (113-119)/(56-58) 113/56     Weight: 66.2 kg (146 lb)  Body mass index is 27.59 kg/m².    Physical Exam   Constitutional: She is oriented to person, place, and time. She appears well-developed.   Elderly frail appearing female   HENT:   Head: Normocephalic and atraumatic.   Dry mucosa   Eyes: Pupils are equal, round, and reactive to light. Conjunctivae are normal.   Neck: Neck supple. No JVD present. No thyromegaly present.   Cardiovascular: Normal rate and regular rhythm. Exam reveals no gallop and no friction rub.   No murmur heard.  Pulmonary/Chest: Effort normal and breath sounds normal.   Abdominal: Soft. Bowel sounds are normal. She exhibits no distension and no mass. There is no tenderness.   Musculoskeletal: Normal range of motion. She exhibits no edema.   Neurological: She is oriented to  person, place, and time. No cranial nerve deficit.   Skin: Skin is warm and dry.   Psychiatric: Her behavior is normal.         CRANIAL NERVES     CN III, IV, VI   Pupils are equal, round, and reactive to light.       Significant Labs:   CBC:   Recent Labs   Lab 10/28/19  1033   WBC 11.74   HGB 13.6   HCT 42.6   *     CMP:   Recent Labs   Lab 10/28/19  1033      K 4.6      CO2 23   *   BUN 67*   CREATININE 2.0*   CALCIUM 9.3   PROT 7.6   ALBUMIN 3.8   BILITOT 0.7   ALKPHOS 133   AST 27   ALT 30   ANIONGAP 13   EGFRNONAA 21*     Urine Culture: No results for input(s): LABURIN in the last 48 hours.  Urine Studies:   Recent Labs   Lab 10/28/19  1046   COLORU Yellow   APPEARANCEUA Cloudy*   PHUR 8.0   SPECGRAV 1.010   PROTEINUA Trace*   GLUCUA Negative   KETONESU Negative   BILIRUBINUA Negative   OCCULTUA 1+*   NITRITE Negative   UROBILINOGEN 2.0-3.0*   LEUKOCYTESUR 3+*   RBCUA 3   WBCUA >100*   BACTERIA Many*       Significant Imaging:   CXR: Pending.     Assessment/Plan:     * JIMBO (acute kidney injury)  Continue IVF hydration.  Follow renal panel and electrolytes closely.  Adjust renal dose medications for creatinine clearance 10-50cc/min.  Avoid NSAIDs, Lorenzo-II inhibitors, ACE-I, Angiotensin Receptor Blockers, or Aminoglycosides.  Hold Lasix therapy and angiotensin receptor blocker for now.    Encephalopathy, metabolic  Neuro checks q.4 hours.  Fall and seizure precautions.  Consult PT and OT in a.m..    UTI (urinary tract infection)  Follow urine culture results.  Continue intravenous Rocephin 1 g daily.    Hyperlipidemia  Chronic problem. Will continue chronic medications and monitor for any changes, adjusting as needed.    Coronary artery disease involving native coronary artery of native heart without angina pectoris  Patient with known CAD and monitor for S/Sx of angina/ACS. Continue to monitor on telemetry.    Essential hypertension  Hold antihypertensive agents for now.  Check  orthostasis in the morning.  Fall precautions.    GERD (gastroesophageal reflux disease)  Continue PPI    DVT prophylaxis:  Use sequential compression stockings and Pepe hose.  Heparin 5000 units subcu Q 12 hr.    Code status: DNR per NH papers.    Ranjith Beebe MD  Department of Hospital Medicine   [facility]

## 2020-05-07 ENCOUNTER — LAB VISIT (OUTPATIENT)
Dept: LAB | Facility: HOSPITAL | Age: 85
End: 2020-05-07
Payer: MEDICARE

## 2020-05-07 DIAGNOSIS — Z20.822 SUSPECTED COVID-19 VIRUS INFECTION: ICD-10-CM

## 2020-05-07 LAB — SARS-COV-2 RNA RESP QL NAA+PROBE: NOT DETECTED

## 2020-05-07 PROCEDURE — U0002 COVID-19 LAB TEST NON-CDC: HCPCS

## 2020-05-13 ENCOUNTER — LAB VISIT (OUTPATIENT)
Dept: LAB | Facility: HOSPITAL | Age: 85
End: 2020-05-13
Payer: MEDICARE

## 2020-05-13 DIAGNOSIS — Z20.822 SUSPECTED COVID-19 VIRUS INFECTION: ICD-10-CM

## 2020-05-13 PROCEDURE — U0002 COVID-19 LAB TEST NON-CDC: HCPCS

## 2020-05-14 LAB — SARS-COV-2 RNA RESP QL NAA+PROBE: DETECTED

## 2020-05-20 DIAGNOSIS — Z20.822 SUSPECTED COVID-19 VIRUS INFECTION: ICD-10-CM

## 2020-05-21 ENCOUNTER — LAB VISIT (OUTPATIENT)
Dept: LAB | Facility: HOSPITAL | Age: 85
End: 2020-05-21
Payer: MEDICARE

## 2020-05-21 DIAGNOSIS — Z20.822 SUSPECTED COVID-19 VIRUS INFECTION: ICD-10-CM

## 2020-05-21 PROCEDURE — U0003 INFECTIOUS AGENT DETECTION BY NUCLEIC ACID (DNA OR RNA); SEVERE ACUTE RESPIRATORY SYNDROME CORONAVIRUS 2 (SARS-COV-2) (CORONAVIRUS DISEASE [COVID-19]), AMPLIFIED PROBE TECHNIQUE, MAKING USE OF HIGH THROUGHPUT TECHNOLOGIES AS DESCRIBED BY CMS-2020-01-R: HCPCS

## 2020-05-22 LAB — SARS-COV-2 RNA RESP QL NAA+PROBE: NOT DETECTED

## 2020-06-04 ENCOUNTER — LAB VISIT (OUTPATIENT)
Dept: LAB | Facility: HOSPITAL | Age: 85
End: 2020-06-04
Payer: MEDICARE

## 2020-06-04 DIAGNOSIS — Z20.822 SUSPECTED COVID-19 VIRUS INFECTION: ICD-10-CM

## 2020-06-04 PROCEDURE — U0003 INFECTIOUS AGENT DETECTION BY NUCLEIC ACID (DNA OR RNA); SEVERE ACUTE RESPIRATORY SYNDROME CORONAVIRUS 2 (SARS-COV-2) (CORONAVIRUS DISEASE [COVID-19]), AMPLIFIED PROBE TECHNIQUE, MAKING USE OF HIGH THROUGHPUT TECHNOLOGIES AS DESCRIBED BY CMS-2020-01-R: HCPCS

## 2020-06-05 LAB — SARS-COV-2 RNA RESP QL NAA+PROBE: NOT DETECTED

## 2020-06-24 ENCOUNTER — HOSPITAL ENCOUNTER (EMERGENCY)
Facility: HOSPITAL | Age: 85
Discharge: HOME | End: 2020-06-24
Attending: EMERGENCY MEDICINE
Payer: MEDICARE

## 2020-06-24 VITALS
OXYGEN SATURATION: 98 % | SYSTOLIC BLOOD PRESSURE: 175 MMHG | RESPIRATION RATE: 18 BRPM | TEMPERATURE: 99 F | HEART RATE: 54 BPM | DIASTOLIC BLOOD PRESSURE: 72 MMHG

## 2020-06-24 DIAGNOSIS — T14.8XXA MULTIPLE SKIN TEARS: ICD-10-CM
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
## 2020-06-24 DIAGNOSIS — M25.552 LEFT HIP PAIN: ICD-10-CM

## 2020-06-24 DIAGNOSIS — S46.911A SHOULDER STRAIN, RIGHT, INITIAL ENCOUNTER: ICD-10-CM

## 2020-06-24 DIAGNOSIS — S70.02XA CONTUSION OF LEFT HIP, INITIAL ENCOUNTER: Primary | ICD-10-CM

## 2020-06-24 PROCEDURE — 99285 EMERGENCY DEPT VISIT HI MDM: CPT | Mod: 25

## 2020-06-24 PROCEDURE — 25000003 PHARM REV CODE 250: Performed by: EMERGENCY MEDICINE

## 2020-06-24 RX ORDER — ACETAMINOPHEN 325 MG/1
650 TABLET ORAL
Status: COMPLETED | OUTPATIENT
Start: 2020-06-24 | End: 2020-06-24

## 2020-06-24 RX ORDER — BACITRACIN ZINC 500 UNIT/G
1 OINTMENT IN PACKET (EA) TOPICAL 2 TIMES DAILY
Status: COMPLETED | OUTPATIENT
Start: 2020-06-24 | End: 2020-06-24

## 2020-06-24 RX ADMIN — BACITRACIN ZINC 1 EACH: 500 OINTMENT TOPICAL at 11:06

## 2020-06-24 RX ADMIN — ACETAMINOPHEN 650 MG: 325 TABLET ORAL at 10:06

## 2020-06-24 NOTE — ED NOTES
Called [facility] re: pt's transport home; states they are sending their van. Pt updated. No other needs identified. Will monitor prn.

## 2020-06-24 NOTE — ED NOTES
Called [facility] re transportation multiple times; when transferred to nursing station no one answers. Pt in rm asleep with no s/sx of distress noted. Will continue to monitor.

## 2020-06-24 NOTE — ED PROVIDER NOTES
Encounter Date: 2020    SCRIBE #1 NOTE: IJami am scribing for, and in the presence of, Wilfred Haddad MD.       History     Chief Complaint   Patient presents with    Fall     forward out of , skin tear to LFA     Time seen by provider: 9:03 AM on 2020    Talia Alberts is a 89 y.o. female with a PMHx of HTN, MI, CHF, CAD, HLD, GERD, and anemia who presents to the ED via EMS with an onset of right shoulder pain, neck pain, and skin tears secondary to a fall. The patient is from [facility] where she fell forward out of her wheelchair. She endured a tear to her left wrist during the incident. Per EMS, bleeding is controlled PTA. The patient denies hitting her head, wrist pain or any other symptoms at this time. PSHx of cholecystectomy, ORIF right femur fracture, and tissue aortic valve replacement.      The history is provided by the patient and the EMS personnel.     Review of patient's allergies indicates:   Allergen Reactions    Morphine Swelling     Past Medical History:   Diagnosis Date    Acute on chronic diastolic heart failure     Aortic stenosis, moderate     Breast cancer     resolved and no evidence of recurrence    Coronary artery disease     GERD (gastroesophageal reflux disease)     Hyperlipidemia     Hypertension     Iron deficiency anemia     Major depressive disorder     MI (myocardial infarction)     Squamous cell carcinoma      Past Surgical History:   Procedure Laterality Date    BREAST LUMPECTOMY      LT SIDE     SECTION      4    CHOLECYSTECTOMY      ORIF FEMUR FRACTURE Right 2014    TISSUE AORTIC VALVE REPLACEMENT  2010     Family History   Problem Relation Age of Onset    No Known Problems Mother     No Known Problems Father     Melanoma Neg Hx     Psoriasis Neg Hx     Lupus Neg Hx     Eczema Neg Hx      Social History     Tobacco Use    Smoking status: Never Smoker    Smokeless tobacco: Never Used   Substance Use Topics     Alcohol use: No    Drug use: No     Review of Systems   Constitutional: Negative for fever.   HENT: Negative for sore throat.    Respiratory: Negative for shortness of breath.    Cardiovascular: Negative for chest pain.   Gastrointestinal: Negative for nausea.   Genitourinary: Negative for dysuria.   Musculoskeletal: Positive for arthralgias, back pain and neck pain.   Skin: Positive for wound. Negative for rash.   Neurological: Negative for weakness.   Hematological: Does not bruise/bleed easily.       Physical Exam     Initial Vitals [06/24/20 0850]   BP Pulse Resp Temp SpO2   134/60 64 18 98.9 °F (37.2 °C) 97 %      MAP       --         Physical Exam    Nursing note and vitals reviewed.  Constitutional: She appears well-developed and well-nourished.  Non-toxic appearance. No distress. Cervical collar in place.   HENT:   Head: Normocephalic and atraumatic.   Eyes: EOM are normal. Pupils are equal, round, and reactive to light.   Neck: Normal range of motion. Neck supple. No neck rigidity. No JVD present.   Cardiovascular: Normal rate, regular rhythm, normal heart sounds and intact distal pulses. Exam reveals no gallop and no friction rub.    No murmur heard.  Pulmonary/Chest: Breath sounds normal. She has no wheezes. She has no rhonchi. She has no rales.   Abdominal: Soft. Bowel sounds are normal. She exhibits no distension. There is no abdominal tenderness. There is no rebound and no guarding.   Musculoskeletal: Normal range of motion.      Right shoulder: She exhibits tenderness. She exhibits no swelling.      Right hip: She exhibits no tenderness.      Left hip: She exhibits tenderness. She exhibits normal range of motion and no swelling.      Lumbar back: She exhibits tenderness.        Left hand: She exhibits tenderness. She exhibits no swelling.      Comments: Tenderness over third MCP of left hand. Sacral and upper midline lumbar tenderness. Tenderness of right anterior and posterior shoulder. No swelling  or bruising. Tenderness of left anterior hip with full ROM. No swelling or bruising. No tenderness of right hip.   Neurological: She is alert and oriented to person, place, and time. She has normal strength and normal reflexes. No cranial nerve deficit or sensory deficit. She exhibits normal muscle tone. Coordination normal. GCS eye subscore is 4. GCS verbal subscore is 5. GCS motor subscore is 6.   Skin: Skin is warm and dry.   Skin tear over third MCP on left hand. Skin tea on dorsal surface of left wrist.   Psychiatric: She has a normal mood and affect. Her speech is normal and behavior is normal. She is not actively hallucinating.         ED Course   Procedures  Labs Reviewed - No data to display       Imaging Results          X-Ray Hip 2 View Left (Final result)  Result time 06/24/20 10:16:49    Final result by Jeevan Duncan MD (06/24/20 10:16:49)                 Impression:      No acute osseous abnormality.  Left femur ORIF partially imaged.  Chronic left superior and inferior pubic rami fractures.      Electronically signed by: Jeevan Duncan MD  Date:    06/24/2020  Time:    10:16             Narrative:    EXAMINATION:  XR HIP 2 VIEW LEFT    CLINICAL HISTORY:  Pain in left hip    TECHNIQUE:  AP view of the pelvis and frog leg lateral view of the left hip were performed.    COMPARISON:  None    FINDINGS:  There is no acute fracture or dislocation.  There are chronic left superior and inferior pubic rami fractures.  ORIF of the left distal femur is partially imaged.  Mild degenerative change of both hips is present.                               X-Ray Hand 3 view Left (Final result)  Result time 06/24/20 10:16:04    Final result by Jeevan Duncan MD (06/24/20 10:16:04)                 Impression:      No acute osseous abnormality. Osteoarthritis.      Electronically signed by: Jeevan Duncan MD  Date:    06/24/2020  Time:    10:16             Narrative:    EXAMINATION:  XR HAND COMPLETE 3  VIEW LEFT    CLINICAL HISTORY:  fall;.    TECHNIQUE:  PA, lateral, and oblique views of the left hand were performed.    COMPARISON:  None    FINDINGS:  There is no fracture or dislocation.  Advanced interphalangeal and 1st carpometacarpal joint degenerative changes are present.  The bones are osteopenic.                               X-Ray Lumbar Spine Ap And Lateral (Final result)  Result time 06/24/20 10:11:51    Final result by Shon Alcantara MD (06/24/20 10:11:51)                 Impression:      1. Osteopenia with multilevel facet joint arthropathy and chronic compression deformities of L1 and L2.  L2 has undergone previous vertebroplasty.  There is no obvious acute fracture.  2. Atherosclerosis.      Electronically signed by: Shon Alcantara MD  Date:    06/24/2020  Time:    10:11             Narrative:    EXAMINATION:  XR LUMBAR SPINE AP AND LATERAL    CLINICAL HISTORY:  Back pain or radiculopathy, trauma;    TECHNIQUE:  AP, lateral and spot images were performed of the lumbar spine.    COMPARISON:  Plain films of the lumbar spine dated 06/05/2019    FINDINGS:  The bones are osteopenic.  There are chronic compression deformities of the L1 and L2 vertebral bodies with inferior endplate compression of L1, superior endplate compression of L2.  L2 has previously undergone vertebroplasty.  This is unchanged.  There is stable mild retropulsion of the posterior inferior cortical margin of L1.  There is stable trace retrolisthesis of L2 on L3.  There is multilevel facet joint arthropathy.  There is no obvious acute fracture.  There are changes of prior cholecystectomy and there is moderate to marked atherosclerosis.                               X-Ray Sacrum And Coccyx (Final result)  Result time 06/24/20 10:15:28    Final result by Jeevan Duncan MD (06/24/20 10:15:28)                 Impression:      No acute fracture/dislocation.  Chronic left superior and inferior pubic rami  fractures.      Electronically signed by: Jeevan Duncan MD  Date:    06/24/2020  Time:    10:15             Narrative:    EXAMINATION:  XR SACRUM AND COCCYX    CLINICAL HISTORY:  Unspecified fall, initial encounter    TECHNIQUE:  Two or three views of the sacrum and coccyx were performed.    COMPARISON:  None    FINDINGS:  There are chronic left superior and inferior pubic rami fractures.  There is no acute fracture or dislocation.                               X-Ray Thoracic Spine AP Lateral (Final result)  Result time 06/24/20 10:10:18    Final result by Shon Alcantara MD (06/24/20 10:10:18)                 Impression:      1. Osteopenia with multilevel degenerative change without obvious acute thoracic fracture or traumatic subluxation.      Electronically signed by: Shon Alcantara MD  Date:    06/24/2020  Time:    10:10             Narrative:    EXAMINATION:  XR THORACIC SPINE AP LATERAL    CLINICAL HISTORY:  Unspecified fall, initial encounter    TECHNIQUE:  AP, lateral and swimmer's views of the thoracic spine were obtained.    COMPARISON:  Plain films of the thoracic spine dated 06/05/2019    FINDINGS:  There is multilevel degenerative change in the thoracic spine with osteophyte formation but the vertebral bodies maintain normal height and alignment.  There is no obvious acute fracture.  The bones are osteopenic.  Redemonstrated are postsurgical changes of anterior interbody fusion of the cervical spine.  Also, there has been vertebroplasty at the L2 level and there are chronic compression deformities of L1 and L2 which are better demonstrated on the comparison study.  The patient has undergone previous median sternotomy and cholecystectomy.                               X-Ray Shoulder Trauma Right (Final result)  Result time 06/24/20 10:14:38    Final result by Jeevan Duncan MD (06/24/20 10:14:38)                 Impression:      Severe right shoulder osteoarthritis.  No acute osseous  abnormality.      Electronically signed by: Jeevan Duncan MD  Date:    06/24/2020  Time:    10:14             Narrative:    EXAMINATION:  XR SHOULDER TRAUMA 3 VIEW RIGHT    CLINICAL HISTORY:  Unspecified fall, initial encounter    TECHNIQUE:  Three or four views of the right shoulder were performed.    COMPARISON:  09/27/2018    FINDINGS:  There is severe degenerative change of the right glenohumeral joint.  No fracture or dislocation is present.  The bones are osteopenic.  There has been a prior sternotomy.  There is postoperative change of the lower cervical spine.  There are multiple chronic right rib fractures.                               CT Cervical Spine Without Contrast (Final result)  Result time 06/24/20 10:02:07    Final result by Shon Alcantara MD (06/24/20 10:02:07)                 Impression:      1. There is extensive multilevel degenerative change.  There also postsurgical changes of anterior interbody fusion of C5 through C7.  There is stable degenerative listhesis at the C3-4 and C4-5 levels.  There is no acute fracture or traumatic subluxation.  There is no significant spinal stenosis but there is multilevel foraminal stenosis.  2. Atherosclerosis      Electronically signed by: Shon Alcantara MD  Date:    06/24/2020  Time:    10:02             Narrative:    EXAMINATION:  CT CERVICAL SPINE WITHOUT CONTRAST    CLINICAL HISTORY:  Neck trauma (Age => 65y);    TECHNIQUE:  Low dose axial images, sagittal and coronal reformations were preformed though the cervical spine.  Contrast was not administered.    COMPARISON:  CT cervical spine dated 06/05/2019    FINDINGS:  Vertebral column: Redemonstrated is multilevel degenerative disc and facet disease in addition to postsurgical changes of anterior interbody fusion of C5 through C7.  Hardware is intact.  Fusion appears solid in the vertebral bodies.  There is partial fusion or non segmentation of the posterior elements at the C2-3 level.  There  is multilevel facet joint arthropathy.  The odontoid process is intact.  There is degenerative change with bony proliferation at the odontoid tip and anterior arch of C1 with prominent pannus posterior to the dens.  These findings were all present previously.  The vertebral bodies maintain normal height.  There is no acute fracture.  There is stable trace anterolisthesis of C3 on C4 and 3 mm anterolisthesis of C4 on C5.    Spinal canal, cord, epidural space: The spinal canal is developmentally normal.  There is no abnormal epidural soft tissue mass or fluid collection.    Findings by level:    C1-2: Alignment is normal.  There is joint space narrowing bilaterally. There is cystic degenerative change in the anterior body of C2. This was present previously.    C2-3: There is right greater than left facet joint arthropathy.  There is no spinal canal or significant foraminal stenosis.  There is no change.    C3-4: There is trace anterolisthesis of C3 on C4.  There is left greater than right facet joint arthropathy and uncovertebral spurring.  There is a mild disc osteophyte complex.  There is no significant spinal stenosis but there is moderate bilateral foraminal stenosis without change.    C4-5: There is stable 3 mm anterolisthesis of C4 on C5.  There is marked left foraminal stenosis due to facet joint arthropathy and uncovertebral spurring.  There are mild changes on the right.  There is a broad posterior disc protrusion/osteophyte which was present previously and contributes to borderline to mild spinal stenosis.  There is no change.    C5-6: There has been previous fusion.  There is bilateral uncovertebral spurring and facet joint arthropathy contributing to right greater than left foraminal stenosis with borderline to mild spinal stenosis.    C6-7: There is stable postsurgical changes of anterior fusion.  There is right greater than left foraminal stenosis due to uncovertebral spurring without change.    C7-T1:  There is facet joint arthropathy, right greater than left.  There is no significant spinal canal or foraminal stenosis.    Soft tissues, other: There is marked calcified atherosclerosis particularly at the left carotid bulb and proximal internal and external carotid arteries.  The upper thoracic esophagus is distended with air.                               CT Head Without Contrast (Final result)  Result time 06/24/20 09:50:50    Final result by Jeevan Duncan MD (06/24/20 09:50:50)                 Impression:      No acute intracranial process.  Advanced chronic white matter change and multifocal chronic lacunar infarcts.      Electronically signed by: Jeevan Duncan MD  Date:    06/24/2020  Time:    09:50             Narrative:    EXAMINATION:  CT HEAD WITHOUT CONTRAST    CLINICAL HISTORY:  Head trauma, minor (Age => 65y);    TECHNIQUE:  Low dose axial images were obtained through the head.  Coronal and sagittal reformations were also performed. Contrast was not administered.    COMPARISON:  06/05/2019    FINDINGS:  Chronically inner infarcts of the right basal ganglia, left thalamus, and right cerebellar hemisphere are present.  There is no intracranial hemorrhage.  No mass or midline shift is present.  There is advanced chronic white matter change.  Moderate generalized cerebral volume loss with compensatory ventricular enlargement is present.  There is heavy calcification of the intracranial ICAs.  The calvarium is intact.                                 Medical Decision Making:   History:   Old Medical Records: I decided to obtain old medical records.  Initial Assessment:   Patient is a very pleasant 89-year-old woman status post slip and fall from her wheelchair just prior to arrival complaining of neck pain, back pain, left hand pain and right shoulder pain.  Patient is alert orient x3 with several skin tears on her left hand and dorsum a wrist.  She has no deformity or bruising over the back or  shoulder.  She was also found to be tender in the left hip but with full range of motion.  CT of head and cervical spine were obtained and were negative.  C-collar was cleared after negative imaging.  X-rays of the spine, right shoulder, left hand and left hip were negative for any acute fracture dislocation.  Patient is diagnosed with skin tears, multiple contusions from a fall.  Supportive therapy discussed.  Antibiotic ointment and wound care performed on her skin tears.  I do not think they need to be sutured as it seems to be superficial skin tears.  Return precautions discussed.  She is discharged improved in no acute distress.  Clinical Tests:   Radiological Study: Ordered and Reviewed            Scribe Attestation:   Scribe #1: I performed the above scribed service and the documentation accurately describes the services I performed. I attest to the accuracy of the note.     I, Boo Casanova, personally performed the services described in this documentation. All medical record entries made by the scribe were at my direction and in my presence.  I have reviewed the chart and agree that the record reflects my personal performance and is accurate and complete. Wilfred Haddad MD.                      Clinical Impression:       ICD-10-CM ICD-9-CM   1. Contusion of left hip, initial encounter  S70.02XA 924.01   2. Fall  W19.XXXA E888.9   3. Left hip pain  M25.552 719.45   4. Shoulder strain, right, initial encounter  S46.911A 840.9   5. Multiple skin tears  T14.8XXA 879.8         Disposition:   Disposition: Discharged  Condition: Stable     ED Disposition Condition    Discharge Stable        ED Prescriptions     None        Follow-up Information    None                                    Wilfred Haddad MD  06/24/20 [id number]

## 2020-06-24 NOTE — ED TRIAGE NOTES
Pt to er after falling out of wheelchair today--c/o pain to R shoulder, L hip, and L hand--no bruising noted to shoulder and hip--no shortening present--bruising, swelling, and skin tears noted to L hand and forearm--bleeding controlled--c collar in place

## 2020-09-22 ENCOUNTER — LAB VISIT (OUTPATIENT)
Dept: LAB | Facility: OTHER | Age: 85
End: 2020-09-22
Payer: MEDICARE

## 2020-09-22 DIAGNOSIS — Z03.818 ENCOUNTER FOR OBSERVATION FOR SUSPECTED EXPOSURE TO OTHER BIOLOGICAL AGENTS RULED OUT: ICD-10-CM

## 2020-09-22 PROCEDURE — U0003 INFECTIOUS AGENT DETECTION BY NUCLEIC ACID (DNA OR RNA); SEVERE ACUTE RESPIRATORY SYNDROME CORONAVIRUS 2 (SARS-COV-2) (CORONAVIRUS DISEASE [COVID-19]), AMPLIFIED PROBE TECHNIQUE, MAKING USE OF HIGH THROUGHPUT TECHNOLOGIES AS DESCRIBED BY CMS-2020-01-R: HCPCS

## 2020-09-24 LAB — SARS-COV-2 RNA RESP QL NAA+PROBE: NOT DETECTED

## 2020-09-29 ENCOUNTER — LAB VISIT (OUTPATIENT)
Dept: LAB | Facility: OTHER | Age: 85
End: 2020-09-29
Payer: MEDICARE

## 2020-09-29 DIAGNOSIS — Z03.818 ENCOUNTER FOR OBSERVATION FOR SUSPECTED EXPOSURE TO OTHER BIOLOGICAL AGENTS RULED OUT: ICD-10-CM

## 2020-09-29 PROCEDURE — U0003 INFECTIOUS AGENT DETECTION BY NUCLEIC ACID (DNA OR RNA); SEVERE ACUTE RESPIRATORY SYNDROME CORONAVIRUS 2 (SARS-COV-2) (CORONAVIRUS DISEASE [COVID-19]), AMPLIFIED PROBE TECHNIQUE, MAKING USE OF HIGH THROUGHPUT TECHNOLOGIES AS DESCRIBED BY CMS-2020-01-R: HCPCS

## 2020-10-01 LAB — SARS-COV-2 RNA RESP QL NAA+PROBE: NOT DETECTED

## 2020-10-06 ENCOUNTER — LAB VISIT (OUTPATIENT)
Dept: LAB | Facility: OTHER | Age: 85
End: 2020-10-06
Payer: MEDICARE

## 2020-10-06 DIAGNOSIS — Z03.818 ENCOUNTER FOR OBSERVATION FOR SUSPECTED EXPOSURE TO OTHER BIOLOGICAL AGENTS RULED OUT: ICD-10-CM

## 2020-10-06 PROCEDURE — U0003 INFECTIOUS AGENT DETECTION BY NUCLEIC ACID (DNA OR RNA); SEVERE ACUTE RESPIRATORY SYNDROME CORONAVIRUS 2 (SARS-COV-2) (CORONAVIRUS DISEASE [COVID-19]), AMPLIFIED PROBE TECHNIQUE, MAKING USE OF HIGH THROUGHPUT TECHNOLOGIES AS DESCRIBED BY CMS-2020-01-R: HCPCS

## 2020-10-07 LAB — SARS-COV-2 RNA RESP QL NAA+PROBE: NOT DETECTED

## 2020-10-13 ENCOUNTER — LAB VISIT (OUTPATIENT)
Dept: LAB | Facility: OTHER | Age: 85
End: 2020-10-13
Payer: MEDICARE

## 2020-10-13 DIAGNOSIS — Z03.818 ENCOUNTER FOR OBSERVATION FOR SUSPECTED EXPOSURE TO OTHER BIOLOGICAL AGENTS RULED OUT: ICD-10-CM

## 2020-10-13 PROCEDURE — U0003 INFECTIOUS AGENT DETECTION BY NUCLEIC ACID (DNA OR RNA); SEVERE ACUTE RESPIRATORY SYNDROME CORONAVIRUS 2 (SARS-COV-2) (CORONAVIRUS DISEASE [COVID-19]), AMPLIFIED PROBE TECHNIQUE, MAKING USE OF HIGH THROUGHPUT TECHNOLOGIES AS DESCRIBED BY CMS-2020-01-R: HCPCS

## 2020-10-14 LAB — SARS-COV-2 RNA RESP QL NAA+PROBE: NOT DETECTED

## 2020-10-20 ENCOUNTER — LAB VISIT (OUTPATIENT)
Dept: LAB | Facility: OTHER | Age: 85
End: 2020-10-20
Payer: MEDICARE

## 2020-10-20 DIAGNOSIS — Z03.818 ENCOUNTER FOR OBSERVATION FOR SUSPECTED EXPOSURE TO OTHER BIOLOGICAL AGENTS RULED OUT: ICD-10-CM

## 2020-10-20 PROCEDURE — U0003 INFECTIOUS AGENT DETECTION BY NUCLEIC ACID (DNA OR RNA); SEVERE ACUTE RESPIRATORY SYNDROME CORONAVIRUS 2 (SARS-COV-2) (CORONAVIRUS DISEASE [COVID-19]), AMPLIFIED PROBE TECHNIQUE, MAKING USE OF HIGH THROUGHPUT TECHNOLOGIES AS DESCRIBED BY CMS-2020-01-R: HCPCS

## 2020-10-21 LAB — SARS-COV-2 RNA RESP QL NAA+PROBE: NOT DETECTED

## 2020-11-06 NOTE — TELEPHONE ENCOUNTER
CDC Testing and Quarantine Guidelines for Exposure:  A close exposure is defined as anyone who had a masked or an unmasked exposure to a known COVID -19 positive person, at less than 6 ft for more than 15 minutes. If your exposure meets this definition, then you are required to quarantine for 14 days per the CDC. They now recommend that a test can be performed if you are asymptomatic (someone who does not have any symptoms), and a test should be done if you develop symptoms after an exposure as described above.    If you meet the definition of a close exposure, it does not matter whether or not you are asymptomatic or symptomatic - A NEGATIVE TEST DOES NOT GET YOU OUT OF 14 DAYS OF QUARANTINE!    Please note that if you are asymptomatic and wait more than 4 days to test after an exposure, you risk lengthening your quarantine. This is because if you test positive as an asymptomatic, your isolation is 10 days from the date of the positive test, not the date of exposure. So for example, if you test positive as an asymptomatic on day 7 from exposure, you have now extended your 14 day quarantine to a 17 day isolation.    If your exposure does not meet the above definition, you may return to your normal activities including social distancing, wearing masks, and frequent handwashing.    Instructions for Patients with Confirmed or Suspected COVID-19    If you are awaiting your test result, you will either be called or it will be released to the patient portal.  If you have any questions about your test, please visit [email or web address] or call our COVID-19 information line at [phone].      Stay home and stay away from family members and friends. You may leave home and/or return to work when the following conditions are met:   24 hours fever free without fever-reducing medications AND   Improved symptoms     Separate yourself from other people and animals in your home.   Call ahead before visiting your  Spoke with pt, bx results reviewed.   doctor.   Wear a facemask.   Cover your coughs and sneezes.   Wash your hands often with soap and water; hand  can be used, too.   Avoid sharing personal household items.   Wipe down surfaces used daily.   Monitor your symptoms. Seek prompt medical attention if your illness is worsening (e.g., difficulty breathing).    Before seeking care, call your healthcare provider.   If you have a medical emergency and need to call 911, notify the dispatch personnel that you have, or are being evaluated for COVID-19. If possible, put on a facemask before emergency medical services arrive.        Recommended precautions for household members, intimate partners, and caregivers in a home setting of a patient with symptomatic laboratory-confirmed COVID-19 or a patient under investigation.  Household members, intimate partners, and caregivers in the home setting awaiting tests results have close contact with a person with symptomatic, laboratory-confirmed COVID-19 or a person under investigation. Close contacts should monitor their health; they should call their provider right away if they develop symptoms suggestive of COVID-19 (e.g., fever, cough, shortness of breath).    Close contacts should also follow these recommendations:   Make sure that you understand and can help the patient follow their provider's instructions for medication(s) and care. You should help the patient with basic needs in the home and provide support for getting groceries, prescriptions, and other personal needs.   Monitor the patient's symptoms. If the patient is getting sicker, call his or her healthcare provider and tell them that the patient has laboratory-confirmed COVID-19. If the patient has a medical emergency and you need to call 911, notify the dispatch personnel that the patient has, or is being evaluated for COVID-19.   Household members should stay in another room or be  from the patient. Household members should use  a separate bedroom and bathroom, if available.   Prohibit visitors.   Household members should care for any pets in the home.   Make sure that shared spaces in the home have good air flow, such as by an air conditioner or an opened window, weather permitting.   Perform hand hygiene frequently. Wash your hands often with soap and water for at least 20 seconds or use an alcohol-based hand  (that contains > 60% alcohol) covering all surfaces of your hands and rubbing them together until they feel dry. Soap and water should be used preferentially.   Avoid touching your eyes, nose, and mouth.   The patient should wear a facemask. If the patient is not able to wear a facemask (for example, because it causes trouble breathing), caregivers should wear a mask when they are in the same room as the patient.   Wear a disposable facemask and gloves when you touch or have contact with the patient's blood, stool, or body fluids, such as saliva, sputum, nasal mucus, vomit, urine.  o Throw out disposable facemasks and gloves after using them. Do not reuse.  o When removing personal protective equipment, first remove and dispose of gloves. Then, immediately clean your hands with soap and water or alcohol-based hand . Next, remove and dispose of facemask, and immediately clean your hands again with soap and water or alcohol-based hand .   You should not share dishes, drinking glasses, cups, eating utensils, towels, bedding, or other items with the patient. After the patient uses these items, you should wash them thoroughly (see below Wash laundry thoroughly).   Clean all high-touch surfaces, such as counters, tabletops, doorknobs, bathroom fixtures, toilets, phones, keyboards, tablets, and bedside tables, every day. Also, clean any surfaces that may have blood, stool, or body fluids on them.   Use a household cleaning spray or wipe, according to the label instructions. Labels contain  instructions for safe and effective use of the cleaning product including precautions you should take when applying the product, such as wearing gloves and making sure you have good ventilation during use of the product.   Wash laundry thoroughly.  o Immediately remove and wash clothes or bedding that have blood, stool, or body fluids on them.  o Wear disposable gloves while handling soiled items and keep soiled items away from your body. Clean your hands (with soap and water or an alcohol-based hand ) immediately after removing your gloves.  o Read and follow directions on labels of laundry or clothing items and detergent. In general, using a normal laundry detergent according to washing machine instructions and dry thoroughly using the warmest temperatures recommended on the clothing label.   Place all used disposable gloves, facemasks, and other contaminated items in a lined container before disposing of them with other household waste. Clean your hands (with soap and water or an alcohol-based hand ) immediately after handling these items. Soap and water should be used preferentially if hands are visibly dirty.   Discuss any additional questions with your state or local health department or healthcare provider. Check available hours when contacting your local health department.    For more information see CDC link below.      https://www.cdc.gov/coronavirus/2019-ncov/hcp/guidance-prevent-spread.html#precautions        Sources:  [facility], [facility]      Your test was NEGATIVE for COVID-19 (coronavirus).        If your symptoms worsen or if you have any other concerns, please contact [facility] at [phone].     Sincerely,    Stephanie Mustafa DO

## 2020-11-24 ENCOUNTER — LAB VISIT (OUTPATIENT)
Dept: LAB | Facility: OTHER | Age: 85
End: 2020-11-24
Payer: MEDICARE

## 2020-11-24 DIAGNOSIS — Z03.818 ENCOUNTER FOR OBSERVATION FOR SUSPECTED EXPOSURE TO OTHER BIOLOGICAL AGENTS RULED OUT: ICD-10-CM

## 2020-11-24 PROCEDURE — U0003 INFECTIOUS AGENT DETECTION BY NUCLEIC ACID (DNA OR RNA); SEVERE ACUTE RESPIRATORY SYNDROME CORONAVIRUS 2 (SARS-COV-2) (CORONAVIRUS DISEASE [COVID-19]), AMPLIFIED PROBE TECHNIQUE, MAKING USE OF HIGH THROUGHPUT TECHNOLOGIES AS DESCRIBED BY CMS-2020-01-R: HCPCS

## 2020-11-25 LAB — SARS-COV-2 RNA RESP QL NAA+PROBE: NOT DETECTED

## 2020-12-01 ENCOUNTER — LAB VISIT (OUTPATIENT)
Dept: LAB | Facility: OTHER | Age: 85
End: 2020-12-01
Payer: MEDICARE

## 2020-12-01 DIAGNOSIS — Z03.818 ENCOUNTER FOR OBSERVATION FOR SUSPECTED EXPOSURE TO OTHER BIOLOGICAL AGENTS RULED OUT: ICD-10-CM

## 2020-12-01 PROCEDURE — U0003 INFECTIOUS AGENT DETECTION BY NUCLEIC ACID (DNA OR RNA); SEVERE ACUTE RESPIRATORY SYNDROME CORONAVIRUS 2 (SARS-COV-2) (CORONAVIRUS DISEASE [COVID-19]), AMPLIFIED PROBE TECHNIQUE, MAKING USE OF HIGH THROUGHPUT TECHNOLOGIES AS DESCRIBED BY CMS-2020-01-R: HCPCS

## 2020-12-03 LAB — SARS-COV-2 RNA RESP QL NAA+PROBE: NOT DETECTED

## 2020-12-08 ENCOUNTER — LAB VISIT (OUTPATIENT)
Dept: LAB | Facility: OTHER | Age: 85
End: 2020-12-08
Payer: MEDICARE

## 2020-12-08 DIAGNOSIS — Z03.818 ENCOUNTER FOR OBSERVATION FOR SUSPECTED EXPOSURE TO OTHER BIOLOGICAL AGENTS RULED OUT: ICD-10-CM

## 2020-12-08 PROCEDURE — U0003 INFECTIOUS AGENT DETECTION BY NUCLEIC ACID (DNA OR RNA); SEVERE ACUTE RESPIRATORY SYNDROME CORONAVIRUS 2 (SARS-COV-2) (CORONAVIRUS DISEASE [COVID-19]), AMPLIFIED PROBE TECHNIQUE, MAKING USE OF HIGH THROUGHPUT TECHNOLOGIES AS DESCRIBED BY CMS-2020-01-R: HCPCS

## 2020-12-09 LAB — SARS-COV-2 RNA RESP QL NAA+PROBE: NOT DETECTED

## 2020-12-15 ENCOUNTER — LAB VISIT (OUTPATIENT)
Dept: LAB | Facility: OTHER | Age: 85
End: 2020-12-15
Payer: MEDICARE

## 2020-12-15 DIAGNOSIS — Z03.818 ENCOUNTER FOR OBSERVATION FOR SUSPECTED EXPOSURE TO OTHER BIOLOGICAL AGENTS RULED OUT: ICD-10-CM

## 2020-12-15 PROCEDURE — U0003 INFECTIOUS AGENT DETECTION BY NUCLEIC ACID (DNA OR RNA); SEVERE ACUTE RESPIRATORY SYNDROME CORONAVIRUS 2 (SARS-COV-2) (CORONAVIRUS DISEASE [COVID-19]), AMPLIFIED PROBE TECHNIQUE, MAKING USE OF HIGH THROUGHPUT TECHNOLOGIES AS DESCRIBED BY CMS-2020-01-R: HCPCS

## 2020-12-17 LAB — SARS-COV-2 RNA RESP QL NAA+PROBE: NOT DETECTED

## 2020-12-22 ENCOUNTER — LAB VISIT (OUTPATIENT)
Dept: LAB | Facility: OTHER | Age: 85
End: 2020-12-22
Payer: MEDICARE

## 2020-12-22 DIAGNOSIS — Z03.818 ENCOUNTER FOR OBSERVATION FOR SUSPECTED EXPOSURE TO OTHER BIOLOGICAL AGENTS RULED OUT: ICD-10-CM

## 2020-12-22 PROCEDURE — U0003 INFECTIOUS AGENT DETECTION BY NUCLEIC ACID (DNA OR RNA); SEVERE ACUTE RESPIRATORY SYNDROME CORONAVIRUS 2 (SARS-COV-2) (CORONAVIRUS DISEASE [COVID-19]), AMPLIFIED PROBE TECHNIQUE, MAKING USE OF HIGH THROUGHPUT TECHNOLOGIES AS DESCRIBED BY CMS-2020-01-R: HCPCS

## 2020-12-23 LAB — SARS-COV-2 RNA RESP QL NAA+PROBE: NOT DETECTED

## 2020-12-29 ENCOUNTER — LAB VISIT (OUTPATIENT)
Dept: LAB | Facility: OTHER | Age: 85
End: 2020-12-29
Payer: MEDICARE

## 2020-12-29 DIAGNOSIS — Z03.818 ENCOUNTER FOR OBSERVATION FOR SUSPECTED EXPOSURE TO OTHER BIOLOGICAL AGENTS RULED OUT: ICD-10-CM

## 2020-12-29 PROCEDURE — U0003 INFECTIOUS AGENT DETECTION BY NUCLEIC ACID (DNA OR RNA); SEVERE ACUTE RESPIRATORY SYNDROME CORONAVIRUS 2 (SARS-COV-2) (CORONAVIRUS DISEASE [COVID-19]), AMPLIFIED PROBE TECHNIQUE, MAKING USE OF HIGH THROUGHPUT TECHNOLOGIES AS DESCRIBED BY CMS-2020-01-R: HCPCS

## 2020-12-30 LAB — SARS-COV-2 RNA RESP QL NAA+PROBE: NOT DETECTED

## 2021-01-05 ENCOUNTER — LAB VISIT (OUTPATIENT)
Dept: LAB | Facility: OTHER | Age: 86
End: 2021-01-05
Payer: MEDICARE

## 2021-01-05 DIAGNOSIS — Z03.818 ENCOUNTER FOR OBSERVATION FOR SUSPECTED EXPOSURE TO OTHER BIOLOGICAL AGENTS RULED OUT: ICD-10-CM

## 2021-01-05 PROCEDURE — U0003 INFECTIOUS AGENT DETECTION BY NUCLEIC ACID (DNA OR RNA); SEVERE ACUTE RESPIRATORY SYNDROME CORONAVIRUS 2 (SARS-COV-2) (CORONAVIRUS DISEASE [COVID-19]), AMPLIFIED PROBE TECHNIQUE, MAKING USE OF HIGH THROUGHPUT TECHNOLOGIES AS DESCRIBED BY CMS-2020-01-R: HCPCS

## 2021-01-07 LAB — SARS-COV-2 RNA RESP QL NAA+PROBE: NOT DETECTED

## 2021-01-12 ENCOUNTER — LAB VISIT (OUTPATIENT)
Dept: LAB | Facility: OTHER | Age: 86
End: 2021-01-12
Payer: MEDICARE

## 2021-01-12 DIAGNOSIS — Z20.822 ENCOUNTER FOR LABORATORY TESTING FOR COVID-19 VIRUS: ICD-10-CM

## 2021-01-12 PROCEDURE — U0003 INFECTIOUS AGENT DETECTION BY NUCLEIC ACID (DNA OR RNA); SEVERE ACUTE RESPIRATORY SYNDROME CORONAVIRUS 2 (SARS-COV-2) (CORONAVIRUS DISEASE [COVID-19]), AMPLIFIED PROBE TECHNIQUE, MAKING USE OF HIGH THROUGHPUT TECHNOLOGIES AS DESCRIBED BY CMS-2020-01-R: HCPCS

## 2021-01-14 LAB — SARS-COV-2 RNA RESP QL NAA+PROBE: NOT DETECTED

## 2021-01-19 ENCOUNTER — LAB VISIT (OUTPATIENT)
Dept: LAB | Facility: OTHER | Age: 86
End: 2021-01-19
Attending: INTERNAL MEDICINE
Payer: MEDICARE

## 2021-01-19 DIAGNOSIS — Z20.822 ENCOUNTER FOR LABORATORY TESTING FOR COVID-19 VIRUS: ICD-10-CM

## 2021-01-19 PROCEDURE — U0003 INFECTIOUS AGENT DETECTION BY NUCLEIC ACID (DNA OR RNA); SEVERE ACUTE RESPIRATORY SYNDROME CORONAVIRUS 2 (SARS-COV-2) (CORONAVIRUS DISEASE [COVID-19]), AMPLIFIED PROBE TECHNIQUE, MAKING USE OF HIGH THROUGHPUT TECHNOLOGIES AS DESCRIBED BY CMS-2020-01-R: HCPCS

## 2021-01-21 LAB — SARS-COV-2 RNA RESP QL NAA+PROBE: NOT DETECTED

## 2021-01-26 ENCOUNTER — LAB VISIT (OUTPATIENT)
Dept: LAB | Facility: OTHER | Age: 86
End: 2021-01-26
Payer: MEDICARE

## 2021-01-26 DIAGNOSIS — Z20.822 ENCOUNTER FOR LABORATORY TESTING FOR COVID-19 VIRUS: ICD-10-CM

## 2021-01-26 PROCEDURE — U0003 INFECTIOUS AGENT DETECTION BY NUCLEIC ACID (DNA OR RNA); SEVERE ACUTE RESPIRATORY SYNDROME CORONAVIRUS 2 (SARS-COV-2) (CORONAVIRUS DISEASE [COVID-19]), AMPLIFIED PROBE TECHNIQUE, MAKING USE OF HIGH THROUGHPUT TECHNOLOGIES AS DESCRIBED BY CMS-2020-01-R: HCPCS

## 2021-01-27 LAB — SARS-COV-2 RNA RESP QL NAA+PROBE: NOT DETECTED

## 2021-02-02 ENCOUNTER — LAB VISIT (OUTPATIENT)
Dept: LAB | Facility: OTHER | Age: 86
End: 2021-02-02
Payer: MEDICARE

## 2021-02-02 DIAGNOSIS — Z20.822 ENCOUNTER FOR LABORATORY TESTING FOR COVID-19 VIRUS: ICD-10-CM

## 2021-02-02 PROCEDURE — U0003 INFECTIOUS AGENT DETECTION BY NUCLEIC ACID (DNA OR RNA); SEVERE ACUTE RESPIRATORY SYNDROME CORONAVIRUS 2 (SARS-COV-2) (CORONAVIRUS DISEASE [COVID-19]), AMPLIFIED PROBE TECHNIQUE, MAKING USE OF HIGH THROUGHPUT TECHNOLOGIES AS DESCRIBED BY CMS-2020-01-R: HCPCS

## 2021-02-03 LAB — SARS-COV-2 RNA RESP QL NAA+PROBE: NOT DETECTED

## 2021-03-02 ENCOUNTER — LAB VISIT (OUTPATIENT)
Dept: LAB | Facility: OTHER | Age: 86
End: 2021-03-02
Payer: MEDICARE

## 2021-03-02 DIAGNOSIS — Z20.822 ENCOUNTER FOR LABORATORY TESTING FOR COVID-19 VIRUS: ICD-10-CM

## 2021-03-02 PROCEDURE — U0003 INFECTIOUS AGENT DETECTION BY NUCLEIC ACID (DNA OR RNA); SEVERE ACUTE RESPIRATORY SYNDROME CORONAVIRUS 2 (SARS-COV-2) (CORONAVIRUS DISEASE [COVID-19]), AMPLIFIED PROBE TECHNIQUE, MAKING USE OF HIGH THROUGHPUT TECHNOLOGIES AS DESCRIBED BY CMS-2020-01-R: HCPCS

## 2021-03-03 LAB — SARS-COV-2 RNA RESP QL NAA+PROBE: NOT DETECTED

## 2021-03-09 ENCOUNTER — LAB VISIT (OUTPATIENT)
Dept: LAB | Facility: OTHER | Age: 86
End: 2021-03-09
Payer: MEDICARE

## 2021-03-09 DIAGNOSIS — Z20.822 ENCOUNTER FOR LABORATORY TESTING FOR COVID-19 VIRUS: ICD-10-CM

## 2021-03-09 PROCEDURE — U0003 INFECTIOUS AGENT DETECTION BY NUCLEIC ACID (DNA OR RNA); SEVERE ACUTE RESPIRATORY SYNDROME CORONAVIRUS 2 (SARS-COV-2) (CORONAVIRUS DISEASE [COVID-19]), AMPLIFIED PROBE TECHNIQUE, MAKING USE OF HIGH THROUGHPUT TECHNOLOGIES AS DESCRIBED BY CMS-2020-01-R: HCPCS | Performed by: NURSE PRACTITIONER

## 2021-03-10 LAB — SARS-COV-2 RNA RESP QL NAA+PROBE: NOT DETECTED

## 2021-03-16 ENCOUNTER — LAB VISIT (OUTPATIENT)
Dept: LAB | Facility: OTHER | Age: 86
End: 2021-03-16
Payer: MEDICARE

## 2021-03-16 DIAGNOSIS — Z20.822 ENCOUNTER FOR LABORATORY TESTING FOR COVID-19 VIRUS: ICD-10-CM

## 2021-03-16 PROCEDURE — U0003 INFECTIOUS AGENT DETECTION BY NUCLEIC ACID (DNA OR RNA); SEVERE ACUTE RESPIRATORY SYNDROME CORONAVIRUS 2 (SARS-COV-2) (CORONAVIRUS DISEASE [COVID-19]), AMPLIFIED PROBE TECHNIQUE, MAKING USE OF HIGH THROUGHPUT TECHNOLOGIES AS DESCRIBED BY CMS-2020-01-R: HCPCS | Performed by: NURSE PRACTITIONER

## 2021-03-18 LAB — SARS-COV-2 RNA RESP QL NAA+PROBE: NOT DETECTED

## 2021-03-23 ENCOUNTER — LAB VISIT (OUTPATIENT)
Dept: LAB | Facility: OTHER | Age: 86
End: 2021-03-23
Payer: MEDICARE

## 2021-03-23 DIAGNOSIS — Z20.822 ENCOUNTER FOR LABORATORY TESTING FOR COVID-19 VIRUS: ICD-10-CM

## 2021-03-23 PROCEDURE — U0003 INFECTIOUS AGENT DETECTION BY NUCLEIC ACID (DNA OR RNA); SEVERE ACUTE RESPIRATORY SYNDROME CORONAVIRUS 2 (SARS-COV-2) (CORONAVIRUS DISEASE [COVID-19]), AMPLIFIED PROBE TECHNIQUE, MAKING USE OF HIGH THROUGHPUT TECHNOLOGIES AS DESCRIBED BY CMS-2020-01-R: HCPCS | Performed by: NURSE PRACTITIONER

## 2021-03-25 LAB — SARS-COV-2 RNA RESP QL NAA+PROBE: NOT DETECTED

## 2021-04-13 ENCOUNTER — LAB VISIT (OUTPATIENT)
Dept: LAB | Facility: OTHER | Age: 86
End: 2021-04-13
Payer: MEDICARE

## 2021-04-13 DIAGNOSIS — Z20.822 ENCOUNTER FOR LABORATORY TESTING FOR COVID-19 VIRUS: ICD-10-CM

## 2021-04-13 PROCEDURE — U0003 INFECTIOUS AGENT DETECTION BY NUCLEIC ACID (DNA OR RNA); SEVERE ACUTE RESPIRATORY SYNDROME CORONAVIRUS 2 (SARS-COV-2) (CORONAVIRUS DISEASE [COVID-19]), AMPLIFIED PROBE TECHNIQUE, MAKING USE OF HIGH THROUGHPUT TECHNOLOGIES AS DESCRIBED BY CMS-2020-01-R: HCPCS | Performed by: NURSE PRACTITIONER

## 2021-04-15 LAB — SARS-COV-2 RNA RESP QL NAA+PROBE: NOT DETECTED

## 2021-04-20 ENCOUNTER — LAB VISIT (OUTPATIENT)
Dept: LAB | Facility: OTHER | Age: 86
End: 2021-04-20
Payer: MEDICARE

## 2021-04-20 DIAGNOSIS — Z20.822 ENCOUNTER FOR LABORATORY TESTING FOR COVID-19 VIRUS: ICD-10-CM

## 2021-04-20 PROCEDURE — U0003 INFECTIOUS AGENT DETECTION BY NUCLEIC ACID (DNA OR RNA); SEVERE ACUTE RESPIRATORY SYNDROME CORONAVIRUS 2 (SARS-COV-2) (CORONAVIRUS DISEASE [COVID-19]), AMPLIFIED PROBE TECHNIQUE, MAKING USE OF HIGH THROUGHPUT TECHNOLOGIES AS DESCRIBED BY CMS-2020-01-R: HCPCS | Performed by: NURSE PRACTITIONER

## 2021-04-22 LAB — SARS-COV-2 RNA RESP QL NAA+PROBE: NOT DETECTED

## 2021-04-27 ENCOUNTER — LAB VISIT (OUTPATIENT)
Dept: LAB | Facility: OTHER | Age: 86
End: 2021-04-27
Payer: MEDICARE

## 2021-04-27 DIAGNOSIS — Z20.822 ENCOUNTER FOR LABORATORY TESTING FOR COVID-19 VIRUS: ICD-10-CM

## 2021-04-27 PROCEDURE — U0003 INFECTIOUS AGENT DETECTION BY NUCLEIC ACID (DNA OR RNA); SEVERE ACUTE RESPIRATORY SYNDROME CORONAVIRUS 2 (SARS-COV-2) (CORONAVIRUS DISEASE [COVID-19]), AMPLIFIED PROBE TECHNIQUE, MAKING USE OF HIGH THROUGHPUT TECHNOLOGIES AS DESCRIBED BY CMS-2020-01-R: HCPCS | Performed by: NURSE PRACTITIONER

## 2021-04-28 LAB — SARS-COV-2 RNA RESP QL NAA+PROBE: NOT DETECTED

## 2021-05-04 ENCOUNTER — LAB VISIT (OUTPATIENT)
Dept: LAB | Facility: OTHER | Age: 86
End: 2021-05-04
Payer: MEDICARE

## 2021-05-04 DIAGNOSIS — Z20.822 ENCOUNTER FOR LABORATORY TESTING FOR COVID-19 VIRUS: ICD-10-CM

## 2021-05-04 PROCEDURE — U0003 INFECTIOUS AGENT DETECTION BY NUCLEIC ACID (DNA OR RNA); SEVERE ACUTE RESPIRATORY SYNDROME CORONAVIRUS 2 (SARS-COV-2) (CORONAVIRUS DISEASE [COVID-19]), AMPLIFIED PROBE TECHNIQUE, MAKING USE OF HIGH THROUGHPUT TECHNOLOGIES AS DESCRIBED BY CMS-2020-01-R: HCPCS | Performed by: NURSE PRACTITIONER

## 2021-05-05 LAB — SARS-COV-2 RNA RESP QL NAA+PROBE: NOT DETECTED

## 2021-05-11 ENCOUNTER — LAB VISIT (OUTPATIENT)
Dept: LAB | Facility: OTHER | Age: 86
End: 2021-05-11
Payer: MEDICARE

## 2021-05-11 DIAGNOSIS — Z20.822 ENCOUNTER FOR LABORATORY TESTING FOR COVID-19 VIRUS: ICD-10-CM

## 2021-05-11 PROCEDURE — U0003 INFECTIOUS AGENT DETECTION BY NUCLEIC ACID (DNA OR RNA); SEVERE ACUTE RESPIRATORY SYNDROME CORONAVIRUS 2 (SARS-COV-2) (CORONAVIRUS DISEASE [COVID-19]), AMPLIFIED PROBE TECHNIQUE, MAKING USE OF HIGH THROUGHPUT TECHNOLOGIES AS DESCRIBED BY CMS-2020-01-R: HCPCS | Performed by: NURSE PRACTITIONER

## 2021-05-13 LAB — SARS-COV-2 RNA RESP QL NAA+PROBE: NOT DETECTED

## 2021-05-18 ENCOUNTER — LAB VISIT (OUTPATIENT)
Dept: LAB | Facility: OTHER | Age: 86
End: 2021-05-18
Payer: MEDICARE

## 2021-05-18 DIAGNOSIS — Z20.822 ENCOUNTER FOR LABORATORY TESTING FOR COVID-19 VIRUS: ICD-10-CM

## 2021-05-18 PROCEDURE — U0003 INFECTIOUS AGENT DETECTION BY NUCLEIC ACID (DNA OR RNA); SEVERE ACUTE RESPIRATORY SYNDROME CORONAVIRUS 2 (SARS-COV-2) (CORONAVIRUS DISEASE [COVID-19]), AMPLIFIED PROBE TECHNIQUE, MAKING USE OF HIGH THROUGHPUT TECHNOLOGIES AS DESCRIBED BY CMS-2020-01-R: HCPCS | Performed by: NURSE PRACTITIONER

## 2021-05-19 LAB — SARS-COV-2 RNA RESP QL NAA+PROBE: NOT DETECTED

## 2021-07-20 ENCOUNTER — LAB VISIT (OUTPATIENT)
Dept: LAB | Facility: OTHER | Age: 86
End: 2021-07-20
Payer: MEDICARE

## 2021-07-20 DIAGNOSIS — Z20.822 ENCOUNTER FOR LABORATORY TESTING FOR COVID-19 VIRUS: ICD-10-CM

## 2021-07-20 PROCEDURE — U0003 INFECTIOUS AGENT DETECTION BY NUCLEIC ACID (DNA OR RNA); SEVERE ACUTE RESPIRATORY SYNDROME CORONAVIRUS 2 (SARS-COV-2) (CORONAVIRUS DISEASE [COVID-19]), AMPLIFIED PROBE TECHNIQUE, MAKING USE OF HIGH THROUGHPUT TECHNOLOGIES AS DESCRIBED BY CMS-2020-01-R: HCPCS | Performed by: NURSE PRACTITIONER

## 2021-07-21 LAB
SARS-COV-2 RNA RESP QL NAA+PROBE: NOT DETECTED
SARS-COV-2- CYCLE NUMBER: -1

## 2021-07-27 ENCOUNTER — LAB VISIT (OUTPATIENT)
Dept: LAB | Facility: OTHER | Age: 86
End: 2021-07-27
Payer: MEDICARE

## 2021-07-27 DIAGNOSIS — Z20.822 ENCOUNTER FOR LABORATORY TESTING FOR COVID-19 VIRUS: ICD-10-CM

## 2021-07-27 PROCEDURE — U0003 INFECTIOUS AGENT DETECTION BY NUCLEIC ACID (DNA OR RNA); SEVERE ACUTE RESPIRATORY SYNDROME CORONAVIRUS 2 (SARS-COV-2) (CORONAVIRUS DISEASE [COVID-19]), AMPLIFIED PROBE TECHNIQUE, MAKING USE OF HIGH THROUGHPUT TECHNOLOGIES AS DESCRIBED BY CMS-2020-01-R: HCPCS | Performed by: NURSE PRACTITIONER

## 2021-07-29 LAB
SARS-COV-2 RNA RESP QL NAA+PROBE: NOT DETECTED
SARS-COV-2- CYCLE NUMBER: -1

## 2021-09-07 ENCOUNTER — LAB VISIT (OUTPATIENT)
Dept: LAB | Facility: OTHER | Age: 86
End: 2021-09-07
Payer: MEDICARE

## 2021-09-07 DIAGNOSIS — Z20.822 ENCOUNTER FOR LABORATORY TESTING FOR COVID-19 VIRUS: ICD-10-CM

## 2021-09-07 PROCEDURE — U0003 INFECTIOUS AGENT DETECTION BY NUCLEIC ACID (DNA OR RNA); SEVERE ACUTE RESPIRATORY SYNDROME CORONAVIRUS 2 (SARS-COV-2) (CORONAVIRUS DISEASE [COVID-19]), AMPLIFIED PROBE TECHNIQUE, MAKING USE OF HIGH THROUGHPUT TECHNOLOGIES AS DESCRIBED BY CMS-2020-01-R: HCPCS | Performed by: NURSE PRACTITIONER

## 2021-09-08 LAB
SARS-COV-2 RNA RESP QL NAA+PROBE: NOT DETECTED
SARS-COV-2- CYCLE NUMBER: NORMAL

## 2021-09-28 ENCOUNTER — LAB VISIT (OUTPATIENT)
Dept: LAB | Facility: OTHER | Age: 86
End: 2021-09-28
Payer: MEDICARE

## 2021-09-28 DIAGNOSIS — Z20.822 ENCOUNTER FOR LABORATORY TESTING FOR COVID-19 VIRUS: ICD-10-CM

## 2021-09-28 PROCEDURE — U0003 INFECTIOUS AGENT DETECTION BY NUCLEIC ACID (DNA OR RNA); SEVERE ACUTE RESPIRATORY SYNDROME CORONAVIRUS 2 (SARS-COV-2) (CORONAVIRUS DISEASE [COVID-19]), AMPLIFIED PROBE TECHNIQUE, MAKING USE OF HIGH THROUGHPUT TECHNOLOGIES AS DESCRIBED BY CMS-2020-01-R: HCPCS | Performed by: NURSE PRACTITIONER

## 2021-09-29 LAB
SARS-COV-2 RNA RESP QL NAA+PROBE: NOT DETECTED
SARS-COV-2- CYCLE NUMBER: NORMAL

## 2021-10-05 ENCOUNTER — LAB VISIT (OUTPATIENT)
Dept: LAB | Facility: OTHER | Age: 86
End: 2021-10-05
Payer: MEDICARE

## 2021-10-05 DIAGNOSIS — Z20.822 ENCOUNTER FOR LABORATORY TESTING FOR COVID-19 VIRUS: ICD-10-CM

## 2021-10-05 PROCEDURE — U0003 INFECTIOUS AGENT DETECTION BY NUCLEIC ACID (DNA OR RNA); SEVERE ACUTE RESPIRATORY SYNDROME CORONAVIRUS 2 (SARS-COV-2) (CORONAVIRUS DISEASE [COVID-19]), AMPLIFIED PROBE TECHNIQUE, MAKING USE OF HIGH THROUGHPUT TECHNOLOGIES AS DESCRIBED BY CMS-2020-01-R: HCPCS | Performed by: NURSE PRACTITIONER

## 2021-10-06 LAB
SARS-COV-2 RNA RESP QL NAA+PROBE: NOT DETECTED
SARS-COV-2- CYCLE NUMBER: NORMAL

## 2021-12-28 ENCOUNTER — LAB VISIT (OUTPATIENT)
Dept: LAB | Facility: OTHER | Age: 86
End: 2021-12-28
Payer: MEDICARE

## 2021-12-28 DIAGNOSIS — Z20.822 ENCOUNTER FOR LABORATORY TESTING FOR COVID-19 VIRUS: ICD-10-CM

## 2021-12-28 PROCEDURE — U0003 INFECTIOUS AGENT DETECTION BY NUCLEIC ACID (DNA OR RNA); SEVERE ACUTE RESPIRATORY SYNDROME CORONAVIRUS 2 (SARS-COV-2) (CORONAVIRUS DISEASE [COVID-19]), AMPLIFIED PROBE TECHNIQUE, MAKING USE OF HIGH THROUGHPUT TECHNOLOGIES AS DESCRIBED BY CMS-2020-01-R: HCPCS | Performed by: NURSE PRACTITIONER

## 2021-12-30 LAB
SARS-COV-2 RNA RESP QL NAA+PROBE: NOT DETECTED
SARS-COV-2- CYCLE NUMBER: NORMAL

## 2022-01-05 ENCOUNTER — LAB VISIT (OUTPATIENT)
Dept: LAB | Facility: OTHER | Age: 87
End: 2022-01-05
Payer: MEDICARE

## 2022-01-05 DIAGNOSIS — Z20.822 ENCOUNTER FOR LABORATORY TESTING FOR COVID-19 VIRUS: ICD-10-CM

## 2022-01-06 PROCEDURE — U0003 INFECTIOUS AGENT DETECTION BY NUCLEIC ACID (DNA OR RNA); SEVERE ACUTE RESPIRATORY SYNDROME CORONAVIRUS 2 (SARS-COV-2) (CORONAVIRUS DISEASE [COVID-19]), AMPLIFIED PROBE TECHNIQUE, MAKING USE OF HIGH THROUGHPUT TECHNOLOGIES AS DESCRIBED BY CMS-2020-01-R: HCPCS | Performed by: NURSE PRACTITIONER

## 2022-01-08 LAB
SARS-COV-2 RNA RESP QL NAA+PROBE: NORMAL
TEST PERFORMANCE INFO SPEC: NORMAL

## 2022-01-18 ENCOUNTER — LAB VISIT (OUTPATIENT)
Dept: LAB | Facility: OTHER | Age: 87
End: 2022-01-18
Payer: MEDICARE

## 2022-01-18 DIAGNOSIS — Z20.822 ENCOUNTER FOR LABORATORY TESTING FOR COVID-19 VIRUS: ICD-10-CM

## 2022-01-18 PROCEDURE — U0003 INFECTIOUS AGENT DETECTION BY NUCLEIC ACID (DNA OR RNA); SEVERE ACUTE RESPIRATORY SYNDROME CORONAVIRUS 2 (SARS-COV-2) (CORONAVIRUS DISEASE [COVID-19]), AMPLIFIED PROBE TECHNIQUE, MAKING USE OF HIGH THROUGHPUT TECHNOLOGIES AS DESCRIBED BY CMS-2020-01-R: HCPCS | Performed by: NURSE PRACTITIONER

## 2022-01-19 LAB
SARS-COV-2 RNA RESP QL NAA+PROBE: NOT DETECTED
SARS-COV-2- CYCLE NUMBER: NORMAL

## 2022-01-25 ENCOUNTER — LAB VISIT (OUTPATIENT)
Dept: LAB | Facility: OTHER | Age: 87
End: 2022-01-25
Payer: MEDICARE

## 2022-01-25 DIAGNOSIS — Z20.822 ENCOUNTER FOR LABORATORY TESTING FOR COVID-19 VIRUS: ICD-10-CM

## 2022-01-25 PROCEDURE — U0003 INFECTIOUS AGENT DETECTION BY NUCLEIC ACID (DNA OR RNA); SEVERE ACUTE RESPIRATORY SYNDROME CORONAVIRUS 2 (SARS-COV-2) (CORONAVIRUS DISEASE [COVID-19]), AMPLIFIED PROBE TECHNIQUE, MAKING USE OF HIGH THROUGHPUT TECHNOLOGIES AS DESCRIBED BY CMS-2020-01-R: HCPCS | Performed by: EMERGENCY MEDICINE

## 2022-01-26 LAB
SARS-COV-2 RNA RESP QL NAA+PROBE: NOT DETECTED
SARS-COV-2- CYCLE NUMBER: NORMAL

## 2022-02-01 ENCOUNTER — LAB VISIT (OUTPATIENT)
Dept: LAB | Facility: OTHER | Age: 87
End: 2022-02-01
Payer: MEDICARE

## 2022-02-01 DIAGNOSIS — Z20.822 ENCOUNTER FOR LABORATORY TESTING FOR COVID-19 VIRUS: ICD-10-CM

## 2022-02-01 PROCEDURE — U0003 INFECTIOUS AGENT DETECTION BY NUCLEIC ACID (DNA OR RNA); SEVERE ACUTE RESPIRATORY SYNDROME CORONAVIRUS 2 (SARS-COV-2) (CORONAVIRUS DISEASE [COVID-19]), AMPLIFIED PROBE TECHNIQUE, MAKING USE OF HIGH THROUGHPUT TECHNOLOGIES AS DESCRIBED BY CMS-2020-01-R: HCPCS | Performed by: EMERGENCY MEDICINE

## 2022-02-02 LAB
SARS-COV-2 RNA RESP QL NAA+PROBE: NOT DETECTED
SARS-COV-2- CYCLE NUMBER: NORMAL

## 2022-02-08 ENCOUNTER — LAB VISIT (OUTPATIENT)
Dept: LAB | Facility: OTHER | Age: 87
End: 2022-02-08
Payer: MEDICARE

## 2022-02-08 DIAGNOSIS — Z20.822 ENCOUNTER FOR LABORATORY TESTING FOR COVID-19 VIRUS: ICD-10-CM

## 2022-02-08 PROCEDURE — U0003 INFECTIOUS AGENT DETECTION BY NUCLEIC ACID (DNA OR RNA); SEVERE ACUTE RESPIRATORY SYNDROME CORONAVIRUS 2 (SARS-COV-2) (CORONAVIRUS DISEASE [COVID-19]), AMPLIFIED PROBE TECHNIQUE, MAKING USE OF HIGH THROUGHPUT TECHNOLOGIES AS DESCRIBED BY CMS-2020-01-R: HCPCS | Performed by: EMERGENCY MEDICINE

## 2022-02-09 LAB
SARS-COV-2 RNA RESP QL NAA+PROBE: NOT DETECTED
SARS-COV-2- CYCLE NUMBER: NORMAL

## 2022-02-15 ENCOUNTER — LAB VISIT (OUTPATIENT)
Dept: LAB | Facility: OTHER | Age: 87
End: 2022-02-15
Payer: MEDICARE

## 2022-02-15 DIAGNOSIS — Z20.822 ENCOUNTER FOR LABORATORY TESTING FOR COVID-19 VIRUS: ICD-10-CM

## 2022-02-15 PROCEDURE — U0003 INFECTIOUS AGENT DETECTION BY NUCLEIC ACID (DNA OR RNA); SEVERE ACUTE RESPIRATORY SYNDROME CORONAVIRUS 2 (SARS-COV-2) (CORONAVIRUS DISEASE [COVID-19]), AMPLIFIED PROBE TECHNIQUE, MAKING USE OF HIGH THROUGHPUT TECHNOLOGIES AS DESCRIBED BY CMS-2020-01-R: HCPCS | Performed by: EMERGENCY MEDICINE

## 2022-02-16 LAB
SARS-COV-2 RNA RESP QL NAA+PROBE: NOT DETECTED
SARS-COV-2- CYCLE NUMBER: NORMAL

## 2022-02-22 ENCOUNTER — LAB VISIT (OUTPATIENT)
Dept: LAB | Facility: OTHER | Age: 87
End: 2022-02-22
Payer: MEDICARE

## 2022-02-22 DIAGNOSIS — Z20.822 ENCOUNTER FOR LABORATORY TESTING FOR COVID-19 VIRUS: ICD-10-CM

## 2022-02-22 PROCEDURE — U0003 INFECTIOUS AGENT DETECTION BY NUCLEIC ACID (DNA OR RNA); SEVERE ACUTE RESPIRATORY SYNDROME CORONAVIRUS 2 (SARS-COV-2) (CORONAVIRUS DISEASE [COVID-19]), AMPLIFIED PROBE TECHNIQUE, MAKING USE OF HIGH THROUGHPUT TECHNOLOGIES AS DESCRIBED BY CMS-2020-01-R: HCPCS | Performed by: EMERGENCY MEDICINE

## 2022-02-23 LAB
SARS-COV-2 RNA RESP QL NAA+PROBE: NOT DETECTED
SARS-COV-2- CYCLE NUMBER: NORMAL

## 2022-03-08 ENCOUNTER — LAB VISIT (OUTPATIENT)
Dept: LAB | Facility: OTHER | Age: 87
End: 2022-03-08
Payer: MEDICARE

## 2022-03-08 DIAGNOSIS — Z20.822 ENCOUNTER FOR LABORATORY TESTING FOR COVID-19 VIRUS: ICD-10-CM

## 2022-03-08 PROCEDURE — U0003 INFECTIOUS AGENT DETECTION BY NUCLEIC ACID (DNA OR RNA); SEVERE ACUTE RESPIRATORY SYNDROME CORONAVIRUS 2 (SARS-COV-2) (CORONAVIRUS DISEASE [COVID-19]), AMPLIFIED PROBE TECHNIQUE, MAKING USE OF HIGH THROUGHPUT TECHNOLOGIES AS DESCRIBED BY CMS-2020-01-R: HCPCS | Performed by: EMERGENCY MEDICINE

## 2022-03-09 LAB
SARS-COV-2 RNA RESP QL NAA+PROBE: NOT DETECTED
SARS-COV-2- CYCLE NUMBER: NORMAL

## 2022-03-15 ENCOUNTER — LAB VISIT (OUTPATIENT)
Dept: LAB | Facility: OTHER | Age: 87
End: 2022-03-15
Payer: MEDICARE

## 2022-03-15 DIAGNOSIS — Z20.822 ENCOUNTER FOR LABORATORY TESTING FOR COVID-19 VIRUS: ICD-10-CM

## 2022-03-15 PROCEDURE — U0003 INFECTIOUS AGENT DETECTION BY NUCLEIC ACID (DNA OR RNA); SEVERE ACUTE RESPIRATORY SYNDROME CORONAVIRUS 2 (SARS-COV-2) (CORONAVIRUS DISEASE [COVID-19]), AMPLIFIED PROBE TECHNIQUE, MAKING USE OF HIGH THROUGHPUT TECHNOLOGIES AS DESCRIBED BY CMS-2020-01-R: HCPCS | Performed by: EMERGENCY MEDICINE

## 2022-03-16 LAB
SARS-COV-2 RNA RESP QL NAA+PROBE: NOT DETECTED
SARS-COV-2- CYCLE NUMBER: NORMAL

## 2022-03-22 ENCOUNTER — LAB VISIT (OUTPATIENT)
Dept: LAB | Facility: OTHER | Age: 87
End: 2022-03-22
Payer: MEDICARE

## 2022-03-22 DIAGNOSIS — Z20.822 ENCOUNTER FOR LABORATORY TESTING FOR COVID-19 VIRUS: ICD-10-CM

## 2022-03-22 PROCEDURE — U0003 INFECTIOUS AGENT DETECTION BY NUCLEIC ACID (DNA OR RNA); SEVERE ACUTE RESPIRATORY SYNDROME CORONAVIRUS 2 (SARS-COV-2) (CORONAVIRUS DISEASE [COVID-19]), AMPLIFIED PROBE TECHNIQUE, MAKING USE OF HIGH THROUGHPUT TECHNOLOGIES AS DESCRIBED BY CMS-2020-01-R: HCPCS | Performed by: EMERGENCY MEDICINE

## 2022-03-23 LAB
SARS-COV-2 RNA RESP QL NAA+PROBE: NOT DETECTED
SARS-COV-2- CYCLE NUMBER: NORMAL

## 2023-02-13 NOTE — ASSESSMENT & PLAN NOTE
After diuresis pt feels much better      [Obese, well nourished, in no acute distress] : obese, well nourished, in no acute distress [Normal] : affect appropriate [de-identified] : nl respirations [de-identified] : soft NT ND

## 2023-03-10 NOTE — PROGRESS NOTES
SNF referral sent to [facility] via [facility]. Jaye Del Rosario LMSW     1 Principal Discharge DX:	Encounter for laboratory test

## 2024-12-21 NOTE — HPI
"89yo F with CAD s/p CABG and SCC of skin, transferred from [facility] with [facility]. Patient reports that she woke up around 5am yesterday morning and need to go to bathroom, states she stood up after finishing voiding and felt dizzy then tried to sit down but fell instead. Per daughter at bedside, she was awoken by a loud noise and found her mother on the floor confused- daughter says patient thought she was at the kitchen sink "rex". Daughter states patient has had worsening confusion and sundowning over the last few months and has had several falls before. Last big fall was Sunday. Daughter says patient usually ambulates with a walker, but was not using at time of fall. Patient is independent with bathing and dressing but does not drive or cook, lives with her daughter and has been refusing assisted care. Patient takes ASA 81 for her CAD as well as Toprol and losartan.    In ED at [facility] she was found to have new small left parasagittal hemorrhage without evidence of mass effect, transferred to [facility] yesterday evening for Neurosurgery evaluation. In ED here CT spine also found compression fracture of L1.     Patient denies chest pain, palpitations and SOB. Reports chronic dry cough for which she has been taking pearls. No fever/chills. No abdo pain/ nausea/ vomiting. No dysuria/ urgency.  " [facility] outreach      Related interaction  [facility] Transitions (Call 2 (09D PD))      Question 1: Diet   Have you been able to eat food and drink fluids as normal? Please press 1 if you have or press 2 if you have not.   Eating/Drinking Concerns        Question 2: Daily Activities   Have you been able to perform your daily activities such as getting dressed or making your meals, as you normally would? Please press 1 if you have been able to perform daily activities. Press 2 if you have not been able to.   Not performing Daily Activities       Required Interventions and Feedback      Call Status:     Attempt 1, Answered (edited by AD on 12/21/2024 09:05 AM CST)     Daily Activities - Issues     Has a Caregiver/Family Member to Assist       Comments:     Pt reports that he still has limited mobility but is getting up and walking around every few hours. he does also note that he is a little constipated and has been experiencing dry mouth and numb/tingling fingers when waking up in the morning.         Daily Activities - Actions Taken         Comments:     Pt and writer discussed importance of and gradual increase in activity. Also discussed fluid intake which is adequate at 8-10 cups/day. Patient just started stool softener and is hoping this will help. Pt is taking oxycodone and Flexeril as prescribed. Pt reports that numbness and tingling does go away after moving fingers around for awhile. Writer will send message to PCP and ortho regarding reported symptoms. Pt has had follow up with ortho since Dc.     Wrong Button Pressed         Call 2:     General Status